# Patient Record
Sex: MALE | Race: WHITE | NOT HISPANIC OR LATINO | Employment: OTHER | URBAN - METROPOLITAN AREA
[De-identification: names, ages, dates, MRNs, and addresses within clinical notes are randomized per-mention and may not be internally consistent; named-entity substitution may affect disease eponyms.]

---

## 2017-01-26 ENCOUNTER — TRANSCRIBE ORDERS (OUTPATIENT)
Dept: LAB | Age: 57
End: 2017-01-26

## 2017-02-03 ENCOUNTER — APPOINTMENT (OUTPATIENT)
Dept: LAB | Age: 57
End: 2017-02-03
Payer: COMMERCIAL

## 2017-02-03 ENCOUNTER — TRANSCRIBE ORDERS (OUTPATIENT)
Dept: LAB | Age: 57
End: 2017-02-03

## 2017-02-03 DIAGNOSIS — E78.5 HYPERLIPIDEMIA, UNSPECIFIED HYPERLIPIDEMIA TYPE: Primary | ICD-10-CM

## 2017-02-03 LAB
CHOLEST SERPL-MCNC: 222 MG/DL (ref 50–200)
HDLC SERPL-MCNC: 41 MG/DL (ref 40–60)
LDLC SERPL CALC-MCNC: 159 MG/DL (ref 0–100)
TRIGL SERPL-MCNC: 108 MG/DL

## 2017-02-03 PROCEDURE — 80061 LIPID PANEL: CPT

## 2017-02-03 PROCEDURE — 36415 COLL VENOUS BLD VENIPUNCTURE: CPT

## 2017-02-22 ENCOUNTER — ALLSCRIPTS OFFICE VISIT (OUTPATIENT)
Dept: OTHER | Facility: OTHER | Age: 57
End: 2017-02-22

## 2017-02-22 DIAGNOSIS — M25.532 PAIN IN LEFT WRIST: ICD-10-CM

## 2017-02-22 DIAGNOSIS — E78.5 HYPERLIPIDEMIA: ICD-10-CM

## 2017-02-24 ENCOUNTER — GENERIC CONVERSION - ENCOUNTER (OUTPATIENT)
Dept: OTHER | Facility: OTHER | Age: 57
End: 2017-02-24

## 2017-07-11 ENCOUNTER — GENERIC CONVERSION - ENCOUNTER (OUTPATIENT)
Dept: OTHER | Facility: OTHER | Age: 57
End: 2017-07-11

## 2017-07-26 ENCOUNTER — HOSPITAL ENCOUNTER (OUTPATIENT)
Dept: RADIOLOGY | Age: 57
Discharge: HOME | End: 2017-07-26
Payer: COMMERCIAL

## 2017-07-26 DIAGNOSIS — I67.1 NONRUPTURED CEREBRAL ANEURYSM: ICD-10-CM

## 2017-07-26 PROCEDURE — 70496 CT ANGIOGRAPHY HEAD: CPT

## 2017-07-26 RX ADMIN — IOHEXOL 100 ML: 350 INJECTION, SOLUTION INTRAVENOUS at 08:41

## 2017-08-03 ENCOUNTER — ALLSCRIPTS OFFICE VISIT (OUTPATIENT)
Dept: OTHER | Facility: OTHER | Age: 57
End: 2017-08-03

## 2017-09-07 ENCOUNTER — TRANSCRIBE ORDERS (OUTPATIENT)
Dept: LAB | Age: 57
End: 2017-09-07

## 2017-09-07 ENCOUNTER — APPOINTMENT (OUTPATIENT)
Dept: LAB | Age: 57
End: 2017-09-07
Payer: COMMERCIAL

## 2017-09-07 DIAGNOSIS — E78.5 HYPERLIPIDEMIA: ICD-10-CM

## 2017-09-07 DIAGNOSIS — M25.532 PAIN IN LEFT WRIST: ICD-10-CM

## 2017-09-07 LAB
ANION GAP SERPL CALCULATED.3IONS-SCNC: 7 MMOL/L (ref 4–13)
BILIRUB UR QL STRIP: NEGATIVE
BUN SERPL-MCNC: 19 MG/DL (ref 5–25)
CALCIUM SERPL-MCNC: 8.8 MG/DL (ref 8.3–10.1)
CHLORIDE SERPL-SCNC: 106 MMOL/L (ref 100–108)
CHOLEST SERPL-MCNC: 201 MG/DL (ref 50–200)
CLARITY UR: CLEAR
CO2 SERPL-SCNC: 28 MMOL/L (ref 21–32)
COLOR UR: YELLOW
CREAT SERPL-MCNC: 0.88 MG/DL (ref 0.6–1.3)
ERYTHROCYTE [DISTWIDTH] IN BLOOD BY AUTOMATED COUNT: 13 % (ref 11.6–15.1)
GFR SERPL CREATININE-BSD FRML MDRD: 94 ML/MIN/1.73SQ M
GLUCOSE P FAST SERPL-MCNC: 91 MG/DL (ref 65–99)
GLUCOSE UR STRIP-MCNC: NEGATIVE MG/DL
HCT VFR BLD AUTO: 44.4 % (ref 36.5–49.3)
HDLC SERPL-MCNC: 35 MG/DL (ref 40–60)
HGB BLD-MCNC: 15 G/DL (ref 12–17)
HGB UR QL STRIP.AUTO: NEGATIVE
KETONES UR STRIP-MCNC: NEGATIVE MG/DL
LDLC SERPL CALC-MCNC: 140 MG/DL (ref 0–100)
LEUKOCYTE ESTERASE UR QL STRIP: NEGATIVE
MCH RBC QN AUTO: 30.5 PG (ref 26.8–34.3)
MCHC RBC AUTO-ENTMCNC: 33.8 G/DL (ref 31.4–37.4)
MCV RBC AUTO: 90 FL (ref 82–98)
NITRITE UR QL STRIP: NEGATIVE
PH UR STRIP.AUTO: 6.5 [PH] (ref 4.5–8)
PLATELET # BLD AUTO: 219 THOUSANDS/UL (ref 149–390)
PMV BLD AUTO: 10.2 FL (ref 8.9–12.7)
POTASSIUM SERPL-SCNC: 4.3 MMOL/L (ref 3.5–5.3)
PROT UR STRIP-MCNC: NEGATIVE MG/DL
PSA SERPL-MCNC: 0.4 NG/ML (ref 0–4)
RBC # BLD AUTO: 4.92 MILLION/UL (ref 3.88–5.62)
SODIUM SERPL-SCNC: 141 MMOL/L (ref 136–145)
SP GR UR STRIP.AUTO: 1.02 (ref 1–1.03)
TRIGL SERPL-MCNC: 132 MG/DL
UROBILINOGEN UR QL STRIP.AUTO: 0.2 E.U./DL
WBC # BLD AUTO: 4.99 THOUSAND/UL (ref 4.31–10.16)

## 2017-09-07 PROCEDURE — 85027 COMPLETE CBC AUTOMATED: CPT

## 2017-09-07 PROCEDURE — 80061 LIPID PANEL: CPT

## 2017-09-07 PROCEDURE — G0103 PSA SCREENING: HCPCS

## 2017-09-07 PROCEDURE — 36415 COLL VENOUS BLD VENIPUNCTURE: CPT

## 2017-09-07 PROCEDURE — 81003 URINALYSIS AUTO W/O SCOPE: CPT

## 2017-09-07 PROCEDURE — 80048 BASIC METABOLIC PNL TOTAL CA: CPT

## 2017-09-14 ENCOUNTER — GENERIC CONVERSION - ENCOUNTER (OUTPATIENT)
Dept: OTHER | Facility: OTHER | Age: 57
End: 2017-09-14

## 2017-09-14 DIAGNOSIS — E78.5 HYPERLIPIDEMIA: ICD-10-CM

## 2017-12-19 ENCOUNTER — APPOINTMENT (OUTPATIENT)
Dept: RADIOLOGY | Facility: CLINIC | Age: 57
End: 2017-12-19
Payer: COMMERCIAL

## 2017-12-19 ENCOUNTER — ALLSCRIPTS OFFICE VISIT (OUTPATIENT)
Dept: OTHER | Facility: OTHER | Age: 57
End: 2017-12-19

## 2017-12-19 DIAGNOSIS — M25.511 PAIN IN RIGHT SHOULDER: ICD-10-CM

## 2017-12-19 DIAGNOSIS — M19.041 PRIMARY OSTEOARTHRITIS OF RIGHT HAND: ICD-10-CM

## 2017-12-19 PROCEDURE — 73130 X-RAY EXAM OF HAND: CPT

## 2017-12-19 PROCEDURE — 73030 X-RAY EXAM OF SHOULDER: CPT

## 2017-12-20 NOTE — PROGRESS NOTES
Assessment  1  Arthritis of finger of right hand (866 94) (M19 041)   2  Right shoulder pain (719 41) (M25 511)    Plan  Arthritis of finger of right hand    · *1 -  OCCUPATIONAL THERAPY Co-Management  *  Status: Active  Requested for:23Uuq7777  Care Summary provided  : Yes   · 1 - Charity Funes MD  (Orthopedic Surgery) Co-Management  *  Status: Active Requested for: 35AQB2464  Care Summary provided  : Yes  Right shoulder pain    · * XR HAND 3+ VIEW RIGHT; Status:Active; Requested for:31Cth2262;    · * XR SHOULDER 2+ VIEW RIGHT; Status:Active; Requested for:51Rly1951;     Discussion/Summary    Right long finger MCP joint DJD  Referred to occupational therapy  Follow-up with Dr Kale Chavarria in 5-6 weeks for further recommendations if not improving  Right shoulder pain  Activity modification as needed  Follow-up as needed if symptoms persist or worsen  Currently symptoms are mild and patient is tolerating his daily activities  Chief Complaint  1  Hand Problem    History of Present Illness  HPI: 77-year-old male presents with pain in the right long finger localizing over the MCP joint with associated swelling  He was treated with multiple steroid injections in the past with temporary relief but persistent pain overall  He also has pain in the right shoulder localized to the posterior aspect and worse with increased activity level  He states that he tends to be more active with his right shoulder during this time of the year  No history of injury reported  The past medical history, past surgical history, medications, allergies, social history, and family history were reviewed and updated today  Review of Systems   Constitutional: No fever or chills, feels well, no tiredness, no recent weight loss or weight gain  Eyes: No complaints of red eyes, no eyesight problems  ENT: no complaints of loss of hearing, no nosebleeds, no sore throat    Cardiovascular: No complaints of chest pain, no palpitations, no leg claudication or lower extremity edema  Respiratory: No complaints of shortness of breath, no wheezing, no cough  Gastrointestinal: No complaints of abdominal pain, no constipation, no nausea or vomiting, no diarrhea or bloody stools  Genitourinary: No complaints of dysuria or incontinence, no hesitancy, no nocturia  Musculoskeletal: as noted in HPI  Integumentary: No complaints of skin rash or lesion, no itching or dry skin, no skin wounds  Neurological: No complaints of headache, no confusion, no numbness or tingling, no dizziness  Psychiatric: No suicidal thoughts, no anxiety, no depression  Endocrine: No muscle weakness, no frequent urination, no excessive thirst, no feelings of weakness  Active Problems  1  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 (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 3) (I67 1)   2  Arthralgia of left wrist (719 43) (M25 532)   3  Hyperlipidemia (272 4) (E78 5)   4  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)   5   Right shoulder pain (719 41) (M25 511)    Past Medical History   · History of arthritis (V13 4) (Z87 39)   · History of Wrist pain (719 43) (M25 539)    Surgical History   · History of Hernia Repair   · History of Hip Surgery   · History of Knee Replacement   · History of Shoulder Surgery Left   · History of Shoulder Surgery Right   · History of Wrist Surgery    Family History  Mother    · Family history of In good health  Father    · Family history of cardiac disorder (V17 49) (Z80 55)  Son    · Family history of malignant neoplasm of thyroid (V16 8) (Z80 8)  Maternal Grandmother    · Family history of    · Family history of death of natural cause (V19 8) (Z80 80)  Paternal Grandmother    · Family history of    · Family history of death of natural cause (V19 8) (Z80 80)  Maternal Grandfather    · Family history of    · FH: heart attack (V17 3) (Z82 49)  Paternal Grandfather    · Family history of    · FH: heart attack (V17 3) (Z82 49)    Social History   · Four children   · High school or GED   · Never a smoker   · No drug use   · Occupation   ·  - [employer]   · Social alcohol use (Z78 9)    Current Meds   1  Aspirin 81 MG Oral Tablet Delayed Release; TAKE 1 TABLET DAILY; Therapy: (Recorded:70Cnq7192) to Recorded   2  Daily Value Multivitamin TABS; TAKE 1 TABLET DAILY; Therapy: (Recorded:94Rvk1341) to Recorded   3  Fish Oil 1200 MG Oral Capsule; TAKE 2 CAPSULE Daily; Therapy: (Recorded:20Jws7571) to Recorded   4  MSM Oral Powder; Mix 1 tsp w/water daily in the morning; Therapy: 35Rbw0219 to Recorded   5  Vitamin C 250 MG Oral Tablet; Take 1 tablet daily; Therapy: 35Ixc5059 to Recorded    Allergies  1  No Known Drug Allergies    Physical Exam  Right hand:  Mild to moderate soft tissue swelling over the long finger MCP joint which lacks approximately 20-30 degrees of flexion  No extensor lag or crossover deformity  Tenderness to palpation over the MCP joint  Skin is intact without erythema or ecchymosis  Capillary refill is brisk distally  Sensation is intact to light touch in the long finger tip  Right shoulder:  No gross deformity  Nontender to palpation  Range of motion is full  Neer impingement sign is negative  Stallworth impingement sign is negative  Empty can test is negative  Speed's test is negative  Cross body adduction test causes posterior shoulder pain  5/5 strength forward flexion  Constitutional - General appearance: Normal    Eyes - Conjunctiva and lids: Normal   Cardiovascular - Pulses: Normal  Lungs - Respiratory effort: Normal  Skin - Skin and subcutaneous tissue: Normal   Neurologic - Sensation: Normal   Psychiatric - Mood and affect: Normal       Results/Data  I personally reviewed the films/images/results in the office today  My interpretation follows  X-ray Review Right shoulder 12/19/17: No acute bony abnormalities  No significant degenerative changes  Right hand 12/19/17: Moderate to severe degenerative changes long finger MCP joint  No acute bony abnormalities        Signatures   Electronically signed by : ROGER Ellison ; Dec 19 2017 10:11AM EST                       (Author)

## 2018-01-12 NOTE — PROGRESS NOTES
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, Arthralgia of left wrist, Brain aneurysm; Ordered By: Berny Salgado Performed:  Due: 79MEY0716    Discussion/Summary    This is a 38-year-old male who is followed for a 3 mm irregularity of the intracavernous carotid  Statistically this has a very small risk of rupture and if it did rupture it would produce a cavernous sinus syndrome (see below)    I've recommended continuing 81 mg of aspirin a day in an effort to stabilize the growth of the inflammatory processes at the aneurysm    He does not need routine follow-up in my view  This is an intracavernous artery aneurysm  The risk of this type of aneurysm includes the development of a cavernous sinus - carotid fistula  This is rare and is not life threatening  If this were to occur, the eye could become injected and pulsatile and have a diminished range of motion  This is an intracavernous artery aneurysm  Chief Complaint  Patient presents for 1 year follow up of 2-3 mm Left Intracavernous Carotid Aneurysm with CTA Head      History of Present Illness  Patient is a 38-year-old male who presented to our office on 8/4/16 for intial evaluation regarding brain aneurysm  Patient reported that he began having left-sided facial pain and then headaches  He presented to the ER and they performed an MRI and MRA which indicated a left cavernous internal carotid artery aneurysm  A CTA Head was ordered for confirmation and this demosntrated a 3 mm x 2 5 mm aneurysm arising from the anterior medial aspect of the cavernous internal carotid artery, just below the ophthalmic artery origin and optic strut   Because of the size and location of the aneurysm we recommended a yearly follow up with repeat CTA Head  The patient is being seen for a routine clinic follow-up of a cerebral aneurysm  Symptoms:  no neck stiffness, no diplopia, no visual disturbance, no extraocular weakness, no facial pain, no facial weakness, no extremity weakness, no spasticity, no dizziness, no syncope, no seizure, no depression, no anxiety and no cognitive difficulties    The patient presents with complaints of occasional episodes of bilateral occipital headache  Symptoms are unchanged  (OTC Tylenol for Headache)      Review of Systems    Constitutional: No fever or chills, feels well, no tiredness, no recent weight gain or weight loss  Eyes: No complaints of eye pain, no red eyes, no discharge from eyes, no itchy eyes  ENT: no complaints of earache, no hearing loss, no nosebleeds, no nasal discharge, no sore throat, no hoarseness  Cardiovascular: No complaints of slow heart rate, no fast heart rate, no chest pain, no palpitations, no leg claudication, no lower extremity  Respiratory: No complaints of shortness of breath, no wheezing, no cough, no SOB on exertion, no orthopnea or PND  Gastrointestinal: No complaints of abdominal pain, no constipation, no nausea or vomiting, no diarrhea or bloody stools  Genitourinary: No complaints of dysuria, no incontinence, no hesitancy, no nocturia, no genital lesion, no testicular pain  Musculoskeletal: No complaints of arthralgia, no myalgias, no joint swelling or stiffness, no limb pain or swelling  Integumentary: No complaints of skin rash or skin lesions, no itching, no skin wound, no dry skin  Neurological: No compliants of headache, no confusion, no convulsions, no numbness or tingling, no dizziness or fainting, no limb weakness, no difficulty walking  Psychiatric: Is not suicidal, no sleep disturbances, no anxiety or depression, no change in personality, no emotional problems     Endocrine: No complaints of proptosis, no hot flashes, no muscle weakness, no erectile dysfunction, no deepening of the voice, no feelings of weakness  Hematologic/Lymphatic: No complaints of swollen glands, no swollen glands in the neck, does not bleed easily, no easy bruising  ROS reviewed  Active Problems    1  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 (437 3) (I67 1)   2  Arthralgia of left wrist (719 43) (M25 532)   3  Brain aneurysm (437 3) (I67 1)   4  Hyperlipidemia (272 4) (E78 5)   5  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)    Past Medical History    1  History of arthritis (V13 4) (Z87 39)   2  History of Wrist pain (719 43) (M25 539)    The active problems and past medical history were reviewed and updated today  Surgical History    1  History of Hernia Repair   2  History of Hip Surgery   3  History of Knee Replacement   4  History of Shoulder Surgery Left   5  History of Shoulder Surgery Right   6  History of Wrist Surgery    The surgical history was reviewed and updated today  Family History  Mother    1  Family history of In good health  Father    2  Family history of cardiac disorder (V17 49) (Z82 49)  Son    3  Family history of malignant neoplasm of thyroid (V16 8) (Z80 8)  Maternal Grandmother    4  Family history of    5  Family history of death of natural cause (V19 8) (Z80 80)  Paternal Grandmother    10  Family history of    7  Family history of death of natural cause (V19 8) (Z80 80)  Maternal Grandfather    8  Family history of    5  FH: heart attack (V17 3) (Z82 49)  Paternal Grandfather    10  Family history of    6  FH: heart attack (V17 3) (Z82 49)    The family history was reviewed and updated today  Social History    · Four children   · High school or GED   · Never a smoker   · No drug use   · Occupation   · Social alcohol use (Z78 9)  The social history was reviewed and updated today  Current Meds   1   Aspirin 81 MG Oral Tablet Delayed Release; TAKE 1 TABLET DAILY; Therapy: (Recorded:39Pzk4425) to Recorded   2  Daily Value Multivitamin TABS; TAKE 1 TABLET DAILY; Therapy: (Recorded:47Qfz4814) to Recorded   3  Fish Oil 1200 MG Oral Capsule; TAKE 2 CAPSULE Daily; Therapy: (Recorded:80Jaa3907) to Recorded    The medication list was reviewed and updated today  Allergies    1  No Known Drug Allergies    Vitals  Vital Signs    Recorded: 03Aug2017 11:50AM   Temperature 97 5 F   Heart Rate 63   Respiration 18   Systolic 230   Diastolic 81   Height 5 ft 7 in   Weight 162 lb 6 oz   BMI Calculated 25 43   BSA Calculated 1 85   Pain Scale 0     Physical Exam     Mental Status: Alert and Oriented x3  Memory is intact  Attentive  Speech is articulate and fluent  Knowledge and vocabulary consistent with education  Grossly nonfocal     Cranial Nerve Exam:  2nd cranial nerve: Normal with no noted deficit  3rd, 4th, and 6th cranial nerves: PERRLA and EOMI without later gaze nystagmus  7th cranial nerve: Face symmetrical at grimace and at rest   8th cranial nerve: Grossly intact to finger rub bilaterally  11th cranial nerve: Shoulder shrug equal bilaterally  Motor System - Upper Extremities: Muscle strength: 5/5 bilaterally  Muscle tone: Normal bilaterally  Muscle Bulk: Normal Muscle bulk bilaterally  Motor System - Lower Extremities: Muscle strength: 5/5 bilaterally  Muscle tone: Normal bilaterally  Muscle Bulk: Normal Muscle bulk bilaterally  Coordination: Coordination: Finger to nose was normal, heel to knee to shin was normal able to perform rapid alternating movements well bilaterally  Involuntary movements: None   Gait and Station: Gait and station: Yuliana with a normal gait and station  Tandem walk is normal, Heel walk and toe walk intact and without sign of paresis  Results/Data  Diagnostic Studies Reviewed [facility]:   I personally reviewed the in detail with the patient  CT Scan Review CTA the brain is carefully reviewed  This demonstrates a small left intracavernous irregularity which is no larger than 2 5 mm in size  No other abnormalities are observed on this very high quality study  Health Management  History of Colon cancer screening   COLONOSCOPY (GI, SURG); every 10 years; Last 11Aug2015; Next Due: 11Aug2025; Active  Health Maintenance   *VB-Depression Screening; every 1 year; Last 01Aug2016; Next Due: 01Aug2017;  Overdue    Future Appointments    Date/Time Provider Specialty Site   09/11/2017 09:15 AM Baylee Garrido MD Internal Medicine [facility]     Signatures   Electronically signed by : ROGER Ibarra ; Aug  3 2017  1:15PM EST                       (Author)

## 2018-01-13 VITALS
DIASTOLIC BLOOD PRESSURE: 80 MMHG | WEIGHT: 167 LBS | OXYGEN SATURATION: 98 % | SYSTOLIC BLOOD PRESSURE: 126 MMHG | TEMPERATURE: 98.6 F | HEIGHT: 67 IN | BODY MASS INDEX: 26.21 KG/M2 | HEART RATE: 63 BPM

## 2018-01-14 VITALS
RESPIRATION RATE: 18 BRPM | DIASTOLIC BLOOD PRESSURE: 81 MMHG | SYSTOLIC BLOOD PRESSURE: 126 MMHG | HEIGHT: 67 IN | HEART RATE: 63 BPM | TEMPERATURE: 97.5 F | BODY MASS INDEX: 25.48 KG/M2 | WEIGHT: 162.38 LBS

## 2018-01-15 NOTE — PROGRESS NOTES
Assessment    1  Hyperlipidemia (272 4) (E78 5)    Plan  Colon cancer screening    · MoviPrep 100 GM Oral Solution Reconstituted  Hyperlipidemia    · (Q) LIPID PANEL WITH DIRECT LDL; Status:Active; Requested for:18Jan2016;    · Follow-up visit in 6 months Evaluation and Treatment  Follow-up  Status: Hold For - Scheduling   Requested for: 70EKT9845  Need for prophylactic vaccination and inoculation against influenza    · Stop: Fluzone Quadrivalent 0 5 ML Intramuscular Suspension  Need for TD vaccine    · Stop: Tetanus    Discussion/Summary  Discussion Summary:   Add fosh oil tab  Counseling Documentation With Imm: The patient was counseled regarding diagnostic results, instructions for management, risk factor reductions, prognosis  Chief Complaint  Chief Complaint Free Text Note Form: PT  presents for 6 month follow up for routine follow up  History of Present Illness  HPI: Pt presents for 6 mo follow up  Has no health concerns today  Colonoscopy was done in August Reports good diet and exercises regularly  Lifts weights, cycles, and walks regularly  Hyperlipidemia (Follow-Up): The patient states his hyperlipidemia has been stable since the last visit  He has no comorbid illnesses  Symptoms: Associated symptoms include focal neurologic deficit  Medications: The patient is not currently on any medications for his hyperlipidemia  The patient is not doing well with his hyperlipidemia goals  Review of Systems  Complete-Male:   Constitutional: No fever or chills, feels well, no tiredness, no recent weight gain or weight loss  Eyes: No complaints of eye pain, no red eyes, no discharge from eyes, no itchy eyes  ENT: no complaints of earache, no hearing loss, no nosebleeds, no nasal discharge, no sore throat, no hoarseness  Cardiovascular: No complaints of slow heart rate, no fast heart rate, no chest pain, no palpitations, no leg claudication, no lower extremity     Respiratory: No complaints of shortness of breath, no wheezing, no cough, no SOB on exertion, no orthopnea or PND  Musculoskeletal: No complaints of arthralgia, no myalgias, no joint swelling or stiffness, no limb pain or swelling  Neurological: No compliants of headache, no confusion, no convulsions, no numbness or tingling, no dizziness or fainting, no limb weakness, no difficulty walking  Psychiatric: Is not suicidal, no sleep disturbances, no anxiety or depression, no change in personality, no emotional problems  Endocrine: No complaints of proptosis, no hot flashes, no muscle weakness, no erectile dysfunction, no deepening of the voice, no feelings of weakness  Active Problems    1  Colon cancer screening (V76 51) (Z12 11)   2  Need for prophylactic vaccination and inoculation against influenza (V04 81) (Z23)    Past Medical History  Active Problems And Past Medical History Reviewed: The active problems and past medical history were reviewed and updated today  Surgical History    1  History of Hernia Repair   2  History of Hip Surgery   3  History of Knee Replacement   4  History of Shoulder Surgery Left   5  History of Shoulder Surgery Right  Surgical History Reviewed: The surgical history was reviewed and updated today  Family History    1  Family history of cardiac disorder (V17 49) (Z82 49)    2  Family history of malignant neoplasm of thyroid (V16 8) (Z80 8)  Family History Reviewed: The family history was reviewed and updated today  Social History    · Never a smoker   · Never smoked any substance (V49 89) (Z78 9)   · No drug use   · Social alcohol use (F10 99)  Social History Reviewed: The social history was reviewed and updated today  Current Meds   1  Daily Multi Oral Tablet; TAKE 1 TABLET DAILY; Therapy: (Recorded:32Vly7862) to Recorded   2  MoviPrep 100 GM Oral Solution Reconstituted; USE AS DIRECTED; Therapy: 06EPI9178 to (Last Rx:34Nao9511)  Requested for: 78Qbe2879 Ordered   3  No Reported Medications Recorded  Medication List Reviewed: The medication list was reviewed and updated today  Allergies    1  No Known Drug Allergies    Vitals  Vital Signs [Data Includes: Current Encounter]    Recorded: 90VYL5959 03:25PM   Temperature 98 1 F, Tympanic   Heart Rate 66   Systolic 916, LUE, Sitting   Diastolic 82, LUE, Sitting   Height 5 ft 7 in   Weight 166 lb 8 oz   BMI Calculated 26 08   BSA Calculated 1 87   O2 Saturation 97     Physical Exam    Constitutional   General appearance: No acute distress, well appearing and well nourished  Eyes   Conjunctiva and lids: No swelling, erythema, or discharge  Ears, Nose, Mouth, and Throat   Otoscopic examination: Tympanic membrance translucent with normal light reflex  Canals patent without erythema  Oropharynx: Normal with no erythema, edema, exudate or lesions  Pulmonary   Auscultation of lungs: Clear to auscultation, equal breath sounds bilaterally, no wheezes, no rales, no rhonci  Cardiovascular   Auscultation of heart: Normal rate and rhythm, normal S1 and S2, without murmurs  Examination of extremities for edema and/or varicosities: Normal     Carotid pulses: Normal     Abdomen   Abdomen: Non-tender, no masses  Lymphatic   Palpation of lymph nodes in neck: No lymphadenopathy  Neurologic   Cranial nerves: Cranial nerves 2-12 intact  Psychiatric   Orientation to person, place and time: Normal     Mood and affect: Normal          Health Management  Colon cancer screening   COLONOSCOPY (GI, SURG); every 10 years; Last 11Aug2015; Next Due: 11Aug2025; Active    Signatures   Electronically signed by :  Moshe Ariza MD; Jan 18 2016  3:52PM EST                       (Author)

## 2018-01-22 VITALS
OXYGEN SATURATION: 98 % | HEIGHT: 66 IN | DIASTOLIC BLOOD PRESSURE: 64 MMHG | TEMPERATURE: 98.5 F | HEART RATE: 57 BPM | WEIGHT: 160 LBS | SYSTOLIC BLOOD PRESSURE: 128 MMHG | BODY MASS INDEX: 25.71 KG/M2

## 2018-02-20 RX ORDER — MULTIVIT WITH MINERALS/LUTEIN
1 TABLET ORAL DAILY
COMMUNITY
Start: 2017-09-14 | End: 2018-09-05

## 2018-02-21 VITALS
HEART RATE: 73 BPM | DIASTOLIC BLOOD PRESSURE: 89 MMHG | SYSTOLIC BLOOD PRESSURE: 150 MMHG | WEIGHT: 168.8 LBS | BODY MASS INDEX: 26.49 KG/M2 | HEIGHT: 67 IN

## 2018-02-21 DIAGNOSIS — M19.041 OSTEOARTHRITIS OF FINGER OF RIGHT HAND: Primary | ICD-10-CM

## 2018-02-21 PROCEDURE — 99214 OFFICE O/P EST MOD 30 MIN: CPT | Performed by: ORTHOPAEDIC SURGERY

## 2018-02-21 NOTE — H&P
ASSESSMENT/PLAN:    Diagnoses and all orders for this visit:    Osteoarthritis of finger of right hand    Other orders  -     ascorbic acid (VITAMIN C) 250 mg tablet; Take 1 tablet by mouth daily  -     aspirin 81 MG tablet; Take 1 tablet by mouth daily        Assessment:   Right LF MCP arthritis and IF MCP arthritis, LF more symptomatic     Plan:   Potentially planning for R LF and IF cheilectomy of MCP for DJD  Discusses cheilectomy, fusion, MCP arthroplasty  Will discuss with wife and will schedule surgery if desires  Follow Up:  after discussion     To Do Next Visit:       General Discussions:     Osteoarthritis:  The anatomy and physiology of osteoarthritis was discussed with the patient today in the office  Deterioration of the articular cartilage eventually leads to altered mobility at the joint, resulting in joint subluxation, osteophyte formation, cystic changes, as well as subchondral sclerosis  Eventually, pain, limited mobility, and compensatory hypermobility at surrounding joints may develop  While normal activity and usage of the joint may provide a painful experience to the patient, this typically does not result in damage to the limb  Treatment options include splints to decreased joint edema, pain, and inflammation  Therapy exercises to strengthen the surrounding musculature may relieve pain, but do not alter the overall continued development of osteoarthritis  Oral medications, topical medications, corticosteroid injections may decrease pain and increase overall function  Eventually, some patients may require surgical intervention  _____________________________________________________  CHIEF COMPLAINT:  Chief Complaint   Patient presents with    Right Hand - Pain         SUBJECTIVE:  Ayah Rhoades is a 59y o  year old male who presents with Pain  Moderate  Constant  Dull and Aching and Stiffness/LROM to the right long finger and occasion IF   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) 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  He has been treated in past at [facility]  He has previously received injections x2 with some improvement  Radiation: Yes to the  index finger  Previous Treatments: steroid injections with relief  Associated symptoms: No Complaints    PAST MEDICAL HISTORY:  No past medical history on file  PAST SURGICAL HISTORY:  Past Surgical History:   Procedure Laterality Date    REPLACEMENT TOTAL KNEE Right 2011    WRIST FUSION Left 2016       FAMILY HISTORY:  Family History   Problem Relation Age of Onset    Heart disease Father        SOCIAL HISTORY:  Social History   Substance Use Topics    Smoking status: Never Smoker    Smokeless tobacco: Never Used    Alcohol use Yes      Comment: social       MEDICATIONS:    Current Outpatient Prescriptions:     ascorbic acid (VITAMIN C) 250 mg tablet, Take 1 tablet by mouth daily, Disp: , Rfl:     aspirin 81 MG tablet, Take 1 tablet by mouth daily, Disp: , Rfl:     multivitamin (THERAGRAN) TABS, Take 1 tablet by mouth daily  , Disp: , Rfl:     Omega-3 Fatty Acids (FISH OIL) 1200 MG CAPS, Take by mouth , Disp: , Rfl:     ALLERGIES:  No Known Allergies    REVIEW OF SYSTEMS:  Pertinent items are noted in HPI  A comprehensive review of systems was negative      LABS:  HgA1c: No results found for: HGBA1C  BMP:   Lab Results   Component Value Date    GLUCOSE 94 08/02/2016    CALCIUM 8 8 09/07/2017     09/07/2017    K 4 3 09/07/2017    CO2 28 09/07/2017     09/07/2017    BUN 19 09/07/2017    CREATININE 0 88 09/07/2017         _____________________________________________________  PHYSICAL EXAMINATION:  General: well developed and well nourished, alert, oriented times 3 and appears comfortable  Psychiatric: Normal  HEENT: Trachea Midline, No torticollis  Cardiovascular: No discernable arrhythmia  Pulmonary: No wheezing or stridor  Skin: No masses, erthema, lacerations, fluctation, ulcerations  Neurovascular: Sensation Intact to the Median, Ulnar, Radial Nerve, Motor Intact to the Median, Ulnar, Radial Nerve and Pulses Intact    MUSCULOSKELETAL EXAMINATION:  RIGHT SIDE:  Finger:  No instability and ROM limited to 45 degrees to LF MCP and 90 degrees all other digits  Osteophytes noted over dorsal aspect of LF MCP  Tendons intact, SILT median, radial, and ulnar      _____________________________________________________  STUDIES REVIEWED:  No Studies to review and I have personally reviewed pertinent films in PACS and my interpretation is Right hand LF MCP joint degenerative arthritis with loss of joint space and osteophytes  Left IF  early changes noted of DJD  PROCEDURES PERFORMED:  Procedures  No Procedures performed today       I interviewed, took the history and examined the patient  I discuss the case with the resident and reviewed the resident's note  I supervised the resident and I agree with the resident management plan as it was presented to me  I was present in the clinic and examined the patient

## 2018-02-21 NOTE — PROGRESS NOTES
ASSESSMENT/PLAN:    Diagnoses and all orders for this visit:    Osteoarthritis of finger of right hand    Other orders  -     ascorbic acid (VITAMIN C) 250 mg tablet; Take 1 tablet by mouth daily  -     aspirin 81 MG tablet; Take 1 tablet by mouth daily        Assessment:   Right LF MCP arthritis and IF MCP arthritis, LF more symptomatic     Plan:   Potentially planning for R LF and IF cheilectomy of MCP for DJD  Discusses cheilectomy, fusion, MCP arthroplasty  Will discuss with wife and will schedule surgery if desires  Follow Up:  after discussion     To Do Next Visit:       General Discussions:     Osteoarthritis:  The anatomy and physiology of osteoarthritis was discussed with the patient today in the office  Deterioration of the articular cartilage eventually leads to altered mobility at the joint, resulting in joint subluxation, osteophyte formation, cystic changes, as well as subchondral sclerosis  Eventually, pain, limited mobility, and compensatory hypermobility at surrounding joints may develop  While normal activity and usage of the joint may provide a painful experience to the patient, this typically does not result in damage to the limb  Treatment options include splints to decreased joint edema, pain, and inflammation  Therapy exercises to strengthen the surrounding musculature may relieve pain, but do not alter the overall continued development of osteoarthritis  Oral medications, topical medications, corticosteroid injections may decrease pain and increase overall function  Eventually, some patients may require surgical intervention  _____________________________________________________  CHIEF COMPLAINT:  Chief Complaint   Patient presents with    Right Hand - Pain         SUBJECTIVE:  Anisha Barker is a 59y o  year old male who presents with Pain  Moderate  Constant  Dull and Aching and Stiffness/LROM to the right long finger and occasion IF   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) 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  He has been treated in past at [facility]  He has previously received injections x2 with some improvement  Radiation: Yes to the  index finger  Previous Treatments: steroid injections with relief  Associated symptoms: No Complaints    PAST MEDICAL HISTORY:  No past medical history on file  PAST SURGICAL HISTORY:  Past Surgical History:   Procedure Laterality Date    REPLACEMENT TOTAL KNEE Right 2011    WRIST FUSION Left 2016       FAMILY HISTORY:  Family History   Problem Relation Age of Onset    Heart disease Father        SOCIAL HISTORY:  Social History   Substance Use Topics    Smoking status: Never Smoker    Smokeless tobacco: Never Used    Alcohol use Yes      Comment: social       MEDICATIONS:    Current Outpatient Prescriptions:     ascorbic acid (VITAMIN C) 250 mg tablet, Take 1 tablet by mouth daily, Disp: , Rfl:     aspirin 81 MG tablet, Take 1 tablet by mouth daily, Disp: , Rfl:     multivitamin (THERAGRAN) TABS, Take 1 tablet by mouth daily  , Disp: , Rfl:     Omega-3 Fatty Acids (FISH OIL) 1200 MG CAPS, Take by mouth , Disp: , Rfl:     ALLERGIES:  No Known Allergies    REVIEW OF SYSTEMS:  Pertinent items are noted in HPI  A comprehensive review of systems was negative      LABS:  HgA1c: No results found for: HGBA1C  BMP:   Lab Results   Component Value Date    GLUCOSE 94 08/02/2016    CALCIUM 8 8 09/07/2017     09/07/2017    K 4 3 09/07/2017    CO2 28 09/07/2017     09/07/2017    BUN 19 09/07/2017    CREATININE 0 88 09/07/2017         _____________________________________________________  PHYSICAL EXAMINATION:  General: well developed and well nourished, alert, oriented times 3 and appears comfortable  Psychiatric: Normal  HEENT: Trachea Midline, No torticollis  Cardiovascular: No discernable arrhythmia  Pulmonary: No wheezing or stridor  Skin: No masses, erthema, lacerations, fluctation, ulcerations  Neurovascular: Sensation Intact to the Median, Ulnar, Radial Nerve, Motor Intact to the Median, Ulnar, Radial Nerve and Pulses Intact    MUSCULOSKELETAL EXAMINATION:  RIGHT SIDE:  Finger:  No instability and ROM limited to 45 degrees to LF MCP and 90 degrees all other digits  Osteophytes noted over dorsal aspect of LF MCP  Tendons intact, SILT median, radial, and ulnar      _____________________________________________________  STUDIES REVIEWED:  No Studies to review and I have personally reviewed pertinent films in PACS and my interpretation is Right hand LF MCP joint degenerative arthritis with loss of joint space and osteophytes  Left IF  early changes noted of DJD  PROCEDURES PERFORMED:  Procedures  No Procedures performed today       I interviewed, took the history and examined the patient  I discuss the case with the resident and reviewed the resident's note  I supervised the resident and I agree with the resident management plan as it was presented to me  I was present in the clinic and examined the patient

## 2018-08-17 ENCOUNTER — APPOINTMENT (OUTPATIENT)
Dept: LAB | Age: 58
End: 2018-08-17
Payer: COMMERCIAL

## 2018-08-17 DIAGNOSIS — E78.5 HYPERLIPIDEMIA: ICD-10-CM

## 2018-08-17 LAB
ALBUMIN SERPL BCP-MCNC: 3.7 G/DL (ref 3.5–5)
ALP SERPL-CCNC: 69 U/L (ref 46–116)
ALT SERPL W P-5'-P-CCNC: 32 U/L (ref 12–78)
ANION GAP SERPL CALCULATED.3IONS-SCNC: 9 MMOL/L (ref 4–13)
AST SERPL W P-5'-P-CCNC: 19 U/L (ref 5–45)
BILIRUB SERPL-MCNC: 0.78 MG/DL (ref 0.2–1)
BUN SERPL-MCNC: 19 MG/DL (ref 5–25)
CALCIUM SERPL-MCNC: 8.9 MG/DL (ref 8.3–10.1)
CHLORIDE SERPL-SCNC: 105 MMOL/L (ref 100–108)
CHOLEST SERPL-MCNC: 239 MG/DL (ref 50–200)
CO2 SERPL-SCNC: 27 MMOL/L (ref 21–32)
CREAT SERPL-MCNC: 0.89 MG/DL (ref 0.6–1.3)
ERYTHROCYTE [DISTWIDTH] IN BLOOD BY AUTOMATED COUNT: 12.5 % (ref 11.6–15.1)
GFR SERPL CREATININE-BSD FRML MDRD: 94 ML/MIN/1.73SQ M
GLUCOSE P FAST SERPL-MCNC: 90 MG/DL (ref 65–99)
HCT VFR BLD AUTO: 45.2 % (ref 36.5–49.3)
HDLC SERPL-MCNC: 42 MG/DL (ref 40–60)
HGB BLD-MCNC: 14.9 G/DL (ref 12–17)
LDLC SERPL CALC-MCNC: 174 MG/DL (ref 0–100)
MCH RBC QN AUTO: 30.1 PG (ref 26.8–34.3)
MCHC RBC AUTO-ENTMCNC: 33 G/DL (ref 31.4–37.4)
MCV RBC AUTO: 91 FL (ref 82–98)
NONHDLC SERPL-MCNC: 197 MG/DL
PLATELET # BLD AUTO: 215 THOUSANDS/UL (ref 149–390)
PMV BLD AUTO: 10.3 FL (ref 8.9–12.7)
POTASSIUM SERPL-SCNC: 4.1 MMOL/L (ref 3.5–5.3)
PROT SERPL-MCNC: 7.1 G/DL (ref 6.4–8.2)
RBC # BLD AUTO: 4.95 MILLION/UL (ref 3.88–5.62)
SODIUM SERPL-SCNC: 141 MMOL/L (ref 136–145)
TRIGL SERPL-MCNC: 117 MG/DL
WBC # BLD AUTO: 4.02 THOUSAND/UL (ref 4.31–10.16)

## 2018-08-17 PROCEDURE — 80061 LIPID PANEL: CPT

## 2018-08-17 PROCEDURE — 80053 COMPREHEN METABOLIC PANEL: CPT

## 2018-08-17 PROCEDURE — 85027 COMPLETE CBC AUTOMATED: CPT

## 2018-08-17 PROCEDURE — 36415 COLL VENOUS BLD VENIPUNCTURE: CPT

## 2018-09-05 ENCOUNTER — OFFICE VISIT (OUTPATIENT)
Dept: INTERNAL MEDICINE CLINIC | Facility: CLINIC | Age: 58
End: 2018-09-05
Payer: COMMERCIAL

## 2018-09-05 VITALS
DIASTOLIC BLOOD PRESSURE: 60 MMHG | HEART RATE: 70 BPM | OXYGEN SATURATION: 98 % | SYSTOLIC BLOOD PRESSURE: 102 MMHG | TEMPERATURE: 98.6 F | WEIGHT: 164.8 LBS | BODY MASS INDEX: 25.87 KG/M2 | HEIGHT: 67 IN

## 2018-09-05 DIAGNOSIS — E78.2 MIXED HYPERLIPIDEMIA: Primary | ICD-10-CM

## 2018-09-05 PROCEDURE — 99214 OFFICE O/P EST MOD 30 MIN: CPT | Performed by: INTERNAL MEDICINE

## 2018-09-05 PROCEDURE — 1036F TOBACCO NON-USER: CPT | Performed by: INTERNAL MEDICINE

## 2018-09-05 RX ORDER — ATORVASTATIN CALCIUM 20 MG/1
20 TABLET, FILM COATED ORAL DAILY
Qty: 90 TABLET | Refills: 1 | Status: SHIPPED | OUTPATIENT
Start: 2018-09-05 | End: 2019-02-18 | Stop reason: SDUPTHER

## 2018-09-05 NOTE — PROGRESS NOTES
Assessment/Plan:   1  Hyperlipidemia   patient's LDL is 174  Is already taking fish oil tablets  He does have a strong family history of hyperlipidemia including brother and father  Will start him on atorvastatin 20 mg daily  Will repeat lipid profile in 4 months  Diagnoses and all orders for this visit:    Mixed hyperlipidemia  -     atorvastatin (LIPITOR) 20 mg tablet; Take 1 tablet (20 mg total) by mouth daily  -     Lipid Panel with Direct LDL reflex; Future          Subjective:          Patient ID: Juan Garrido is a 59 y o  male  Patient came to the office for regular checkup  He does have blood work done few weeks ago  Want to discuss the results  Denied any new complaints        The following portions of the patient's history were reviewed and updated as appropriate: allergies, current medications, past family history, past medical history, past social history, past surgical history and problem list     Review of Systems   Constitutional: Negative for fatigue and fever  HENT: Negative for congestion, ear discharge, ear pain, postnasal drip, sinus pressure, sore throat, tinnitus and trouble swallowing  Eyes: Negative for discharge, itching and visual disturbance  Respiratory: Negative for cough and shortness of breath  Cardiovascular: Negative for chest pain and palpitations  Gastrointestinal: Negative for abdominal pain, diarrhea, nausea and vomiting  Endocrine: Negative for cold intolerance and polyuria  Genitourinary: Negative for difficulty urinating, dysuria and urgency  Musculoskeletal: Negative for arthralgias and neck pain  Skin: Negative for rash  Allergic/Immunologic: Negative for environmental allergies  Neurological: Negative for dizziness, weakness and headaches  Psychiatric/Behavioral: Negative for agitation and behavioral problems  The patient is not nervous/anxious            Past Medical History:   Diagnosis Date    Arthritis          Current Outpatient Prescriptions:     aspirin 81 MG tablet, Take 1 tablet by mouth daily, Disp: , Rfl:     multivitamin (THERAGRAN) TABS, Take 1 tablet by mouth daily  , Disp: , Rfl:     Omega-3 Fatty Acids (FISH OIL) 1200 MG CAPS, Take by mouth , Disp: , Rfl:     atorvastatin (LIPITOR) 20 mg tablet, Take 1 tablet (20 mg total) by mouth daily, Disp: 90 tablet, Rfl: 1    No Known Allergies    Social History   Past Surgical History:   Procedure Laterality Date    HERNIA REPAIR      HIP SURGERY      REPLACEMENT TOTAL KNEE Right 2011    REPLACEMENT TOTAL KNEE      SHOULDER SURGERY Left     SHOULDER SURGERY Right     WRIST FUSION Left 2016    WRIST SURGERY       Family History   Problem Relation Age of Onset    Heart disease Father     Other Father         cardiac disorder    No Known Problems Mother     Heart attack Maternal Grandfather     Heart attack Paternal Grandfather     Thyroid cancer Son        Objective:  /60 (BP Location: Left arm, Patient Position: Sitting, Cuff Size: Standard)   Pulse 70   Temp 98 6 °F (37 °C) (Oral)   Ht 5' 7" (1 702 m)   Wt 74 8 kg (164 lb 12 8 oz)   SpO2 98%   BMI 25 81 kg/m²   Body mass index is 25 81 kg/m²  Physical Exam   Constitutional: He appears well-developed  HENT:   Head: Normocephalic  Right Ear: External ear normal    Left Ear: External ear normal    Mouth/Throat: Oropharynx is clear and moist    Eyes: Pupils are equal, round, and reactive to light  No scleral icterus  Neck: Normal range of motion  Neck supple  No tracheal deviation present  No thyromegaly present  Cardiovascular: Normal rate, regular rhythm and normal heart sounds  No murmur heard  Pulmonary/Chest: Effort normal and breath sounds normal  No respiratory distress  He exhibits no tenderness  Abdominal: Soft  Bowel sounds are normal  He exhibits no mass  There is no tenderness  Musculoskeletal: Normal range of motion  He exhibits no edema     Lymphadenopathy:     He has no cervical adenopathy  Neurological: He is alert  No cranial nerve deficit  Skin: Skin is warm  Psychiatric: He has a normal mood and affect   His behavior is normal

## 2018-12-12 DIAGNOSIS — E78.2 MIXED HYPERLIPIDEMIA: ICD-10-CM

## 2018-12-12 RX ORDER — ATORVASTATIN CALCIUM 20 MG/1
TABLET, FILM COATED ORAL
Qty: 90 TABLET | Refills: 1 | OUTPATIENT
Start: 2018-12-12

## 2019-02-12 ENCOUNTER — APPOINTMENT (OUTPATIENT)
Dept: LAB | Age: 59
End: 2019-02-12
Payer: COMMERCIAL

## 2019-02-12 DIAGNOSIS — E78.2 MIXED HYPERLIPIDEMIA: ICD-10-CM

## 2019-02-12 LAB
CHOLEST SERPL-MCNC: 191 MG/DL (ref 50–200)
HDLC SERPL-MCNC: 40 MG/DL (ref 40–60)
LDLC SERPL CALC-MCNC: 120 MG/DL (ref 0–100)
TRIGL SERPL-MCNC: 156 MG/DL

## 2019-02-12 PROCEDURE — 36415 COLL VENOUS BLD VENIPUNCTURE: CPT

## 2019-02-12 PROCEDURE — 80061 LIPID PANEL: CPT

## 2019-02-18 ENCOUNTER — OFFICE VISIT (OUTPATIENT)
Dept: INTERNAL MEDICINE CLINIC | Age: 59
End: 2019-02-18
Payer: COMMERCIAL

## 2019-02-18 VITALS
TEMPERATURE: 97.8 F | HEIGHT: 66 IN | HEART RATE: 64 BPM | DIASTOLIC BLOOD PRESSURE: 64 MMHG | SYSTOLIC BLOOD PRESSURE: 116 MMHG | BODY MASS INDEX: 26.1 KG/M2 | WEIGHT: 162.4 LBS | OXYGEN SATURATION: 97 %

## 2019-02-18 DIAGNOSIS — Z12.11 SCREENING FOR COLON CANCER: Primary | ICD-10-CM

## 2019-02-18 DIAGNOSIS — E78.2 MIXED HYPERLIPIDEMIA: ICD-10-CM

## 2019-02-18 PROCEDURE — 99214 OFFICE O/P EST MOD 30 MIN: CPT | Performed by: INTERNAL MEDICINE

## 2019-02-18 PROCEDURE — 3008F BODY MASS INDEX DOCD: CPT | Performed by: INTERNAL MEDICINE

## 2019-02-18 PROCEDURE — 1036F TOBACCO NON-USER: CPT | Performed by: INTERNAL MEDICINE

## 2019-02-18 RX ORDER — ATORVASTATIN CALCIUM 20 MG/1
20 TABLET, FILM COATED ORAL DAILY
Qty: 90 TABLET | Refills: 2 | Status: SHIPPED | OUTPATIENT
Start: 2019-02-18 | End: 2019-11-08 | Stop reason: SDUPTHER

## 2019-02-18 NOTE — PROGRESS NOTES
Assessment/Plan:  1  Hyperlipidemia  Patient's lipid profile reviewed  LDL is still 120  Will continue with present dose of atorvastatin 20 mg daily along with the fish oil tablets and exercise  Will repeat lipid profile in about 4 months if it is still high will increase the dose  Diagnoses and all orders for this visit:    Screening for colon cancer  -     Ambulatory referral to Gastroenterology; Future    Mixed hyperlipidemia  -     atorvastatin (LIPITOR) 20 mg tablet; Take 1 tablet (20 mg total) by mouth daily  -     Lipid Panel with Direct LDL reflex; Future          Subjective:          Patient ID: Anisha Barker is a 59 y o  male  Patient is here for regular follow-up  Does have blood work done last week and would like to discuss results  Denied any new complaints      The following portions of the patient's history were reviewed and updated as appropriate: allergies, current medications, past family history, past medical history, past social history, past surgical history and problem list     Review of Systems   Constitutional: Negative for fatigue and fever  HENT: Negative for congestion, ear discharge, ear pain, postnasal drip, sinus pressure, sore throat, tinnitus and trouble swallowing  Eyes: Negative for discharge, itching and visual disturbance  Respiratory: Negative for cough and shortness of breath  Cardiovascular: Negative for chest pain and palpitations  Gastrointestinal: Negative for abdominal pain, diarrhea, nausea and vomiting  Endocrine: Negative for cold intolerance and polyuria  Genitourinary: Negative for difficulty urinating, dysuria and urgency  Musculoskeletal: Negative for arthralgias and neck pain  Skin: Negative for rash  Allergic/Immunologic: Negative for environmental allergies  Neurological: Negative for dizziness, weakness and headaches  Psychiatric/Behavioral: Negative for agitation and behavioral problems   The patient is not nervous/anxious  Past Medical History:   Diagnosis Date    Arthritis          Current Outpatient Medications:     aspirin 81 MG tablet, Take 1 tablet by mouth daily, Disp: , Rfl:     atorvastatin (LIPITOR) 20 mg tablet, Take 1 tablet (20 mg total) by mouth daily, Disp: 90 tablet, Rfl: 2    multivitamin (THERAGRAN) TABS, Take 1 tablet by mouth daily  , Disp: , Rfl:     Omega-3 Fatty Acids (FISH OIL) 1200 MG CAPS, Take by mouth , Disp: , Rfl:     No Known Allergies    Social History   Past Surgical History:   Procedure Laterality Date    HERNIA REPAIR      HIP SURGERY      REPLACEMENT TOTAL KNEE Right 2011    REPLACEMENT TOTAL KNEE      SHOULDER SURGERY Left     SHOULDER SURGERY Right     WRIST FUSION Left 2016    WRIST SURGERY       Family History   Problem Relation Age of Onset    Heart disease Father     Other Father         cardiac disorder    No Known Problems Mother     Heart attack Maternal Grandfather     Heart attack Paternal Grandfather     Thyroid cancer Son        Objective:  /64 (BP Location: Left arm, Patient Position: Sitting, Cuff Size: Standard)   Pulse 64   Temp 97 8 °F (36 6 °C) (Tympanic)   Ht 5' 5 75" (1 67 m) Comment: shoes off  Wt 73 7 kg (162 lb 6 4 oz) Comment: shoes off  SpO2 97%   BMI 26 41 kg/m²   Body mass index is 26 41 kg/m²  Physical Exam   Constitutional: He appears well-developed  HENT:   Head: Normocephalic  Mouth/Throat: Oropharynx is clear and moist    Eyes: Pupils are equal, round, and reactive to light  No scleral icterus  Neck: Normal range of motion  Neck supple  No tracheal deviation present  No thyromegaly present  Cardiovascular: Normal rate, regular rhythm and normal heart sounds  Pulmonary/Chest: Effort normal and breath sounds normal  No respiratory distress  He exhibits no tenderness  Abdominal: Soft  Bowel sounds are normal  He exhibits no mass  There is no tenderness  Musculoskeletal: Normal range of motion  Lymphadenopathy:     He has no cervical adenopathy  Neurological: He is alert  No cranial nerve deficit  Skin: Skin is warm  No erythema  No pallor  Psychiatric: He has a normal mood and affect

## 2019-03-06 DIAGNOSIS — E78.2 MIXED HYPERLIPIDEMIA: ICD-10-CM

## 2019-03-06 RX ORDER — ATORVASTATIN CALCIUM 20 MG/1
TABLET, FILM COATED ORAL
Qty: 90 TABLET | Refills: 1 | OUTPATIENT
Start: 2019-03-06

## 2019-05-30 ENCOUNTER — APPOINTMENT (OUTPATIENT)
Dept: LAB | Age: 59
End: 2019-05-30
Payer: COMMERCIAL

## 2019-05-30 ENCOUNTER — TRANSCRIBE ORDERS (OUTPATIENT)
Dept: LAB | Age: 59
End: 2019-05-30

## 2019-05-30 DIAGNOSIS — E78.2 MIXED HYPERLIPIDEMIA: ICD-10-CM

## 2019-05-30 LAB
CHOLEST SERPL-MCNC: 137 MG/DL (ref 50–200)
HDLC SERPL-MCNC: 43 MG/DL (ref 40–60)
LDLC SERPL CALC-MCNC: 80 MG/DL (ref 0–100)
TRIGL SERPL-MCNC: 69 MG/DL

## 2019-05-30 PROCEDURE — 36415 COLL VENOUS BLD VENIPUNCTURE: CPT

## 2019-05-30 PROCEDURE — 80061 LIPID PANEL: CPT

## 2019-06-10 ENCOUNTER — EVALUATION (OUTPATIENT)
Dept: PHYSICAL THERAPY | Age: 59
End: 2019-06-10
Payer: COMMERCIAL

## 2019-06-10 ENCOUNTER — OFFICE VISIT (OUTPATIENT)
Dept: INTERNAL MEDICINE CLINIC | Age: 59
End: 2019-06-10
Payer: COMMERCIAL

## 2019-06-10 VITALS
BODY MASS INDEX: 25.02 KG/M2 | TEMPERATURE: 98.3 F | SYSTOLIC BLOOD PRESSURE: 128 MMHG | DIASTOLIC BLOOD PRESSURE: 68 MMHG | HEIGHT: 67 IN | WEIGHT: 159.4 LBS | HEART RATE: 64 BPM | OXYGEN SATURATION: 97 %

## 2019-06-10 DIAGNOSIS — M54.50 ACUTE BILATERAL LOW BACK PAIN WITHOUT SCIATICA: Primary | ICD-10-CM

## 2019-06-10 DIAGNOSIS — E78.2 MIXED HYPERLIPIDEMIA: ICD-10-CM

## 2019-06-10 DIAGNOSIS — Z23 ENCOUNTER FOR IMMUNIZATION: Primary | ICD-10-CM

## 2019-06-10 DIAGNOSIS — M54.50 ACUTE BILATERAL LOW BACK PAIN WITHOUT SCIATICA: ICD-10-CM

## 2019-06-10 PROCEDURE — 99214 OFFICE O/P EST MOD 30 MIN: CPT | Performed by: NURSE PRACTITIONER

## 2019-06-10 PROCEDURE — 97161 PT EVAL LOW COMPLEX 20 MIN: CPT | Performed by: PHYSICAL THERAPIST

## 2019-06-10 PROCEDURE — 97110 THERAPEUTIC EXERCISES: CPT | Performed by: PHYSICAL THERAPIST

## 2019-06-10 PROCEDURE — 97140 MANUAL THERAPY 1/> REGIONS: CPT | Performed by: PHYSICAL THERAPIST

## 2019-06-10 PROCEDURE — 3008F BODY MASS INDEX DOCD: CPT | Performed by: NURSE PRACTITIONER

## 2019-06-10 RX ORDER — CYCLOBENZAPRINE HCL 5 MG
5 TABLET ORAL 3 TIMES DAILY PRN
Qty: 30 TABLET | Refills: 0 | Status: SHIPPED | OUTPATIENT
Start: 2019-06-10 | End: 2019-12-10 | Stop reason: ALTCHOICE

## 2019-06-11 ENCOUNTER — OFFICE VISIT (OUTPATIENT)
Dept: PHYSICAL THERAPY | Age: 59
End: 2019-06-11
Payer: COMMERCIAL

## 2019-06-11 DIAGNOSIS — M54.50 ACUTE BILATERAL LOW BACK PAIN WITHOUT SCIATICA: Primary | ICD-10-CM

## 2019-06-11 PROCEDURE — 97112 NEUROMUSCULAR REEDUCATION: CPT | Performed by: PHYSICAL THERAPIST

## 2019-06-11 PROCEDURE — 97110 THERAPEUTIC EXERCISES: CPT | Performed by: PHYSICAL THERAPIST

## 2019-06-11 PROCEDURE — 97140 MANUAL THERAPY 1/> REGIONS: CPT | Performed by: PHYSICAL THERAPIST

## 2019-06-13 ENCOUNTER — OFFICE VISIT (OUTPATIENT)
Dept: PHYSICAL THERAPY | Age: 59
End: 2019-06-13
Payer: COMMERCIAL

## 2019-06-13 DIAGNOSIS — M54.50 ACUTE BILATERAL LOW BACK PAIN WITHOUT SCIATICA: Primary | ICD-10-CM

## 2019-06-13 PROCEDURE — 97112 NEUROMUSCULAR REEDUCATION: CPT | Performed by: PHYSICAL THERAPIST

## 2019-06-13 PROCEDURE — 97140 MANUAL THERAPY 1/> REGIONS: CPT | Performed by: PHYSICAL THERAPIST

## 2019-06-13 PROCEDURE — 97110 THERAPEUTIC EXERCISES: CPT | Performed by: PHYSICAL THERAPIST

## 2019-06-24 ENCOUNTER — OFFICE VISIT (OUTPATIENT)
Dept: PHYSICAL THERAPY | Age: 59
End: 2019-06-24
Payer: COMMERCIAL

## 2019-06-24 DIAGNOSIS — M54.50 ACUTE BILATERAL LOW BACK PAIN WITHOUT SCIATICA: Primary | ICD-10-CM

## 2019-06-24 PROCEDURE — 97110 THERAPEUTIC EXERCISES: CPT | Performed by: PHYSICAL THERAPIST

## 2019-06-24 PROCEDURE — 97112 NEUROMUSCULAR REEDUCATION: CPT | Performed by: PHYSICAL THERAPIST

## 2019-06-24 PROCEDURE — 97140 MANUAL THERAPY 1/> REGIONS: CPT | Performed by: PHYSICAL THERAPIST

## 2019-10-13 ENCOUNTER — APPOINTMENT (EMERGENCY)
Dept: CT IMAGING | Facility: HOSPITAL | Age: 59
DRG: 872 | End: 2019-10-13
Payer: COMMERCIAL

## 2019-10-13 ENCOUNTER — HOSPITAL ENCOUNTER (INPATIENT)
Facility: HOSPITAL | Age: 59
LOS: 2 days | Discharge: HOME | DRG: 872 | End: 2019-10-15
Attending: EMERGENCY MEDICINE | Admitting: HOSPITALIST
Payer: COMMERCIAL

## 2019-10-13 DIAGNOSIS — C64.2 RENAL CELL CANCER, LEFT (HCC): ICD-10-CM

## 2019-10-13 DIAGNOSIS — D72.829 LEUKOCYTOSIS: ICD-10-CM

## 2019-10-13 DIAGNOSIS — G44.009 CLUSTER HEADACHE: ICD-10-CM

## 2019-10-13 DIAGNOSIS — N28.89 LEFT KIDNEY MASS: ICD-10-CM

## 2019-10-13 DIAGNOSIS — R11.0 NAUSEA: ICD-10-CM

## 2019-10-13 DIAGNOSIS — K57.92 DIVERTICULITIS: Primary | ICD-10-CM

## 2019-10-13 PROBLEM — K57.20 DIVERTICULITIS OF LARGE INTESTINE WITH PERFORATION WITHOUT BLEEDING: Status: ACTIVE | Noted: 2019-10-13

## 2019-10-13 PROBLEM — A41.9 SEPSIS (HCC): Status: ACTIVE | Noted: 2019-10-13

## 2019-10-13 LAB
BILIRUB UR QL STRIP: NEGATIVE
CLARITY UR: CLEAR
COLOR UR: YELLOW
GLUCOSE UR STRIP-MCNC: NEGATIVE MG/DL
HGB UR QL STRIP.AUTO: NEGATIVE
KETONES UR STRIP-MCNC: NEGATIVE MG/DL
LEUKOCYTE ESTERASE UR QL STRIP: NEGATIVE
NITRITE UR QL STRIP: NEGATIVE
PH UR STRIP.AUTO: 6 [PH]
PROT UR STRIP-MCNC: NEGATIVE MG/DL
SP GR UR STRIP.AUTO: <=1.005 (ref 1–1.03)
UROBILINOGEN UR QL STRIP.AUTO: 0.2 E.U./DL

## 2019-10-13 PROCEDURE — 99284 EMERGENCY DEPT VISIT MOD MDM: CPT | Performed by: EMERGENCY MEDICINE

## 2019-10-13 PROCEDURE — 99223 1ST HOSP IP/OBS HIGH 75: CPT | Performed by: PHYSICIAN ASSISTANT

## 2019-10-13 PROCEDURE — 36415 COLL VENOUS BLD VENIPUNCTURE: CPT | Performed by: EMERGENCY MEDICINE

## 2019-10-13 PROCEDURE — 96375 TX/PRO/DX INJ NEW DRUG ADDON: CPT

## 2019-10-13 PROCEDURE — 96361 HYDRATE IV INFUSION ADD-ON: CPT

## 2019-10-13 PROCEDURE — 99285 EMERGENCY DEPT VISIT HI MDM: CPT

## 2019-10-13 PROCEDURE — 74177 CT ABD & PELVIS W/CONTRAST: CPT

## 2019-10-13 PROCEDURE — 87040 BLOOD CULTURE FOR BACTERIA: CPT | Performed by: EMERGENCY MEDICINE

## 2019-10-13 PROCEDURE — 96374 THER/PROPH/DIAG INJ IV PUSH: CPT

## 2019-10-13 PROCEDURE — 81003 URINALYSIS AUTO W/O SCOPE: CPT | Performed by: EMERGENCY MEDICINE

## 2019-10-13 PROCEDURE — 99223 1ST HOSP IP/OBS HIGH 75: CPT | Performed by: COLON & RECTAL SURGERY

## 2019-10-13 RX ORDER — CHLORAL HYDRATE 500 MG
1000 CAPSULE ORAL DAILY
Status: DISCONTINUED | OUTPATIENT
Start: 2019-10-14 | End: 2019-10-15 | Stop reason: HOSPADM

## 2019-10-13 RX ORDER — ONDANSETRON 2 MG/ML
4 INJECTION INTRAMUSCULAR; INTRAVENOUS EVERY 6 HOURS PRN
Status: DISCONTINUED | OUTPATIENT
Start: 2019-10-13 | End: 2019-10-15 | Stop reason: HOSPADM

## 2019-10-13 RX ORDER — SODIUM CHLORIDE 9 MG/ML
125 INJECTION, SOLUTION INTRAVENOUS CONTINUOUS
Status: DISCONTINUED | OUTPATIENT
Start: 2019-10-14 | End: 2019-10-15 | Stop reason: HOSPADM

## 2019-10-13 RX ORDER — CEFAZOLIN SODIUM 2 G/50ML
2000 SOLUTION INTRAVENOUS ONCE
Status: COMPLETED | OUTPATIENT
Start: 2019-10-13 | End: 2019-10-13

## 2019-10-13 RX ORDER — OXYCODONE HYDROCHLORIDE 5 MG/1
5 TABLET ORAL EVERY 6 HOURS PRN
Status: DISCONTINUED | OUTPATIENT
Start: 2019-10-13 | End: 2019-10-14

## 2019-10-13 RX ORDER — ATORVASTATIN CALCIUM 20 MG/1
20 TABLET, FILM COATED ORAL DAILY
Status: DISCONTINUED | OUTPATIENT
Start: 2019-10-14 | End: 2019-10-15 | Stop reason: HOSPADM

## 2019-10-13 RX ORDER — SODIUM CHLORIDE 9 MG/ML
75 INJECTION, SOLUTION INTRAVENOUS CONTINUOUS
Status: DISCONTINUED | OUTPATIENT
Start: 2019-10-13 | End: 2019-10-13

## 2019-10-13 RX ORDER — ACETAMINOPHEN 325 MG/1
650 TABLET ORAL EVERY 6 HOURS PRN
Status: DISCONTINUED | OUTPATIENT
Start: 2019-10-13 | End: 2019-10-15 | Stop reason: HOSPADM

## 2019-10-13 RX ORDER — ONDANSETRON 2 MG/ML
4 INJECTION INTRAMUSCULAR; INTRAVENOUS ONCE
Status: COMPLETED | OUTPATIENT
Start: 2019-10-13 | End: 2019-10-13

## 2019-10-13 RX ORDER — MORPHINE SULFATE 10 MG/ML
7 INJECTION, SOLUTION INTRAMUSCULAR; INTRAVENOUS ONCE
Status: DISCONTINUED | OUTPATIENT
Start: 2019-10-13 | End: 2019-10-13

## 2019-10-13 RX ORDER — CEFAZOLIN SODIUM 2 G/50ML
2000 SOLUTION INTRAVENOUS EVERY 8 HOURS
Status: DISCONTINUED | OUTPATIENT
Start: 2019-10-14 | End: 2019-10-15 | Stop reason: HOSPADM

## 2019-10-13 RX ADMIN — ONDANSETRON 4 MG: 2 INJECTION INTRAMUSCULAR; INTRAVENOUS at 18:37

## 2019-10-13 RX ADMIN — SODIUM CHLORIDE 125 ML/HR: 0.9 INJECTION, SOLUTION INTRAVENOUS at 23:41

## 2019-10-13 RX ADMIN — METRONIDAZOLE 500 MG: 500 INJECTION, SOLUTION INTRAVENOUS at 21:15

## 2019-10-13 RX ADMIN — CEFAZOLIN SODIUM 2000 MG: 2 SOLUTION INTRAVENOUS at 20:37

## 2019-10-13 RX ADMIN — IOHEXOL 100 ML: 350 INJECTION, SOLUTION INTRAVENOUS at 19:05

## 2019-10-13 RX ADMIN — SODIUM CHLORIDE 1000 ML: 0.9 INJECTION, SOLUTION INTRAVENOUS at 18:39

## 2019-10-13 NOTE — ED PROVIDER NOTES
Final Diagnosis:  1  Diverticulitis (sigmoid, perforated)    2  Left kidney mass    3  Nausea    4  Leukocytosis      Chief Complaint   Patient presents with    Back Pain     Pt presents to ED from Urgent Care w/ lower back and abd pain starting 3 weeks ago  Pt (-) N/VD  Pt (+) fever  Pt (+) decreased talon  Pt denies PMH   Abdominal Pain     This si a 63 y/o M presenting for evaluation of belly pain, fever  For the past 2 weeks the patient has been feeling unwell  In the last 2 days it's gotten significantly worse so went to urgent care  While there he had a urine, and basic labs done  WBC 12 9  BUN/Creat: 17/1 1  Patient was then sent here for further evaluation  Fever at urgent care (102F)  No vomiting but feels very nauseous today  No CP/SOB  No change in location of pain  A little but on the left lateral abdomen, but diffuse belly especially around the umbilicus  Not localizable  Unchanged with oral intake but has decreased oral intake / appetite  Denies any urinary tract infection symptoms (burning, itching, pain, blood, frequency)  Denies any upper respiratory tract infection symptoms (cough, congestion, rhinorrhea, sore throat)  PMH:  - Arthritis  - HLD  PSH:  - Appendectomy (per patient)  - TKR  - Wrist fusion  - Hernia repair  - Shoulder surgery  - Wrist surgery  No smoking  +alcohol socially  No drugs  PE:   Vitals:    10/13/19 1756 10/13/19 1759 10/13/19 2038   BP: 158/74  132/62   BP Location: Right arm  Left arm   Pulse: 88  78   Resp: 16  18   Temp:  98 3 °F (36 8 °C)    TempSrc:  Oral    SpO2: 97%  96%   Weight: 75 5 kg (166 lb 7 2 oz)     Height: 5' 7" (1 702 m)     General: VSS, NAD, awake, alert  Well-nourished, well-developed  Appears stated age  Head: Normocephalic, atraumatic, nontender  Eyes: PERRL, EOM-I  No diplopia  No hyphema  No subconjunctival hemorrhages  Symmetrical lids  ENT: Atraumatic external nose and ears  Dry MM  No malocclusion  No stridor   Normal phonation  No drooling  Normal swallowing  Neck: Symmetric, trachea midline  No JVD  CV: RRR  +S1/S2  No murmurs or gallops  Peripheral pulses +2 throughout  No chest wall tenderness  Lungs:   Unlabored No retractions  CTAB, lungs sounds equal bilateral    No crepitus  No tachypnea  No paradoxical motion  Abd: +BS, soft, diffuse belly tenderness w/o rebound / guarding  ND   No guarding  No rigidity  No rebound  Heel strike negative  No CVAT  MSK:   FROM   No lower extremity edema  Back:   No CVAT  Skin: Dry, intact  Neuro: AAOx3, GCS 15, CN II-XII grossly intact  Motor grossly intact  Psychiatric/Behavioral: Appropriate mood and affect   Exam: deferred  A:  - Belly pain  - Leukocytosis  - Fever  P:  - Labs already done: CT for acute pathology, specifically, r/o cholecystitis  - 13 point ROS was performed and all are normal unless stated in the history above  - Nursing note reviewed  Vitals reviewed  - Orders placed by myself and/or advanced practitioner / resident     - Previous chart was reviewed  - No language barrier    - History obtained from patient, wife  - There are no limitations to the history obtained  - Critical care time: Not applicable for this patient  ED Course as of Oct 13 2045   Kimberly Penn Oct 13, 2019   1831 Updated CT scan about labs being done right before coming in        1856 Leukocytes, UA: Negative   1856 Nitrite, UA: Negative   1856 Clarity, UA: Clear   1856 SL AMB SPECIFIC GRAVITY_URINE: <=1 005   2002 CT:   - Left renal cancer  - Perforated sigmoid diverticultits free air       2032 I had a very long conversation with the patient about the CT scan results  Discussed that I will consult surgery, begin antibiotics  Nursing aware of plan  2042 Consulted Colorectal (Resident Justice Elsie)  Will admit to medicine  Likely non-operative management           Medications   morphine (PF) 10 mg/mL injection 7 mg (7 mg Intravenous Not Given 10/13/19 1840)   ceFAZolin (ANCEF) IVPB (premix) 2,000 mg (2,000 mg Intravenous New Bag 10/13/19 2037)   metroNIDAZOLE (FLAGYL) IVPB (premix) 500 mg (has no administration in time range)   sodium chloride 0 9 % bolus 1,000 mL (1,000 mL Intravenous New Bag 10/13/19 1839)   ondansetron (ZOFRAN) injection 4 mg (4 mg Intravenous Given 10/13/19 1837)   iohexol (OMNIPAQUE) 350 MG/ML injection (MULTI-DOSE) 100 mL (100 mL Intravenous Given 10/13/19 1905)     CT abdomen pelvis with contrast   Final Result         1  Perforated sigmoid diverticulitis with focal free air  Surgical consult recommended  2  2 5 cm left renal midpole enhancing mass protruding into the calyceal system / renal pelvis containing peripheral calcification concerning for renal cell carcinoma/renal neoplasm  Renal ultrasound and CT urogram is recommended for further evaluation  Renal vein appears patent  No evidence of perirenal infiltration or medial invasion at this time  3  Diffuse bladder wall thickening could relate to underdistention, cystitis or outlet obstruction  Correlate with urinalysis  I personally discussed this study with Noemi Clemons on 10/13/2019 at 7:53 PM                          Workstation performed: [id number]           Orders Placed This Encounter   Procedures    Blood culture #1    Blood culture #2    CT abdomen pelvis with contrast    UA w Reflex to Microscopic w Reflex to Culture    Insert peripheral IV    Inpatient consult to Colorectal Surgery    Inpatient Admission (expected length of stay for this patient Order details is greater than two midnights)     Labs Reviewed   BLOOD CULTURE   BLOOD CULTURE   UA W REFLEX TO MICROSCOPIC WITH REFLEX TO CULTURE       Result Value Ref Range Status    Color, UA Yellow   Final    Clarity, UA Clear   Final    Specific Gravity, UA <=1 005  1 003 - 1 030 Final    pH, UA 6 0  4 5, 5 0, 5 5, 6 0, 6 5, 7 0, 7 5, 8 0 Final    Leukocytes, UA Negative  Negative Final    Nitrite, UA Negative  Negative Final    Protein, UA Negative  Negative mg/dl Final    Glucose, UA Negative  Negative mg/dl Final    Ketones, UA Negative  Negative mg/dl Final    Urobilinogen, UA 0 2  0 2, 1 0 E U /dl E U /dl Final    Bilirubin, UA Negative  Negative Final    Blood, UA Negative  Negative Final     Time reflects when diagnosis was documented in both MDM as applicable and the Disposition within this note     Time User Action Codes Description Comment    10/13/2019  8:44 PM Maribell Galeazzi [K57 92] Diverticulitis     10/13/2019  8:44 PM Travis Titus Modify [K57 92] Diverticulitis (sigmoid, perforated)     10/13/2019  8:44 PM Travis Titus Add [N28 89] Left kidney mass     10/13/2019  8:44 PM Maribell Galeazzi [R11 0] Nausea     10/13/2019  8:44 PM Maribell Galeazzi [V85 555] Leukocytosis       ED Disposition     ED Disposition Condition Date/Time Comment    Admit Stable Sun Oct 13, 2019  8:44 PM Case was discussed with Elsie and the patient's admission status was agreed to be Admission Status: inpatient status to the service of Dr Goldy Bassett   Follow-up Information    None       Patient's Medications   Discharge Prescriptions    No medications on file     No discharge procedures on file  Prior to Admission Medications   Prescriptions Last Dose Informant Patient Reported? Taking? Omega-3 Fatty Acids (FISH OIL) 1200 MG CAPS  Self Yes Yes   Sig: Take by mouth  aspirin 81 MG tablet  Self Yes Yes   Sig: Take 1 tablet by mouth daily   atorvastatin (LIPITOR) 20 mg tablet  Self No Yes   Sig: Take 1 tablet (20 mg total) by mouth daily   cyclobenzaprine (FLEXERIL) 5 mg tablet   No No   Sig: Take 1 tablet (5 mg total) by mouth 3 (three) times a day as needed for muscle spasms for up to 10 days   multivitamin (THERAGRAN) TABS  Self Yes Yes   Sig: Take 1 tablet by mouth daily        Facility-Administered Medications: None       Portions of the record may have been created with voice recognition software  Occasional wrong word or "sound a like" substitutions may have occurred due to the inherent limitations of voice recognition software  Read the chart carefully and recognize, using context, where substitutions have occurred      Electronically signed by:  Carol Frias MD  10/13/19 2043

## 2019-10-14 ENCOUNTER — APPOINTMENT (INPATIENT)
Dept: CT IMAGING | Facility: HOSPITAL | Age: 59
DRG: 872 | End: 2019-10-14
Payer: COMMERCIAL

## 2019-10-14 ENCOUNTER — TELEPHONE (OUTPATIENT)
Dept: UROLOGY | Facility: CLINIC | Age: 59
End: 2019-10-14

## 2019-10-14 LAB
ANION GAP SERPL CALCULATED.3IONS-SCNC: 7 MMOL/L (ref 4–13)
BASOPHILS # BLD AUTO: 0.03 THOUSANDS/ΜL (ref 0–0.1)
BASOPHILS NFR BLD AUTO: 0 % (ref 0–1)
BUN SERPL-MCNC: 14 MG/DL (ref 5–25)
CALCIUM SERPL-MCNC: 8.2 MG/DL (ref 8.3–10.1)
CHLORIDE SERPL-SCNC: 107 MMOL/L (ref 100–108)
CO2 SERPL-SCNC: 25 MMOL/L (ref 21–32)
CREAT SERPL-MCNC: 1.04 MG/DL (ref 0.6–1.3)
EOSINOPHIL # BLD AUTO: 0.04 THOUSAND/ΜL (ref 0–0.61)
EOSINOPHIL NFR BLD AUTO: 1 % (ref 0–6)
ERYTHROCYTE [DISTWIDTH] IN BLOOD BY AUTOMATED COUNT: 12.6 % (ref 11.6–15.1)
GFR SERPL CREATININE-BSD FRML MDRD: 77 ML/MIN/1.73SQ M
GLUCOSE SERPL-MCNC: 91 MG/DL (ref 65–140)
HCT VFR BLD AUTO: 38.7 % (ref 36.5–49.3)
HGB BLD-MCNC: 12.6 G/DL (ref 12–17)
IMM GRANULOCYTES # BLD AUTO: 0.03 THOUSAND/UL (ref 0–0.2)
IMM GRANULOCYTES NFR BLD AUTO: 0 % (ref 0–2)
LYMPHOCYTES # BLD AUTO: 0.57 THOUSANDS/ΜL (ref 0.6–4.47)
LYMPHOCYTES NFR BLD AUTO: 7 % (ref 14–44)
MCH RBC QN AUTO: 30.2 PG (ref 26.8–34.3)
MCHC RBC AUTO-ENTMCNC: 32.6 G/DL (ref 31.4–37.4)
MCV RBC AUTO: 93 FL (ref 82–98)
MONOCYTES # BLD AUTO: 0.51 THOUSAND/ΜL (ref 0.17–1.22)
MONOCYTES NFR BLD AUTO: 6 % (ref 4–12)
NEUTROPHILS # BLD AUTO: 7.16 THOUSANDS/ΜL (ref 1.85–7.62)
NEUTS SEG NFR BLD AUTO: 86 % (ref 43–75)
NRBC BLD AUTO-RTO: 0 /100 WBCS
PLATELET # BLD AUTO: 157 THOUSANDS/UL (ref 149–390)
PMV BLD AUTO: 9.2 FL (ref 8.9–12.7)
POTASSIUM SERPL-SCNC: 3.7 MMOL/L (ref 3.5–5.3)
RBC # BLD AUTO: 4.17 MILLION/UL (ref 3.88–5.62)
SODIUM SERPL-SCNC: 139 MMOL/L (ref 136–145)
WBC # BLD AUTO: 8.34 THOUSAND/UL (ref 4.31–10.16)

## 2019-10-14 PROCEDURE — 99232 SBSQ HOSP IP/OBS MODERATE 35: CPT | Performed by: HOSPITALIST

## 2019-10-14 PROCEDURE — 74178 CT ABD&PLV WO CNTR FLWD CNTR: CPT

## 2019-10-14 PROCEDURE — 80048 BASIC METABOLIC PNL TOTAL CA: CPT | Performed by: PHYSICIAN ASSISTANT

## 2019-10-14 PROCEDURE — 70450 CT HEAD/BRAIN W/O DYE: CPT

## 2019-10-14 PROCEDURE — 99254 IP/OBS CNSLTJ NEW/EST MOD 60: CPT | Performed by: UROLOGY

## 2019-10-14 PROCEDURE — 85025 COMPLETE CBC W/AUTO DIFF WBC: CPT | Performed by: PHYSICIAN ASSISTANT

## 2019-10-14 RX ORDER — KETOROLAC TROMETHAMINE 30 MG/ML
15 INJECTION, SOLUTION INTRAMUSCULAR; INTRAVENOUS EVERY 6 HOURS PRN
Status: DISCONTINUED | OUTPATIENT
Start: 2019-10-14 | End: 2019-10-15 | Stop reason: HOSPADM

## 2019-10-14 RX ADMIN — SODIUM CHLORIDE 125 ML/HR: 0.9 INJECTION, SOLUTION INTRAVENOUS at 21:43

## 2019-10-14 RX ADMIN — ACETAMINOPHEN 650 MG: 325 TABLET ORAL at 12:12

## 2019-10-14 RX ADMIN — CEFAZOLIN SODIUM 2000 MG: 2 SOLUTION INTRAVENOUS at 04:12

## 2019-10-14 RX ADMIN — SODIUM CHLORIDE 125 ML/HR: 0.9 INJECTION, SOLUTION INTRAVENOUS at 12:56

## 2019-10-14 RX ADMIN — KETOROLAC TROMETHAMINE 15 MG: 30 INJECTION, SOLUTION INTRAMUSCULAR at 23:50

## 2019-10-14 RX ADMIN — MORPHINE SULFATE 2 MG: 2 INJECTION, SOLUTION INTRAMUSCULAR; INTRAVENOUS at 00:38

## 2019-10-14 RX ADMIN — SODIUM CHLORIDE 125 ML/HR: 0.9 INJECTION, SOLUTION INTRAVENOUS at 07:09

## 2019-10-14 RX ADMIN — METRONIDAZOLE 500 MG: 500 INJECTION, SOLUTION INTRAVENOUS at 21:42

## 2019-10-14 RX ADMIN — ACETAMINOPHEN 650 MG: 325 TABLET ORAL at 20:28

## 2019-10-14 RX ADMIN — CEFAZOLIN SODIUM 2000 MG: 2 SOLUTION INTRAVENOUS at 20:30

## 2019-10-14 RX ADMIN — IOHEXOL 100 ML: 350 INJECTION, SOLUTION INTRAVENOUS at 22:50

## 2019-10-14 RX ADMIN — METRONIDAZOLE 500 MG: 500 INJECTION, SOLUTION INTRAVENOUS at 12:52

## 2019-10-14 RX ADMIN — METRONIDAZOLE 500 MG: 500 INJECTION, SOLUTION INTRAVENOUS at 04:47

## 2019-10-14 RX ADMIN — CEFAZOLIN SODIUM 2000 MG: 2 SOLUTION INTRAVENOUS at 11:34

## 2019-10-14 NOTE — PROGRESS NOTES
Progress Note - Sherri Woodruff 1960, 62 y o  male MRN: [id number]    Unit/Bed#: -01 Encounter: [id number]    Primary Care Provider: Donald Jack MD   Date and time admitted to hospital: 10/13/2019  5:53 PM        * Diverticulitis of large intestine with perforation without bleeding  Assessment & Plan  · POA, noted on CT scan  Microperforation noted  No prior history of diverticulitis  Last colonoscopy was around the age of 52 and was noted to have a normal findings, for repeat colonoscopy after 10 years  No no history of bleeding reported  Abdomen is tender, however no signs of peritonitis  · Per patient, he was told that he will be undergoing surgical intervention in around 4-6 weeks once inflammation and infection is much more controlled  · Colorectal surgery on board, appreciate input  · Continue with IV antibiotics Ancef/Flagyl  · Continue with IV fluids  · Currently still NPO, will await for any change/advance in diet  · Adequate pain control   · Monitor hemodynamics      Sepsis ([address] )  Assessment & Plan  · Likely present on admission, patient felt feverish at home since last night as well as has a white count of 12,900 as per urgent care labs  Likely from diverticultitis   · Continue with IV antibiotics   · IVF  · Follow blood cultures  · Trend CBC  · Monitor temp    Renal cell cancer, left (HCC)  Assessment & Plan  · POA, incidental finding on CT abdomen pelvis  Patient did have left flank pain for the last few weeks that was different from his typical back pain  No signs of blood in urine  No family history of cancer  No smoking history   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   · Urology on board, appreciate input  · Awaiting results of renal CT  · Will plan for further surgical evaluation once diverticulitis more controlled and addressed  · If findings remain equivocal for urothelial neoplasm versus renal cell carcinoma, would consider more invasive testing as an outpatient either in the form of diagnostic left ureteroscopy or potentially percutaneous biopsy of the renal mass preoperatively    Mixed hyperlipidemia  Assessment & Plan  · Continue statin        VTE Pharmacologic Prophylaxis:   Pharmacologic: Enoxaparin (Lovenox)  Mechanical VTE Prophylaxis in Place: Yes    Discussions with Specialists or Other Care Team Provider:  Discussed with the nurse, attending, case management    Education and Discussions with Family / Patient:  Discussed with the patient and wife at bedside    Current Length of Stay: 1 day(s)    Current Patient Status: Inpatient     Discharge Plan / Estimated Discharge Date: To be determined based on clinical course likely within 48 hours    Code Status: Level 1 - Full Code      Subjective:   Patient seen and examined this morning  Patient noted improvement with abdominal pain compared with initial presentation  Patient denies any blood in the urine or the stool  Does not endorse any chest pain, lightheadedness/dizziness, shortness of breath  With noted febrile episode last night  However no recurrence of fever this morning  No other complaints noted  Objective:     Vitals:   Temp (24hrs), Av 6 °F (37 6 °C), Min:98 3 °F (36 8 °C), Max:100 7 °F (38 2 °C)    Temp:  [98 3 °F (36 8 °C)-100 7 °F (38 2 °C)] 99 5 °F (37 5 °C)  HR:  [73-88] 73  Resp:  [16-18] 18  BP: (115-158)/(57-74) 115/57  SpO2:  [95 %-97 %] 95 %  Body mass index is 26 21 kg/m²  Input and Output Summary (last 24 hours): Intake/Output Summary (Last 24 hours) at 10/14/2019 1154  Last data filed at 10/14/2019 0800  Gross per 24 hour   Intake 1039 58 ml   Output    Net 1039 58 ml       Physical Exam:     Physical Exam   Constitutional: He is oriented to person, place, and time  He appears well-developed and well-nourished  No distress  HENT:   Head: Normocephalic  Mouth/Throat: Oropharynx is clear and moist  No oropharyngeal exudate     Eyes: Pupils are equal, round, and reactive to light  Conjunctivae are normal  No scleral icterus  Neck: Normal range of motion  Neck supple  No JVD present  Cardiovascular: Normal rate, regular rhythm, normal heart sounds and intact distal pulses  Pulmonary/Chest: Effort normal and breath sounds normal  No respiratory distress  Abdominal: Soft  Bowel sounds are normal  There is no tenderness  There is no guarding  Musculoskeletal: Normal range of motion  He exhibits no edema or tenderness  Lymphadenopathy:     He has no cervical adenopathy  Neurological: He is alert and oriented to person, place, and time  No sensory deficit  He exhibits normal muscle tone  Skin: Skin is warm  No erythema  No pallor  Psychiatric: He has a normal mood and affect  His behavior is normal  Thought content normal    Nursing note and vitals reviewed  Additional Data:     Labs:    Results from last 7 days   Lab Units 10/14/19  0445   WBC Thousand/uL 8 34   HEMOGLOBIN g/dL 12 6   HEMATOCRIT % 38 7   PLATELETS Thousands/uL 157   NEUTROS PCT % 86*   LYMPHS PCT % 7*   MONOS PCT % 6   EOS PCT % 1     Results from last 7 days   Lab Units 10/14/19  0445   POTASSIUM mmol/L 3 7   CHLORIDE mmol/L 107   CO2 mmol/L 25   BUN mg/dL 14   CREATININE mg/dL 1 04   CALCIUM mg/dL 8 2*           * I Have Reviewed All Lab Data Listed Above  * Additional Pertinent Lab Tests Reviewed:  [facility] 64 Admission Reviewed    Imaging:    Imaging Reports Reviewed Today Include:  CT of the abdomen and pelvis  Imaging Personally Reviewed by Myself Includes:  None    Recent Cultures (last 7 days):           Last 24 Hours Medication List:     Current Facility-Administered Medications:  acetaminophen 650 mg Oral Q6H PRN Иван Ordonez PA-C    atorvastatin 20 mg Oral Daily Иван Cummings PA-C    cefazolin 2,000 mg Intravenous Q8H Иван Cummings PA-C Last Rate: 2,000 mg (10/14/19 1134)   enoxaparin 40 mg Subcutaneous Daily Иван Cummings PA-C    fish oil 1,000 mg Oral Daily Иван Moreno PA-C    metroNIDAZOLE 500 mg Intravenous Q8H Иван Moreno PA-C Last Rate: 500 mg (10/14/19 4947)   morphine injection 2 mg Intravenous Q6H PRN JULIETTE Duran    ondansetron 4 mg Intravenous Q6H PRN Иван Moreno PA-C    sodium chloride 125 mL/hr Intravenous Continuous Ava Ponce PA-C Last Rate: 125 mL/hr (10/14/19 0709)        Today, Patient Was Seen By: Dolly Alaniz MD    ** Please Note: This note has been constructed using a voice recognition system   **

## 2019-10-14 NOTE — UTILIZATION REVIEW
Initial Clinical Review    Admission: Date/Time/Statement: Inpatient Admission Orders (From admission, onward)     Ordered        10/13/19 2044  Inpatient Admission (expected length of stay for this patient Order details is greater than two midnights)  Once                   Orders Placed This Encounter   Procedures    Inpatient Admission (expected length of stay for this patient Order details is greater than two midnights)     Standing Status:   Standing     Number of Occurrences:   1     Order Specific Question:   Admitting Physician     Answer:   Naeem Brewer     Order Specific Question:   Level of Care     Answer:   Med Surg [16]     Order Specific Question:   Estimated length of stay     Answer:   More than 2 Midnights     Order Specific Question:   Certification     Answer:   I certify that inpatient services are medically necessary for this patient for a duration of greater than two midnights  See H&P and MD Progress Notes for additional information about the patient's course of treatment  ED Arrival Information     Expected Arrival Acuity Means of Arrival Escorted By Service Admission Type    - 10/13/2019 17:36 Urgent Walk-In Family Member General Medicine Urgent    Arrival Complaint    Back/Abd Pain        Chief Complaint   Patient presents with    Back Pain     Pt presents to ED from Urgent Care w/ lower back and abd pain starting 3 weeks ago  Pt (-) N/VD  Pt (+) fever  Pt (+) decreased talon  Pt denies PMH   Abdominal Pain     Assessment/Plan:   61 yo male,  To ED from Urgent Care,  Admitted INPT status at [facility] level of care,  For treatment of  Sepsis 2/2  perforated large intestine w/diverticulitis and workup of incidental renal mass  Pt reports severe pain located inferior to umbilicus; Comes in "waves"  Pt went to urgent care today where temp was 102 and WBC was 12 9  Sent to ER:  Exam reveals tender abd but no signs peritonitis    CTAP  Reveals  Perforated sigmoid diverticulitis with focal free air  AND incidental  Renal mass  Will initiate IVF, IV Ancef/Flagyl,  IV pain control,  Consult colorectal surgery and  Nephrology, obtain CT  Urogram       10/13  COLORECTAL/UROLOGY   Consults: Will continue treatment plan "as is" at this time: treatment of Diverticulitis w/perf is priority  Once pt has recovered from [place],  Will plan for surgical management of his left kidney cancer     10/14  INTERNAL MED  Adequate pain control;  abd tender but no sign peritoniits; Cont treatment plan      ED Triage Vitals   Temperature Pulse Respirations Blood Pressure SpO2   10/13/19 1759 10/13/19 1756 10/13/19 1756 10/13/19 1756 10/13/19 1756   98 3 °F (36 8 °C) 88 16 158/74 97 %      Temp Source Heart Rate Source Patient Position - Orthostatic VS BP Location FiO2 (%)   10/13/19 1759 10/13/19 1756 10/13/19 1756 10/13/19 1756 --   Oral Monitor Sitting Right arm       Pain Score       10/13/19 1756       9        Wt Readings from Last 1 Encounters:   10/13/19 75 9 kg (167 lb 5 3 oz)     Additional Vital Signs:   10/14/19 0700  99 5 °F  73  18  115/57  82  95 %   10/14/19 0000  100 7 °F              10/13/19 2224  100 °F   78  18  125/64  88  96 %  rm air      Pertinent Labs/Diagnostic Test Results:   10/13  cTAP -  1  Perforated sigmoid diverticulitis with focal free air  Surgical consult recommended  2  2 5 cm left renal midpole enhancing mass protruding into the calyceal system / renal pelvis containing peripheral calcification concerning for renal cell carcinoma/renal neoplasm  Renal ultrasound and CT urogram is recommended for further evaluation  Renal vein appears patent  No evidence of perirenal infiltration or medial invasion at this time  3  Diffuse bladder wall thickening could relate to underdistention, cystitis or outlet obstruction  Correlate with urinalysis  *Awaiting CT renal protocol (CT urogram)    Results from last 7 days   Lab Units 10/14/19  0445   WBC Thousand/uL 8 34   HEMOGLOBIN g/dL 12 6   HEMATOCRIT % 38 7   PLATELETS Thousands/uL 157   NEUTROS ABS Thousands/µL 7 16         Results from last 7 days   Lab Units 10/14/19  0445   SODIUM mmol/L 139   POTASSIUM mmol/L 3 7   CHLORIDE mmol/L 107   CO2 mmol/L 25   ANION GAP mmol/L 7   BUN mg/dL 14   CREATININE mg/dL 1 04   EGFR ml/min/1 73sq m 77   CALCIUM mg/dL 8 2*     Results from last 7 days   Lab Units 10/14/19  0445   GLUCOSE RANDOM mg/dL 91     Results from last 7 days   Lab Units 10/13/19  1832   CLARITY UA  Clear   COLOR UA  Yellow   SPEC GRAV UA  <=1 005   PH UA  6 0   GLUCOSE UA mg/dl Negative   KETONES UA mg/dl Negative   BLOOD UA  Negative   PROTEIN UA mg/dl Negative   NITRITE UA  Negative   BILIRUBIN UA  Negative   UROBILINOGEN UA E U /dl 0 2   LEUKOCYTES UA  Negative         ED Treatment:   Medication Administration from 10/13/2019 1736 to 10/13/2019 2236       Date/Time Order Dose Route     10/13/2019 1839 sodium chloride 0 9 % bolus 1,000 mL 1,000 mL Intravenous     10/13/2019 1837 ondansetron (ZOFRAN) injection 4 mg 4 mg Intravenous     10/13/2019 2037 ceFAZolin (ANCEF) IVPB (premix) 2,000 mg 2,000 mg Intravenous     10/13/2019 2115 metroNIDAZOLE (FLAGYL) IVPB (premix) 500 mg 500 mg Intravenous        Past Medical History:   Diagnosis Date    Arthritis     Diverticulitis of large intestine with perforation without bleeding 10/13/2019    Hyperlipidemia     Renal cell cancer, left ([facility] [address] ) 10/13/2019     Present on Admission:   Sepsis ([facility] [address] )   Diverticulitis of large intestine with perforation without bleeding   Renal cell cancer, left ([facility] [address] )   Mixed hyperlipidemia      Admitting Diagnosis: Back pain [M54 9]  Diverticulitis [K57 92]  Leukocytosis [D72 829]  Nausea [R11 0]  Left kidney mass [N28 89]  Renal cell cancer, left ([facility] [address] ) [C64 2]  Age/Sex: 62 y o  male  Admission Orders:  SCD;s;  NPO;  IVF @  125      Medications:  atorvastatin 20 mg Oral Daily   cefazolin 2,000 mg Intravenous Q8H   enoxaparin 40 mg Subcutaneous Daily   fish oil 1,000 mg Oral Daily   metroNIDAZOLE 500 mg Intravenous Q8H       sodium chloride 125 mL/hr Intravenous Continuous     acetaminophen 650 mg Oral Q6H PRN  10/14  @  1212   morphine injection 2 mg Intravenous Q6H PRN  10/14  @  0038   ondansetron 4 mg Intravenous Q6H PRN       Network Utilization Review Department  Phone: [phone]; Fax [phone]  [email or web address]  ATTENTION: Please call with any questions or concerns to [phone]  and carefully listen to the prompts so that you are directed to the right person  Send all requests for admission clinical reviews, approved or denied determinations and any other requests to fax [phone]   All voicemails are confidential

## 2019-10-14 NOTE — ASSESSMENT & PLAN NOTE
· Likely present on admission, patient felt feverish at home since last night as well as has a white count of 12,900 as per urgent care labs   Likely from diverticultitis   · Continue with IV antibiotics   · IVF  · Follow blood cultures  · Trend CBC  · Monitor temp

## 2019-10-14 NOTE — ASSESSMENT & PLAN NOTE
· POA, noted on CT scan  Microperforation noted  No prior history of diverticulitis  Last colonoscopy was around the age of 52 and was noted to have a normal findings, for repeat colonoscopy after 10 years  No no history of bleeding reported  Abdomen is tender, however no signs of peritonitis    · Per patient, he was told that he will be undergoing surgical intervention in around 4-6 weeks once inflammation and infection is much more controlled  · Colorectal surgery on board, appreciate input  · Continue with IV antibiotics Ancef/Flagyl  · Continue with IV fluids  · Current the still NPO, will await for any change in diet  · Adequate pain control   · Monitor hemodynamics

## 2019-10-14 NOTE — ASSESSMENT & PLAN NOTE
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

## 2019-10-14 NOTE — SEPSIS NOTE
Sepsis Note   Henny Toth 62 y o  male MRN: [id number]  Unit/Bed#: ED 05 Encounter: [id number]      qSOFA     [address] Name 10/13/19 1902 10/13/19 1756             Altered mental status GCS < 15  0         Respiratory Rate > / =22    0       Systolic BP < / =519    0       Q Sofa Score  0  0           Initial Sepsis Screening     Row Name 10/13/19 2032                Is the patient's history suggestive of a new or worsening infection? (!) Yes (Proceed)  -VB        Suspected source of infection  acute abdominal infection  -VB        Are two or more of the following signs & symptoms of infection both present and new to the patient? No  -VB        Indicate SIRS criteria  Leukocytosis (WBC > 91756 IJL)  -VB        If the answer is yes to both questions, suspicion of sepsis is present          If severe sepsis is present AND tissue hypoperfusion perists in the hour after fluid resuscitation or lactate > 4, the patient meets criteria for SEPTIC SHOCK          Are any of the following organ dysfunction criteria present within 6 hours of suspected infection and SIRS criteria that are NOT considered to be chronic conditions?         Organ dysfunction          Date of presentation of severe sepsis          Time of presentation of severe sepsis          Tissue hypoperfusion persists in the hour after crystalloid fluid administration, evidenced, by either:          Was hypotension present within one hour of the conclusion of crystalloid fluid administration?           Date of presentation of septic shock          Time of presentation of septic shock            User Key  (r) = Recorded By, (t) = Taken By, (c) = Cosigned By    [address] Name Provider Evelina Vásquez MD Physician
verbal cues/1 person assist

## 2019-10-14 NOTE — CONSULTS
UROLOGY CONSULTATION NOTE     Patient Identifiers: Analilia Osborne (MRN [id number])  Service Requesting Consultation:  Internal Medicine  Service Providing Consultation:  Urology, Zeferino Murguia MD    Date of Service: 10/14/2019    Reason for Consultation:  Left renal mass      ASSESSMENT:     1  2 5 cm enhancing left renal mass, consistent with urothelial neoplasm  - CT is negative for regional lymphadenopathy or metastatic disease, clinical staging is cT1a  - difficult to determine if this is renal cell carcinoma versus upper tract urothelial neoplasm  On personal review I favor renal cell carcinoma though further evaluation is certainly required to determine ultimate surgical plan    2  Perforated diverticulitis  - doing well with conservative management for colorectal surgery      We discussed with the patient the details regarding their renal mass, including the risks of recurrence, progression and metastasis  We went over the various alternatives for managing renal masses including active surveillance, cryoablation and radiofrequency ablation, partial nephrectomy and radical nephrectomy  Where appropriate, we also discussed the differences between open, laparoscopic and robotic renal surgery  A particular emphasis was placed on how these options apply (or don't apply) to the patient's current situation  The perioperative morbidity and mortality associated with the nephrectomy procedures was discussed in detail and the normal postoperative course was presented  The patient asked questions and all of them were answered  PLAN:     - I have ordered a CT urogram for further care duration of the renal mass  - discussed the management of his diverticulitis with microperforation is the clinical prior to the present time and once he has recovered from this we will plan for surgical management of his left kidney cancer  - I will follow up on the CT scan    If the findings remain equivocal for urothelial neoplasm versus renal cell carcinoma, would consider more invasive testing as an outpatient either in the form of diagnostic left ureteroscopy or potentially percutaneous biopsy of the renal mass preoperatively  - discussed with the patient that if the evaluation is consistent with renal cell carcinoma, due to the endophytic nature of his tumor this lesion would not be amenable to a nephron sparing approach my recommendation would be for a laparoscopic left radical nephrectomy  - will request appropriate outpatient follow-up      Thank you for allowing me to participate in this patients care  Please do not hesitate to call with any additional questions  Damon Mata MD    History of Present Illness:     Allyson Stringer is a 62 y o  old with minimal medical history who presented with abdominal and back pain and subjective fevers  Imaging revealed evidence of perforated diverticulitis  He has been clinically stable and is being managed with conservative therapy, antibiotics and fluids and bowel rest appears to be doing quite well  Incidentally noted on his CT scan was a 2 5 cm well circumscribed completely endophytic enhancing left renal mass consistent with neoplasm  The lesion is a centrally central within the kidney, located close to the hilum and the renal sinus  Peripheral calcification is noted  No evidence of renal vein, regional lymphadenopathy or metastatic disease is noted  Nehemiah Ventura is a lifelong nonsmoker  Familial history is negative for genitourinary malignancies  He describes no gross hematuria or other urologic complaints      Past Medical, Past Surgical History:     Past Medical History:   Diagnosis Date    Arthritis     Diverticulitis of large intestine with perforation without bleeding 10/13/2019    Hyperlipidemia     Renal cell cancer, left ([address] ) 10/13/2019   :    Past Surgical History:   Procedure Laterality Date    HERNIA REPAIR      HIP SURGERY      REPLACEMENT TOTAL KNEE Right 2011    REPLACEMENT TOTAL KNEE      SHOULDER SURGERY Left     SHOULDER SURGERY Right     WRIST FUSION Left 2016    WRIST SURGERY     :    Medications, Allergies:     Current Facility-Administered Medications:     acetaminophen (TYLENOL) tablet 650 mg, 650 mg, Oral, Q6H PRN, Иван Cummings PA-C    atorvastatin (LIPITOR) tablet 20 mg, 20 mg, Oral, Daily, Иван Cummings PA-C    ceFAZolin (ANCEF) IVPB (premix) 2,000 mg, 2,000 mg, Intravenous, Q8H, Иван Cummings PA-C, Last Rate: 100 mL/hr at 10/14/19 0412, 2,000 mg at 10/14/19 0412    enoxaparin (LOVENOX) subcutaneous injection 40 mg, 40 mg, Subcutaneous, Daily, Иван Cummings PA-C    fish oil capsule 1,000 mg, 1,000 mg, Oral, Daily, Иван Cummings PA-C    metroNIDAZOLE (FLAGYL) IVPB (premix) 500 mg, 500 mg, Intravenous, Q8H, Иван Cummings PA-C, Last Rate: 200 mL/hr at 10/14/19 0447, 500 mg at 10/14/19 0447    morphine injection 2 mg, 2 mg, Intravenous, Q6H PRN, JULIETTE Allen, 2 mg at 10/14/19 0038    ondansetron (ZOFRAN) injection 4 mg, 4 mg, Intravenous, Q6H PRN, Sue Sahu PA-C    sodium chloride 0 9 % infusion, 125 mL/hr, Intravenous, Continuous, Иван Carmona PA-C, Last Rate: 125 mL/hr at 10/14/19 0709, 125 mL/hr at 10/14/19 0709    Allergies:  No Known Allergies:    Social and Family History:   Social History:   Social History     Tobacco Use    Smoking status: Never Smoker    Smokeless tobacco: Never Used   Substance Use Topics    Alcohol use:  Yes     Alcohol/week: 3 0 standard drinks     Types: 3 Cans of beer per week     Frequency: Monthly or less     Drinks per session: 3 or 4     Binge frequency: Never     Comment: weekends    Drug use: No        Social History     Tobacco Use   Smoking Status Never Smoker   Smokeless Tobacco Never Used       Family History:  Family History   Problem Relation Age of Onset    Heart disease Father     Other Father         cardiac disorder    No Known Problems Mother     Heart attack Maternal Grandfather     Heart attack Paternal Grandfather     Thyroid cancer Son    :     Review of Systems:     General: Fever, chills, or night sweats: positive  Cardiac: Negative for chest pain  Pulmonary: Negative for shortness of breath  Gastrointestinal: Abdominal pain positive  Nausea, vomiting, or diarrhea negative,  Genitourinary: See HPI above  Patient does not have hematuria  All other systems queried were negative  Physical Exam:   General: Patient is pleasant and in NAD  Awake and alert  /57 (BP Location: Left arm)   Pulse 73   Temp 99 5 °F (37 5 °C) (Oral)   Resp 18   Ht 5' 7" (1 702 m)   Wt 75 9 kg (167 lb 5 3 oz)   SpO2 95%   BMI 26 21 kg/m²   Cardiac: Peripheral edema: positive  Pulmonary: Non-labored breathing  Abdomen: Soft, non-tender, non-distended  No surgical scars  No masses, tenderness, hernias noted  Genitourinary: Negative CVA tenderness, positive suprapubic tenderness  Labs:     Lab Results   Component Value Date    HGB 12 6 10/14/2019    HCT 38 7 10/14/2019    WBC 8 34 10/14/2019     10/14/2019   ]    Lab Results   Component Value Date    K 3 7 10/14/2019     10/14/2019    CO2 25 10/14/2019    BUN 14 10/14/2019    CREATININE 1 04 10/14/2019    CALCIUM 8 2 (L) 10/14/2019   ]    Imaging:   I personally reviewed the images and report of the following studies, and reviewed them with the patient:    FINDINGS:     ABDOMEN     LOWER CHEST:  No clinically significant abnormality identified in the visualized lower chest      LIVER/BILIARY TREE:  Unremarkable      GALLBLADDER:  No calcified gallstones  No pericholecystic inflammatory change      SPLEEN:  Unremarkable      PANCREAS:  Unremarkable      ADRENAL GLANDS:  Unremarkable      KIDNEYS/URETERS:  2 5 cm suspect left renal mass protruding into the calyceal system / renal pelvis containing peripheral calcification concerning for renal cell carcinoma/renal neoplasm   Renal ultrasound is recommended for further evaluation  Renal vein   appears patent  No evidence of perirenal infiltration or medial invasion at this time       STOMACH AND BOWEL:  Fatty infiltration about the proximal sigmoid colon consistent with sigmoid diverticulitis  Few foci of free air identified along the superior aspect best seen on series 2 image 62 and 61 and 64 concerning for focal perforation       APPENDIX:  No findings to suggest appendicitis      ABDOMINOPELVIC CAVITY:  Small foci of free air identified around the sigmoid diverticulitis  No significant free fluid No lymphadenopathy      VESSELS:  Unremarkable for patient's age      PELVIS     REPRODUCTIVE ORGANS:  Unremarkable for patient's age      URINARY BLADDER:  Diffuse bladder wall thickening could relate to underdistention, cystitis or outlet obstruction  Correlate with urinalysis      ABDOMINAL WALL/INGUINAL REGIONS:  Unremarkable      OSSEOUS STRUCTURES:  No acute fracture or destructive osseous lesion      IMPRESSION:        1  Perforated sigmoid diverticulitis with focal free air  Surgical consult recommended  2  2 5 cm left renal midpole enhancing mass protruding into the calyceal system / renal pelvis containing peripheral calcification concerning for renal cell carcinoma/renal neoplasm  Renal ultrasound and CT urogram is recommended for further evaluation  Renal vein appears patent  No evidence of perirenal infiltration or medial invasion at this time  3  Diffuse bladder wall thickening could relate to underdistention, cystitis or outlet obstruction  Correlate with urinalysis

## 2019-10-14 NOTE — ASSESSMENT & PLAN NOTE
· POA, noted on CT scan  Microperforation noted  No prior history of diverticulitis  Last colonoscopy was around the age of 52 and he was told he was good for 10 years  No signs of bleeding as per patient  Abdomen is tender, however no signs of peritonitis     · Admit patient to med/surg under inpatient status   · Consult Colorectal surgery   · Ancef/Flagyl IV  · Aggressive IVF  · Pain control   · Monitor hemodynamics

## 2019-10-14 NOTE — ASSESSMENT & PLAN NOTE
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

## 2019-10-14 NOTE — PLAN OF CARE
Problem: PAIN - ADULT  Goal: Verbalizes/displays adequate comfort level or baseline comfort level  Description  Interventions:  - Encourage patient to monitor pain and request assistance  - Assess pain using appropriate pain scale  - Administer analgesics based on type and severity of pain and evaluate response  - Implement non-pharmacological measures as appropriate and evaluate response  - Consider cultural and social influences on pain and pain management  - Notify physician/advanced practitioner if interventions unsuccessful or patient reports new pain  Outcome: Progressing     Problem: DISCHARGE PLANNING  Goal: Discharge to home or other facility with appropriate resources  Description  INTERVENTIONS:  - Identify barriers to discharge w/patient and caregiver  - Arrange for needed discharge resources and transportation as appropriate  - Identify discharge learning needs (meds, wound care, etc )  - Arrange for interpretive services to assist at discharge as needed  - Refer to Case Management Department for coordinating discharge planning if the patient needs post-hospital services based on physician/advanced practitioner order or complex needs related to functional status, cognitive ability, or social support system  Outcome: Progressing

## 2019-10-14 NOTE — TELEPHONE ENCOUNTER
I spoke to pt and confirmed apt  He is still inpatient but he thinks he is being discharged tomorrow

## 2019-10-14 NOTE — TELEPHONE ENCOUNTER
New patient with  Renal mass  Seen by me in hospital    Please schedule office visit for follow-up, 15 minutes is okay, next available date at [place], okay to over both    Let me know if any questions

## 2019-10-14 NOTE — ASSESSMENT & PLAN NOTE
· Likely present on admission, patient felt feverish at home since last night as well as has a white count of 12,900 as per urgent care labs   Likely from diverticultitis   · Antibiotics as per primary problem   · IVF  · Follow blood cultures

## 2019-10-14 NOTE — CONSULTS
Consultation - Colorectal Surgery  Nicola Ryan 62 y o  male MRN: [id number]  Unit/Bed#: -01 Encounter: [id number]    Assessment/Plan     Assessment/Plan:  62 y o  yo male with acute diverticulitis with contained microperforation    - NPO  - IVF resuscitation  - Ancef/flagyl  - OR if clinically deteriorates, otherwise would manage conservatively  - will need outpatient c-scope once acute inflammation has resolved    History of Present Illness     HPI:  Nicola Ryan is a 62 y o  male who presents with L sided abdominal/back pain  Patient reports back pain started three weeks ago after doing lawn work  Gradually got worse and radiated to LUQ and LLQ  He reports that he was very sensitive to touch  Pain has been intermittent, but worsened today  Patient also reports fevers at home  Denies previous episodes of diverticulitis  Last c-scope was at age 52, reportedly normal           Inpatient consult to Colorectal Surgery     Performed by  [facility]     Authorized by Sarah Escobar PA-C              Review of Systems   Constitutional: Positive for fever  HENT: Negative  Eyes: Negative  Respiratory: Negative  Cardiovascular: Negative  Gastrointestinal: Positive for abdominal pain  Endocrine: Negative  Genitourinary: Negative  Musculoskeletal: Positive for back pain  Skin: Negative  Allergic/Immunologic: Negative  Hematological: Negative  Psychiatric/Behavioral: Negative          Historical Information   Past Medical History:   Diagnosis Date    Arthritis     Diverticulitis of large intestine with perforation without bleeding 10/13/2019    Hyperlipidemia     Renal cell cancer, left ([facility] [address] ) 10/13/2019     Past Surgical History:   Procedure Laterality Date    HERNIA REPAIR      HIP SURGERY      REPLACEMENT TOTAL KNEE Right 2011    REPLACEMENT TOTAL KNEE      SHOULDER SURGERY Left     SHOULDER SURGERY Right     WRIST FUSION Left 2016    WRIST SURGERY       Social History   Social History     Substance and Sexual Activity   Alcohol Use Yes    Alcohol/week: 3 0 standard drinks    Types: 3 Cans of beer per week    Frequency: Monthly or less    Drinks per session: 3 or 4    Binge frequency: Never    Comment: weekends     Social History     Substance and Sexual Activity   Drug Use No     Social History     Tobacco Use   Smoking Status Never Smoker   Smokeless Tobacco Never Used     Family History:   Family History   Problem Relation Age of Onset    Heart disease Father     Other Father         cardiac disorder    No Known Problems Mother     Heart attack Maternal Grandfather     Heart attack Paternal Grandfather     Thyroid cancer Son        Meds/Allergies   current meds:   Current Facility-Administered Medications   Medication Dose Route Frequency    acetaminophen (TYLENOL) tablet 650 mg  650 mg Oral Q6H PRN    [START ON 10/14/2019] atorvastatin (LIPITOR) tablet 20 mg  20 mg Oral Daily    [START ON 10/14/2019] ceFAZolin (ANCEF) IVPB (premix) 2,000 mg  2,000 mg Intravenous Q8H    [START ON 10/14/2019] enoxaparin (LOVENOX) subcutaneous injection 40 mg  40 mg Subcutaneous Daily    [START ON 10/14/2019] fish oil capsule 1,000 mg  1,000 mg Oral Daily    [START ON 10/14/2019] metroNIDAZOLE (FLAGYL) IVPB (premix) 500 mg  500 mg Intravenous Q8H    morphine injection 2 mg  2 mg Intravenous Q6H PRN    ondansetron (ZOFRAN) injection 4 mg  4 mg Intravenous Q6H PRN    oxyCODONE (ROXICODONE) IR tablet 5 mg  5 mg Oral Q6H PRN    [START ON 10/14/2019] sodium chloride 0 9 % infusion  125 mL/hr Intravenous Continuous    and PTA meds:   Prior to Admission Medications   Prescriptions Last Dose Informant Patient Reported? Taking? Omega-3 Fatty Acids (FISH OIL) 1200 MG CAPS  Self Yes Yes   Sig: Take by mouth     aspirin 81 MG tablet  Self Yes Yes   Sig: Take 1 tablet by mouth daily   atorvastatin (LIPITOR) 20 mg tablet  Self No Yes   Sig: Take 1 tablet (20 mg total) by mouth daily   cyclobenzaprine (FLEXERIL) 5 mg tablet   No No   Sig: Take 1 tablet (5 mg total) by mouth 3 (three) times a day as needed for muscle spasms for up to 10 days   multivitamin (THERAGRAN) TABS  Self Yes Yes   Sig: Take 1 tablet by mouth daily  Facility-Administered Medications: None     No Known Allergies    Objective   First Vitals:   Blood Pressure: 158/74 (10/13/19 1756)  Pulse: 88 (10/13/19 1756)  Temperature: 98 3 °F (36 8 °C) (10/13/19 1759)  Temp Source: Oral (10/13/19 1759)  Respirations: 16 (10/13/19 1756)  Height: 5' 7" (170 2 cm) (10/13/19 1756)  Weight - Scale: 75 5 kg (166 lb 7 2 oz) (10/13/19 1756)  SpO2: 97 % (10/13/19 1756)    Current Vitals:   Blood Pressure: 125/64 (10/13/19 2224)  Pulse: 78 (10/13/19 2224)  Temperature: 100 °F (37 8 °C) (10/13/19 2224)  Temp Source: Oral (10/13/19 2224)  Respirations: 18 (10/13/19 2224)  Height: 5' 7" (170 2 cm) (10/13/19 2224)  Weight - Scale: 75 9 kg (167 lb 5 3 oz) (10/13/19 2224)  SpO2: 96 % (10/13/19 2224)    No intake or output data in the 24 hours ending 10/13/19 2246    Invasive Devices     Peripheral Intravenous Line            Peripheral IV 10/13/19 Right Antecubital less than 1 day                Physical Exam   Constitutional: He is oriented to person, place, and time  He appears well-developed and well-nourished  HENT:   Head: Normocephalic and atraumatic  Eyes: Pupils are equal, round, and reactive to light  Neck: Normal range of motion  Cardiovascular: Normal rate and regular rhythm  Pulmonary/Chest: Effort normal    Abdominal: Soft  He exhibits no distension and no mass  There is tenderness  There is no guarding  Musculoskeletal: Normal range of motion  He exhibits no edema  Neurological: He is alert and oriented to person, place, and time  Skin: Skin is warm and dry         Lab Results: CBC: No results found for: WBC, HGB, HCT, MCV, PLT, ADJUSTEDWBC, MCH, MCHC, RDW, MPV, NRBC, CMP: No results found for: SODIUM, K, CL, CO2, ANIONGAP, BUN, CREATININE, GLUCOSE, CALCIUM, AST, ALT, ALKPHOS, PROT, BILITOT, EGFR, Coagulation: No results found for: PT, INR, APTT  Imaging: I have personally reviewed pertinent reports  and I have personally reviewed pertinent films in PACS  EKG, Pathology, and Other Studies: I have personally reviewed pertinent reports  and I have personally reviewed pertinent films in PACS    Counseling / Coordination of Care  Total floor / unit time spent today 20 minutes  Greater than 50% of total time was spent with the patient and / or family counseling and / or coordination of care

## 2019-10-14 NOTE — H&P
[address] [facility]  H&P- Virgie Simental 1960, 62 y o  male MRN: [id number]    Unit/Bed#: [facility] Encounter: [id number]    Primary Care Provider: Dora Singh MD   Date and time admitted to hospital: 10/13/2019  5:53 PM        * Diverticulitis of large intestine with perforation without bleeding  Assessment & Plan  · POA, noted on CT scan  Microperforation noted  No prior history of diverticulitis  Last colonoscopy was around the age of 52 and he was told he was good for 10 years  No signs of bleeding as per patient  Abdomen is tender, however no signs of peritonitis  · Admit patient to med/surg under inpatient status   · Consult Colorectal surgery   · Ancef/Flagyl IV  · Aggressive IVF  · Pain control   · Monitor hemodynamics      Sepsis ([address] )  Assessment & Plan  · Likely present on admission, patient felt feverish at home since last night as well as has a white count of 12,900 as per urgent care labs  Likely from diverticultitis   · Antibiotics as per primary problem   · IVF  · Follow blood cultures     Renal cell cancer, left (HCC)  Assessment & Plan  · POA, incidental finding on CT abdomen pelvis  Patient did have left flank pain for the last few weeks that was different from his typical back pain  No signs of blood in urine  No family history of cancer  No smoking history  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   · Check retroperitoneal ultrasound   · Consult urology     Mixed hyperlipidemia  Assessment & Plan  · Continue statin       VTE Prophylaxis: Enoxaparin (Lovenox)  / sequential compression device   Code Status: Full Code   POLST: POLST form is not discussed and not completed at this time  Discussion with family: Wife at bedside     Anticipated Length of Stay:  Patient will be admitted on an Inpatient basis with an anticipated length of stay of  Greater than 2 midnights     Justification for Hospital Stay: As per above assessment and plan     Total Time for Visit, including Counseling / Coordination of Care: 1 hour  Greater than 50% of this total time spent on direct patient counseling and coordination of care  Chief Complaint:   Back pain and abdominal pain    History of Present Illness:    Lata Swain is a 62 y o  male with a history of hyperlipidemia who presents with back and abdominal pain  The patient states that initially he started with left sided back pain few weeks ago  He states that this back pain was different than his typical back pain which is usually in the center and he has all the time  He states he felt that the back pain then moved to the center of his abdomen a few days ago  He denies any blood in his urine or changes urinary frequency  He denies any family history of cancers  He denies any smoking history  He states that he works at a power plant for 35 years  As far as his abdominal pain the patient states that is very severe and located inferiorly to his umbilicus  He states that it seems to come in waves and when it is at its worst is roughly a 7 to 8/10, right now is controlled at a 3 to 4/10  The patient states that last night he felt very fevers and states he could not get call and he states that he had a fever again today  He states that both times he did not check his temperatures  The patient denies any nausea or vomiting  He denies any diarrhea  He denies any change in color of stool, bright red blood in the stool, or tarry stools  The patient states his last colonoscopy was a roughly 6 years ago at the age of 52 and he states he was told he was good until he was 63  The patient denies any prior history of diverticulosis or diverticulitis  Review of Systems:    Review of Systems   Constitutional: Positive for fever  Negative for appetite change, chills, diaphoresis and fatigue  HENT: Negative for congestion, rhinorrhea and sore throat  Eyes: Negative for visual disturbance     Respiratory: Negative for cough, chest tightness, shortness of breath and wheezing  Cardiovascular: Negative for chest pain, palpitations and leg swelling  Gastrointestinal: Positive for abdominal pain  Negative for anal bleeding, blood in stool, constipation, diarrhea, nausea and vomiting  Genitourinary: Positive for flank pain  Negative for decreased urine volume, dysuria, frequency and hematuria  Musculoskeletal: Positive for back pain  Negative for arthralgias and myalgias  Neurological: Negative for dizziness, syncope, weakness, light-headedness, numbness and headaches  All other systems reviewed and are negative  Past Medical and Surgical History:     Past Medical History:   Diagnosis Date    Arthritis     Diverticulitis of large intestine with perforation without bleeding 10/13/2019    Hyperlipidemia     Renal cell cancer, left ([address] ) 10/13/2019       Past Surgical History:   Procedure Laterality Date    HERNIA REPAIR      HIP SURGERY      REPLACEMENT TOTAL KNEE Right 2011    REPLACEMENT TOTAL KNEE      SHOULDER SURGERY Left     SHOULDER SURGERY Right     WRIST FUSION Left 2016    WRIST SURGERY         Meds/Allergies:    Prior to Admission medications    Medication Sig Start Date End Date Taking? Authorizing Provider   aspirin 81 MG tablet Take 1 tablet by mouth daily   Yes Historical Provider, MD   atorvastatin (LIPITOR) 20 mg tablet Take 1 tablet (20 mg total) by mouth daily 2/18/19  Yes Char Urena MD   multivitamin [facility]) TABS Take 1 tablet by mouth daily  Yes Historical Provider, MD   Omega-3 Fatty Acids (FISH OIL) 1200 MG CAPS Take by mouth  Yes Historical Provider, MD   cyclobenzaprine (FLEXERIL) 5 mg tablet Take 1 tablet (5 mg total) by mouth 3 (three) times a day as needed for muscle spasms for up to 10 days 6/10/19 6/20/19  Lexus Carlin have reviewed home medications with patient personally      Allergies: No Known Allergies    Social History:     Marital Status: /Civil Union   Occupation: Semi-retired  Patient Pre-hospital Living Situation: Home  Patient Pre-hospital Level of Mobility: Full  Patient Pre-hospital Diet Restrictions: None  Substance Use History:   Social History     Substance and Sexual Activity   Alcohol Use Yes    Alcohol/week: 3 0 standard drinks    Types: 3 Cans of beer per week    Frequency: Monthly or less    Drinks per session: 3 or 4    Binge frequency: Never    Comment: weekends     Social History     Tobacco Use   Smoking Status Never Smoker   Smokeless Tobacco Never Used     Social History     Substance and Sexual Activity   Drug Use No       Family History:    Family History   Problem Relation Age of Onset    Heart disease Father     Other Father         cardiac disorder    No Known Problems Mother     Heart attack Maternal Grandfather     Heart attack Paternal Grandfather     Thyroid cancer Son        Physical Exam:     Vitals:   Blood Pressure: 132/62 (10/13/19 2116)  Pulse: 83 (10/13/19 2116)  Temperature: 98 3 °F (36 8 °C) (10/13/19 1759)  Temp Source: Oral (10/13/19 1759)  Respirations: 18 (10/13/19 2116)  Height: 5' 7" (170 2 cm) (10/13/19 1756)  Weight - Scale: 75 5 kg (166 lb 7 2 oz) (10/13/19 1756)  SpO2: 96 % (10/13/19 2038)    Physical Exam   Constitutional: He is oriented to person, place, and time  Vital signs are normal  He appears well-developed and well-nourished  Non-toxic appearance  No distress  HENT:   Head: Normocephalic and atraumatic  Mouth/Throat: Mucous membranes are not dry  Eyes: Pupils are equal, round, and reactive to light  Conjunctivae and EOM are normal  No scleral icterus  Pupils are equal    Neck: Neck supple  Cardiovascular: Normal rate, regular rhythm, S1 normal, S2 normal, normal heart sounds and intact distal pulses  Exam reveals no S3 and no S4  No murmur heard  Pulmonary/Chest: Effort normal and breath sounds normal  No accessory muscle usage or stridor  No respiratory distress   He has no decreased breath sounds  He has no wheezes  He has no rhonchi  He has no rales  He exhibits no tenderness  Abdominal: Soft  Bowel sounds are normal  He exhibits no distension and no mass  There is tenderness in the right lower quadrant, suprapubic area and left lower quadrant  There is no rigidity, no rebound, no guarding and no CVA tenderness  Neurological: He is alert and oriented to person, place, and time  He is not disoriented  GCS eye subscore is 4  GCS verbal subscore is 5  GCS motor subscore is 6  Skin: Skin is warm and dry  Additional Data:     Lab Results: I have personally reviewed pertinent reports  Imaging: I have personally reviewed pertinent reports  CT abdomen pelvis with contrast   Final Result by Ton Serra MD (10/13 2013)         1  Perforated sigmoid diverticulitis with focal free air  Surgical consult recommended  2  2 5 cm left renal midpole enhancing mass protruding into the calyceal system / renal pelvis containing peripheral calcification concerning for renal cell carcinoma/renal neoplasm  Renal ultrasound and CT urogram is recommended for further evaluation  Renal vein appears patent  No evidence of perirenal infiltration or medial invasion at this time  3  Diffuse bladder wall thickening could relate to underdistention, cystitis or outlet obstruction  Correlate with urinalysis  I personally discussed this study with Ambreen Miller on 10/13/2019 at 7:53 PM              Workstation performed: [id number]         US retroperitoneal complete    (Results Pending)       EKG, Pathology, and Other Studies Reviewed on Admission:   · EKG:  Perforated sigmoid colon diverticulitis with local free air  Surgical consult recommended  2 5 cm left renal mid pole enhancing mass protruding into the caliceal system/renal pelvis containing peripheral calcification concerning for renal cell carcinoma/renal neoplasm    Renal ultrasound CT urogram is recommended for further evaluation  Renal vein appears patent  No evidence of daniella renal infiltration or medial invasion at this time  Diffuse bladder wall thickening could relate to understand shin, cystitis or outlet obstruction  Correlate with urinalysis  Allscripts / Epic Records Reviewed: Yes     ** Please Note: This note has been constructed using a voice recognition system   **

## 2019-10-15 VITALS
BODY MASS INDEX: 26.26 KG/M2 | OXYGEN SATURATION: 99 % | RESPIRATION RATE: 18 BRPM | WEIGHT: 167.33 LBS | HEIGHT: 67 IN | HEART RATE: 50 BPM | TEMPERATURE: 98.9 F | DIASTOLIC BLOOD PRESSURE: 70 MMHG | SYSTOLIC BLOOD PRESSURE: 164 MMHG

## 2019-10-15 PROBLEM — G44.009 CLUSTER HEADACHE: Status: ACTIVE | Noted: 2019-10-15

## 2019-10-15 PROBLEM — A41.9 SEPSIS (HCC): Status: RESOLVED | Noted: 2019-10-13 | Resolved: 2019-10-15

## 2019-10-15 LAB
ERYTHROCYTE [DISTWIDTH] IN BLOOD BY AUTOMATED COUNT: 12.4 % (ref 11.6–15.1)
GLUCOSE SERPL-MCNC: 71 MG/DL (ref 65–140)
HCT VFR BLD AUTO: 40 % (ref 36.5–49.3)
HGB BLD-MCNC: 12.9 G/DL (ref 12–17)
MCH RBC QN AUTO: 30.3 PG (ref 26.8–34.3)
MCHC RBC AUTO-ENTMCNC: 32.3 G/DL (ref 31.4–37.4)
MCV RBC AUTO: 94 FL (ref 82–98)
PLATELET # BLD AUTO: 160 THOUSANDS/UL (ref 149–390)
PMV BLD AUTO: 9.3 FL (ref 8.9–12.7)
RBC # BLD AUTO: 4.26 MILLION/UL (ref 3.88–5.62)
WBC # BLD AUTO: 6.22 THOUSAND/UL (ref 4.31–10.16)

## 2019-10-15 PROCEDURE — 82948 REAGENT STRIP/BLOOD GLUCOSE: CPT

## 2019-10-15 PROCEDURE — 85027 COMPLETE CBC AUTOMATED: CPT | Performed by: INTERNAL MEDICINE

## 2019-10-15 PROCEDURE — 99233 SBSQ HOSP IP/OBS HIGH 50: CPT | Performed by: COLON & RECTAL SURGERY

## 2019-10-15 PROCEDURE — 99239 HOSP IP/OBS DSCHRG MGMT >30: CPT | Performed by: INTERNAL MEDICINE

## 2019-10-15 RX ORDER — SUMATRIPTAN 6 MG/.5ML
6 INJECTION, SOLUTION SUBCUTANEOUS ONCE
Status: COMPLETED | OUTPATIENT
Start: 2019-10-15 | End: 2019-10-15

## 2019-10-15 RX ORDER — SUMATRIPTAN 50 MG/1
50 TABLET, FILM COATED ORAL ONCE AS NEEDED
Qty: 10 TABLET | Refills: 0 | Status: SHIPPED | OUTPATIENT
Start: 2019-10-15 | End: 2019-12-10

## 2019-10-15 RX ORDER — AMOXICILLIN AND CLAVULANATE POTASSIUM 875; 125 MG/1; MG/1
1 TABLET, FILM COATED ORAL EVERY 12 HOURS SCHEDULED
Qty: 16 TABLET | Refills: 0 | Status: SHIPPED | OUTPATIENT
Start: 2019-10-15 | End: 2019-10-23

## 2019-10-15 RX ADMIN — ACETAMINOPHEN 650 MG: 325 TABLET ORAL at 03:26

## 2019-10-15 RX ADMIN — MORPHINE SULFATE 2 MG: 2 INJECTION, SOLUTION INTRAMUSCULAR; INTRAVENOUS at 04:37

## 2019-10-15 RX ADMIN — METRONIDAZOLE 500 MG: 500 INJECTION, SOLUTION INTRAVENOUS at 12:10

## 2019-10-15 RX ADMIN — METRONIDAZOLE 500 MG: 500 INJECTION, SOLUTION INTRAVENOUS at 04:33

## 2019-10-15 RX ADMIN — SUMATRIPTAN 6 MG: 6 INJECTION, SOLUTION SUBCUTANEOUS at 15:05

## 2019-10-15 RX ADMIN — SODIUM CHLORIDE 125 ML/HR: 0.9 INJECTION, SOLUTION INTRAVENOUS at 07:51

## 2019-10-15 RX ADMIN — KETOROLAC TROMETHAMINE 15 MG: 30 INJECTION, SOLUTION INTRAMUSCULAR at 09:57

## 2019-10-15 RX ADMIN — CEFAZOLIN SODIUM 2000 MG: 2 SOLUTION INTRAVENOUS at 04:33

## 2019-10-15 RX ADMIN — CEFAZOLIN SODIUM 2000 MG: 2 SOLUTION INTRAVENOUS at 11:31

## 2019-10-15 NOTE — PROGRESS NOTES
Pt is c/o worsening h/a behind his right eye  He is also now developing pain in the right jaw and ear, with associated mild numbness to the right cheek  Pt states "this feels similar to when my aneurysm was discovered in the past"  Neuro check WNKAYLA Wade with [facility] made aware

## 2019-10-15 NOTE — DISCHARGE SUMMARY
Discharge- Oliva Santiago 1960, 62 y o  male MRN: [id number]    Unit/Bed#: -01 Encounter: [id number]    Primary Care Provider: Brianna Lucio MD   Date and time admitted to hospital: 10/13/2019  5:53 PM        * Diverticulitis of large intestine with perforation without bleeding  Assessment & Plan  · POA, noted on CT scan  Microperforation noted  No prior history of diverticulitis  Last colonoscopy was around the age of 52 and was noted to have a normal findings, for repeat colonoscopy after 10 years  No no history of bleeding reported  Abdomen is tender, however no signs of peritonitis  · Colorectal surgery on board, appreciate input  · Transitioned to Augmentin 875 mg twice a day for 8 more days to complete 10 days  · Follow up with Dr Nickie Dumont within a week as outpatient for further evaluation and management  · May advance diet as tolerated  · Stable for discharge per colorectal surgery team    Sepsis (HCC)resolved as of 10/15/2019  Assessment & Plan  · Likely present on admission, patient felt feverish at home since last night as well as has a white count of 12,900 as per urgent care labs  Likely from diverticultitis   · No more leukocytosis  · Transition to oral antibiotics Augmentin 875 mg b i d  For 8 more days to complete 10 days  · Follow-up as outpatient with Dr Nickie Dumont    Renal cell cancer, left [facility])  Assessment & Plan  · POA, incidental finding on CT abdomen pelvis  Patient did have left flank pain for the last few weeks that was different from his typical back pain  No signs of blood in urine  No family history of cancer  No smoking history  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   · Urology on board, appreciate input  · CT scan renal protocol: 2 8 x 2 6 cm enhancing left midpole parapelvic renal mass consistent with renal cell carcinoma    · Follow-up with Urology as outpatient for further evaluation and possible surgical management  · If findings remain equivocal for urothelial neoplasm versus renal cell carcinoma, would consider more invasive testing as an outpatient either in the form of diagnostic left ureteroscopy or potentially percutaneous biopsy of the renal mass preoperatively    Mixed hyperlipidemia  Assessment & Plan  · Continue statin    Cluster headache  Assessment & Plan  Patient presented with headache described to be behind the right eye  Patient also with slight congestion and right eye tearing  Patient was also complaining of runny nose  Patient reported that he had the same symptoms during a previous hospitalization  · CT of the head was unremarkable  · O2 therapy  · Imitrex 6 mg subcutaneous  · Patient will be prescribed sumatriptan 50 mg tablet as needed      Discharging Resident Physician: Finesse Lora MD  Attending: Mallory Regalado MD  PCP: Linnette Dang MD  Admission Date: 10/13/2019  Discharge Date: 10/15/19    Disposition:     Home    Reason for Admission:  Palpitations    Consultations During Hospital Stay:  · Colorectal surgery  · Urology    Procedures Performed:     · None    Significant Findings / Test Results:     · CT of the abdomen and pelvis: Perforated sigmoid diverticulitis with focal free air  Surgical consult recommended; 2 5 cm left renal midpole enhancing mass protruding into the calyceal system / renal pelvis containing peripheral calcification concerning for renal cell carcinoma/renal neoplasm  Renal ultrasound and CT urogram is recommended for further evaluation  Renal vein appears patent  No evidence of perirenal infiltration or medial invasion at this time; Diffuse bladder wall thickening could relate to underdistention, cystitis or outlet obstruction  · CT of the head:  No acute intracranial abnormality  · CT renal protocol: 2 8 x 2 6 cm enhancing left midpole parapelvic renal mass consistent with renal cell carcinoma      Incidental Findings:   · None     Test Results Pending at Discharge (will require follow up):   · None     Outpatient Tests Requested:  · None    Complications:  None    Hospital Course:     Oliva Santiago is a 62 y o  male patient who originally presented to the hospital on 10/13/2019 due to back pain and abdominal pain  Pain was worse around the left upper quadrant and left lower quadrant  Patient also reports intermittent fever at home  No blood in the urine or the stool, no dysuria, no change in bowel or bladder habits  Last colonoscopy was done 6 years ago and was told that it was normal and to come back after 10 years  Patient went to the urgent care and was noted to be afebrile at 102° F  Patient also had leukocytosis of 12  Patient was then sent to the emergency department for further evaluation and management  At the ED CT of the abdomen and pelvis showed perforated sigmoid diverticulitis with focal free air  There was also a 2 x 5 cm left renal mid pole enhancing mass  Patient was referred to Colorectal surgery as well as Urology  Patient was also started on IV Ancef and Flagyl  Patient was kept on NPO for possible surgical intervention  Patient was noted to be complaining of pain behind the eye  Patient also noted increased tearing and slight congestion of her right eye  CT of the head was obtained, see findings above  Patient was given supplemental oxygen  Patient was also given Imitrex 6 mg subcutaneous  Patient was monitored for any recurrence of fever  Patient's leukocytosis eventually resolved  Patient was able to tolerate advance in diet  Patient is stable for discharge and will be sent home with Augmentin 875 mg twice a day to complete for 8 more days for a total of 10 days  Patient will follow up with Colorectal surgery and Urology as outpatient for further evaluation and management and possible surgical intervention  Patient will also be discharged with sumatriptan 50 mg tablets to take daily as needed for migraine    Patient was told to advance diet as tolerated  Patient is stable for discharge today  Condition at Discharge: stable     Discharge Day Visit / Exam:     Subjective:    Patient seen and examined today  Patient noted slight relief of headache and numbness and tingling on the face as compared to yesterday  Patient verbalized that his abdominal pain is a lot better  Patient was able to tolerate low fiber low residue diet today  Patient verbalized that he is ready to go home however was hoping for some pain medications for the headache  Otherwise no noted complaints  Vitals: Blood Pressure: 164/70 (10/15/19 1455)  Pulse: (!) 50 (10/15/19 1455)  Temperature: 98 9 °F (37 2 °C) (10/15/19 1455)  Temp Source: Oral (10/15/19 1455)  Respirations: 18 (10/15/19 1455)  Height: 5' 7" (170 2 cm) (10/13/19 2224)  Weight - Scale: 75 9 kg (167 lb 5 3 oz) (10/13/19 2224)  SpO2: 99 % (10/15/19 1455)    Exam:   Physical Exam   Constitutional: He is oriented to person, place, and time  He appears well-developed and well-nourished  No distress  HENT:   Head: Normocephalic  Mouth/Throat: Oropharynx is clear and moist  No oropharyngeal exudate  Eyes: Pupils are equal, round, and reactive to light  Conjunctivae and EOM are normal  No scleral icterus  Neck: Normal range of motion  Neck supple  No JVD present  Cardiovascular: Normal rate, regular rhythm, normal heart sounds and intact distal pulses  Pulmonary/Chest: Effort normal and breath sounds normal  No respiratory distress  Abdominal: Soft  Bowel sounds are normal  There is no tenderness  There is no guarding  Musculoskeletal: Normal range of motion  He exhibits no edema or tenderness  Lymphadenopathy:     He has no cervical adenopathy  Neurological: He is alert and oriented to person, place, and time  No cranial nerve deficit or sensory deficit  He exhibits normal muscle tone  Skin: Skin is warm  He is not diaphoretic  No erythema  No pallor     Psychiatric: He has a normal mood and affect  His behavior is normal  Thought content normal    Nursing note and vitals reviewed  Discussion with Family:  Discussed with the patient and wife at bedside    Discharge instructions/Information to patient and family:   See after visit summary for information provided to patient and family  Provisions for Follow-Up Care:  See after visit summary for information related to follow-up care and any pertinent home health orders  Planned Readmission:  None     Discharge Medications:  See after visit summary for reconciled discharge medications provided to patient and family        ** Please Note: This note has been constructed using a voice recognition system **

## 2019-10-15 NOTE — PROGRESS NOTES
Progress Note -Surgery PA  Norma Burroughs 62 y o  male MRN: [id number]  Unit/Bed#: -01 Encounter: [id number]    ASSESSMENT/PLAN:  Problem List     * (Principal) Diverticulitis of large intestine with perforation without bleeding    Osteoarthritis of finger of right hand    Mixed hyperlipidemia    Acute bilateral low back pain without sciatica    Sepsis ([facility] [address] )    Renal cell cancer, left [facility])        38-year-old male admitted for sigmoid diverticulitis with perforation, incidental CT scan finding renal cell cancer  Minimal abdominal pain, passing flatus but no bowel movement  Complaining of right-sided headaches and facial tingling, workup by medicine team so far negative  - Continue ancef/flagyl  - NPO, IVF  - Pain control PRN  - Coordinate with urology regarding renal cell cancer surgical planning      VTE Pharmacologic Prophylaxis: Sequential compression device (Venodyne)  and Enoxaparin (Lovenox)    Subjective/Objective     Subjective:  38-year-old male being treated for sigmoid diverticulitis with perforation  States he is doing well regarding his abdomen, minimal lower abdominal pain at this time  Passing large amounts of flatus but no bowel movement yet  Denies fevers or chills, chest pain shortness of breath, nausea or vomiting, calf pain  Does note onset of right-sided headache and facial tingling started last night, "this feels similar to when my aneurysm was discovered in the past", medicine team aware, CTH negative  Objective/Physical Exam: Blood pressure 158/76, pulse (!) 53, temperature 98 3 °F (36 8 °C), temperature source Temporal, resp  rate 18, height 5' 7" (1 702 m), weight 75 9 kg (167 lb 5 3 oz), SpO2 97 %  ,Body mass index is 26 21 kg/m²  General appearance: alert and oriented, in no acute distress  Heart: regular rate and rhythm, S1, S2 normal, no murmur, click, rub or gallop  Lungs: clear to auscultation bilaterally  Abdomen: A soft nondistended abdomen    Active bowel sounds throughout  Mild tenderness in lower abdomen without rigidity or guarding    Neurological: normal without focal findings, mental status, speech normal, alert and oriented x3, MIREYA and reflexes normal and symmetric      Current Facility-Administered Medications:     acetaminophen (TYLENOL) tablet 650 mg, 650 mg, Oral, Q6H PRN, DANIEL Sierra-C, 650 mg at 10/15/19 0326    atorvastatin (LIPITOR) tablet 20 mg, 20 mg, Oral, Daily, DANIEL Sierra-DAYRON    ceFAZolin (ANCEF) IVPB (premix) 2,000 mg, 2,000 mg, Intravenous, Q8H, Иван Cummings PA-C, Last Rate: 100 mL/hr at 10/15/19 0433, 2,000 mg at 10/15/19 0433    enoxaparin (LOVENOX) subcutaneous injection 40 mg, 40 mg, Subcutaneous, Daily, Иван Cummings PA-C    fish oil capsule 1,000 mg, 1,000 mg, Oral, Daily, DANIEL Sierra-DAYRON    ketorolac (TORADOL) injection 15 mg, 15 mg, Intravenous, Q6H PRN, Hedy Cox PA-C, 15 mg at 10/14/19 2350    metroNIDAZOLE (FLAGYL) IVPB (premix) 500 mg, 500 mg, Intravenous, Q8H, Иван Cummings PA-C, Last Rate: 200 mL/hr at 10/15/19 0433, 500 mg at 10/15/19 0433    morphine injection 2 mg, 2 mg, Intravenous, Q6H PRN, JULIETTE Gallagher, 2 mg at 10/15/19 0437    ondansetron (ZOFRAN) injection 4 mg, 4 mg, Intravenous, Q6H PRN, Candy Gray PA-C    sodium chloride 0 9 % infusion, 125 mL/hr, Intravenous, Continuous, Иван Cummings PA-C, Last Rate: 125 mL/hr at 10/15/19 0751, 125 mL/hr at 10/15/19 0751      Intake/Output Summary (Last 24 hours) at 10/15/2019 0914  Last data filed at 10/14/2019 1801  Gross per 24 hour   Intake 0 ml   Output    Net 0 ml       Invasive Devices     Peripheral Intravenous Line            Peripheral IV 10/13/19 Right Antecubital 1 day                          Lab, Imaging and other studies:  Admission on 10/13/2019   Component Date Value Ref Range Status    Color, UA 10/13/2019 Yellow   Final    Clarity, UA 10/13/2019 Clear   Final    Specific [place], UA 10/13/2019 <=1 005  1 003 - 1 030 Final    pH, UA 10/13/2019 6 0  4 5, 5 0, 5 5, 6 0, 6 5, 7 0, 7 5, 8 0 Final    Leukocytes, UA 10/13/2019 Negative  Negative Final    Nitrite, UA 10/13/2019 Negative  Negative Final    Protein, UA 10/13/2019 Negative  Negative mg/dl Final    Glucose, UA 10/13/2019 Negative  Negative mg/dl Final    Ketones, UA 10/13/2019 Negative  Negative mg/dl Final    Urobilinogen, UA 10/13/2019 0 2  0 2, 1 0 E U /dl E U /dl Final    Bilirubin, UA 10/13/2019 Negative  Negative Final    Blood, UA 10/13/2019 Negative  Negative Final    Blood Culture 10/13/2019 No Growth at 24 hrs  Preliminary    Blood Culture 10/13/2019 No Growth at 24 hrs     Preliminary    Sodium 10/14/2019 139  136 - 145 mmol/L Final    Potassium 10/14/2019 3 7  3 5 - 5 3 mmol/L Final    Chloride 10/14/2019 107  100 - 108 mmol/L Final    CO2 10/14/2019 25  21 - 32 mmol/L Final    ANION GAP 10/14/2019 7  4 - 13 mmol/L Final    BUN 10/14/2019 14  5 - 25 mg/dL Final    Creatinine 10/14/2019 1 04  0 60 - 1 30 mg/dL Final    Glucose 10/14/2019 91  65 - 140 mg/dL Final    Calcium 10/14/2019 8 2* 8 3 - 10 1 mg/dL Final    eGFR 10/14/2019 77  ml/min/1 73sq m Final    WBC 10/14/2019 8 34  4 31 - 10 16 Thousand/uL Final    RBC 10/14/2019 4 17  3 88 - 5 62 Million/uL Final    Hemoglobin 10/14/2019 12 6  12 0 - 17 0 g/dL Final    Hematocrit 10/14/2019 38 7  36 5 - 49 3 % Final    MCV 10/14/2019 93  82 - 98 fL Final    MCH 10/14/2019 30 2  26 8 - 34 3 pg Final    MCHC 10/14/2019 32 6  31 4 - 37 4 g/dL Final    RDW 10/14/2019 12 6  11 6 - 15 1 % Final    MPV 10/14/2019 9 2  8 9 - 12 7 fL Final    Platelets 09/25/1303 157  149 - 390 Thousands/uL Final    nRBC 10/14/2019 0  /100 WBCs Final    Neutrophils Relative 10/14/2019 86* 43 - 75 % Final    Immat GRANS % 10/14/2019 0  0 - 2 % Final    Lymphocytes Relative 10/14/2019 7* 14 - 44 % Final    Monocytes Relative 10/14/2019 6  4 - 12 % Final    Eosinophils Relative 10/14/2019 1  0 - 6 % Final  Basophils Relative 10/14/2019 0  0 - 1 % Final    Neutrophils Absolute 10/14/2019 7 16  1 85 - 7 62 Thousands/µL Final    Immature Grans Absolute 10/14/2019 0 03  0 00 - 0 20 Thousand/uL Final    Lymphocytes Absolute 10/14/2019 0 57* 0 60 - 4 47 Thousands/µL Final    Monocytes Absolute 10/14/2019 0 51  0 17 - 1 22 Thousand/µL Final    Eosinophils Absolute 10/14/2019 0 04  0 00 - 0 61 Thousand/µL Final    Basophils Absolute 10/14/2019 0 03  0 00 - 0 10 Thousands/µL Final    WBC 10/15/2019 6 22  4 31 - 10 16 Thousand/uL Final    RBC 10/15/2019 4 26  3 88 - 5 62 Million/uL Final    Hemoglobin 10/15/2019 12 9  12 0 - 17 0 g/dL Final    Hematocrit 10/15/2019 40 0  36 5 - 49 3 % Final    MCV 10/15/2019 94  82 - 98 fL Final    MCH 10/15/2019 30 3  26 8 - 34 3 pg Final    MCHC 10/15/2019 32 3  31 4 - 37 4 g/dL Final    RDW 10/15/2019 12 4  11 6 - 15 1 % Final    Platelets 45/38/2511 160  149 - 390 Thousands/uL Final    MPV 10/15/2019 9 3  8 9 - 12 7 fL Final

## 2019-10-15 NOTE — TELEPHONE ENCOUNTER
Patient is still inpt as of 10/15/19  Check back tomorrow morning to see if patient has been discharged, if not call patient to reschedule as we do not want this to count against him as a no show due to hospitalization

## 2019-10-15 NOTE — ASSESSMENT & PLAN NOTE
· Likely present on admission, patient felt feverish at home since last night as well as has a white count of 12,900 as per urgent care labs  Likely from diverticultitis   · No more leukocytosis  · Transition to oral antibiotics Augmentin 875 mg b i d   For 8 more days to complete 10 days  · Follow-up as outpatient with Dr Rian Cordero

## 2019-10-15 NOTE — ASSESSMENT & PLAN NOTE
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

## 2019-10-15 NOTE — ASSESSMENT & PLAN NOTE
· POA, noted on CT scan  Microperforation noted  No prior history of diverticulitis  Last colonoscopy was around the age of 52 and was noted to have a normal findings, for repeat colonoscopy after 10 years  No no history of bleeding reported  Abdomen is tender, however no signs of peritonitis    · Colorectal surgery on board, appreciate input  · Transitioned to Augmentin 875 mg twice a day for 8 more days to complete 10 days  · Follow up with Dr Lima Rodríguez within a week as outpatient for further evaluation and management  · May advance diet as tolerated  · Stable for discharge per colorectal surgery team

## 2019-10-15 NOTE — ASSESSMENT & PLAN NOTE
Patient presented with headache described to be behind the right eye  Patient also with slight congestion and right eye tearing  Patient was also complaining of runny nose  Patient reported that he had the same symptoms during a previous hospitalization    · CT of the head was unremarkable  · O2 therapy  · Imitrex 6 mg subcutaneous  · Patient will be prescribed sumatriptan 50 mg tablet as needed

## 2019-10-16 ENCOUNTER — OFFICE VISIT (OUTPATIENT)
Dept: UROLOGY | Facility: CLINIC | Age: 59
End: 2019-10-16
Payer: COMMERCIAL

## 2019-10-16 VITALS
WEIGHT: 162 LBS | SYSTOLIC BLOOD PRESSURE: 148 MMHG | BODY MASS INDEX: 25.43 KG/M2 | DIASTOLIC BLOOD PRESSURE: 84 MMHG | HEART RATE: 63 BPM | HEIGHT: 67 IN

## 2019-10-16 DIAGNOSIS — C64.2 RENAL CELL CANCER, LEFT (HCC): Primary | ICD-10-CM

## 2019-10-16 PROCEDURE — 99214 OFFICE O/P EST MOD 30 MIN: CPT | Performed by: UROLOGY

## 2019-10-16 NOTE — TELEPHONE ENCOUNTER
Per [facility], pt was discharged and remains on the schedule for today

## 2019-10-16 NOTE — LETTER
October 16, 2019     Stephanie Sorto MD  [address] [place] [address]    Patient: Shade Todd   YOB: 1960   Date of Visit: 10/16/2019       Dear Dr Jaiden Diaz: Thank you for referring [place] to me for evaluation  Below are my notes for this consultation  If you have questions, please do not hesitate to call me  I look forward to following your patient along with you  Sincerely,        Sabino Morley MD        CC: Claudell Hoa, MD Sander Soles, MD  10/16/2019  2:49 PM  Sign at close encounter      ASSESSMENT/PLAN:     1  2 5 cm enhancing left renal mass, consistent with urothelial neoplasm  - CT is negative for regional lymphadenopathy or metastatic disease, clinical staging is cT1a  - radiographically this lesion is consistent with renal cell carcinoma  Notation is made to the absence of microscopic hematuria on urinalysis, lower level of concern for urothelial carcinoma      2  Perforated diverticulitis  - doing well with conservative management per colorectal surgery    Had a nice discussion with Nelly Ruvalcaba and his rajwinder wife today  We discussed the CT scan is clearly consistent with renal cell carcinoma  Based upon the purely endophytic nature of his tumor abutting the renal hilum and in close proximity to the renal sinus I do not believe that his lesion is amenable to a nephron sparing approach my recommendation for a laparoscopic left radical nephrectomy      We discussed with the patient the details regarding their renal mass, including the risks of recurrence, progression and metastasis  We went over the various alternatives for managing renal masses including active surveillance, cryoablation and radiofrequency ablation, partial nephrectomy and radical nephrectomy  Where appropriate, we also discussed the differences between open, laparoscopic and robotic renal surgery    A particular emphasis was placed on how these options apply (or don't apply) to the patient's current situation  The perioperative morbidity and mortality associated with the nephrectomy procedures was discussed in detail and the normal postoperative course was presented  The patient asked questions and all of them were answered  The potential risks, benefits, possible complications and alternatives were discussed  Possible complications include but are not limited to bleeding/transfusion, infection, reaction to anesthesia, heart attack,stroke or other adverse medical outcome, positioning injury,neuropraxia, PE/DVT, air embolus, the possibility the lesion is benign, failure to eradicate all the tumor if applicable or tumor recurrence necessitating further treatment, injury to surrounding structures including but not limited to solid organs (spleen/splenectomy, pancreas, liver, adrenal, etc ), bowel, nerve, vessels, etc , port site hernia, bowel obstruction, discomfort, wound infection, poor cosmesis, worsened renal function, chronic renal insufficiency, need for temporary or permanent dialysis, postoperative fluid collections or other unforeseen complication  The patient understands there is always a chance of having to convert the procedure to an open nephrectomy  Also, complications may require additional procedures or surgery  Informed consent was obtained  After this discussion the patient consented to a laparoscopic hand assisted radical nephrectomy  Surgical be scheduled in the near future  If the patient is recommended to undergo a sigmoid colectomy by Dr Marcelle Machado for his diverticulitis, we discussed that the laparoscopic left radical nephrectomy could be performed as a concurrent procedure    I will coordinate with Dr Ana Guerra of indicated, otherwise we will plan to schedule his nephrectomy in the next 4-6 weeks giving some time to continue recovery from his episode of diverticulitis     Marcie Long MD    History of Present Illness: Ramiro Juan is a 62 y o  returns in follow-up  He is known to me for recent inpatient hospitalization  In brief,hepresented with abdominal and back pain and subjective fevers  Imaging revealed evidence of perforated diverticulitis  He has been clinically stable and is being managed with conservative therapy, antibiotics and fluids and bowel rest appears to be doing quite well  Incidentally noted on his CT scan was a 2 5 cm well circumscribed completely endophytic enhancing left renal mass consistent with neoplasm  The lesion is a centrally central within the kidney, located close to the hilum and the renal sinus  Peripheral calcification is noted  No evidence of renal vein, regional lymphadenopathy or metastatic disease is noted  Shante Mendez is a lifelong nonsmoker  Familial history is negative for genitourinary malignancies  He describes no gross hematuria or other urologic complaints  Following my initial evaluation obtain a CT renal protocol which revealed a well-circumscribed nodular lesion within the parenchyma of the left kidney  Radiographically this is felt to be consistent with renal cell carcinoma      Past Medical, Past Surgical History:     Past Medical History:   Diagnosis Date    Arthritis     Diverticulitis of large intestine with perforation without bleeding 10/13/2019    Hyperlipidemia     Renal cell cancer, left ([facility] [address] ) 10/13/2019   :    Past Surgical History:   Procedure Laterality Date    HERNIA REPAIR      HIP SURGERY      REPLACEMENT TOTAL KNEE Right 2011    REPLACEMENT TOTAL KNEE      SHOULDER SURGERY Left     SHOULDER SURGERY Right     WRIST FUSION Left 2016    WRIST SURGERY     :    Medications, Allergies:     Current Outpatient Medications:     amoxicillin-clavulanate (AUGMENTIN) 875-125 mg per tablet, Take 1 tablet by mouth every 12 (twelve) hours for 8 days, Disp: 16 tablet, Rfl: 0    aspirin 81 MG tablet, Take 1 tablet by mouth daily, Disp: , Rfl:    atorvastatin (LIPITOR) 20 mg tablet, Take 1 tablet (20 mg total) by mouth daily, Disp: 90 tablet, Rfl: 2    multivitamin (THERAGRAN) TABS, Take 1 tablet by mouth daily  , Disp: , Rfl:     Omega-3 Fatty Acids (FISH OIL) 1200 MG CAPS, Take by mouth , Disp: , Rfl:     SUMAtriptan (IMITREX) 50 mg tablet, Take 1 tablet (50 mg total) by mouth once as needed for migraine (headache) for up to 10 days, Disp: 10 tablet, Rfl: 0    cyclobenzaprine (FLEXERIL) 5 mg tablet, Take 1 tablet (5 mg total) by mouth 3 (three) times a day as needed for muscle spasms for up to 10 days, Disp: 30 tablet, Rfl: 0  No current facility-administered medications for this visit  Allergies:  No Known Allergies:    Social and Family History:   Social History:   Social History     Tobacco Use    Smoking status: Never Smoker    Smokeless tobacco: Never Used   Substance Use Topics    Alcohol use: Yes     Alcohol/week: 3 0 standard drinks     Types: 3 Cans of beer per week     Frequency: Monthly or less     Drinks per session: 3 or 4     Binge frequency: Never     Comment: weekends    Drug use: No        Social History     Tobacco Use   Smoking Status Never Smoker   Smokeless Tobacco Never Used       Family History:  Family History   Problem Relation Age of Onset    Heart disease Father     Other Father         cardiac disorder    No Known Problems Mother     Heart attack Maternal Grandfather     Heart attack Paternal Grandfather     Thyroid cancer Son    :     Review of Systems:     General: Fever, chills, or night sweats: positive  Cardiac: Negative for chest pain  Pulmonary: Negative for shortness of breath  Gastrointestinal: Abdominal pain positive  Nausea, vomiting, or diarrhea negative,  Genitourinary: See HPI above  Patient does not have hematuria  All other systems queried were negative  Physical Exam:   General: Patient is pleasant and in NAD   Awake and alert  /84 (BP Location: Right arm, Patient Position: Sitting, Cuff Size: Adult)   Pulse 63   Ht 5' 7" (1 702 m)   Wt 73 5 kg (162 lb)   BMI 25 37 kg/m²    Cardiac: Peripheral edema: positive  Pulmonary: Non-labored breathing  Abdomen: Soft, non-tender, non-distended  No surgical scars  No masses, tenderness, hernias noted  Genitourinary: Negative CVA tenderness, positive suprapubic tenderness  Labs:     Lab Results   Component Value Date    HGB 12 9 10/15/2019    HCT 40 0 10/15/2019    WBC 6 22 10/15/2019     10/15/2019   ]    Lab Results   Component Value Date    K 3 7 10/14/2019     10/14/2019    CO2 25 10/14/2019    BUN 14 10/14/2019    CREATININE 1 04 10/14/2019    CALCIUM 8 2 (L) 10/14/2019   ]    Imaging:   I personally reviewed the images and report of the following studies, and reviewed them with the patient:    FINDINGS:     ABDOMEN     LOWER CHEST:  No clinically significant abnormality identified in the visualized lower chest      LIVER/BILIARY TREE:  Unremarkable      GALLBLADDER:  No calcified gallstones  No pericholecystic inflammatory change      SPLEEN:  Unremarkable      PANCREAS:  Unremarkable      ADRENAL GLANDS:  Unremarkable      KIDNEYS/URETERS:  2 5 cm suspect left renal mass protruding into the calyceal system / renal pelvis containing peripheral calcification concerning for renal cell carcinoma/renal neoplasm  Renal ultrasound is recommended for further evaluation  Renal vein   appears patent  No evidence of perirenal infiltration or medial invasion at this time       STOMACH AND BOWEL:  Fatty infiltration about the proximal sigmoid colon consistent with sigmoid diverticulitis  Few foci of free air identified along the superior aspect best seen on series 2 image 62 and 61 and 64 concerning for focal perforation       APPENDIX:  No findings to suggest appendicitis      ABDOMINOPELVIC CAVITY:  Small foci of free air identified around the sigmoid diverticulitis   No significant free fluid No lymphadenopathy      VESSELS: Unremarkable for patient's age      PELVIS     REPRODUCTIVE ORGANS:  Unremarkable for patient's age      URINARY BLADDER:  Diffuse bladder wall thickening could relate to underdistention, cystitis or outlet obstruction  Correlate with urinalysis      ABDOMINAL WALL/INGUINAL REGIONS:  Unremarkable      OSSEOUS STRUCTURES:  No acute fracture or destructive osseous lesion      IMPRESSION:        1  Perforated sigmoid diverticulitis with focal free air  Surgical consult recommended  2  2 5 cm left renal midpole enhancing mass protruding into the calyceal system / renal pelvis containing peripheral calcification concerning for renal cell carcinoma/renal neoplasm  Renal ultrasound and CT urogram is recommended for further evaluation  Renal vein appears patent  No evidence of perirenal infiltration or medial invasion at this time  3  Diffuse bladder wall thickening could relate to underdistention, cystitis or outlet obstruction  Correlate with urinalysis

## 2019-10-16 NOTE — PROGRESS NOTES
ASSESSMENT/PLAN:     1  2 5 cm enhancing left renal mass, consistent with urothelial neoplasm  - CT is negative for regional lymphadenopathy or metastatic disease, clinical staging is cT1a  - radiographically this lesion is consistent with renal cell carcinoma  Notation is made to the absence of microscopic hematuria on urinalysis, lower level of concern for urothelial carcinoma      2  Perforated diverticulitis  - doing well with conservative management per colorectal surgery    Had a nice discussion with Stephanie Remy and his rajwinder wife today  We discussed the CT scan is clearly consistent with renal cell carcinoma  Based upon the purely endophytic nature of his tumor abutting the renal hilum and in close proximity to the renal sinus I do not believe that his lesion is amenable to a nephron sparing approach my recommendation for a laparoscopic left radical nephrectomy      We discussed with the patient the details regarding their renal mass, including the risks of recurrence, progression and metastasis  We went over the various alternatives for managing renal masses including active surveillance, cryoablation and radiofrequency ablation, partial nephrectomy and radical nephrectomy  Where appropriate, we also discussed the differences between open, laparoscopic and robotic renal surgery  A particular emphasis was placed on how these options apply (or don't apply) to the patient's current situation  The perioperative morbidity and mortality associated with the nephrectomy procedures was discussed in detail and the normal postoperative course was presented  The patient asked questions and all of them were answered  The potential risks, benefits, possible complications and alternatives were discussed    Possible complications include but are not limited to bleeding/transfusion, infection, reaction to anesthesia, heart attack,stroke or other adverse medical outcome, positioning injury,neuropraxia, PE/DVT, air embolus, the possibility the lesion is benign, failure to eradicate all the tumor if applicable or tumor recurrence necessitating further treatment, injury to surrounding structures including but not limited to solid organs (spleen/splenectomy, pancreas, liver, adrenal, etc ), bowel, nerve, vessels, etc , port site hernia, bowel obstruction, discomfort, wound infection, poor cosmesis, worsened renal function, chronic renal insufficiency, need for temporary or permanent dialysis, postoperative fluid collections or other unforeseen complication  The patient understands there is always a chance of having to convert the procedure to an open nephrectomy  Also, complications may require additional procedures or surgery  Informed consent was obtained  After this discussion the patient consented to a laparoscopic hand assisted radical nephrectomy  Surgical be scheduled in the near future  If the patient is recommended to undergo a sigmoid colectomy by Dr Sameer Lacy for his diverticulitis, we discussed that the laparoscopic left radical nephrectomy could be performed as a concurrent procedure  I will coordinate with Dr Karlene Rivera of indicated, otherwise we will plan to schedule his nephrectomy in the next 4-6 weeks giving some time to continue recovery from his episode of diverticulitis     Jennifer Zimmerman MD    History of Present Illness:     Cristina Queen is a 62 y o  returns in follow-up  He is known to me for recent inpatient hospitalization  In brief,hepresented with abdominal and back pain and subjective fevers  Imaging revealed evidence of perforated diverticulitis  He has been clinically stable and is being managed with conservative therapy, antibiotics and fluids and bowel rest appears to be doing quite well  Incidentally noted on his CT scan was a 2 5 cm well circumscribed completely endophytic enhancing left renal mass consistent with neoplasm    The lesion is a centrally central within the kidney, located close to the hilum and the renal sinus  Peripheral calcification is noted  No evidence of renal vein, regional lymphadenopathy or metastatic disease is noted  Tiffany Hanson is a lifelong nonsmoker  Familial history is negative for genitourinary malignancies  He describes no gross hematuria or other urologic complaints  Following my initial evaluation obtain a CT renal protocol which revealed a well-circumscribed nodular lesion within the parenchyma of the left kidney  Radiographically this is felt to be consistent with renal cell carcinoma  Past Medical, Past Surgical History:     Past Medical History:   Diagnosis Date    Arthritis     Diverticulitis of large intestine with perforation without bleeding 10/13/2019    Hyperlipidemia     Renal cell cancer, left ([facility] [address] ) 10/13/2019   :    Past Surgical History:   Procedure Laterality Date    HERNIA REPAIR      HIP SURGERY      REPLACEMENT TOTAL KNEE Right 2011    REPLACEMENT TOTAL KNEE      SHOULDER SURGERY Left     SHOULDER SURGERY Right     WRIST FUSION Left 2016    WRIST SURGERY     :    Medications, Allergies:     Current Outpatient Medications:     amoxicillin-clavulanate (AUGMENTIN) 875-125 mg per tablet, Take 1 tablet by mouth every 12 (twelve) hours for 8 days, Disp: 16 tablet, Rfl: 0    aspirin 81 MG tablet, Take 1 tablet by mouth daily, Disp: , Rfl:     atorvastatin (LIPITOR) 20 mg tablet, Take 1 tablet (20 mg total) by mouth daily, Disp: 90 tablet, Rfl: 2    multivitamin (THERAGRAN) TABS, Take 1 tablet by mouth daily  , Disp: , Rfl:     Omega-3 Fatty Acids (FISH OIL) 1200 MG CAPS, Take by mouth , Disp: , Rfl:     SUMAtriptan (IMITREX) 50 mg tablet, Take 1 tablet (50 mg total) by mouth once as needed for migraine (headache) for up to 10 days, Disp: 10 tablet, Rfl: 0    cyclobenzaprine (FLEXERIL) 5 mg tablet, Take 1 tablet (5 mg total) by mouth 3 (three) times a day as needed for muscle spasms for up to 10 days, Disp: 30 tablet, Rfl: 0  No current facility-administered medications for this visit  Allergies:  No Known Allergies:    Social and Family History:   Social History:   Social History     Tobacco Use    Smoking status: Never Smoker    Smokeless tobacco: Never Used   Substance Use Topics    Alcohol use: Yes     Alcohol/week: 3 0 standard drinks     Types: 3 Cans of beer per week     Frequency: Monthly or less     Drinks per session: 3 or 4     Binge frequency: Never     Comment: weekends    Drug use: No        Social History     Tobacco Use   Smoking Status Never Smoker   Smokeless Tobacco Never Used       Family History:  Family History   Problem Relation Age of Onset    Heart disease Father     Other Father         cardiac disorder    No Known Problems Mother     Heart attack Maternal Grandfather     Heart attack Paternal Grandfather     Thyroid cancer Son    :     Review of Systems:     General: Fever, chills, or night sweats: positive  Cardiac: Negative for chest pain  Pulmonary: Negative for shortness of breath  Gastrointestinal: Abdominal pain positive  Nausea, vomiting, or diarrhea negative,  Genitourinary: See HPI above  Patient does not have hematuria  All other systems queried were negative  Physical Exam:   General: Patient is pleasant and in NAD  Awake and alert  /84 (BP Location: Right arm, Patient Position: Sitting, Cuff Size: Adult)   Pulse 63   Ht 5' 7" (1 702 m)   Wt 73 5 kg (162 lb)   BMI 25 37 kg/m²   Cardiac: Peripheral edema: positive  Pulmonary: Non-labored breathing  Abdomen: Soft, non-tender, non-distended  No surgical scars  No masses, tenderness, hernias noted  Genitourinary: Negative CVA tenderness, positive suprapubic tenderness        Labs:     Lab Results   Component Value Date    HGB 12 9 10/15/2019    HCT 40 0 10/15/2019    WBC 6 22 10/15/2019     10/15/2019   ]    Lab Results   Component Value Date    K 3 7 10/14/2019     10/14/2019    CO2 25 10/14/2019    BUN 14 10/14/2019    CREATININE 1 04 10/14/2019    CALCIUM 8 2 (L) 10/14/2019   ]    Imaging:   I personally reviewed the images and report of the following studies, and reviewed them with the patient:    FINDINGS:     ABDOMEN     LOWER CHEST:  No clinically significant abnormality identified in the visualized lower chest      LIVER/BILIARY TREE:  Unremarkable      GALLBLADDER:  No calcified gallstones  No pericholecystic inflammatory change      SPLEEN:  Unremarkable      PANCREAS:  Unremarkable      ADRENAL GLANDS:  Unremarkable      KIDNEYS/URETERS:  2 5 cm suspect left renal mass protruding into the calyceal system / renal pelvis containing peripheral calcification concerning for renal cell carcinoma/renal neoplasm  Renal ultrasound is recommended for further evaluation  Renal vein   appears patent  No evidence of perirenal infiltration or medial invasion at this time       STOMACH AND BOWEL:  Fatty infiltration about the proximal sigmoid colon consistent with sigmoid diverticulitis  Few foci of free air identified along the superior aspect best seen on series 2 image 62 and 61 and 64 concerning for focal perforation       APPENDIX:  No findings to suggest appendicitis      ABDOMINOPELVIC CAVITY:  Small foci of free air identified around the sigmoid diverticulitis  No significant free fluid No lymphadenopathy      VESSELS:  Unremarkable for patient's age      PELVIS     REPRODUCTIVE ORGANS:  Unremarkable for patient's age      URINARY BLADDER:  Diffuse bladder wall thickening could relate to underdistention, cystitis or outlet obstruction  Correlate with urinalysis      ABDOMINAL WALL/INGUINAL REGIONS:  Unremarkable      OSSEOUS STRUCTURES:  No acute fracture or destructive osseous lesion      IMPRESSION:        1  Perforated sigmoid diverticulitis with focal free air  Surgical consult recommended    2  2 5 cm left renal midpole enhancing mass protruding into the calyceal system / renal pelvis containing peripheral calcification concerning for renal cell carcinoma/renal neoplasm  Renal ultrasound and CT urogram is recommended for further evaluation  Renal vein appears patent  No evidence of perirenal infiltration or medial invasion at this time  3  Diffuse bladder wall thickening could relate to underdistention, cystitis or outlet obstruction  Correlate with urinalysis

## 2019-10-17 ENCOUNTER — TRANSITIONAL CARE MANAGEMENT (OUTPATIENT)
Dept: INTERNAL MEDICINE CLINIC | Facility: CLINIC | Age: 59
End: 2019-10-17

## 2019-10-19 LAB
BACTERIA BLD CULT: NORMAL
BACTERIA BLD CULT: NORMAL

## 2019-10-29 PROBLEM — C64.2 MALIGNANT NEOPLASM OF LEFT KIDNEY, EXCEPT RENAL PELVIS (HCC): Status: ACTIVE | Noted: 2019-10-23

## 2019-10-31 ENCOUNTER — TELEPHONE (OUTPATIENT)
Dept: UROLOGY | Facility: AMBULATORY SURGERY CENTER | Age: 59
End: 2019-10-31

## 2019-10-31 NOTE — TELEPHONE ENCOUNTER
Appointment scheduled for 1/15/2020 at 11:30 at the [facility] office with Dr Ramona Jones  Please confirm with patient

## 2019-10-31 NOTE — TELEPHONE ENCOUNTER
Keturahshorty Chung is scheduled for joint surgery with Consuelo Marie on 12/30/19  He will need a post-op appointment with Dr Allie Olivera for the left nephrectomy  Please schedule and have  call patient with date/time  Thank you

## 2019-11-08 DIAGNOSIS — E78.2 MIXED HYPERLIPIDEMIA: ICD-10-CM

## 2019-11-08 RX ORDER — ATORVASTATIN CALCIUM 20 MG/1
20 TABLET, FILM COATED ORAL DAILY
Qty: 30 TABLET | Refills: 0 | Status: SHIPPED | OUTPATIENT
Start: 2019-11-08 | End: 2019-12-10 | Stop reason: SDUPTHER

## 2019-11-08 NOTE — TELEPHONE ENCOUNTER
Patient will need a refill on the atorvastatin (LIPITOR) 20 mg tablet     Once a day    Patient doesn't want the 30 day supply till he comes in to see the provider on his 12/10/2019 appointment then he will get the 90 day to the pharmacy on the visit      Pharmacy    [facility] on [address] in [place]

## 2019-11-19 DIAGNOSIS — E78.2 MIXED HYPERLIPIDEMIA: ICD-10-CM

## 2019-11-19 RX ORDER — ATORVASTATIN CALCIUM 20 MG/1
TABLET, FILM COATED ORAL
Qty: 90 TABLET | Refills: 2 | OUTPATIENT
Start: 2019-11-19

## 2019-12-02 ENCOUNTER — OFFICE VISIT (OUTPATIENT)
Dept: LAB | Age: 59
End: 2019-12-02
Payer: COMMERCIAL

## 2019-12-02 ENCOUNTER — APPOINTMENT (OUTPATIENT)
Dept: LAB | Age: 59
End: 2019-12-02
Payer: COMMERCIAL

## 2019-12-02 DIAGNOSIS — C64.2 RENAL CELL CANCER, LEFT (HCC): ICD-10-CM

## 2019-12-02 DIAGNOSIS — K57.20 DIVERTICULITIS OF LARGE INTESTINE WITH PERFORATION WITHOUT BLEEDING: ICD-10-CM

## 2019-12-02 DIAGNOSIS — E78.2 MIXED HYPERLIPIDEMIA: ICD-10-CM

## 2019-12-02 LAB
ABO GROUP BLD: NORMAL
ALBUMIN SERPL BCP-MCNC: 4.2 G/DL (ref 3.5–5)
ALP SERPL-CCNC: 80 U/L (ref 46–116)
ALT SERPL W P-5'-P-CCNC: 41 U/L (ref 12–78)
ANION GAP SERPL CALCULATED.3IONS-SCNC: 2 MMOL/L (ref 4–13)
AST SERPL W P-5'-P-CCNC: 22 U/L (ref 5–45)
ATRIAL RATE: 56 BPM
BASOPHILS # BLD AUTO: 0.03 THOUSANDS/ΜL (ref 0–0.1)
BASOPHILS NFR BLD AUTO: 1 % (ref 0–1)
BILIRUB SERPL-MCNC: 0.99 MG/DL (ref 0.2–1)
BLD GP AB SCN SERPL QL: NEGATIVE
BUN SERPL-MCNC: 18 MG/DL (ref 5–25)
CALCIUM SERPL-MCNC: 9.3 MG/DL (ref 8.3–10.1)
CHLORIDE SERPL-SCNC: 107 MMOL/L (ref 100–108)
CHOLEST SERPL-MCNC: 164 MG/DL (ref 50–200)
CO2 SERPL-SCNC: 30 MMOL/L (ref 21–32)
CREAT SERPL-MCNC: 0.87 MG/DL (ref 0.6–1.3)
EOSINOPHIL # BLD AUTO: 0.12 THOUSAND/ΜL (ref 0–0.61)
EOSINOPHIL NFR BLD AUTO: 2 % (ref 0–6)
ERYTHROCYTE [DISTWIDTH] IN BLOOD BY AUTOMATED COUNT: 12.8 % (ref 11.6–15.1)
EST. AVERAGE GLUCOSE BLD GHB EST-MCNC: 120 MG/DL
GFR SERPL CREATININE-BSD FRML MDRD: 93 ML/MIN/1.73SQ M
GLUCOSE P FAST SERPL-MCNC: 79 MG/DL (ref 65–99)
HBA1C MFR BLD: 5.8 % (ref 4.2–6.3)
HCT VFR BLD AUTO: 48.5 % (ref 36.5–49.3)
HDLC SERPL-MCNC: 42 MG/DL
HGB BLD-MCNC: 15.8 G/DL (ref 12–17)
IMM GRANULOCYTES # BLD AUTO: 0.01 THOUSAND/UL (ref 0–0.2)
IMM GRANULOCYTES NFR BLD AUTO: 0 % (ref 0–2)
LDLC SERPL CALC-MCNC: 106 MG/DL (ref 0–100)
LYMPHOCYTES # BLD AUTO: 1.03 THOUSANDS/ΜL (ref 0.6–4.47)
LYMPHOCYTES NFR BLD AUTO: 19 % (ref 14–44)
MCH RBC QN AUTO: 29.6 PG (ref 26.8–34.3)
MCHC RBC AUTO-ENTMCNC: 32.6 G/DL (ref 31.4–37.4)
MCV RBC AUTO: 91 FL (ref 82–98)
MONOCYTES # BLD AUTO: 0.48 THOUSAND/ΜL (ref 0.17–1.22)
MONOCYTES NFR BLD AUTO: 9 % (ref 4–12)
NEUTROPHILS # BLD AUTO: 3.7 THOUSANDS/ΜL (ref 1.85–7.62)
NEUTS SEG NFR BLD AUTO: 69 % (ref 43–75)
NONHDLC SERPL-MCNC: 122 MG/DL
NRBC BLD AUTO-RTO: 0 /100 WBCS
P AXIS: 58 DEGREES
PLATELET # BLD AUTO: 217 THOUSANDS/UL (ref 149–390)
PMV BLD AUTO: 9.7 FL (ref 8.9–12.7)
POTASSIUM SERPL-SCNC: 3.8 MMOL/L (ref 3.5–5.3)
PR INTERVAL: 136 MS
PROT SERPL-MCNC: 7.5 G/DL (ref 6.4–8.2)
QRS AXIS: -7 DEGREES
QRSD INTERVAL: 96 MS
QT INTERVAL: 422 MS
QTC INTERVAL: 407 MS
RBC # BLD AUTO: 5.33 MILLION/UL (ref 3.88–5.62)
RH BLD: POSITIVE
SODIUM SERPL-SCNC: 139 MMOL/L (ref 136–145)
SPECIMEN EXPIRATION DATE: NORMAL
T WAVE AXIS: 11 DEGREES
TRIGL SERPL-MCNC: 81 MG/DL
VENTRICULAR RATE: 56 BPM
WBC # BLD AUTO: 5.37 THOUSAND/UL (ref 4.31–10.16)

## 2019-12-02 PROCEDURE — 86901 BLOOD TYPING SEROLOGIC RH(D): CPT

## 2019-12-02 PROCEDURE — 93005 ELECTROCARDIOGRAM TRACING: CPT

## 2019-12-02 PROCEDURE — 86850 RBC ANTIBODY SCREEN: CPT

## 2019-12-02 PROCEDURE — 85025 COMPLETE CBC W/AUTO DIFF WBC: CPT | Performed by: NURSE PRACTITIONER

## 2019-12-02 PROCEDURE — 36415 COLL VENOUS BLD VENIPUNCTURE: CPT

## 2019-12-02 PROCEDURE — 83036 HEMOGLOBIN GLYCOSYLATED A1C: CPT

## 2019-12-02 PROCEDURE — 86900 BLOOD TYPING SEROLOGIC ABO: CPT

## 2019-12-02 PROCEDURE — 86920 COMPATIBILITY TEST SPIN: CPT

## 2019-12-02 PROCEDURE — 80053 COMPREHEN METABOLIC PANEL: CPT

## 2019-12-02 PROCEDURE — 93010 ELECTROCARDIOGRAM REPORT: CPT | Performed by: INTERNAL MEDICINE

## 2019-12-02 PROCEDURE — 80061 LIPID PANEL: CPT | Performed by: NURSE PRACTITIONER

## 2019-12-10 ENCOUNTER — CONSULT (OUTPATIENT)
Dept: INTERNAL MEDICINE CLINIC | Age: 59
End: 2019-12-10
Payer: COMMERCIAL

## 2019-12-10 VITALS
SYSTOLIC BLOOD PRESSURE: 126 MMHG | OXYGEN SATURATION: 97 % | DIASTOLIC BLOOD PRESSURE: 80 MMHG | BODY MASS INDEX: 24.96 KG/M2 | WEIGHT: 159 LBS | HEIGHT: 67 IN | TEMPERATURE: 97.7 F | HEART RATE: 68 BPM

## 2019-12-10 DIAGNOSIS — Z01.818 PREOP EXAM FOR INTERNAL MEDICINE: ICD-10-CM

## 2019-12-10 DIAGNOSIS — C64.2 MALIGNANT NEOPLASM OF LEFT KIDNEY, EXCEPT RENAL PELVIS (HCC): Primary | ICD-10-CM

## 2019-12-10 DIAGNOSIS — E78.2 MIXED HYPERLIPIDEMIA: ICD-10-CM

## 2019-12-10 DIAGNOSIS — C64.2 RENAL CELL CANCER, LEFT (HCC): ICD-10-CM

## 2019-12-10 DIAGNOSIS — B36.0 TINEA VERSICOLOR: ICD-10-CM

## 2019-12-10 DIAGNOSIS — Z11.59 NEED FOR HEPATITIS C SCREENING TEST: ICD-10-CM

## 2019-12-10 DIAGNOSIS — K57.20 DIVERTICULITIS OF LARGE INTESTINE WITH PERFORATION WITHOUT BLEEDING: ICD-10-CM

## 2019-12-10 PROBLEM — M54.50 ACUTE BILATERAL LOW BACK PAIN WITHOUT SCIATICA: Status: RESOLVED | Noted: 2019-06-10 | Resolved: 2019-12-10

## 2019-12-10 PROCEDURE — 99242 OFF/OP CONSLTJ NEW/EST SF 20: CPT | Performed by: NURSE PRACTITIONER

## 2019-12-10 RX ORDER — ATORVASTATIN CALCIUM 20 MG/1
20 TABLET, FILM COATED ORAL DAILY
Qty: 90 TABLET | Refills: 1 | Status: SHIPPED | OUTPATIENT
Start: 2019-12-10 | End: 2020-07-08 | Stop reason: SDUPTHER

## 2019-12-10 RX ORDER — KETOCONAZOLE 20 MG/ML
1 SHAMPOO TOPICAL ONCE
Qty: 120 ML | Refills: 0 | Status: SHIPPED | OUTPATIENT
Start: 2019-12-10 | End: 2020-05-13

## 2019-12-10 NOTE — PROGRESS NOTES
Subjective:    John Redding is a 62y o  year old male who presents to the office today for a preoperative consultation at the request of surgeon Dr Ancelmo Morales and Dr Pravin Jorge who plans on performing RESECTION COLON SIGMOID LAPAROSCOPIC HAND-ASSISTED, NEPHRECTOMY LAPAROSCOPIC ASSISTED (Left Abdomen) on December 30, 2019  Planned anesthesia: general  The patient has the following known anesthesia issues: No known personal or family issues with anesthesia  He reports he has developed cluster headaches the last 2 times he has been in the hospital - most recently for his diverticulitis and prior to that was after waking up from surgery with a nerve block  Patients bleeding risk: no recent abnormal bleeding  Patient is able to walk 4 blocks without symptoms  Patient is able to walk up 2 flights of stairs without symptoms       Significant past medical history includes:  HLD  Lipid panel well controlled - currently on Atorvastatin 20mg once daily   The 10-year ASCVD risk score (Anatoliy Mayfield et al , 2013) is: 8 6%    Values used to calculate the score:      Age: 62 years      Sex: Male      Is Non- : No      Diabetic: No      Tobacco smoker: No      Systolic Blood Pressure: 210 mmHg      Is BP treated: No      HDL Cholesterol: 42 mg/dL      Total Cholesterol: 164 mg/dL    He denies any history of arrythmia, CAD, CAD with hx MI, CAD with recent PCI, CHF, chronic liver disease, acute hepatitis, coagulation delay, primary hypercoagulable state, pulmonary embolism, DVT, use of anticoagulants, diabetes, insulin use, thyroid disease, neck osteoarthritis, TMJ osteoarthritis, wears dentures, seizure disorder, CVA, asthma, COPD, BULL,  obesity, low serum albumin    Tobacco use: none  Alcohol use: occasional  Illicit drug use: none    Symptoms:   Easy bleeding: no  Easy bruising: no  Frequent nose bleeds: no  Chest pain: no  Cough: no  Dyspnea on exertion: no  Edema: no  Palpitations: no  Wheezing: no    Living situation: Patient lives with wife Jg Núñez in a private home  She will be caring for him after surgery  hedoes not have post-op concerns  The following portions of the patient's history were reviewed and updated as appropriate: allergies, current medications, past family history, past medical history, past social history, past surgical history and problem list     Review of Systems  Review of Systems   Constitutional: Negative for activity change, appetite change, chills, diaphoresis, fatigue, fever and unexpected weight change  HENT: Negative for congestion, ear discharge, ear pain, postnasal drip, rhinorrhea, sinus pressure, sinus pain and sore throat  Respiratory: Negative for cough, chest tightness, shortness of breath and wheezing  Cardiovascular: Negative for chest pain, palpitations and leg swelling  Gastrointestinal: Positive for abdominal pain (mild and intermittent, generalized to lower abdomen)  Negative for abdominal distention, blood in stool, constipation, diarrhea, nausea and vomiting  Genitourinary: Negative for difficulty urinating, dysuria, frequency and hematuria  Musculoskeletal: Positive for arthralgias (chronic arthritis, hips)  Negative for myalgias  Skin: Positive for rash (upper shoulders)  Neurological: Negative for dizziness, seizures, syncope, light-headedness and headaches           Past Medical History:   Diagnosis Date    Arthritis     Diverticulitis of large intestine with perforation without bleeding 10/13/2019    Hyperlipidemia     Renal cell cancer, left ([facility] [address] ) 10/13/2019     Past Surgical History:   Procedure Laterality Date    HERNIA REPAIR      HIP SURGERY      REPLACEMENT TOTAL KNEE Right 2011    REPLACEMENT TOTAL KNEE      SHOULDER SURGERY Left     SHOULDER SURGERY Right     WRIST FUSION Left 2016    WRIST SURGERY       Social History     Tobacco Use    Smoking status: Never Smoker    Smokeless tobacco: Never Used   Substance Use Topics    Alcohol use: Yes     Alcohol/week: 3 0 standard drinks     Types: 3 Cans of beer per week     Frequency: Monthly or less     Drinks per session: 3 or 4     Binge frequency: Never     Comment: weekends    Drug use: No     Family History   Problem Relation Age of Onset    Heart disease Father     Other Father         cardiac disorder    No Known Problems Mother     Heart attack Maternal Grandfather     Heart attack Paternal Grandfather     Thyroid cancer Son     Colon cancer Neg Hx      Patient has no known allergies  Current Outpatient Medications   Medication Sig Dispense Refill    aspirin 81 MG tablet Take 1 tablet by mouth daily      atorvastatin (LIPITOR) 20 mg tablet Take 1 tablet (20 mg total) by mouth daily 90 tablet 1    [START ON 12/29/2019] metroNIDAZOLE (FLAGYL) 500 mg tablet Take 1 tablet at 1:00pm and 1 tablet at 10:00pm the day prior to surgery  2 tablet 0    multivitamin (THERAGRAN) TABS Take 1 tablet by mouth daily   [START ON 12/29/2019] neomycin (MYCIFRADIN) 500 mg tablet Take 2 tablets at 1:00pm and 2 tablets at 10:00pm the day prior to surgery  4 tablet 0    Omega-3 Fatty Acids (FISH OIL) 1200 MG CAPS Take by mouth   ketoconazole (NIZORAL) 2 % shampoo Apply 1 application topically once for 1 dose 120 mL 0     No current facility-administered medications for this visit  Objective:     /80 (BP Location: Left arm, Patient Position: Sitting, Cuff Size: Standard)   Pulse 68   Temp 97 7 °F (36 5 °C) (Tympanic)   Ht 5' 7" (1 702 m)   Wt 72 1 kg (159 lb)   SpO2 97%   BMI 24 90 kg/m²   Physical Exam   Constitutional: He is oriented to person, place, and time  He appears well-developed and well-nourished  No distress  HENT:   Head: Normocephalic and atraumatic     Right Ear: Tympanic membrane and external ear normal    Left Ear: Tympanic membrane and external ear normal    Nose: Nose normal    Mouth/Throat: Oropharynx is clear and moist  No oropharyngeal exudate, posterior oropharyngeal edema or posterior oropharyngeal erythema  Eyes: Pupils are equal, round, and reactive to light  Conjunctivae and EOM are normal    Neck: Normal range of motion  Neck supple  Carotid bruit is not present  No thyromegaly present  Cardiovascular: Normal rate, regular rhythm and normal heart sounds  No murmur heard  Pulmonary/Chest: Effort normal and breath sounds normal  No respiratory distress  He has no decreased breath sounds  He has no wheezes  He has no rhonchi  Abdominal: Soft  Bowel sounds are normal  He exhibits no distension and no mass  There is tenderness (mild lower abdominal tenderness, generalized)  There is no guarding  Musculoskeletal: He exhibits no edema  Lymphadenopathy:     He has no cervical adenopathy  Neurological: He is alert and oriented to person, place, and time  Skin: Skin is warm and dry  Rash (tinea versicolor right shoulder) noted  He is not diaphoretic  Psychiatric: He has a normal mood and affect  His behavior is normal    Vitals reviewed         Cardiographics  ECG: Sinus bradycardia with nonspecific T wave abnormality     Imaging  Chest x-ray: not indicated     Lab Review   Office Visit on 12/02/2019   Component Date Value    Ventricular Rate 12/02/2019 56     Atrial Rate 12/02/2019 56     MI Interval 12/02/2019 136     QRSD Interval 12/02/2019 96     QT Interval 12/02/2019 422     QTC Interval 12/02/2019 407     P Axis 12/02/2019 58     QRS Axis 12/02/2019 -7     T Wave [place] 12/02/2019 11    Appointment on 12/02/2019   Component Date Value    Sodium 12/02/2019 139     Potassium 12/02/2019 3 8     Chloride 12/02/2019 107     CO2 12/02/2019 30     ANION GAP 12/02/2019 2*    BUN 12/02/2019 18     Creatinine 12/02/2019 0 87     Glucose, Fasting 12/02/2019 79     Calcium 12/02/2019 9 3     AST 12/02/2019 22     ALT 12/02/2019 41     Alkaline Phosphatase 12/02/2019 80     Total Protein 12/02/2019 7 5  Albumin 12/02/2019 4 2     Total Bilirubin 12/02/2019 0 99     eGFR 12/02/2019 93     Hemoglobin A1C 12/02/2019 5 8     EAG 12/02/2019 120     ABO Grouping 12/02/2019 AB     Rh Factor 12/02/2019 Positive     Antibody Screen 12/02/2019 Negative     Specimen Expiration Date 12/02/2019 [id number]    Admission on 10/13/2019, Discharged on 10/15/2019   Component Date Value    Color, UA 10/13/2019 Yellow     Clarity, UA 10/13/2019 Clear     Specific [place], UA 10/13/2019 <=1 005     pH, UA 10/13/2019 6 0     Leukocytes, UA 10/13/2019 Negative     Nitrite, UA 10/13/2019 Negative     Protein, UA 10/13/2019 Negative     Glucose, UA 10/13/2019 Negative     Ketones, UA 10/13/2019 Negative     Urobilinogen, UA 10/13/2019 0 2     Bilirubin, UA 10/13/2019 Negative     Blood, UA 10/13/2019 Negative     Blood Culture 10/13/2019 No Growth After 5 Days   Blood Culture 10/13/2019 No Growth After 5 Days       Sodium 10/14/2019 139     Potassium 10/14/2019 3 7     Chloride 10/14/2019 107     CO2 10/14/2019 25     ANION GAP 10/14/2019 7     BUN 10/14/2019 14     Creatinine 10/14/2019 1 04     Glucose 10/14/2019 91     Calcium 10/14/2019 8 2*    eGFR 10/14/2019 77     WBC 10/14/2019 8 34     RBC 10/14/2019 4 17     Hemoglobin 10/14/2019 12 6     Hematocrit 10/14/2019 38 7     MCV 10/14/2019 93     MCH 10/14/2019 30 2     MCHC 10/14/2019 32 6     RDW 10/14/2019 12 6     MPV 10/14/2019 9 2     Platelets 37/57/6252 157     nRBC 10/14/2019 0     Neutrophils Relative 10/14/2019 86*    Immat GRANS % 10/14/2019 0     Lymphocytes Relative 10/14/2019 7*    Monocytes Relative 10/14/2019 6     Eosinophils Relative 10/14/2019 1     Basophils Relative 10/14/2019 0     Neutrophils Absolute 10/14/2019 7 16     Immature Grans Absolute 10/14/2019 0 03     Lymphocytes Absolute 10/14/2019 0 57*    Monocytes Absolute 10/14/2019 0 51     Eosinophils Absolute 10/14/2019 0 04     Basophils Absolute 10/14/2019 0 03     WBC 10/15/2019 6 22     RBC 10/15/2019 4 26     Hemoglobin 10/15/2019 12 9     Hematocrit 10/15/2019 40 0     MCV 10/15/2019 94     MCH 10/15/2019 30 3     MCHC 10/15/2019 32 3     RDW 10/15/2019 12 4     Platelets 76/86/5702 160     MPV 10/15/2019 9 3     POC Glucose 10/15/2019 71         Assessment:     62 y o  male with planned surgery as above  Known risk factors for perioperative complications: None      Cardiac Risk Estimation: franklin Real is cleared from a cardiovascular standpoint to proceed with surgery  he is at a low risk from a cardiovascular standpoint at this time without any additional cardiac testing  Reevaluation needed if he should present with symptoms prior to surgery  Plan:  Preoperative workup as follows: none  Change in medication regimen before surgery: Stop ASA and Fish oil 7 days prior to surgery  Avoid all NSAIDs 7 days before surgery         Assessment/Plan:    Problem List Items Addressed This Visit        Digestive    Diverticulitis of large intestine with perforation without bleeding       Musculoskeletal and Integument    Tinea versicolor     Ketoconazole 2% shampoo 1 application to affected area         Relevant Medications    ketoconazole (NIZORAL) 2 % shampoo       Genitourinary    Renal cell cancer, left (HCC)    Malignant neoplasm of left kidney, except renal pelvis (HCC) - Primary       Other    Mixed hyperlipidemia     Continue atorvastatin 20mg daily  Continue routine exerise         Relevant Medications    atorvastatin (LIPITOR) 20 mg tablet    Preop exam for internal medicine     Optimized for proposed surgeries           Other Visit Diagnoses     Need for hepatitis C screening test        Relevant Orders    Hepatitis C antibody

## 2019-12-10 NOTE — PATIENT INSTRUCTIONS
Stop aspirin and fish oil 7 days before surgery   No advil, motrin, ibuprofen or aleve prior 1 week prior to surgery   Notify office of any medical changes prior to surgery

## 2019-12-12 DIAGNOSIS — Z91.89 RISK FACTORS FOR OBSTRUCTIVE SLEEP APNEA: Primary | ICD-10-CM

## 2019-12-26 ENCOUNTER — TELEPHONE (OUTPATIENT)
Dept: UROLOGY | Facility: MEDICAL CENTER | Age: 59
End: 2019-12-26

## 2019-12-26 NOTE — TELEPHONE ENCOUNTER
Thank you  Please let Mortimer Luz know I reviewed his images as well, extensively in the past and again today  I agree this is an error in the interpretation by the radiologist but will in no way impact our plan  It is assuredly his left kidney which harbors the kidney tumor      Please let him know I have reached out directly to the radiologist to inform him of the error and to request an addendum

## 2019-12-26 NOTE — TELEPHONE ENCOUNTER
Patient of Dr Una Mullen seen in the [facility] office for suspected renal cell carcinoma  Patient is scheduled for left nephrectomy on 12/30/19  Called and spoke with patient at this time  He reports he recently downloaded [employer] and was reviewing his CT scans  The CT renal protocol scan on 10/14/19 report states:  ABDOMEN     RIGHT KIDNEY AND URETER:  Solid enhancing 2 8 x 2 6 cm left mid pole parapelvic renal mass is noted series 3 image 65  Associated calcifications are seen  Appearance is consistent with renal cell carcinoma  No hydronephrosis or hydroureter  No urinary tract calculi  No perinephric collection      LEFT KIDNEY AND URETER:  No solid renal mass  No detectable urothelial mass  No hydronephrosis or hydroureter  No urinary tract calculi  No perinephric collection  IMPRESSION:     2 8 x 2 6 cm enhancing left midpole parapelvic renal mass consistent with renal cell carcinoma      Acute uncomplicated proximal sigmoid diverticulitis again seen  This concerned patient as he is having surgery on Monday and wants to ensure the correct kidney is removed  Informed patient the images are always reviewed by providers, this is likely a typo  All other documentation does state mass in left kidney  Will route to providers for review and return call to patient  Please review images and double check the renal mass is in the left kidney

## 2019-12-26 NOTE — TELEPHONE ENCOUNTER
Pt managed by Carmine Grace pt calling with concerns with upcoming surgery he has reviewed his 10/14/19 CT Scan results which states finding left kidney and he's having rt kidney removed,please contact pt

## 2019-12-26 NOTE — TELEPHONE ENCOUNTER
Called and left detailed message per communication consent  Informed patient Dr Jesika Oropeza and another provider reviewed CT scan images and ensured the mass is in the left kidney  Dr Jesika Oropeza called radiology and requested that report be addended  Encouraged a call back with any other questions or concerns

## 2019-12-26 NOTE — TELEPHONE ENCOUNTER
I personally reviewed patient's most recent CT in agree with left-sided findings  Please have the radiology team review CT report and amended report as necessary  Please provide patient reassurance that this will again be reviewed with Dr Jeremiah Reynoso to ensure proper kidneys removed  Thank you

## 2019-12-30 ENCOUNTER — ANESTHESIA EVENT (OUTPATIENT)
Dept: PERIOP | Facility: HOSPITAL | Age: 59
DRG: 330 | End: 2019-12-30
Payer: COMMERCIAL

## 2019-12-30 ENCOUNTER — HOSPITAL ENCOUNTER (INPATIENT)
Facility: HOSPITAL | Age: 59
LOS: 4 days | Discharge: HOME | DRG: 330 | End: 2020-01-03
Attending: COLON & RECTAL SURGERY | Admitting: COLON & RECTAL SURGERY
Payer: COMMERCIAL

## 2019-12-30 ENCOUNTER — ANESTHESIA (OUTPATIENT)
Dept: PERIOP | Facility: HOSPITAL | Age: 59
DRG: 330 | End: 2019-12-30
Payer: COMMERCIAL

## 2019-12-30 DIAGNOSIS — K57.20 DIVERTICULITIS OF LARGE INTESTINE WITH PERFORATION WITHOUT BLEEDING: ICD-10-CM

## 2019-12-30 DIAGNOSIS — N17.9 AKI (ACUTE KIDNEY INJURY) (HCC): Primary | ICD-10-CM

## 2019-12-30 DIAGNOSIS — C64.2 MALIGNANT NEOPLASM OF LEFT KIDNEY, EXCEPT RENAL PELVIS (HCC): ICD-10-CM

## 2019-12-30 LAB
ABO GROUP BLD: NORMAL
ANION GAP SERPL CALCULATED.3IONS-SCNC: 8 MMOL/L (ref 4–13)
BASOPHILS # BLD MANUAL: 0 THOUSAND/UL (ref 0–0.1)
BASOPHILS NFR MAR MANUAL: 0 % (ref 0–1)
BUN SERPL-MCNC: 10 MG/DL (ref 5–25)
CALCIUM SERPL-MCNC: 7.9 MG/DL (ref 8.3–10.1)
CHLORIDE SERPL-SCNC: 109 MMOL/L (ref 100–108)
CO2 SERPL-SCNC: 25 MMOL/L (ref 21–32)
CREAT SERPL-MCNC: 0.91 MG/DL (ref 0.6–1.3)
EOSINOPHIL # BLD MANUAL: 0 THOUSAND/UL (ref 0–0.4)
EOSINOPHIL NFR BLD MANUAL: 0 % (ref 0–6)
ERYTHROCYTE [DISTWIDTH] IN BLOOD BY AUTOMATED COUNT: 12.4 % (ref 11.6–15.1)
GFR SERPL CREATININE-BSD FRML MDRD: 91 ML/MIN/1.73SQ M
GLUCOSE SERPL-MCNC: 143 MG/DL (ref 65–140)
GLUCOSE SERPL-MCNC: 79 MG/DL (ref 65–140)
HCT VFR BLD AUTO: 40 % (ref 36.5–49.3)
HGB BLD-MCNC: 13.5 G/DL (ref 12–17)
LYMPHOCYTES # BLD AUTO: 0.46 THOUSAND/UL (ref 0.6–4.47)
LYMPHOCYTES # BLD AUTO: 4 % (ref 14–44)
MCH RBC QN AUTO: 30.5 PG (ref 26.8–34.3)
MCHC RBC AUTO-ENTMCNC: 33.8 G/DL (ref 31.4–37.4)
MCV RBC AUTO: 91 FL (ref 82–98)
MONOCYTES # BLD AUTO: 0.34 THOUSAND/UL (ref 0–1.22)
MONOCYTES NFR BLD: 3 % (ref 4–12)
NEUTROPHILS # BLD MANUAL: 10.51 THOUSAND/UL (ref 1.85–7.62)
NEUTS BAND NFR BLD MANUAL: 9 % (ref 0–8)
NEUTS SEG NFR BLD AUTO: 83 % (ref 43–75)
NRBC BLD AUTO-RTO: 0 /100 WBCS
PLATELET # BLD AUTO: 175 THOUSANDS/UL (ref 149–390)
PLATELET BLD QL SMEAR: ADEQUATE
PMV BLD AUTO: 9.5 FL (ref 8.9–12.7)
POTASSIUM SERPL-SCNC: 3.5 MMOL/L (ref 3.5–5.3)
RBC # BLD AUTO: 4.42 MILLION/UL (ref 3.88–5.62)
RBC MORPH BLD: NORMAL
RH BLD: POSITIVE
SODIUM SERPL-SCNC: 142 MMOL/L (ref 136–145)
TOTAL CELLS COUNTED SPEC: 100
VARIANT LYMPHS # BLD AUTO: 1 %
WBC # BLD AUTO: 11.42 THOUSAND/UL (ref 4.31–10.16)

## 2019-12-30 PROCEDURE — 94762 N-INVAS EAR/PLS OXIMTRY CONT: CPT

## 2019-12-30 PROCEDURE — 82948 REAGENT STRIP/BLOOD GLUCOSE: CPT

## 2019-12-30 PROCEDURE — 45330 DIAGNOSTIC SIGMOIDOSCOPY: CPT | Performed by: PHYSICIAN ASSISTANT

## 2019-12-30 PROCEDURE — 44213 LAP MOBIL SPLENIC FL ADD-ON: CPT | Performed by: PHYSICIAN ASSISTANT

## 2019-12-30 PROCEDURE — 88307 TISSUE EXAM BY PATHOLOGIST: CPT | Performed by: PATHOLOGY

## 2019-12-30 PROCEDURE — 86900 BLOOD TYPING SEROLOGIC ABO: CPT | Performed by: UROLOGY

## 2019-12-30 PROCEDURE — 44207 L COLECTOMY/COLOPROCTOSTOMY: CPT | Performed by: COLON & RECTAL SURGERY

## 2019-12-30 PROCEDURE — 88342 IMHCHEM/IMCYTCHM 1ST ANTB: CPT | Performed by: PATHOLOGY

## 2019-12-30 PROCEDURE — NC001 PR NO CHARGE: Performed by: UROLOGY

## 2019-12-30 PROCEDURE — 0TT14ZZ RESECTION OF LEFT KIDNEY, PERCUTANEOUS ENDOSCOPIC APPROACH: ICD-10-PCS | Performed by: UROLOGY

## 2019-12-30 PROCEDURE — 88341 IMHCHEM/IMCYTCHM EA ADD ANTB: CPT | Performed by: PATHOLOGY

## 2019-12-30 PROCEDURE — NC001 PR NO CHARGE: Performed by: PHYSICIAN ASSISTANT

## 2019-12-30 PROCEDURE — 44213 LAP MOBIL SPLENIC FL ADD-ON: CPT | Performed by: COLON & RECTAL SURGERY

## 2019-12-30 PROCEDURE — 50545 LAPARO RADICAL NEPHRECTOMY: CPT | Performed by: PHYSICIAN ASSISTANT

## 2019-12-30 PROCEDURE — 50545 LAPARO RADICAL NEPHRECTOMY: CPT | Performed by: UROLOGY

## 2019-12-30 PROCEDURE — 45330 DIAGNOSTIC SIGMOIDOSCOPY: CPT | Performed by: COLON & RECTAL SURGERY

## 2019-12-30 PROCEDURE — 44207 L COLECTOMY/COLOPROCTOSTOMY: CPT | Performed by: PHYSICIAN ASSISTANT

## 2019-12-30 PROCEDURE — 94760 N-INVAS EAR/PLS OXIMETRY 1: CPT

## 2019-12-30 PROCEDURE — 0DBN4ZZ EXCISION OF SIGMOID COLON, PERCUTANEOUS ENDOSCOPIC APPROACH: ICD-10-PCS | Performed by: COLON & RECTAL SURGERY

## 2019-12-30 PROCEDURE — 86901 BLOOD TYPING SEROLOGIC RH(D): CPT | Performed by: UROLOGY

## 2019-12-30 PROCEDURE — 85027 COMPLETE CBC AUTOMATED: CPT | Performed by: UROLOGY

## 2019-12-30 PROCEDURE — 80048 BASIC METABOLIC PNL TOTAL CA: CPT | Performed by: UROLOGY

## 2019-12-30 PROCEDURE — 85007 BL SMEAR W/DIFF WBC COUNT: CPT | Performed by: UROLOGY

## 2019-12-30 RX ORDER — METRONIDAZOLE 500 MG/1
1000 TABLET ORAL 3 TIMES DAILY
Status: DISCONTINUED | OUTPATIENT
Start: 2019-12-30 | End: 2019-12-30

## 2019-12-30 RX ORDER — SODIUM CHLORIDE, SODIUM LACTATE, POTASSIUM CHLORIDE, CALCIUM CHLORIDE 600; 310; 30; 20 MG/100ML; MG/100ML; MG/100ML; MG/100ML
125 INJECTION, SOLUTION INTRAVENOUS CONTINUOUS
Status: DISCONTINUED | OUTPATIENT
Start: 2019-12-30 | End: 2019-12-31

## 2019-12-30 RX ORDER — LABETALOL 20 MG/4 ML (5 MG/ML) INTRAVENOUS SYRINGE
AS NEEDED
Status: DISCONTINUED | OUTPATIENT
Start: 2019-12-30 | End: 2019-12-30 | Stop reason: SURG

## 2019-12-30 RX ORDER — NEOMYCIN SULFATE 500 MG/1
1000 TABLET ORAL 3 TIMES DAILY
Status: DISCONTINUED | OUTPATIENT
Start: 2019-12-30 | End: 2019-12-30

## 2019-12-30 RX ORDER — HYDROMORPHONE HCL 110MG/55ML
PATIENT CONTROLLED ANALGESIA SYRINGE INTRAVENOUS AS NEEDED
Status: DISCONTINUED | OUTPATIENT
Start: 2019-12-30 | End: 2019-12-30 | Stop reason: SURG

## 2019-12-30 RX ORDER — MIDAZOLAM HYDROCHLORIDE 2 MG/2ML
INJECTION, SOLUTION INTRAMUSCULAR; INTRAVENOUS AS NEEDED
Status: DISCONTINUED | OUTPATIENT
Start: 2019-12-30 | End: 2019-12-30 | Stop reason: SURG

## 2019-12-30 RX ORDER — ONDANSETRON 2 MG/ML
4 INJECTION INTRAMUSCULAR; INTRAVENOUS ONCE AS NEEDED
Status: DISCONTINUED | OUTPATIENT
Start: 2019-12-30 | End: 2019-12-30 | Stop reason: HOSPADM

## 2019-12-30 RX ORDER — HEPARIN SODIUM 5000 [USP'U]/ML
5000 INJECTION, SOLUTION INTRAVENOUS; SUBCUTANEOUS ONCE
Status: COMPLETED | OUTPATIENT
Start: 2019-12-30 | End: 2019-12-30

## 2019-12-30 RX ORDER — CEFAZOLIN SODIUM 1 G/50ML
1000 SOLUTION INTRAVENOUS ONCE
Status: COMPLETED | OUTPATIENT
Start: 2019-12-30 | End: 2019-12-30

## 2019-12-30 RX ORDER — PROPOFOL 10 MG/ML
INJECTION, EMULSION INTRAVENOUS CONTINUOUS PRN
Status: DISCONTINUED | OUTPATIENT
Start: 2019-12-30 | End: 2019-12-30 | Stop reason: SURG

## 2019-12-30 RX ORDER — EPHEDRINE SULFATE 50 MG/ML
INJECTION INTRAVENOUS AS NEEDED
Status: DISCONTINUED | OUTPATIENT
Start: 2019-12-30 | End: 2019-12-30 | Stop reason: SURG

## 2019-12-30 RX ORDER — POLYETHYLENE GLYCOL 3350 17 G/17G
255 POWDER, FOR SOLUTION ORAL ONCE
Status: DISCONTINUED | OUTPATIENT
Start: 2019-12-30 | End: 2019-12-30

## 2019-12-30 RX ORDER — PROPOFOL 10 MG/ML
INJECTION, EMULSION INTRAVENOUS AS NEEDED
Status: DISCONTINUED | OUTPATIENT
Start: 2019-12-30 | End: 2019-12-30 | Stop reason: SURG

## 2019-12-30 RX ORDER — BUPIVACAINE HYDROCHLORIDE 2.5 MG/ML
INJECTION, SOLUTION EPIDURAL; INFILTRATION; INTRACAUDAL AS NEEDED
Status: DISCONTINUED | OUTPATIENT
Start: 2019-12-30 | End: 2019-12-30 | Stop reason: HOSPADM

## 2019-12-30 RX ORDER — ATORVASTATIN CALCIUM 20 MG/1
20 TABLET, FILM COATED ORAL
Status: DISCONTINUED | OUTPATIENT
Start: 2019-12-30 | End: 2020-01-03 | Stop reason: HOSPADM

## 2019-12-30 RX ORDER — LIDOCAINE HYDROCHLORIDE 10 MG/ML
INJECTION, SOLUTION EPIDURAL; INFILTRATION; INTRACAUDAL; PERINEURAL AS NEEDED
Status: DISCONTINUED | OUTPATIENT
Start: 2019-12-30 | End: 2019-12-30 | Stop reason: SURG

## 2019-12-30 RX ORDER — GLYCOPYRROLATE 0.2 MG/ML
INJECTION INTRAMUSCULAR; INTRAVENOUS AS NEEDED
Status: DISCONTINUED | OUTPATIENT
Start: 2019-12-30 | End: 2019-12-30 | Stop reason: SURG

## 2019-12-30 RX ORDER — MAGNESIUM HYDROXIDE 1200 MG/15ML
LIQUID ORAL AS NEEDED
Status: DISCONTINUED | OUTPATIENT
Start: 2019-12-30 | End: 2019-12-30 | Stop reason: HOSPADM

## 2019-12-30 RX ORDER — HYDROMORPHONE HCL/PF 1 MG/ML
0.2 SYRINGE (ML) INJECTION
Status: DISCONTINUED | OUTPATIENT
Start: 2019-12-30 | End: 2019-12-30 | Stop reason: HOSPADM

## 2019-12-30 RX ORDER — FENTANYL CITRATE 50 UG/ML
INJECTION, SOLUTION INTRAMUSCULAR; INTRAVENOUS AS NEEDED
Status: DISCONTINUED | OUTPATIENT
Start: 2019-12-30 | End: 2019-12-30 | Stop reason: SURG

## 2019-12-30 RX ORDER — LIDOCAINE HYDROCHLORIDE 20 MG/ML
INJECTION, SOLUTION INFILTRATION; PERINEURAL AS NEEDED
Status: DISCONTINUED | OUTPATIENT
Start: 2019-12-30 | End: 2019-12-30 | Stop reason: HOSPADM

## 2019-12-30 RX ORDER — ONDANSETRON 2 MG/ML
INJECTION INTRAMUSCULAR; INTRAVENOUS AS NEEDED
Status: DISCONTINUED | OUTPATIENT
Start: 2019-12-30 | End: 2019-12-30 | Stop reason: SURG

## 2019-12-30 RX ORDER — DIPHENHYDRAMINE HYDROCHLORIDE 50 MG/ML
25 INJECTION INTRAMUSCULAR; INTRAVENOUS EVERY 6 HOURS PRN
Status: DISCONTINUED | OUTPATIENT
Start: 2019-12-30 | End: 2020-01-03 | Stop reason: HOSPADM

## 2019-12-30 RX ORDER — DEXAMETHASONE SODIUM PHOSPHATE 10 MG/ML
INJECTION, SOLUTION INTRAMUSCULAR; INTRAVENOUS AS NEEDED
Status: DISCONTINUED | OUTPATIENT
Start: 2019-12-30 | End: 2019-12-30 | Stop reason: SURG

## 2019-12-30 RX ORDER — ONDANSETRON 2 MG/ML
4 INJECTION INTRAMUSCULAR; INTRAVENOUS EVERY 6 HOURS PRN
Status: DISCONTINUED | OUTPATIENT
Start: 2019-12-30 | End: 2020-01-03 | Stop reason: HOSPADM

## 2019-12-30 RX ORDER — SODIUM CHLORIDE 9 MG/ML
INJECTION, SOLUTION INTRAVENOUS CONTINUOUS PRN
Status: DISCONTINUED | OUTPATIENT
Start: 2019-12-30 | End: 2019-12-30 | Stop reason: SURG

## 2019-12-30 RX ORDER — ACETAMINOPHEN 325 MG/1
975 TABLET ORAL EVERY 8 HOURS SCHEDULED
Status: DISCONTINUED | OUTPATIENT
Start: 2019-12-30 | End: 2020-01-03 | Stop reason: HOSPADM

## 2019-12-30 RX ORDER — NEOSTIGMINE METHYLSULFATE 1 MG/ML
INJECTION INTRAVENOUS AS NEEDED
Status: DISCONTINUED | OUTPATIENT
Start: 2019-12-30 | End: 2019-12-30 | Stop reason: SURG

## 2019-12-30 RX ORDER — SODIUM CHLORIDE, SODIUM LACTATE, POTASSIUM CHLORIDE, CALCIUM CHLORIDE 600; 310; 30; 20 MG/100ML; MG/100ML; MG/100ML; MG/100ML
100 INJECTION, SOLUTION INTRAVENOUS CONTINUOUS
Status: DISCONTINUED | OUTPATIENT
Start: 2019-12-30 | End: 2019-12-30

## 2019-12-30 RX ORDER — FENTANYL CITRATE/PF 50 MCG/ML
25 SYRINGE (ML) INJECTION
Status: DISCONTINUED | OUTPATIENT
Start: 2019-12-30 | End: 2019-12-30 | Stop reason: HOSPADM

## 2019-12-30 RX ORDER — SODIUM CHLORIDE, SODIUM LACTATE, POTASSIUM CHLORIDE, CALCIUM CHLORIDE 600; 310; 30; 20 MG/100ML; MG/100ML; MG/100ML; MG/100ML
INJECTION, SOLUTION INTRAVENOUS CONTINUOUS PRN
Status: DISCONTINUED | OUTPATIENT
Start: 2019-12-30 | End: 2019-12-30 | Stop reason: SURG

## 2019-12-30 RX ORDER — ROCURONIUM BROMIDE 10 MG/ML
INJECTION, SOLUTION INTRAVENOUS AS NEEDED
Status: DISCONTINUED | OUTPATIENT
Start: 2019-12-30 | End: 2019-12-30 | Stop reason: SURG

## 2019-12-30 RX ADMIN — DEXAMETHASONE SODIUM PHOSPHATE 4 MG: 10 INJECTION, SOLUTION INTRAMUSCULAR; INTRAVENOUS at 08:20

## 2019-12-30 RX ADMIN — SODIUM CHLORIDE, SODIUM LACTATE, POTASSIUM CHLORIDE, AND CALCIUM CHLORIDE: .6; .31; .03; .02 INJECTION, SOLUTION INTRAVENOUS at 07:07

## 2019-12-30 RX ADMIN — EPHEDRINE SULFATE 10 MG: 50 INJECTION, SOLUTION INTRAVENOUS at 11:38

## 2019-12-30 RX ADMIN — ROCURONIUM BROMIDE 20 MG: 10 SOLUTION INTRAVENOUS at 10:44

## 2019-12-30 RX ADMIN — NEOSTIGMINE METHYLSULFATE 3 MG: 1 INJECTION INTRAVENOUS at 11:21

## 2019-12-30 RX ADMIN — ACETAMINOPHEN 975 MG: 325 TABLET, FILM COATED ORAL at 16:15

## 2019-12-30 RX ADMIN — ROCURONIUM BROMIDE 10 MG: 10 SOLUTION INTRAVENOUS at 08:20

## 2019-12-30 RX ADMIN — CEFAZOLIN SODIUM 1000 MG: 1 SOLUTION INTRAVENOUS at 07:45

## 2019-12-30 RX ADMIN — Medication 20 MG: at 09:00

## 2019-12-30 RX ADMIN — EPHEDRINE SULFATE 10 MG: 50 INJECTION, SOLUTION INTRAVENOUS at 07:59

## 2019-12-30 RX ADMIN — ROCURONIUM BROMIDE 10 MG: 10 SOLUTION INTRAVENOUS at 09:05

## 2019-12-30 RX ADMIN — SODIUM CHLORIDE, SODIUM LACTATE, POTASSIUM CHLORIDE, AND CALCIUM CHLORIDE 125 ML/HR: .6; .31; .03; .02 INJECTION, SOLUTION INTRAVENOUS at 22:34

## 2019-12-30 RX ADMIN — Medication: at 12:30

## 2019-12-30 RX ADMIN — PROPOFOL 60 MG: 10 INJECTION, EMULSION INTRAVENOUS at 08:34

## 2019-12-30 RX ADMIN — LIDOCAINE HYDROCHLORIDE 50 MG: 10 INJECTION, SOLUTION EPIDURAL; INFILTRATION; INTRACAUDAL; PERINEURAL at 07:53

## 2019-12-30 RX ADMIN — HYDROMORPHONE HYDROCHLORIDE 1 MG: 2 INJECTION INTRAMUSCULAR; INTRAVENOUS; SUBCUTANEOUS at 09:12

## 2019-12-30 RX ADMIN — HEPARIN SODIUM 5000 UNITS: 5000 INJECTION INTRAVENOUS; SUBCUTANEOUS at 22:28

## 2019-12-30 RX ADMIN — GLYCOPYRROLATE 0.6 MG: 0.2 INJECTION, SOLUTION INTRAMUSCULAR; INTRAVENOUS at 11:21

## 2019-12-30 RX ADMIN — ROCURONIUM BROMIDE 20 MG: 10 SOLUTION INTRAVENOUS at 09:41

## 2019-12-30 RX ADMIN — ROCURONIUM BROMIDE 20 MG: 10 SOLUTION INTRAVENOUS at 10:01

## 2019-12-30 RX ADMIN — ACETAMINOPHEN 975 MG: 325 TABLET, FILM COATED ORAL at 22:28

## 2019-12-30 RX ADMIN — ROCURONIUM BROMIDE 40 MG: 10 SOLUTION INTRAVENOUS at 07:53

## 2019-12-30 RX ADMIN — PROPOFOL 100 MCG/KG/MIN: 10 INJECTION, EMULSION INTRAVENOUS at 07:56

## 2019-12-30 RX ADMIN — Medication 0.4 MCG/KG/HR: at 07:56

## 2019-12-30 RX ADMIN — EPHEDRINE SULFATE 10 MG: 50 INJECTION, SOLUTION INTRAVENOUS at 08:25

## 2019-12-30 RX ADMIN — ONDANSETRON 4 MG: 2 INJECTION INTRAMUSCULAR; INTRAVENOUS at 11:05

## 2019-12-30 RX ADMIN — ATORVASTATIN CALCIUM 20 MG: 20 TABLET, FILM COATED ORAL at 16:15

## 2019-12-30 RX ADMIN — HYDROMORPHONE HYDROCHLORIDE 1 MG: 2 INJECTION INTRAMUSCULAR; INTRAVENOUS; SUBCUTANEOUS at 08:34

## 2019-12-30 RX ADMIN — SODIUM CHLORIDE, SODIUM LACTATE, POTASSIUM CHLORIDE, AND CALCIUM CHLORIDE: .6; .31; .03; .02 INJECTION, SOLUTION INTRAVENOUS at 11:01

## 2019-12-30 RX ADMIN — Medication 20 MG: at 10:00

## 2019-12-30 RX ADMIN — PROPOFOL 140 MG: 10 INJECTION, EMULSION INTRAVENOUS at 07:53

## 2019-12-30 RX ADMIN — MIDAZOLAM HYDROCHLORIDE 2 MG: 1 INJECTION, SOLUTION INTRAMUSCULAR; INTRAVENOUS at 07:47

## 2019-12-30 RX ADMIN — SODIUM CHLORIDE, SODIUM LACTATE, POTASSIUM CHLORIDE, AND CALCIUM CHLORIDE 125 ML/HR: .6; .31; .03; .02 INJECTION, SOLUTION INTRAVENOUS at 16:16

## 2019-12-30 RX ADMIN — ROCURONIUM BROMIDE 20 MG: 10 SOLUTION INTRAVENOUS at 08:08

## 2019-12-30 RX ADMIN — Medication 20 MG: at 08:01

## 2019-12-30 RX ADMIN — LABETALOL 20 MG/4 ML (5 MG/ML) INTRAVENOUS SYRINGE 10 MG: at 09:27

## 2019-12-30 RX ADMIN — SODIUM CHLORIDE: 0.9 INJECTION, SOLUTION INTRAVENOUS at 07:56

## 2019-12-30 RX ADMIN — FENTANYL CITRATE 100 MCG: 50 INJECTION INTRAMUSCULAR; INTRAVENOUS at 07:53

## 2019-12-30 RX ADMIN — METRONIDAZOLE 500 MG: 500 INJECTION, SOLUTION INTRAVENOUS at 08:03

## 2019-12-30 NOTE — PROGRESS NOTES
Pca pump initiated  Pt denies pain at present  Pt education performed regarding Pca pump use for pain    Pt requires reinforcement

## 2019-12-30 NOTE — H&P
UROLOGY HISTORY AND PHYSICAL     Patient Identifiers: Tashi Islas (MRN [id number])      Date of Service: 12/30/2019        ASSESSMENT:     62 y o  old male with  2 cm enhancing completely endophytic left renal mass, as well as diverticulitis  PLAN:     To OR for laparoscopic left radical nephrectomy, as a planned combined case with colectomy by Dr Graham Méndez      History of Present Illness:     Tashi Islas is a 62 y o  old with a history of recently diagnosed solid left renal mass consistent with organ confined renal cell carcinoma  Tumor is completely endophytic and not amenable to a partial nephrectomy    This was diagnosed in the setting of acute diverticulitis    Past Medical, Past Surgical History:     Past Medical History:   Diagnosis Date    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-    Arthritis     Diverticulitis of large intestine with perforation without bleeding 10/13/2019    Hyperlipidemia     Renal cell cancer, left ([facility] [address] ) 10/13/2019   :    Past Surgical History:   Procedure Laterality Date    HERNIA REPAIR      HIP SURGERY Right     age 4    JOINT REPLACEMENT Right 2011    TKR    REPLACEMENT TOTAL KNEE Right 2011    REPLACEMENT TOTAL KNEE      SHOULDER SURGERY Left     SHOULDER SURGERY Right     WRIST FUSION Left 2016    WRIST SURGERY     :    Medications, Allergies:     Current Facility-Administered Medications:     bisacodyl (DULCOLAX) EC tablet 10 mg, 10 mg, Oral, BID, Suraj Bee MD    ceFAZolin (ANCEF) IVPB (premix) 1,000 mg, 1,000 mg, Intravenous, Once **AND** metroNIDAZOLE (FLAGYL) IVPB (premix) 500 mg, 500 mg, Intravenous, Once, MD Lexi Mancilla [facility]) tablet 1,000 mg, 1,000 mg, Oral, TID **AND** metroNIDAZOLE (FLAGYL) tablet 1,000 mg, 1,000 mg, Oral, TID, Suraj Bee MD    polyethylene glycol [facility]) bowel prep 255 g, 255 g, Oral, Once, Suraj Bee MD    Allergies:  No Known Allergies:    Social and Family History:   Social History:   Social History     Tobacco Use    Smoking status: Never Smoker    Smokeless tobacco: Never Used   Substance Use Topics    Alcohol use: Yes     Alcohol/week: 3 0 standard drinks     Types: 3 Cans of beer per week     Frequency: Monthly or less     Drinks per session: 3 or 4     Binge frequency: Never    Drug use: No        Social History     Tobacco Use   Smoking Status Never Smoker   Smokeless Tobacco Never Used       Family History:  Family History   Problem Relation Age of Onset    Heart disease Father     Other Father         cardiac disorder    No Known Problems Mother     Heart attack Maternal Grandfather     Heart attack Paternal Grandfather     Thyroid cancer Son     Colon cancer Neg Hx    :     Review of Systems:     General: Fever, chills, or night sweats: negative  Cardiac: Negative for chest pain  Pulmonary: Negative for shortness of breath  Gastrointestinal: Abdominal pain negative  Nausea, vomiting, or diarrhea negative  Genitourinary: See HPI above  Patient does nothave hematuria  All other systems queried were negative  Physical Exam:   General: Patient is pleasant and in NAD  Awake and alert  Ht 5' 7" (1 702 m)   Wt 72 1 kg (159 lb)   BMI 24 90 kg/m²   Cardiac: Peripheral edema: negative  Pulmonary: Non-labored breathing  Abdomen: Soft, non-tender, non-distended  No surgical scars  No masses, tenderness, hernias noted  Genitourinary: negative CVA tenderness, negative suprapubic tenderness        Labs:     Lab Results   Component Value Date    HGB 15 8 12/02/2019    HCT 48 5 12/02/2019    WBC 5 37 12/02/2019     12/02/2019   ]    Lab Results   Component Value Date    K 3 8 12/02/2019     12/02/2019    CO2 30 12/02/2019    BUN 18 12/02/2019    CREATININE 0 87 12/02/2019    CALCIUM 9 3 12/02/2019   ]    Imaging:   I personally reviewed the images and report of the following studies, and reviewed them with the patient:        Thank you for allowing me to participate in this patients care  Please do not hesitate to call with any additional questions    Marcie Long MD

## 2019-12-30 NOTE — ANESTHESIA PROCEDURE NOTES
Arterial Line Insertion  Performed by: Rupert Bennett CRNA  Authorized by: Lacy Nevarez MD   Consent: Verbal consent obtained  Written consent obtained  Risks and benefits: risks, benefits and alternatives were discussed  Consent given by: patient  Patient understanding: patient states understanding of the procedure being performed  Patient consent: the patient's understanding of the procedure matches consent given  Procedure consent: procedure consent matches procedure scheduled  Relevant documents: relevant documents present and verified  Test results: test results not available  Site marked: the operative site was not marked  Radiology Images: No Radiology Images displayed or report reviewed  Required items: required blood products, implants, devices, and special equipment available  Patient identity confirmed: arm band  Time out: Immediately prior to procedure a "time out" was called to verify the correct patient, procedure, equipment, support staff and site/side marked as required  Preparation: Patient was prepped and draped in the usual sterile fashion    Indications: hemodynamic monitoring  Orientation:  Right  Location: radial artery  Procedure Details:  Sai's test normal: no  Needle gauge: 20  Number of attempts: 2    Post-procedure:  Post-procedure: dressing applied  Waveform: good waveform  Post-procedure CNS: normal

## 2019-12-30 NOTE — OP NOTE
OPERATIVE REPORT  PATIENT NAME: Oliva Santiago    :  1960  MRN: [id number]  Pt Location: AN OR ROOM 04    SURGERY DATE: 2019    Surgeon(s) and Role:  Panel 1:     * Jo Cornejo MD - Primary     * [facility], [facility], HAKEEM - Assisting  She was needed for assistance and retraction as well as visualization  No resident was available  Panel 2:      * Alcides Garsia MD - Primary    Preop Diagnosis:  Diverticulitis of large intestine with perforation without bleeding [K57 20]  Malignant neoplasm of left kidney, except renal pelvis ([address] ) [C64 2]    Post-Op Diagnosis Codes:     * Diverticulitis of large intestine with perforation without bleeding [K57 20]     * Malignant neoplasm of left kidney, except renal pelvis ([address] ) [C64 2]    Procedure(s) (LRB):  RESECTION COLON SIGMOID LAPAROSCOPIC HAND-ASSISTED (N/A)  NEPHRECTOMY LAPAROSCOPIC HAND ASSISTED (Left)    Specimen(s):  ID Type Source Tests Collected by Time Destination   1 : LEFT KIDNEY Tissue Kidney, Left TISSUE EXAM Alcides Garsia MD 2019 1025    2 : SIGMOID COLON Tissue Large Intestine, Sigmoid Colon TISSUE EXAM Jo Cornejo MD 2019 1041        Estimated Blood Loss:   100 mL    Drains:  Urethral Catheter Latex 16 Fr   (Active)   Number of days: 0       Anesthesia Type:   General    Operative Indications:  Diverticulitis of large intestine with perforation without bleeding [K57 20]  Malignant neoplasm of left kidney, except renal pelvis ([address] ) [C64 2]    Operative Findings:  Diverticulitis of large intestine with perforation without bleeding [K57 20]  Malignant neoplasm of left kidney, except renal pelvis ([address] ) [S56 8]    Complications:   None    Procedure and Technique:  The patient was placed in a supine position with the left torso folded anteriorly with arms across his chest   The arms were doubly padded at the elbows and wrists and were then held in position by the bump under his left chest and circumferential wrapping up tape around his thorax  He was also adhered with the [employer] in which he was positioned  The abdomen was prepped widely using ChloraPrep and was draped in a sterile manner  A time-out was done  The procedure was initiated by creating a periumbilical vertical incision in the midline  A GelPort was positioned and hand assist was used to place 12 mm trocars in the left lower quadrant laterally and near the midline  A 5 mm trocar was placed in the right abdomen at the lateral border of the rectus muscle at the level of the umbilicus  A 12 mm trocar was placed in the right lower quadrant  Hand assisted laparoscopic technique was used to mobilize the sigmoid anteriorly  The sigmoid was palpated and was found to have a nodular scar in the mid sigmoid that was consistent with the area of previous perforation  No surrounding infection or adhesions were present  The small and large bowel were unremarkable except for in the distal sigmoid colon at and distal to the point mentioned above  The medial approach was used to open the peritoneum at the avascular plane  The avascular plane was adhered to in the left ureter was identified through a window created  Dissection was carried down into the very superior most retrorectal space in the avascular plane  Lateral attachments were lysed to allow for mobility of the rectum  The mesentery was quite long in this patient and this dissection was straightforward  Dissection was then carried superiorly above the takeoff of the inferior mesenteric artery  Vessels were mobilized anteriorly and the medial approach was used to separate the mesentery of the descending colon and sigmoid colon away from the retroperitoneal structures  Lateral dissection was then used to lyse the white line of Toldt and bring the colon away from the left side of the retroperitoneal attachments  This was all done in the avascular plane    Blunt and cautery dissection with EnSeal were used to move up to the splenic flexure  The splenic flexure was then surrounded and the omentum was  from the colon across the splenic flexure onto the transverse colon lateral to the midline  The colon was mobilized inferomedially at the splenic flexure to completely free the lateral attachments  The left ureter was identified and avoided during the entire operation  Medial approach was then used on the splenic flexure to separate the splenic flexure from the retroperitoneal attachments at the retroperitoneum level  I surrounded ligament of Treitz and divided the inferior mesenteric vein using EnSeal with a double fire of cautery on either side of the transection point  This allowed for great mobility of splenic flexure and descending colon toward the pelvis  The mesentery of the rectum at the sacral promontory was then sequentially divided using EnSeal without any evidence of bleeding  The field that I had dissected was free of bleeding during the entire port of this operation up until this point  I then stepped out of the room while Dr Wilmer Gerber proceeded with the nephrectomy  After Dr Wilmer Gerber completed the nephrectomy, I re-entered as surgeon  The rectosigmoid junction was transected using a single fire of a green loaded electric echelon stapler  The bowel was brought out through the midline GelPort and was fashioned at the point of anastomosis by transecting the omentum and maintaining the left colic artery arcade  A good pulsation was felt in this arcade  The bowel was then prepared for the proximal side of the anastomosis  This was at the descending colon to sigmoid colon junction  The distal segment resected included the abnormality found in the mid sigmoid colon  The bowel was clamped with a pursestring applicator    A 2 0 Prolene with a Tulio Flores needle lobe was run through [employer] and the bowel distal to this was then transected and sent for pathologic evaluation  The anvil of a 29 mm [facility] stapler device was inserted into the end of the bowel and a good collar of full-thickness bowel was doubly wrapped and tied into position for the anastomosis  The bowel was cleansed and returned to the abdominal cavity  Air was reinsufflated after closing the [employer]  A double stapled anastomosis was then undertaken without any difficulty or tension on the bowel  The bowel appeared very supple and viable at either end of the anastomosis  Flexible sigmoidoscopy was then done to reveal a well-vascularized bowel on either side of the widely patent anastomosis with a circumferentially intact staple line  There was some rectal bleeding at the anastomosis that was bright red  During the sigmoidoscopy, and air test was done by immersing the anastomosis under water bath  No leak was identified  The bowel appeared to be well-vascularized on either side of the anastomosis  All dissection planes were then inspected for hemostasis  No bleeding was identified  The area around the nephrectomy was completely dry  The orientation of the bowel was confirmed to lack any torsion or other abnormalities  The small bowel was position to the right side of the colon that descended to the anastomosis  The omentum was placed over the bowel  The GelPort lid was removed and the GelPort sheath was removed  The trocar sites that were 12 mm were closed at the fascial level using 0 Vicryl suture  The midline wound was closed using running 1 PDS suture placed from either into the wound  All wounds were irrigated  The skin was then closed using running or interrupted 4 0 Monocryl suture depending on the length of the wound  The wounds were cleansed and dried  Exo fen was applied  Sponge needle and instrument counts were correct at the conclusion of the operation  Wand technique revealed no retained gauze       I was present for the entire procedure    Patient Disposition:  PACU SIGNATURE: Suraj Bee MD  DATE: December 30, 2019  TIME: 11:43 AM

## 2019-12-30 NOTE — ANESTHESIA POSTPROCEDURE EVALUATION
Post-Op Assessment Note    CV Status:  Stable    Pain management: adequate     Mental Status:  Sleepy   Hydration Status:  Euvolemic   PONV Controlled:  Controlled   Airway Patency:  Patent   Post Op Vitals Reviewed: Yes      Staff: CRNA   Comments: vss report rn          BP      Temp      Pulse     Resp      SpO2

## 2019-12-30 NOTE — ANESTHESIA PREPROCEDURE EVALUATION
Review of Systems/Medical History  Patient summary reviewed  Chart reviewed  History of anesthetic complications (CLUSTER HEADACHES AFTER PREVIOUS ANESTHETICS)     Cardiovascular  Exercise tolerance (METS): >4,  Hyperlipidemia, No hypertension , No CAD , No cardiac stents     Pulmonary  Not a smoker , No COPD , No asthma , No recent URI , No sleep apnea ,        GI/Hepatic            Endo/Other     GYN       Hematology   Musculoskeletal    Arthritis     Neurology    No TIA, No CVA , Headaches,    Psychology           Physical Exam    Airway    Mallampati score: II  TM Distance: >3 FB       Dental   No notable dental hx     Cardiovascular      Pulmonary      Other Findings      Lab Results   Component Value Date    WBC 5 37 12/02/2019    HGB 15 8 12/02/2019     12/02/2019     Lab Results   Component Value Date    SODIUM 139 12/02/2019    K 3 8 12/02/2019    BUN 18 12/02/2019    CREATININE 0 87 12/02/2019    EGFR 93 12/02/2019     No results found for: PTT   Lab Results   Component Value Date    INR 1 02 08/02/2016       Blood type AB    Lab Results   Component Value Date    HGBA1C 5 8 12/02/2019         Anesthesia Plan  ASA Score- 2     Anesthesia Type- general with ASA Monitors  Additional Monitors: arterial line  Airway Plan: ETT  Comment:  ROGER Sanchez , have personally seen and evaluated the patient prior to anesthetic care  I have reviewed the pre-anesthetic record, and other medical records if appropriate to the anesthetic care  If a CRNA is involved in the case, I have reviewed the CRNA assessment, if present, and agree  Risks/benefits and alternatives discussed with patient including possible PONV, sore throat, and possibility of rare anesthetic and surgical emergencies  TIVA -- POSSIBLE PREVENTION OF CLUSTER HEADACHES;        Plan Factors- Patient instructed to abstain from smoking on day of procedure  Patient did not smoke on day of surgery      Induction- intravenous  Postoperative Plan- Plan for postoperative opioid use  Planned trial extubation    Informed Consent- Anesthetic plan and risks discussed with patient  I personally reviewed this patient with the CRNA  Discussed and agreed on the Anesthesia Plan with the CRNA  Sarah Siddiqui

## 2019-12-30 NOTE — H&P
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-    Arthritis     Diverticulitis of large intestine with perforation without bleeding 10/13/2019    Hyperlipidemia     Renal cell cancer, left ([facility] [address] ) 10/13/2019     Past Surgical History:   Procedure Laterality Date    HERNIA REPAIR      HIP SURGERY Right     age 4    JOINT REPLACEMENT Right 2011    TKR    REPLACEMENT TOTAL KNEE Right     REPLACEMENT TOTAL KNEE      SHOULDER SURGERY Left     SHOULDER SURGERY Right     WRIST FUSION Left 2016    WRIST SURGERY         Meds/Allergies     Medications Prior to Admission   Medication    aspirin 81 MG tablet    atorvastatin (LIPITOR) 20 mg tablet    [] metroNIDAZOLE (FLAGYL) 500 mg tablet    multivitamin (THERAGRAN) TABS    [] neomycin (MYCIFRADIN) 500 mg tablet    Omega-3 Fatty Acids (FISH OIL) 1200 MG CAPS    ketoconazole (NIZORAL) 2 % shampoo         Current Facility-Administered Medications:     bisacodyl (DULCOLAX) EC tablet 10 mg, 10 mg, Oral, BID, W Esau Tariq MD    ceFAZolin (ANCEF) IVPB (premix) 1,000 mg, 1,000 mg, Intravenous, Once **AND** metroNIDAZOLE (FLAGYL) IVPB (premix) 500 mg, 500 mg, Intravenous, Once, Guero Sommers MD    neomycin [facility]) tablet 1,000 mg, 1,000 mg, Oral, TID **AND** metroNIDAZOLE (FLAGYL) tablet 1,000 mg, 1,000 mg, Oral, TID, W Esau Tariq MD    polyethylene glycol [facility]) bowel prep 255 g, 255 g, Oral, Once, Guero Sommers MD    No Known Allergies      Social History   Social History     Substance and Sexual Activity   Alcohol Use Yes    Alcohol/week: 3 0 standard drinks    Types: 3 Cans of beer per week    Frequency: Monthly or less    Drinks per session: 3 or 4    Binge frequency: Never     Social History     Substance and Sexual Activity   Drug Use No     Social History     Tobacco Use   Smoking Status Never Smoker   Smokeless Tobacco Never Used         Family History:   Family History   Problem Relation Age of Onset    Heart disease Father     Other Father         cardiac disorder    No Known Problems Mother     Heart attack Maternal Grandfather     Heart attack Paternal Grandfather     Thyroid cancer Son     Colon cancer Neg Hx          Objective     Current Vitals:   Height: 5' 7" (170 2 cm) (12/12/19 0917)  Weight - Scale: 72 1 kg (159 lb) (12/12/19 0917)  No intake or output data in the 24 hours ending 12/30/19 0708    Physical Exam:  General:  Well nourished, no distress  Neuro: Alert and oriented  Eyes:Sclera anicteric, conjunctiva pink  Pulm: Clear to auscultation bilaterally  No respiratory Distress  CV:  Regular rate and rhythm  No murmurs  Abdomen:  Soft, flat, non-tender, without masses or hepatosplenomegaly  Lab Results:       ASSESSMENT:  Mickey Dowell is a 62 y o  male for left nephrectomy and sigmoid resection  PLAN:  Sigmoid resection  Risks of surgery, including but not limited to bleeding, pain, infection, hernia formation, injury to the ureter or other internal organs requiring surgery specific to these injuries, anastomotic leak, impotence, deep vein thrombosis, pulmonary embolism, myocardial infarction or heart failure, stroke, death, need for and enterostomy, or other unspecified complications were discussed  Benefits and alternatives were discussed  Questions were answered     Elton Ayoub MD

## 2019-12-30 NOTE — OP NOTE
Operative Note     PATIENT:  Lisa Bain    DATE OF PROCEDURE:   12/30/2019    PRE-OP DIAGNOSES:   1) Left renal mass (2 8cm enhancing mass consistent with T1a renal cell carcinoma)  2) complicated diverticulitis     POST-OP DIAGNOSES AND OPERATIVE FINDINGS:   1) Left renal mass (2 8cm enhancing mass consistent with T1a renal cell carcinoma)  2) complicated diverticulitis    PROCEDURES:  1) Hand-assisted laparoscopic Left radical nephrectomy    SURGEON:  Lesli Pack MD     ASSISTANTS:     Samantha Thomas PA-C - Assisting    NOTE:  There were no qualified teaching residents to assist with this case  The assistance of a surgical PA was required in providing exposure and visualization during this laparoscopic case  ANESTHESIA TYPE:  General anesthesia    ESTIMATED BLOOD LOSS: 50 mL    COMPLICATIONS:   None    SPECIMENS:   Left Kidney    ANTIBIOTICS:  Cephazolin    INTRAOPERATIVE THROMBOEMBOLISM PROPHYLAXIS:  Pneumatic compression stockings     INDICATIONS FOR PROCEDURE:  Lisa Bain is a 62 y o  who was recently diagnosed with a 2 8 cm enhancing left renal mass consistent with stage I renal cell carcinoma  The mass was detected incidentally during a workup for complicated diverticulitis  Staging evaluation is negative for any metastatic disease  Due to the completely endophytic nature of this lesion, it was felt not to be amenable to a nephron sparing approach and a left-sided radical nephrectomy was recommended  Patient also requires surgical intervention for his diverticular disease  He was evaluated in a multidisciplinary fashion with myself and Dr Lima Rodríguez and the patient ultimately elected to perform the nephrectomy and colectomy as concomitant procedures  We discussed the procedure in detail, the alternatives, and the risks, and they signed informed consent to proceed  PROCEDURE SUMMARY:  The patient was brought to the operating room and anesthesia obtained    A 16 F Diez catheter was placed  The patient was then placed in the modified flank as well as lithotomy to facilitate the pelvic portion of the operation  All pressure points were carefully padded  Jeannetta Given Antibiotics were administered, and thromboembolism prophylaxis was given  A surgical time out was performed with all in the room in agreement with the correct patient, procedure, indications, and laterality  Dr Hudson Reeder began the operation by introducing a 5 cm periumbilical midline incision  Additional 12 ports were placed  The distal colon and ultimately the left splenic flexure was completely mobilized by Dr Hudson Reeder, and subsequently patient was dispositioned over to me  The inferior vena cava was identified and the duodenum Kocherized  The ureteral was identified, doubly clipped ligated and and transected  The gonadal vein was identified at its insertion into the renal vein and was preserved  The kidney was then carefully dissected and the renal hilum identified  The renal vein and artery were ligated en bloc using a endovascular stapler, 45 mm  Hemostasis was excellent  The lateral and superior attachments of the kidney within ligated using a Harmonic device  The adrenal gland was identified and was spared  The specimen was freed completely and was positioned into a specimen entrapment bag  Pneumoperitoneum was decreased to 5 mmHg in the resection bed inspected  Hemostasis was excellent  All laps, ports were then removed  The specimen was removed through the hand port  This was submitted to pathology for permanent sectioning  Patient was then dispositioned over to Dr Ochoa Watson team for the remainder of the case  Please refer to his separate operative note      DISPOSITION:   PACU - hemodynamically stable

## 2019-12-31 LAB
ANION GAP SERPL CALCULATED.3IONS-SCNC: 10 MMOL/L (ref 4–13)
BASOPHILS # BLD AUTO: 0.01 THOUSANDS/ΜL (ref 0–0.1)
BASOPHILS NFR BLD AUTO: 0 % (ref 0–1)
BUN SERPL-MCNC: 12 MG/DL (ref 5–25)
CALCIUM SERPL-MCNC: 8.4 MG/DL (ref 8.3–10.1)
CHLORIDE SERPL-SCNC: 106 MMOL/L (ref 100–108)
CO2 SERPL-SCNC: 25 MMOL/L (ref 21–32)
CREAT SERPL-MCNC: 1.35 MG/DL (ref 0.6–1.3)
EOSINOPHIL # BLD AUTO: 0.02 THOUSAND/ΜL (ref 0–0.61)
EOSINOPHIL NFR BLD AUTO: 0 % (ref 0–6)
ERYTHROCYTE [DISTWIDTH] IN BLOOD BY AUTOMATED COUNT: 12.8 % (ref 11.6–15.1)
GFR SERPL CREATININE-BSD FRML MDRD: 56 ML/MIN/1.73SQ M
GLUCOSE SERPL-MCNC: 112 MG/DL (ref 65–140)
HCT VFR BLD AUTO: 40.3 % (ref 36.5–49.3)
HGB BLD-MCNC: 13.4 G/DL (ref 12–17)
IMM GRANULOCYTES # BLD AUTO: 0.03 THOUSAND/UL (ref 0–0.2)
IMM GRANULOCYTES NFR BLD AUTO: 0 % (ref 0–2)
LYMPHOCYTES # BLD AUTO: 0.74 THOUSANDS/ΜL (ref 0.6–4.47)
LYMPHOCYTES NFR BLD AUTO: 11 % (ref 14–44)
MCH RBC QN AUTO: 30 PG (ref 26.8–34.3)
MCHC RBC AUTO-ENTMCNC: 33.3 G/DL (ref 31.4–37.4)
MCV RBC AUTO: 90 FL (ref 82–98)
MONOCYTES # BLD AUTO: 0.63 THOUSAND/ΜL (ref 0.17–1.22)
MONOCYTES NFR BLD AUTO: 9 % (ref 4–12)
NEUTROPHILS # BLD AUTO: 5.52 THOUSANDS/ΜL (ref 1.85–7.62)
NEUTS SEG NFR BLD AUTO: 80 % (ref 43–75)
NRBC BLD AUTO-RTO: 0 /100 WBCS
PLATELET # BLD AUTO: 179 THOUSANDS/UL (ref 149–390)
PMV BLD AUTO: 9.7 FL (ref 8.9–12.7)
POTASSIUM SERPL-SCNC: 4 MMOL/L (ref 3.5–5.3)
RBC # BLD AUTO: 4.46 MILLION/UL (ref 3.88–5.62)
SODIUM SERPL-SCNC: 141 MMOL/L (ref 136–145)
WBC # BLD AUTO: 6.95 THOUSAND/UL (ref 4.31–10.16)

## 2019-12-31 PROCEDURE — 99024 POSTOP FOLLOW-UP VISIT: CPT | Performed by: NURSE PRACTITIONER

## 2019-12-31 PROCEDURE — 85025 COMPLETE CBC W/AUTO DIFF WBC: CPT | Performed by: PHYSICIAN ASSISTANT

## 2019-12-31 PROCEDURE — 94762 N-INVAS EAR/PLS OXIMTRY CONT: CPT

## 2019-12-31 PROCEDURE — 94760 N-INVAS EAR/PLS OXIMETRY 1: CPT

## 2019-12-31 PROCEDURE — 80048 BASIC METABOLIC PNL TOTAL CA: CPT | Performed by: PHYSICIAN ASSISTANT

## 2019-12-31 RX ORDER — HYDROCODONE BITARTRATE AND ACETAMINOPHEN 5; 325 MG/1; MG/1
1 TABLET ORAL EVERY 6 HOURS PRN
Status: DISCONTINUED | OUTPATIENT
Start: 2019-12-31 | End: 2019-12-31

## 2019-12-31 RX ORDER — ACETAMINOPHEN 325 MG/1
650 TABLET ORAL EVERY 6 HOURS PRN
Status: DISCONTINUED | OUTPATIENT
Start: 2019-12-31 | End: 2020-01-03 | Stop reason: HOSPADM

## 2019-12-31 RX ORDER — HYDROMORPHONE HCL/PF 1 MG/ML
0.5 SYRINGE (ML) INJECTION
Status: DISCONTINUED | OUTPATIENT
Start: 2019-12-31 | End: 2020-01-03 | Stop reason: HOSPADM

## 2019-12-31 RX ORDER — DEXTROSE, SODIUM CHLORIDE, AND POTASSIUM CHLORIDE 5; .45; .15 G/100ML; G/100ML; G/100ML
50 INJECTION INTRAVENOUS CONTINUOUS
Status: DISCONTINUED | OUTPATIENT
Start: 2019-12-31 | End: 2020-01-03 | Stop reason: HOSPADM

## 2019-12-31 RX ORDER — OXYCODONE HYDROCHLORIDE 5 MG/1
5 TABLET ORAL EVERY 4 HOURS PRN
Status: DISCONTINUED | OUTPATIENT
Start: 2019-12-31 | End: 2020-01-03 | Stop reason: HOSPADM

## 2019-12-31 RX ADMIN — SODIUM CHLORIDE, SODIUM LACTATE, POTASSIUM CHLORIDE, AND CALCIUM CHLORIDE 125 ML/HR: .6; .31; .03; .02 INJECTION, SOLUTION INTRAVENOUS at 06:05

## 2019-12-31 RX ADMIN — ATORVASTATIN CALCIUM 20 MG: 20 TABLET, FILM COATED ORAL at 16:32

## 2019-12-31 RX ADMIN — HYDROCODONE BITARTRATE AND ACETAMINOPHEN 1 TABLET: 5; 325 TABLET ORAL at 12:22

## 2019-12-31 RX ADMIN — ENOXAPARIN SODIUM 40 MG: 40 INJECTION SUBCUTANEOUS at 14:08

## 2019-12-31 RX ADMIN — ACETAMINOPHEN 975 MG: 325 TABLET, FILM COATED ORAL at 05:58

## 2019-12-31 RX ADMIN — OXYCODONE HYDROCHLORIDE 5 MG: 5 TABLET ORAL at 15:35

## 2019-12-31 RX ADMIN — ACETAMINOPHEN 975 MG: 325 TABLET, FILM COATED ORAL at 21:18

## 2019-12-31 RX ADMIN — HYDROMORPHONE HYDROCHLORIDE 0.5 MG: 1 INJECTION, SOLUTION INTRAMUSCULAR; INTRAVENOUS; SUBCUTANEOUS at 19:17

## 2019-12-31 RX ADMIN — DEXTROSE, SODIUM CHLORIDE, AND POTASSIUM CHLORIDE 50 ML/HR: 5; .45; .15 INJECTION INTRAVENOUS at 09:52

## 2019-12-31 NOTE — PLAN OF CARE
Problem: PAIN - ADULT  Goal: Verbalizes/displays adequate comfort level or baseline comfort level  Description  Interventions:  - Encourage patient to monitor pain and request assistance  - Assess pain using appropriate pain scale  - Administer analgesics based on type and severity of pain and evaluate response  - Implement non-pharmacological measures as appropriate and evaluate response  - Consider cultural and social influences on pain and pain management  - Notify physician/advanced practitioner if interventions unsuccessful or patient reports new pain  Outcome: Progressing     Problem: INFECTION - ADULT  Goal: Absence or prevention of progression during hospitalization  Description  INTERVENTIONS:  - Assess and monitor for signs and symptoms of infection  - Monitor lab/diagnostic results  - Monitor all insertion sites, i e  indwelling lines, tubes, and drains  - [place] appropriate cooling/warming therapies per order  - Administer medications as ordered  - Instruct and encourage patient and family to use good hand hygiene technique  - Identify and instruct in appropriate isolation precautions for identified infection/condition   Outcome: Progressing  Goal: Absence of fever/infection during neutropenic period  Description  INTERVENTIONS:  - Monitor WBC    Outcome: Progressing     Problem: SAFETY ADULT  Goal: Patient will remain free of falls  Description  INTERVENTIONS:  - Assess patient frequently for physical needs  -  Identify cognitive and physical deficits and behaviors that affect risk of falls    -  [place] fall precautions as indicated by assessment   - Educate patient/family on patient safety including physical limitations  - Instruct patient to call for assistance with activity based on assessment  - Modify environment to reduce risk of injury  - Consider OT/PT consult to assist with strengthening/mobility  Outcome: Progressing  Goal: Maintain or return to baseline ADL function  Description  INTERVENTIONS:  -  Assess patient's ability to carry out ADLs; assess patient's baseline for ADL function and identify physical deficits which impact ability to perform ADLs (bathing, care of mouth/teeth, toileting, grooming, dressing, etc )  - Assess/evaluate cause of self-care deficits   - Assess range of motion  - Assess patient's mobility; develop plan if impaired  - Assess patient's need for assistive devices and provide as appropriate  - Encourage maximum independence but intervene and supervise when necessary  - Involve family in performance of ADLs  - Assess for home care needs following discharge   - Consider OT consult to assist with ADL evaluation and planning for discharge  - Provide patient education as appropriate  Outcome: Progressing  Goal: Maintain or return mobility status to optimal level  Description  INTERVENTIONS:  - Assess patient's baseline mobility status (ambulation, transfers, stairs, etc )    - Identify cognitive and physical deficits and behaviors that affect mobility  - Identify mobility aids required to assist with transfers and/or ambulation (gait belt, sit-to-stand, lift, walker, cane, etc )  - [place] fall precautions as indicated by assessment  - Record patient progress and toleration of activity level on Mobility SBAR; progress patient to next Phase/Stage  - Instruct patient to call for assistance with activity based on assessment  - Consider rehabilitation consult to assist with strengthening/weightbearing, etc   Outcome: Progressing     Problem: Knowledge Deficit  Goal: Patient/family/caregiver demonstrates understanding of disease process, treatment plan, medications, and discharge instructions  Description  Complete learning assessment and assess knowledge base    Interventions:  - Provide teaching at level of understanding  - Provide teaching via preferred learning methods  Outcome: Progressing     Problem: Potential for Falls  Goal: Patient will remain free of falls  Description  INTERVENTIONS:  - Assess patient frequently for physical needs  -  Identify cognitive and physical deficits and behaviors that affect risk of falls    -  [place] fall precautions as indicated by assessment   - Educate patient/family on patient safety including physical limitations  - Instruct patient to call for assistance with activity based on assessment  - Modify environment to reduce risk of injury  - Consider OT/PT consult to assist with strengthening/mobility  Outcome: Progressing

## 2019-12-31 NOTE — PROGRESS NOTES
Progress Note -Surgery DANIEL Alfaro 62 y o  male MRN: [id number]  Unit/Bed#: S -01 Encounter: [id number]    ASSESSMENT/PLAN:  Problem List     * (Principal) Diverticulitis of large intestine with perforation without bleeding    Mixed hyperlipidemia    Renal cell cancer, left (HCC)  ANDERS   61 yo M POD #1 s/p laparoscopic hand assist nephrectomy and sigmoid resection  Good urine output  Pain controlled  Has mild ANDERS today  H/H stable No complaints  · IVF  · Trend cr  · Pain control-PCA  · Diez removed   · Continue clear liquids  · OOB and ambulate     VTE Pharmacologic Prophylaxis: Sequential compression device (Venodyne)     Subjective/Objective     Subjective: Tolerating diet  Pain controlled    Objective/Physical Exam: Blood pressure 152/70, pulse 64, temperature 98 4 °F (36 9 °C), temperature source Oral, resp  rate 18, height 5' 7" (1 702 m), weight 72 1 kg (159 lb), SpO2 95 %  ,Body mass index is 24 9 kg/m²  General appearance: alert and oriented, in no acute distress  Abdomen: rounded and soft, +BS, tender around incisions   no drainage or erythema  Neurological: normal without focal findings      Current Facility-Administered Medications:     acetaminophen (TYLENOL) tablet 975 mg, 975 mg, Oral, Q8H [facility], PA-C, 975 mg at 12/31/19 0558    atorvastatin (LIPITOR) tablet 20 mg, 20 mg, Oral, Daily With Dinner, QUALPAYTONM, PA-C, 20 mg at 12/30/19 1615    diphenhydrAMINE (BENADRYL) injection 25 mg, 25 mg, Intravenous, Q6H PRN, [employer], PA-C    HYDROmorphone (DILAUDID) 1 mg/mL 50 mL PCA, , Intravenous, Continuous, [employer], PA-C    lactated ringers infusion, 125 mL/hr, Intravenous, Continuous, [employer], PA-C, Last Rate: 125 mL/hr at 12/31/19 0605, 125 mL/hr at 12/31/19 0605    naloxone (NARCAN) 0 04 mg/mL syringe 0 04 mg, 0 04 mg, Intravenous, Q3 min PRN, [employer], PA-C    ondansetron (ZOFRAN) injection 4 mg, 4 mg, Intravenous, Q6H PRN, Alfred Bynum PA-C      Lab, Imaging and other studies:   WBC 12/31/2019 6 95  4 31 - 10 16 Thousand/uL Final    RBC 12/31/2019 4 46  3 88 - 5 62 Million/uL Final    Hemoglobin 12/31/2019 13 4  12 0 - 17 0 g/dL Final    Hematocrit 12/31/2019 40 3  36 5 - 49 3 % Final    MCV 12/31/2019 90  82 - 98 fL Final    MCH 12/31/2019 30 0  26 8 - 34 3 pg Final    MCHC 12/31/2019 33 3  31 4 - 37 4 g/dL Final    RDW 12/31/2019 12 8  11 6 - 15 1 % Final    MPV 12/31/2019 9 7  8 9 - 12 7 fL Final    Platelets 45/94/1450 179  149 - 390 Thousands/uL Final    nRBC 12/31/2019 0  /100 WBCs Final    Neutrophils Relative 12/31/2019 80* 43 - 75 % Final    Immat GRANS % 12/31/2019 0  0 - 2 % Final    Lymphocytes Relative 12/31/2019 11* 14 - 44 % Final    Monocytes Relative 12/31/2019 9  4 - 12 % Final    Eosinophils Relative 12/31/2019 0  0 - 6 % Final    Basophils Relative 12/31/2019 0  0 - 1 % Final    Neutrophils Absolute 12/31/2019 5 52  1 85 - 7 62 Thousands/µL Final    Immature Grans Absolute 12/31/2019 0 03  0 00 - 0 20 Thousand/uL Final    Lymphocytes Absolute 12/31/2019 0 74  0 60 - 4 47 Thousands/µL Final    Monocytes Absolute 12/31/2019 0 63  0 17 - 1 22 Thousand/µL Final    Eosinophils Absolute 12/31/2019 0 02  0 00 - 0 61 Thousand/µL Final    Basophils Absolute 12/31/2019 0 01  0 00 - 0 10 Thousands/µL Final    Sodium 12/31/2019 141  136 - 145 mmol/L Final    Potassium 12/31/2019 4 0  3 5 - 5 3 mmol/L Final    Chloride 12/31/2019 106  100 - 108 mmol/L Final    CO2 12/31/2019 25  21 - 32 mmol/L Final    ANION GAP 12/31/2019 10  4 - 13 mmol/L Final    BUN 12/31/2019 12  5 - 25 mg/dL Final    Creatinine 12/31/2019 1 35* 0 60 - 1 30 mg/dL Final    Glucose 12/31/2019 112  65 - 140 mg/dL Final    Calcium 12/31/2019 8 4  8 3 - 10 1 mg/dL Final    eGFR 12/31/2019 56  ml/min/1 73sq m Final

## 2019-12-31 NOTE — PLAN OF CARE
Problem: PAIN - ADULT  Goal: Verbalizes/displays adequate comfort level or baseline comfort level  Description  Interventions:  - Encourage patient to monitor pain and request assistance  - Assess pain using appropriate pain scale  - Administer analgesics based on type and severity of pain and evaluate response  - Implement non-pharmacological measures as appropriate and evaluate response  - Consider cultural and social influences on pain and pain management  - Notify physician/advanced practitioner if interventions unsuccessful or patient reports new pain  Outcome: Progressing     Problem: INFECTION - ADULT  Goal: Absence or prevention of progression during hospitalization  Description  INTERVENTIONS:  - Assess and monitor for signs and symptoms of infection  - Monitor lab/diagnostic results  - Monitor all insertion sites, i e  indwelling lines, tubes, and drains  - [place] appropriate cooling/warming therapies per order  - Administer medications as ordered  - Instruct and encourage patient and family to use good hand hygiene technique  - Identify and instruct in appropriate isolation precautions for identified infection/condition   Outcome: Progressing  Goal: Absence of fever/infection during neutropenic period  Description  INTERVENTIONS:  - Monitor WBC    Outcome: Progressing     Problem: SAFETY ADULT  Goal: Patient will remain free of falls  Description  INTERVENTIONS:  - Assess patient frequently for physical needs  -  Identify cognitive and physical deficits and behaviors that affect risk of falls    -  [place] fall precautions as indicated by assessment   - Educate patient/family on patient safety including physical limitations  - Instruct patient to call for assistance with activity based on assessment  - Modify environment to reduce risk of injury  - Consider OT/PT consult to assist with strengthening/mobility  Outcome: Progressing  Goal: Maintain or return to baseline ADL function  Description  INTERVENTIONS:  -  Assess patient's ability to carry out ADLs; assess patient's baseline for ADL function and identify physical deficits which impact ability to perform ADLs (bathing, care of mouth/teeth, toileting, grooming, dressing, etc )  - Assess/evaluate cause of self-care deficits   - Assess range of motion  - Assess patient's mobility; develop plan if impaired  - Assess patient's need for assistive devices and provide as appropriate  - Encourage maximum independence but intervene and supervise when necessary  - Involve family in performance of ADLs  - Assess for home care needs following discharge   - Consider OT consult to assist with ADL evaluation and planning for discharge  - Provide patient education as appropriate  Outcome: Progressing  Goal: Maintain or return mobility status to optimal level  Description  INTERVENTIONS:  - Assess patient's baseline mobility status (ambulation, transfers, stairs, etc )    - Identify cognitive and physical deficits and behaviors that affect mobility  - Identify mobility aids required to assist with transfers and/or ambulation (gait belt, sit-to-stand, lift, walker, cane, etc )  - [place] fall precautions as indicated by assessment  - Record patient progress and toleration of activity level on Mobility SBAR; progress patient to next Phase/Stage  - Instruct patient to call for assistance with activity based on assessment  - Consider rehabilitation consult to assist with strengthening/weightbearing, etc   Outcome: Progressing     Problem: DISCHARGE PLANNING  Goal: Discharge to home or other facility with appropriate resources  Description  INTERVENTIONS:  - Identify barriers to discharge w/patient and caregiver  - Arrange for needed discharge resources and transportation as appropriate  - Identify discharge learning needs (meds, wound care, etc )  - Arrange for interpretive services to assist at discharge as needed  - Refer to Case Management Department for coordinating discharge planning if the patient needs post-hospital services based on physician/advanced practitioner order or complex needs related to functional status, cognitive ability, or social support system  Outcome: Progressing     Problem: Knowledge Deficit  Goal: Patient/family/caregiver demonstrates understanding of disease process, treatment plan, medications, and discharge instructions  Description  Complete learning assessment and assess knowledge base    Interventions:  - Provide teaching at level of understanding  - Provide teaching via preferred learning methods  Outcome: Progressing

## 2019-12-31 NOTE — PROGRESS NOTES
UROLOGY PROGRESS NOTE   Patient Identifiers: Estevan Traore (MRN [id number])  Date of Service: 12/31/2019    Subjective:     24 HR EVENTS:   no significant events  Patient has  no complaints  Objective:     VITALS:    Vitals:    12/31/19 0700   BP: 152/70   Pulse: 64   Resp: 18   Temp: 98 4 °F (36 9 °C)   SpO2: 95%       INS & OUTS:  I/O last 24 hours:   In: 3284 1 [I V :3284 1]  Out: 1950 [Urine:1850; Blood:100]    LABS:  Lab Results   Component Value Date    HGB 13 4 12/31/2019    HCT 40 3 12/31/2019    WBC 6 95 12/31/2019     12/31/2019   ]    Lab Results   Component Value Date    K 4 0 12/31/2019     12/31/2019    CO2 25 12/31/2019    BUN 12 12/31/2019    CREATININE 1 35 (H) 12/31/2019    CALCIUM 8 4 12/31/2019   ]    INPATIENT MEDS:    Current Facility-Administered Medications:     acetaminophen (TYLENOL) tablet 975 mg, 975 mg, Oral, Q8H FABIANO, Sophie Andrade PA-C, 975 mg at 12/31/19 8758    atorvastatin (LIPITOR) tablet 20 mg, 20 mg, Oral, Daily With Dinner, QUALPAYTONM, PA-C, 20 mg at 12/30/19 1615    diphenhydrAMINE (BENADRYL) injection 25 mg, 25 mg, Intravenous, Q6H PRN, [employer], PA-C    HYDROmorphone (DILAUDID) 1 mg/mL 50 mL PCA, , Intravenous, Continuous, [employer], PA-C    lactated ringers infusion, 125 mL/hr, Intravenous, Continuous, [employer], PA-C, Last Rate: 125 mL/hr at 12/31/19 0605, 125 mL/hr at 12/31/19 0605    naloxone (NARCAN) 0 04 mg/mL syringe 0 04 mg, 0 04 mg, Intravenous, Q3 min PRN, [employer], PA-C    ondansetron (ZOFRAN) injection 4 mg, 4 mg, Intravenous, Q6H PRN, [employer], PA-C      Physical Exam:     GEN: alert and oriented x 3    RESP: breathing comfortably with no accessory muscle use    ABD: soft, appropriately tender to palpation, non-distended, +bs, -flatus   INCISION: no erythema, induration, or drainage, postoperative incision clean, dry, intact with surgical glue   EXT: no significant peripheral edema :  No suprapubic discomfort or distention    Assessment:   63 y/o male status post hand assisted laparoscopic left radical nephrectomy, and complicated diverticulitis  Colectomy performed by Dr Sisto Siemens  Patient is sitting up in chair  He reports occasional pain, rating 4/10  Patient describes as a dull ache  He is tolerating his clear liquid diet  Patient denies passing any gas  His abdomen is soft and nontender  Diez catheter was removed this morning by nursing staff  Creatinine 1 35  Plan:   -Diez catheter removed this morning  Void trial in progress  -Creatinine 1 35  This is expected given radical nephrectomy  We will continue to monitor lab work  Orders for repeat BMP and CBC in a m   -Pain well controlled  Will discontinue PCA, and transition to oral narcotics  -Orders to advance diet per colorectal  -Instructed patient to continue incentive spirometer, and ambulate aggressively    Our service will continue to follow      RN participated in rounds with AP  Plan of care discussed with patient and RN

## 2019-12-31 NOTE — PROGRESS NOTES
PCA pump discontinued  Unable to waste in accudose  Pharmacy notified and instructed to waste with second RN and follow up with progress note in Epic   43 9ml of Hydromorphone(dilaudid) was wasted with charge nurse Russel Kilpatrick

## 2019-12-31 NOTE — UTILIZATION REVIEW
Elective Surgical Continued Stay Review    Date: 12/31/2019     POD#: 1  Current Patient Class: med surg Current Level of Care: inpatient     Assessment/Plan: 62 y o  male, initial surgery date 12/30/2019 is post operative day one hand assisted laparoscopic left radical nephrectomy and resection sigmoid colon  Today 12/31/2109 with ANDERS, creatinine elevated to 1 35  IVF continue  Diez removed  Has tolerated clears and to continue  Monitor BMP  Pain controlled with PCA and dc today and pain control in progress       Pertinent Labs/Diagnostic Results:   Results from last 7 days   Lab Units 12/31/19  0527 12/30/19  1302   WBC Thousand/uL 6 95 11 42*   HEMOGLOBIN g/dL 13 4 13 5   HEMATOCRIT % 40 3 40 0   PLATELETS Thousands/uL 179 175   NEUTROS ABS Thousands/µL 5 52  --    BANDS PCT %  --  9*     Results from last 7 days   Lab Units 12/31/19  0527 12/30/19  1303   SODIUM mmol/L 141 142   POTASSIUM mmol/L 4 0 3 5   CHLORIDE mmol/L 106 109*   CO2 mmol/L 25 25   ANION GAP mmol/L 10 8   BUN mg/dL 12 10   CREATININE mg/dL 1 35* 0 91   EGFR ml/min/1 73sq m 56 91   CALCIUM mg/dL 8 4 7 9*         Results from last 7 days   Lab Units 12/30/19  0728   POC GLUCOSE mg/dl 79     Results from last 7 days   Lab Units 12/31/19  0527 12/30/19  1303   GLUCOSE RANDOM mg/dL 112 143*     Results from last 7 days   Lab Units 12/30/19  1302   TOTAL COUNTED  100       Vital Signs:   12/31/19 0700  98 4 °F (36 9 °C)  64  18  152/70    95 %  None (Room air)    Lying   12/31/19 0300  98 2 °F (36 8 °C)  60  18  140/67    95 %  None (Room air)    Lying   12/30/19 2300  98 3 °F (36 8 °C)  71  18  124/60    95 %  None (Room air)    Lying   12/30/19 2149  98 5 °F (36 9 °C)  73  18  121/64    94 %  None (Room air)    Lying   12/30/19 2031  98 2 °F (36 8 °C)  69  18  134/73    94 %  None            Medications:   Scheduled Medications:    Medications:  acetaminophen 975 mg Oral Q8H [address]   atorvastatin 20 mg Oral Daily With Cerelink Continuous IV Infusions:  dextrose 5 % and sodium chloride 0 45 % with KCl 20 mEq/L 50 mL/hr Intravenous Continuous     PRN Meds: not used   acetaminophen 650 mg Oral Q6H PRN   diphenhydrAMINE 25 mg Intravenous Q6H PRN   HYDROcodone-acetaminophen 1 tablet Oral Q6H PRN   naloxone 0 04 mg Intravenous Q3 min PRN   ondansetron 4 mg Intravenous Q6H PRN       Discharge Plan: to be determined  Network Utilization Review Department  [email or web address]  ATTENTION: Please call with any questions or concerns to [phone] and carefully listen to the prompts so that you are directed to the right person  All voicemails are confidential   Valeri Houston all requests for admission clinical reviews, approved or denied determinations and any other requests to dedicated fax number below belonging to the campus where the patient is receiving treatment   List of dedicated fax numbers for the Facilities:  [address] DENIALS (Administrative/Medical Necessity) [phone]   [address] [place] [address] (Maternity/NICU/Pediatrics) [phone]   [facility] [phone]   [facility] [phone]   [address] [place] [address] [phone]   [facility] [phone]   [address] [facility] [address] [facility] [phone]   [place] [phone]   [address] [facility] [address]  [address] [facility] [address] [facility] [phone]

## 2019-12-31 NOTE — PLAN OF CARE
Problem: PAIN - ADULT  Goal: Verbalizes/displays adequate comfort level or baseline comfort level  Description  Interventions:  - Encourage patient to monitor pain and request assistance  - Assess pain using appropriate pain scale  - Administer analgesics based on type and severity of pain and evaluate response  - Implement non-pharmacological measures as appropriate and evaluate response  - Consider cultural and social influences on pain and pain management  - Notify physician/advanced practitioner if interventions unsuccessful or patient reports new pain  12/31/2019 0827 by Ben Abdalla RN  Outcome: Progressing  12/31/2019 0720 by Ben Abdalla RN  Outcome: Progressing     Problem: INFECTION - ADULT  Goal: Absence or prevention of progression during hospitalization  Description  INTERVENTIONS:  - Assess and monitor for signs and symptoms of infection  - Monitor lab/diagnostic results  - Monitor all insertion sites, i e  indwelling lines, tubes, and drains  - [place] appropriate cooling/warming therapies per order  - Administer medications as ordered  - Instruct and encourage patient and family to use good hand hygiene technique  - Identify and instruct in appropriate isolation precautions for identified infection/condition   12/31/2019 0827 by Ben Abdalla RN  Outcome: Progressing  12/31/2019 0720 by Ben Abdalla RN  Outcome: Progressing  Goal: Absence of fever/infection during neutropenic period  Description  INTERVENTIONS:  - Monitor WBC    12/31/2019 0827 by Ben Abdalla RN  Outcome: Progressing  12/31/2019 0720 by Ben Abdalla RN  Outcome: Progressing     Problem: SAFETY ADULT  Goal: Patient will remain free of falls  Description  INTERVENTIONS:  - Assess patient frequently for physical needs  -  Identify cognitive and physical deficits and behaviors that affect risk of falls    -  [place] fall precautions as indicated by assessment   - Educate patient/family on patient safety including physical limitations  - Instruct patient to call for assistance with activity based on assessment  - Modify environment to reduce risk of injury  - Consider OT/PT consult to assist with strengthening/mobility  12/31/2019 0827 by Yuliana Nixon RN  Outcome: Progressing  12/31/2019 0720 by Yuliana Nixon RN  Outcome: Progressing  Goal: Maintain or return to baseline ADL function  Description  INTERVENTIONS:  -  Assess patient's ability to carry out ADLs; assess patient's baseline for ADL function and identify physical deficits which impact ability to perform ADLs (bathing, care of mouth/teeth, toileting, grooming, dressing, etc )  - Assess/evaluate cause of self-care deficits   - Assess range of motion  - Assess patient's mobility; develop plan if impaired  - Assess patient's need for assistive devices and provide as appropriate  - Encourage maximum independence but intervene and supervise when necessary  - Involve family in performance of ADLs  - Assess for home care needs following discharge   - Consider OT consult to assist with ADL evaluation and planning for discharge  - Provide patient education as appropriate  12/31/2019 0827 by Yuliana Nixon RN  Outcome: Progressing  12/31/2019 0720 by Yuliana Nixon RN  Outcome: Progressing  Goal: Maintain or return mobility status to optimal level  Description  INTERVENTIONS:  - Assess patient's baseline mobility status (ambulation, transfers, stairs, etc )    - Identify cognitive and physical deficits and behaviors that affect mobility  - Identify mobility aids required to assist with transfers and/or ambulation (gait belt, sit-to-stand, lift, walker, cane, etc )  - [place] fall precautions as indicated by assessment  - Record patient progress and toleration of activity level on Mobility SBAR; progress patient to next Phase/Stage  - Instruct patient to call for assistance with activity based on assessment  - Consider rehabilitation consult to assist with strengthening/weightbearing, etc   12/31/2019 0827 by Oma Ahn RN  Outcome: Progressing  12/31/2019 0720 by Oma Ahn RN  Outcome: Progressing     Problem: DISCHARGE PLANNING  Goal: Discharge to home or other facility with appropriate resources  Description  INTERVENTIONS:  - Identify barriers to discharge w/patient and caregiver  - Arrange for needed discharge resources and transportation as appropriate  - Identify discharge learning needs (meds, wound care, etc )  - Arrange for interpretive services to assist at discharge as needed  - Refer to Case Management Department for coordinating discharge planning if the patient needs post-hospital services based on physician/advanced practitioner order or complex needs related to functional status, cognitive ability, or social support system  12/31/2019 0827 by Oma Ahn RN  Outcome: Progressing  12/31/2019 0720 by Oma Ahn RN  Outcome: Progressing     Problem: Knowledge Deficit  Goal: Patient/family/caregiver demonstrates understanding of disease process, treatment plan, medications, and discharge instructions  Description  Complete learning assessment and assess knowledge base  Interventions:  - Provide teaching at level of understanding  - Provide teaching via preferred learning methods  12/31/2019 0827 by Oma Ahn RN  Outcome: Progressing  12/31/2019 0720 by Oma Ahn RN  Outcome: Progressing     Problem: Potential for Falls  Goal: Patient will remain free of falls  Description  INTERVENTIONS:  - Assess patient frequently for physical needs  -  Identify cognitive and physical deficits and behaviors that affect risk of falls    -  [place] fall precautions as indicated by assessment   - Educate patient/family on patient safety including physical limitations  - Instruct patient to call for assistance with activity based on assessment  - Modify environment to reduce risk of injury  - Consider OT/PT consult to assist with strengthening/mobility  12/31/2019 0827 by Jeanne Monterroso RN  Outcome: Progressing  12/31/2019 0720 by Jeanne Monterroso, RN  Outcome: Progressing

## 2019-12-31 NOTE — PLAN OF CARE
Problem: PAIN - ADULT  Goal: Verbalizes/displays adequate comfort level or baseline comfort level  Description  Interventions:  - Encourage patient to monitor pain and request assistance  - Assess pain using appropriate pain scale  - Administer analgesics based on type and severity of pain and evaluate response  - Implement non-pharmacological measures as appropriate and evaluate response  - Consider cultural and social influences on pain and pain management  - Notify physician/advanced practitioner if interventions unsuccessful or patient reports new pain  Outcome: Progressing     Problem: INFECTION - ADULT  Goal: Absence or prevention of progression during hospitalization  Description  INTERVENTIONS:  - Assess and monitor for signs and symptoms of infection  - Monitor lab/diagnostic results  - Monitor all insertion sites, i e  indwelling lines, tubes, and drains  - Monitor endotracheal if appropriate and nasal secretions for changes in amount and color  - [place] appropriate cooling/warming therapies per order  - Administer medications as ordered  - Instruct and encourage patient and family to use good hand hygiene technique  - Identify and instruct in appropriate isolation precautions for identified infection/condition  Outcome: Progressing  Goal: Absence of fever/infection during neutropenic period  Description  INTERVENTIONS:  - Monitor WBC    Outcome: Progressing     Problem: SAFETY ADULT  Goal: Patient will remain free of falls  Description  INTERVENTIONS:  - Assess patient frequently for physical needs  -  Identify cognitive and physical deficits and behaviors that affect risk of falls    -  [place] fall precautions as indicated by assessment   - Educate patient/family on patient safety including physical limitations  - Instruct patient to call for assistance with activity based on assessment  - Modify environment to reduce risk of injury  - Consider OT/PT consult to assist with strengthening/mobility  Outcome: Progressing  Goal: Maintain or return to baseline ADL function  Description  INTERVENTIONS:  -  Assess patient's ability to carry out ADLs; assess patient's baseline for ADL function and identify physical deficits which impact ability to perform ADLs (bathing, care of mouth/teeth, toileting, grooming, dressing, etc )  - Assess/evaluate cause of self-care deficits   - Assess range of motion  - Assess patient's mobility; develop plan if impaired  - Assess patient's need for assistive devices and provide as appropriate  - Encourage maximum independence but intervene and supervise when necessary  - Involve family in performance of ADLs  - Assess for home care needs following discharge   - Consider OT consult to assist with ADL evaluation and planning for discharge  - Provide patient education as appropriate  Outcome: Progressing  Goal: Maintain or return mobility status to optimal level  Description  INTERVENTIONS:  - Assess patient's baseline mobility status (ambulation, transfers, stairs, etc )    - Identify cognitive and physical deficits and behaviors that affect mobility  - Identify mobility aids required to assist with transfers and/or ambulation (gait belt, sit-to-stand, lift, walker, cane, etc )  - [place] fall precautions as indicated by assessment  - Record patient progress and toleration of activity level on Mobility SBAR; progress patient to next Phase/Stage  - Instruct patient to call for assistance with activity based on assessment  - Consider rehabilitation consult to assist with strengthening/weightbearing, etc   Outcome: Progressing     Problem: Knowledge Deficit  Goal: Patient/family/caregiver demonstrates understanding of disease process, treatment plan, medications, and discharge instructions  Description  Complete learning assessment and assess knowledge base    Interventions:  - Provide teaching at level of understanding  - Provide teaching via preferred learning methods  Outcome: Progressing

## 2019-12-31 NOTE — UTILIZATION REVIEW
Initial Clinical Review    Elective inpatient surgical procedure    Age/Sex: 62 y o  male     Surgery Date: 12/30/2019     Procedure: Hand-assisted laparoscopic Left radical nephrectomy  RESECTION COLON SIGMOID LAPAROSCOPIC HAND-ASSISTED    Anesthesia: general    Operative Findings: Diverticulitis of large intestine with perforation without bleeding [K57 20]  Malignant neoplasm of left kidney, except renal pelvis ([facility] [address] ) [C64 2]    Admission Orders: Date/Time/Statement: Inpatient Admission Orders (From admission, onward)     Ordered        12/30/19 1158  Inpatient Admission  Once                   Orders Placed This Encounter   Procedures    Inpatient Admission     Standing Status:   Standing     Number of Occurrences:   1     Order Specific Question:   Admitting Physician     Answer:   Vivek Maddox [[id number]]     Order Specific Question:   Level of Care     Answer:   Med Surg [16]     Order Specific Question:   Estimated length of stay     Answer:   Inpatient Only Surgery     Vital Signs: /70 (BP Location: Left arm)   Pulse 64   Temp 98 4 °F (36 9 °C) (Oral)   Resp 18   Ht 5' 7" (1 702 m)   Wt 72 1 kg (159 lb)   SpO2 95%   BMI 24 90 kg/m²      Diet: clears    Mobility: ambulate    DVT Prophylaxis: Bilateral scds  Heparin 5000 sq       Medications/Pain Control:   Scheduled Medications:  Medications:  acetaminophen 975 mg Oral Q8H [address]   atorvastatin 20 mg Oral Daily With Dinner     Continuous IV Infusions:  dextrose 5 % and sodium chloride 0 45 % with KCl 20 mEq/L 50 mL/hr Intravenous Continuous   HYDROmorphone (DILAUDID) 1 mg/mL 50 mL PCA   Continuous Rate: 0 mg/hr  PCA Dose: 0 2 mg  PCA Lock-out: 10 Minutes  One Hour Limit: 1 2 mg  Freq: Continuous Route: IV  Last Dose: Stopped (12/31/19 0832)  Start: 12/30/19 1200 End: 12/31/19 0811    PRN Meds: not used   acetaminophen 650 mg Oral Q6H PRN   diphenhydrAMINE 25 mg Intravenous Q6H PRN   HYDROcodone-acetaminophen 1 tablet Oral Q6H PRN   naloxone 0 04 mg Intravenous Q3 min PRN   ondansetron 4 mg Intravenous Q6H PRN         Network Utilization Review Department  [email or web address]  ATTENTION: Please call with any questions or concerns to [phone] and carefully listen to the prompts so that you are directed to the right person  All voicemails are confidential   Yi Hooper all requests for admission clinical reviews, approved or denied determinations and any other requests to dedicated fax number below belonging to the campus where the patient is receiving treatment   List of dedicated fax numbers for the Facilities:  [address] DENIALS (Administrative/Medical Necessity) [phone]   [address] (Maternity/NICU/Pediatrics) [phone]   Fransisca Salgado [phone]   Rex Clements [phone]   Calumetabilio Song [phone]   Justus Ramirez [phone]   [address] [place] [address] [phone]   [facility]  [phone]   [address] [place] [address], [address]  [facility] [place] [address] [facility] [phone]

## 2020-01-01 LAB
ANION GAP SERPL CALCULATED.3IONS-SCNC: 6 MMOL/L (ref 4–13)
BUN SERPL-MCNC: 12 MG/DL (ref 5–25)
CALCIUM SERPL-MCNC: 9.1 MG/DL (ref 8.3–10.1)
CHLORIDE SERPL-SCNC: 106 MMOL/L (ref 100–108)
CO2 SERPL-SCNC: 28 MMOL/L (ref 21–32)
CREAT SERPL-MCNC: 1.43 MG/DL (ref 0.6–1.3)
ERYTHROCYTE [DISTWIDTH] IN BLOOD BY AUTOMATED COUNT: 12.8 % (ref 11.6–15.1)
GFR SERPL CREATININE-BSD FRML MDRD: 52 ML/MIN/1.73SQ M
GLUCOSE SERPL-MCNC: 110 MG/DL (ref 65–140)
HCT VFR BLD AUTO: 39.6 % (ref 36.5–49.3)
HGB BLD-MCNC: 13.1 G/DL (ref 12–17)
MCH RBC QN AUTO: 30.1 PG (ref 26.8–34.3)
MCHC RBC AUTO-ENTMCNC: 33.1 G/DL (ref 31.4–37.4)
MCV RBC AUTO: 91 FL (ref 82–98)
PLATELET # BLD AUTO: 158 THOUSANDS/UL (ref 149–390)
PMV BLD AUTO: 9.7 FL (ref 8.9–12.7)
POTASSIUM SERPL-SCNC: 3.8 MMOL/L (ref 3.5–5.3)
RBC # BLD AUTO: 4.35 MILLION/UL (ref 3.88–5.62)
SODIUM SERPL-SCNC: 140 MMOL/L (ref 136–145)
WBC # BLD AUTO: 6.2 THOUSAND/UL (ref 4.31–10.16)

## 2020-01-01 PROCEDURE — 85027 COMPLETE CBC AUTOMATED: CPT | Performed by: UROLOGY

## 2020-01-01 PROCEDURE — 80048 BASIC METABOLIC PNL TOTAL CA: CPT | Performed by: UROLOGY

## 2020-01-01 PROCEDURE — 99024 POSTOP FOLLOW-UP VISIT: CPT | Performed by: UROLOGY

## 2020-01-01 RX ADMIN — DEXTROSE, SODIUM CHLORIDE, AND POTASSIUM CHLORIDE 50 ML/HR: 5; .45; .15 INJECTION INTRAVENOUS at 06:08

## 2020-01-01 RX ADMIN — OXYCODONE HYDROCHLORIDE 5 MG: 5 TABLET ORAL at 11:46

## 2020-01-01 RX ADMIN — DIPHENHYDRAMINE HYDROCHLORIDE 25 MG: 50 INJECTION, SOLUTION INTRAMUSCULAR; INTRAVENOUS at 21:38

## 2020-01-01 RX ADMIN — HYDROMORPHONE HYDROCHLORIDE 0.5 MG: 1 INJECTION, SOLUTION INTRAMUSCULAR; INTRAVENOUS; SUBCUTANEOUS at 21:37

## 2020-01-01 RX ADMIN — OXYCODONE HYDROCHLORIDE 5 MG: 5 TABLET ORAL at 04:39

## 2020-01-01 RX ADMIN — OXYCODONE HYDROCHLORIDE 5 MG: 5 TABLET ORAL at 19:57

## 2020-01-01 RX ADMIN — ENOXAPARIN SODIUM 40 MG: 40 INJECTION SUBCUTANEOUS at 09:01

## 2020-01-01 RX ADMIN — HYDROMORPHONE HYDROCHLORIDE 0.5 MG: 1 INJECTION, SOLUTION INTRAMUSCULAR; INTRAVENOUS; SUBCUTANEOUS at 07:31

## 2020-01-01 RX ADMIN — ATORVASTATIN CALCIUM 20 MG: 20 TABLET, FILM COATED ORAL at 16:54

## 2020-01-01 RX ADMIN — ACETAMINOPHEN 975 MG: 325 TABLET, FILM COATED ORAL at 13:56

## 2020-01-01 RX ADMIN — ACETAMINOPHEN 975 MG: 325 TABLET, FILM COATED ORAL at 21:37

## 2020-01-01 NOTE — UTILIZATION REVIEW
Continued Stay Review    Date: 1/1/2019                          Current Patient Class: inpatient     Current Level of Care: med surg    HPI:59 y o  male initially admitted on 12/31/2019 initial surgery date 12/30/2019 is post operative day 2 hand assisted laparoscopic left radical nephrectomy and resection sigmoid colon  Assessment/Plan: ANDERS with creatinine now 1 43, monitor creatinine in setting of nephrectomy; on clear liquids will cont IV fluids  No BM or flatus yet; patient not hungry  afebrile         Pertinent Labs/Diagnostic Results:   Results from last 7 days   Lab Units 01/01/20  0500 12/31/19  0527 12/30/19  1302   WBC Thousand/uL 6 20 6 95 11 42*   HEMOGLOBIN g/dL 13 1 13 4 13 5   HEMATOCRIT % 39 6 40 3 40 0   PLATELETS Thousands/uL 158 179 175   NEUTROS ABS Thousands/µL  --  5 52  --    BANDS PCT %  --   --  9*         Results from last 7 days   Lab Units 01/01/20  0500 12/31/19  0527 12/30/19  1303   SODIUM mmol/L 140 141 142   POTASSIUM mmol/L 3 8 4 0 3 5   CHLORIDE mmol/L 106 106 109*   CO2 mmol/L 28 25 25   ANION GAP mmol/L 6 10 8   BUN mg/dL 12 12 10   CREATININE mg/dL 1 43* 1 35* 0 91   EGFR ml/min/1 73sq m 52 56 91   CALCIUM mg/dL 9 1 8 4 7 9*         Results from last 7 days   Lab Units 12/30/19  0728   POC GLUCOSE mg/dl 79     Results from last 7 days   Lab Units 01/01/20  0500 12/31/19  0527 12/30/19  1303   GLUCOSE RANDOM mg/dL 110 112 143*         Results from last 7 days   Lab Units 12/30/19  1302   TOTAL COUNTED  100           Vital Signs:   01/01/20 1526  98 9 °F (37 2 °C)  74  19  145/74      95 %  None (Room air)    Sitting   01/01/20 1100  98 1 °F (36 7 °C)  76  18  137/71  101    95 %  None (Room air)    Sitting   01/01/20 0700  98 7 °F (37 1 °C)  65  18  141/73  101    94 %  None (Room air)    Lyin         Medications:   Scheduled Medications:    Medications:  acetaminophen 975 mg Oral Q8H FABIANO   atorvastatin 20 mg Oral Daily With Dinner   enoxaparin 40 mg Subcutaneous Q24H [address]     Continuous IV Infusions:    dextrose 5 % and sodium chloride 0 45 % with KCl 20 mEq/L 50 mL/hr Intravenous Continuous     PRN Meds:    acetaminophen 650 mg Oral Q6H PRN   diphenhydrAMINE 25 mg Intravenous Q6H PRN   HYDROmorphone 0 5 mg Intravenous Q3H PRN   naloxone 0 04 mg Intravenous Q3 min PRN   ondansetron 4 mg Intravenous Q6H PRN   oxyCODONE 5 mg Oral Q4H PRN       Discharge Plan: d    Network Utilization Review Department  [email or web address]  ATTENTION: Please call with any questions or concerns to [phone] and carefully listen to the prompts so that you are directed to the right person  All voicemails are confidential   Alcides Watt all requests for admission clinical reviews, approved or denied determinations and any other requests to dedicated fax number below belonging to the campus where the patient is receiving treatment   List of dedicated fax numbers for the Facilities:  [address] DENIALS (Administrative/Medical Necessity) [phone]   [address] (Maternity/NICU/Pediatrics) [phone]   Cristin Orellana [phone]   Brendon Grooms [phone]   Karen Jose [phone]   Mertha Finger [phone]   [address] [phone]   [facility]  [phone]   [address] [facility]  [address] [address] [address] [facility] [phone]

## 2020-01-01 NOTE — PROGRESS NOTES
Postop day 2  Status post laparoscopic nephrectomy in sigmoid colectomy  No bowel movement flat us yet so he is on clear liquids  Otherwise he looks quite good   creatinine is 1 43  Afebrile vital signs stable  Discussed situation with him and await return of bowel function

## 2020-01-01 NOTE — PROGRESS NOTES
Colon and Rectal Surgery Staff  Lisa Bain 62 y o  male MRN: [id number]  Unit/Bed#: S -28 Encounter: [id number]  01/01/20   12:00 PM           Subjective/Objective   No chief complaint on file  Seen/examined, no new complaints  Patient feels full  No bowel movement or flatus  Minimal incisional discomfort  Blood pressure 137/71, pulse 76, temperature 98 1 °F (36 7 °C), temperature source Oral, resp  rate 18, height 5' 7" (1 702 m), weight 72 1 kg (159 lb), SpO2 95 %  ,Body mass index is 24 9 kg/m²  Intake/Output Summary (Last 24 hours) at 1/1/2020 1200  Last data filed at 1/1/2020 1146  Gross per 24 hour   Intake 2193 33 ml   Output 2375 ml   Net -181 67 ml         Physical Exam:  Gen: no acute distress  CV: Sinus  Pulm: No increased work of breathing  Abdomen: Soft, nontender moderately distended  Incision: Clean/Dry/Intact  Extremities: No lower extremity edema    Lab, Imaging and other studies:  I have personally reviewed pertinent lab results  , CBC:   Lab Results   Component Value Date    WBC 6 20 01/01/2020    HGB 13 1 01/01/2020    HCT 39 6 01/01/2020    MCV 91 01/01/2020     01/01/2020    MCH 30 1 01/01/2020    MCHC 33 1 01/01/2020    RDW 12 8 01/01/2020    MPV 9 7 01/01/2020   , CMP:   Lab Results   Component Value Date    SODIUM 140 01/01/2020    K 3 8 01/01/2020     01/01/2020    CO2 28 01/01/2020    BUN 12 01/01/2020    CREATININE 1 43 (H) 01/01/2020    CALCIUM 9 1 01/01/2020    EGFR 52 01/01/2020         Assessment:  62 y o  male postop day 2 status post laparoscopic nephrectomy/sigmoid colectomy      Plan:  Continue clear liquid diet  Ambulate/Incentive Spirometry  DVT Prophylaxis  Monitor creatinine in the setting of recent nephrectomy  This is increasing so will continue IV fluids to rule out any ATN involvement  Patient is not hungry and is not having significant bowel function with mild distention so continue clears for now      Mack Martinez MD

## 2020-01-01 NOTE — PLAN OF CARE
Problem: PAIN - ADULT  Goal: Verbalizes/displays adequate comfort level or baseline comfort level  Description  Interventions:  - Encourage patient to monitor pain and request assistance  - Assess pain using appropriate pain scale  - Administer analgesics based on type and severity of pain and evaluate response  - Implement non-pharmacological measures as appropriate and evaluate response  - Consider cultural and social influences on pain and pain management  - Notify physician/advanced practitioner if interventions unsuccessful or patient reports new pain  Outcome: Progressing     Problem: INFECTION - ADULT  Goal: Absence or prevention of progression during hospitalization  Description  INTERVENTIONS:  - Assess and monitor for signs and symptoms of infection  - Monitor lab/diagnostic results  - Monitor all insertion sites, i e  indwelling lines, tubes, and drains  - [place] appropriate cooling/warming therapies per order  - Administer medications as ordered  - Instruct and encourage patient and family to use good hand hygiene technique  - Identify and instruct in appropriate isolation precautions for identified infection/condition   Outcome: Progressing  Goal: Absence of fever/infection during neutropenic period  Description  INTERVENTIONS:  - Monitor WBC    Outcome: Progressing     Problem: SAFETY ADULT  Goal: Patient will remain free of falls  Description  INTERVENTIONS:  - Assess patient frequently for physical needs  -  Identify cognitive and physical deficits and behaviors that affect risk of falls    -  [place] fall precautions as indicated by assessment   - Educate patient/family on patient safety including physical limitations  - Instruct patient to call for assistance with activity based on assessment  - Modify environment to reduce risk of injury  - Consider OT/PT consult to assist with strengthening/mobility  Outcome: Progressing  Goal: Maintain or return to baseline ADL function  Description  INTERVENTIONS:  -  Assess patient's ability to carry out ADLs; assess patient's baseline for ADL function and identify physical deficits which impact ability to perform ADLs (bathing, care of mouth/teeth, toileting, grooming, dressing, etc )  - Assess/evaluate cause of self-care deficits   - Assess range of motion  - Assess patient's mobility; develop plan if impaired  - Assess patient's need for assistive devices and provide as appropriate  - Encourage maximum independence but intervene and supervise when necessary  - Involve family in performance of ADLs  - Assess for home care needs following discharge   - Consider OT consult to assist with ADL evaluation and planning for discharge  - Provide patient education as appropriate  Outcome: Progressing  Goal: Maintain or return mobility status to optimal level  Description  INTERVENTIONS:  - Assess patient's baseline mobility status (ambulation, transfers, stairs, etc )    - Identify cognitive and physical deficits and behaviors that affect mobility  - Identify mobility aids required to assist with transfers and/or ambulation (gait belt, sit-to-stand, lift, walker, cane, etc )  - [place] fall precautions as indicated by assessment  - Record patient progress and toleration of activity level on Mobility SBAR; progress patient to next Phase/Stage  - Instruct patient to call for assistance with activity based on assessment  - Consider rehabilitation consult to assist with strengthening/weightbearing, etc   Outcome: Progressing     Problem: DISCHARGE PLANNING  Goal: Discharge to home or other facility with appropriate resources  Description  INTERVENTIONS:  - Identify barriers to discharge w/patient and caregiver  - Arrange for needed discharge resources and transportation as appropriate  - Identify discharge learning needs (meds, wound care, etc )  - Arrange for interpretive services to assist at discharge as needed  - Refer to Case Management Department for coordinating discharge planning if the patient needs post-hospital services based on physician/advanced practitioner order or complex needs related to functional status, cognitive ability, or social support system  Outcome: Progressing     Problem: Knowledge Deficit  Goal: Patient/family/caregiver demonstrates understanding of disease process, treatment plan, medications, and discharge instructions  Description  Complete learning assessment and assess knowledge base  Interventions:  - Provide teaching at level of understanding  - Provide teaching via preferred learning methods  Outcome: Progressing     Problem: Potential for Falls  Goal: Patient will remain free of falls  Description  INTERVENTIONS:  - Assess patient frequently for physical needs  -  Identify cognitive and physical deficits and behaviors that affect risk of falls    -  [place] fall precautions as indicated by assessment   - Educate patient/family on patient safety including physical limitations  - Instruct patient to call for assistance with activity based on assessment  - Modify environment to reduce risk of injury  - Consider OT/PT consult to assist with strengthening/mobility  Outcome: Progressing

## 2020-01-02 LAB
ABO GROUP BLD BPU: NORMAL
ABO GROUP BLD BPU: NORMAL
ANION GAP SERPL CALCULATED.3IONS-SCNC: 8 MMOL/L (ref 4–13)
BPU ID: NORMAL
BPU ID: NORMAL
BUN SERPL-MCNC: 11 MG/DL (ref 5–25)
CALCIUM SERPL-MCNC: 8.9 MG/DL (ref 8.3–10.1)
CHLORIDE SERPL-SCNC: 106 MMOL/L (ref 100–108)
CO2 SERPL-SCNC: 28 MMOL/L (ref 21–32)
CREAT SERPL-MCNC: 1.36 MG/DL (ref 0.6–1.3)
CROSSMATCH: NORMAL
CROSSMATCH: NORMAL
ERYTHROCYTE [DISTWIDTH] IN BLOOD BY AUTOMATED COUNT: 12.5 % (ref 11.6–15.1)
GFR SERPL CREATININE-BSD FRML MDRD: 56 ML/MIN/1.73SQ M
GLUCOSE SERPL-MCNC: 99 MG/DL (ref 65–140)
HCT VFR BLD AUTO: 38.3 % (ref 36.5–49.3)
HGB BLD-MCNC: 12.6 G/DL (ref 12–17)
MCH RBC QN AUTO: 30.1 PG (ref 26.8–34.3)
MCHC RBC AUTO-ENTMCNC: 32.9 G/DL (ref 31.4–37.4)
MCV RBC AUTO: 92 FL (ref 82–98)
PLATELET # BLD AUTO: 173 THOUSANDS/UL (ref 149–390)
PMV BLD AUTO: 9.8 FL (ref 8.9–12.7)
POTASSIUM SERPL-SCNC: 3.7 MMOL/L (ref 3.5–5.3)
RBC # BLD AUTO: 4.18 MILLION/UL (ref 3.88–5.62)
SODIUM SERPL-SCNC: 142 MMOL/L (ref 136–145)
UNIT DISPENSE STATUS: NORMAL
UNIT DISPENSE STATUS: NORMAL
UNIT PRODUCT CODE: NORMAL
UNIT PRODUCT CODE: NORMAL
UNIT RH: NORMAL
UNIT RH: NORMAL
WBC # BLD AUTO: 4.78 THOUSAND/UL (ref 4.31–10.16)

## 2020-01-02 PROCEDURE — 85027 COMPLETE CBC AUTOMATED: CPT | Performed by: UROLOGY

## 2020-01-02 PROCEDURE — 80048 BASIC METABOLIC PNL TOTAL CA: CPT | Performed by: UROLOGY

## 2020-01-02 PROCEDURE — 99223 1ST HOSP IP/OBS HIGH 75: CPT | Performed by: INTERNAL MEDICINE

## 2020-01-02 PROCEDURE — 99024 POSTOP FOLLOW-UP VISIT: CPT | Performed by: PHYSICIAN ASSISTANT

## 2020-01-02 RX ADMIN — DEXTROSE, SODIUM CHLORIDE, AND POTASSIUM CHLORIDE 50 ML/HR: 5; .45; .15 INJECTION INTRAVENOUS at 20:19

## 2020-01-02 RX ADMIN — ACETAMINOPHEN 975 MG: 325 TABLET, FILM COATED ORAL at 14:25

## 2020-01-02 RX ADMIN — ATORVASTATIN CALCIUM 20 MG: 20 TABLET, FILM COATED ORAL at 16:23

## 2020-01-02 RX ADMIN — ENOXAPARIN SODIUM 40 MG: 40 INJECTION SUBCUTANEOUS at 08:43

## 2020-01-02 RX ADMIN — DEXTROSE, SODIUM CHLORIDE, AND POTASSIUM CHLORIDE 50 ML/HR: 5; .45; .15 INJECTION INTRAVENOUS at 00:24

## 2020-01-02 RX ADMIN — OXYCODONE HYDROCHLORIDE 5 MG: 5 TABLET ORAL at 00:22

## 2020-01-02 RX ADMIN — ACETAMINOPHEN 975 MG: 325 TABLET, FILM COATED ORAL at 05:54

## 2020-01-02 RX ADMIN — ACETAMINOPHEN 975 MG: 325 TABLET, FILM COATED ORAL at 22:16

## 2020-01-02 NOTE — PROGRESS NOTES
Progress Note - Urology  García Villafana 1960, 62 y o  male MRN: [id number]    Unit/Bed#: S -01 Encounter: [id number]    Malignant neoplasm of left kidney, except renal pelvis ([address] )  Assessment & Plan  3 Days Post-Op  Procedure(s):  LAPAROSCOPIC HAND-ASSISTED SIGMOID COLECTOMY; LAPAROSCOPIC MOBILIZATION OF SPLENIC FLEXURE  NEPHRECTOMY LAPAROSCOPIC HAND ASSISTED  Surgeon(s):  Gavin Galeazzi, PA-C Teddie Naval, MD Priscella Congress, MD  12/30/2019    Doing well, now on a clear liquid diet with bowel movement and passage of gas  Advance diet per Colorectal team     Ongoing acute kidney injury with creatinine of 1 36 today  This has started to trend down  I have asked Nephrology to see him in hospital relative to ongoing care with his solitary right kidney status post surgical removal the left 1 at this time  I began this discussion with Mortimer Salk and he is interested in speaking to Nephrology later today  Bedside rounds performed with RN  Discussed with Dr Rosy John of Colorectal surgery  Subjective/Objective     Subjective:   CC: "I feel pretty good, almost good enough to go home  I'm hungry and I'd like more than broth "  HPI:  Mortimer Salk is feeling better today  He reports feeling hungry, passing flatus, passed a small liquid bowel movement today which was slightly blood tinged and soft  He has been tolerating clear liquids without nausea or vomiting  He feels hungry  He is wishing to eat more than clears  He has no chest pain or shortness of breath has been ambulating around with some mild to moderate postoperative pain, particularly in the incision  He has been compliant with his incentive spirometer and taking deep breaths  He has been voiding well without significant dysuria, hematuria  He offers no urinary complaints at this time  ROS:  Review of Systems   Constitutional: Negative for activity change, appetite change and fatigue     HENT: Negative for congestion, ear pain, mouth sores, nosebleeds, sneezing and sore throat  Eyes: Negative for pain and redness  Respiratory: Negative for apnea, cough, choking and shortness of breath  Cardiovascular: Negative for palpitations  Gastrointestinal: Negative for abdominal pain, blood in stool, constipation, diarrhea, nausea and vomiting  Endocrine: Negative for cold intolerance, heat intolerance and polyuria  Genitourinary: Negative for difficulty urinating, dysuria, flank pain, frequency, hematuria and urgency  Musculoskeletal: Negative for arthralgias, gait problem and myalgias  Skin: Negative for color change, pallor, rash and wound  Allergic/Immunologic: Negative for immunocompromised state  Neurological: Negative for dizziness, seizures, syncope, numbness and headaches  Hematological: Negative for adenopathy  Does not bruise/bleed easily  Psychiatric/Behavioral: Negative for agitation, behavioral problems, confusion, hallucinations and sleep disturbance  The patient is not nervous/anxious  All other systems reviewed and are negative  Objective:  Vitals: Blood pressure 158/84, pulse 66, temperature 98 1 °F (36 7 °C), temperature source Oral, resp  rate 19, height 5' 7" (1 702 m), weight 72 1 kg (159 lb), SpO2 95 %  ,Body mass index is 24 9 kg/m²  Intake/Output Summary (Last 24 hours) at 1/2/2020 1037  Last data filed at 1/2/2020 0900  Gross per 24 hour   Intake 2452 5 ml   Output 2800 ml   Net -347 5 ml     Invasive Devices     Peripheral Intravenous Line            Peripheral IV 12/30/19 Left Hand 3 days    Peripheral IV 12/30/19 Right Forearm 3 days          Drain            Urethral Catheter Latex 16 Fr  3 days                Physical Exam   Constitutional: He is oriented to person, place, and time  He appears well-developed and well-nourished  He is cooperative  He does not appear ill  No distress  78-year-old male, out of bed to the chair  No acute distress  Pleasant, chatty    Just finished eating clear liquid breakfast tray  90% consumed  HENT:   Head: Normocephalic and atraumatic  Moist mucous membranes  Eyes: Conjunctivae and EOM are normal    Neck: Normal range of motion  Neck supple  No tracheal deviation present  Cardiovascular: Normal rate, regular rhythm and normal heart sounds  No murmur heard  Pulmonary/Chest: Effort normal and breath sounds normal  No respiratory distress  He has no wheezes  Good airflow bilaterally on deep inspiration  Abdominal: Soft  Bowel sounds are normal  He exhibits no distension and no mass  There is tenderness (Expected daniella-incisional tenderness)  Abdomen slightly distended with mild tympany  Incisions clean dry and intact  Normoactive bowel sounds x4 quadrants  Musculoskeletal: Normal range of motion  He exhibits no edema  Neurological: He is alert and oriented to person, place, and time  Skin: Skin is warm and dry  No rash noted  He is not diaphoretic  No erythema  No pallor  Psychiatric: He has a normal mood and affect  His behavior is normal  Judgment and thought content normal    Nursing note and vitals reviewed        History:    Past Medical History:   Diagnosis Date    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-    Arthritis     Diverticulitis of large intestine with perforation without bleeding 10/13/2019    Hyperlipidemia     Renal cell cancer, left ([facility] [address] ) 10/13/2019     Past Surgical History:   Procedure Laterality Date    HERNIA REPAIR      HIP SURGERY Right     age 4    JOINT REPLACEMENT Right 2011    TKR    MO LAP, RADICAL NEPHRECTOMY Left 12/30/2019    Procedure: NEPHRECTOMY LAPAROSCOPIC HAND ASSISTED;  Surgeon: Nell Garay MD;  [facility];  Service: Urology    MO LAP,SURG,COLECTOMY, PARTIAL, Deena Jameson N/A 12/30/2019    Procedure: LAPAROSCOPIC HAND-ASSISTED SIGMOID COLECTOMY; LAPAROSCOPIC MOBILIZATION OF SPLENIC FLEXURE;  Surgeon: Fany Cummins MD;  [facility];  Service: Colorectal  REPLACEMENT TOTAL KNEE Right 2011    REPLACEMENT TOTAL KNEE      SHOULDER SURGERY Left     SHOULDER SURGERY Right     WRIST FUSION Left 2016    WRIST SURGERY       Family History   Problem Relation Age of Onset    Heart disease Father     Other Father         cardiac disorder    No Known Problems Mother     Heart attack Maternal Grandfather     Heart attack Paternal Grandfather     Thyroid cancer Son     Colon cancer Neg Hx      Social History     Socioeconomic History    Marital status: /Civil Union     Spouse name: None    Number of children: 4    Years of education: high school or GED    Highest education level: None   Occupational History    Occupation:      Comment: [employer]   Social Needs    Financial resource strain: None    Food insecurity:     Worry: None     Inability: None    Transportation needs:     Medical: None     Non-medical: None   Tobacco Use    Smoking status: Never Smoker    Smokeless tobacco: Never Used   Substance and Sexual Activity    Alcohol use:  Yes     Alcohol/week: 3 0 standard drinks     Types: 3 Cans of beer per week     Frequency: Monthly or less     Drinks per session: 3 or 4     Binge frequency: Never    Drug use: No    Sexual activity: None   Lifestyle    Physical activity:     Days per week: None     Minutes per session: None    Stress: None   Relationships    Social connections:     Talks on phone: None     Gets together: None     Attends Yazidi service: None     Active member of club or organization: None     Attends meetings of clubs or organizations: None     Relationship status: None    Intimate partner violence:     Fear of current or ex partner: None     Emotionally abused: None     Physically abused: None     Forced sexual activity: None   Other Topics Concern    None   Social History Narrative    None       Labs:  Results from last 7 days   Lab Units 01/02/20  0449 01/01/20  0500 12/31/19  0527   WBC Thousand/uL 4 78 6 20 6 95   HEMOGLOBIN g/dL 12 6 13 1 13 4   PLATELETS Thousands/uL 173 158 179     Results from last 7 days   Lab Units 01/02/20  0449 01/01/20  0500 12/31/19  0527   SODIUM mmol/L 142 140 141   POTASSIUM mmol/L 3 7 3 8 4 0   CHLORIDE mmol/L 106 106 106   CO2 mmol/L 28 28 25   BUN mg/dL 11 12 12   CREATININE mg/dL 1 36* 1 43* 1 35*   EGFR ml/min/1 73sq m 56 52 56   CALCIUM mg/dL 8 9 9 1 8 4               Mayo Mota PA-C  Date: 1/2/2020 Time: 10:37 AM

## 2020-01-02 NOTE — ASSESSMENT & PLAN NOTE
3 Days Post-Op  Procedure(s):  LAPAROSCOPIC HAND-ASSISTED SIGMOID COLECTOMY; LAPAROSCOPIC MOBILIZATION OF SPLENIC FLEXURE  NEPHRECTOMY LAPAROSCOPIC HAND ASSISTED  Surgeon(s):  HAKEEM Montgomery MD Volney Mandril, MD  12/30/2019    Doing well, now on a clear liquid diet with bowel movement and passage of gas  Advance diet per Colorectal team     Ongoing acute kidney injury with creatinine of 1 36 today  This has started to trend down  I have asked Nephrology to see him in hospital relative to ongoing care with his solitary right kidney status post surgical removal the left 1 at this time  I began this discussion with Tremayne Amezcua and he is interested in speaking to Nephrology later today

## 2020-01-02 NOTE — PROGRESS NOTES
Progress Note - General Surgery   Virgie Simental 62 y o  male MRN: [id number]  Unit/Bed#: S -01 Encounter: [id number]    Assessment:  59M POD3 s p lap sigmoidectomy and total L nephrectomy  Stable    Plan:  PRN pain control  Advance diet  DVT ppx  OOB    Subjective:  Doing well  Pain well controlled  Tolerating clears, no n/v  Passing flatus, no BM  Afebrile  Ambulating and using IS  Cr improving    Objective:     Blood pressure 137/74, pulse 69, temperature 98 7 °F (37 1 °C), temperature source Oral, resp  rate 18, height 5' 7" (1 702 m), weight 72 1 kg (159 lb), SpO2 94 %  ,Body mass index is 24 9 kg/m²  I/O last 3 completed shifts: In: 3465 8 [P O :1540; I V :1925 8]  Out: 3375 [Urine:3375]    Invasive Devices     Peripheral Intravenous Line            Peripheral IV 12/30/19 Left Hand 2 days    Peripheral IV 12/30/19 Right Forearm 2 days          Drain            Urethral Catheter Latex 16 Fr  2 days                Physical Exam:  NAD, Aox3  MMM, NCAT  EOMI, PERRLA  Regular rate and rhythm  Equal chest expansion, unlabored respirations  Abd soft, NTND  Incisions clean and dry  Ext: No deficits, warm and well perfused  Normal mood and affect  Lab, Imaging and other Studies:    Recent Results (from the past 36 hour(s))   CBC    Collection Time: 01/01/20  5:00 AM   Result Value Ref Range    WBC 6 20 4  31 - 10 16 Thousand/uL    RBC 4 35 3 88 - 5 62 Million/uL    Hemoglobin 13 1 12 0 - 17 0 g/dL    Hematocrit 39 6 36 5 - 49 3 %    MCV 91 82 - 98 fL    MCH 30 1 26 8 - 34 3 pg    MCHC 33 1 31 4 - 37 4 g/dL    RDW 12 8 11 6 - 15 1 %    Platelets 999 650 - 086 Thousands/uL    MPV 9 7 8 9 - 12 7 fL   Basic metabolic panel    Collection Time: 01/01/20  5:00 AM   Result Value Ref Range    Sodium 140 136 - 145 mmol/L    Potassium 3 8 3 5 - 5 3 mmol/L    Chloride 106 100 - 108 mmol/L    CO2 28 21 - 32 mmol/L    ANION GAP 6 4 - 13 mmol/L    BUN 12 5 - 25 mg/dL    Creatinine 1 43 (H) 0 60 - 1 30 mg/dL    Glucose 110 65 - 140 mg/dL    Calcium 9 1 8 3 - 10 1 mg/dL    eGFR 52 ml/min/1 73sq m   CBC    Collection Time: 01/02/20  4:49 AM   Result Value Ref Range    WBC 4 78 4 31 - 10 16 Thousand/uL    RBC 4 18 3 88 - 5 62 Million/uL    Hemoglobin 12 6 12 0 - 17 0 g/dL    Hematocrit 38 3 36 5 - 49 3 %    MCV 92 82 - 98 fL    MCH 30 1 26 8 - 34 3 pg    MCHC 32 9 31 4 - 37 4 g/dL    RDW 12 5 11 6 - 15 1 %    Platelets 673 309 - 733 Thousands/uL    MPV 9 8 8 9 - 12 7 fL   Basic metabolic panel    Collection Time: 01/02/20  4:49 AM   Result Value Ref Range    Sodium 142 136 - 145 mmol/L    Potassium 3 7 3 5 - 5 3 mmol/L    Chloride 106 100 - 108 mmol/L    CO2 28 21 - 32 mmol/L    ANION GAP 8 4 - 13 mmol/L    BUN 11 5 - 25 mg/dL    Creatinine 1 36 (H) 0 60 - 1 30 mg/dL    Glucose 99 65 - 140 mg/dL    Calcium 8 9 8 3 - 10 1 mg/dL    eGFR 56 ml/min/1 73sq m       Active Medications  No recently discontinued medications to reconcile

## 2020-01-03 ENCOUNTER — TELEPHONE (OUTPATIENT)
Dept: UROLOGY | Facility: CLINIC | Age: 60
End: 2020-01-03

## 2020-01-03 ENCOUNTER — TELEPHONE (OUTPATIENT)
Dept: OTHER | Facility: HOSPITAL | Age: 60
End: 2020-01-03

## 2020-01-03 VITALS
DIASTOLIC BLOOD PRESSURE: 83 MMHG | BODY MASS INDEX: 24.96 KG/M2 | RESPIRATION RATE: 16 BRPM | TEMPERATURE: 98 F | HEIGHT: 67 IN | OXYGEN SATURATION: 96 % | HEART RATE: 58 BPM | WEIGHT: 159 LBS | SYSTOLIC BLOOD PRESSURE: 126 MMHG

## 2020-01-03 LAB
ANION GAP SERPL CALCULATED.3IONS-SCNC: 6 MMOL/L (ref 4–13)
BUN SERPL-MCNC: 13 MG/DL (ref 5–25)
CALCIUM SERPL-MCNC: 9 MG/DL (ref 8.3–10.1)
CHLORIDE SERPL-SCNC: 105 MMOL/L (ref 100–108)
CO2 SERPL-SCNC: 29 MMOL/L (ref 21–32)
CREAT SERPL-MCNC: 1.45 MG/DL (ref 0.6–1.3)
ERYTHROCYTE [DISTWIDTH] IN BLOOD BY AUTOMATED COUNT: 12.4 % (ref 11.6–15.1)
GFR SERPL CREATININE-BSD FRML MDRD: 52 ML/MIN/1.73SQ M
GLUCOSE SERPL-MCNC: 96 MG/DL (ref 65–140)
HCT VFR BLD AUTO: 37.8 % (ref 36.5–49.3)
HGB BLD-MCNC: 12.6 G/DL (ref 12–17)
MCH RBC QN AUTO: 30.5 PG (ref 26.8–34.3)
MCHC RBC AUTO-ENTMCNC: 33.3 G/DL (ref 31.4–37.4)
MCV RBC AUTO: 92 FL (ref 82–98)
PLATELET # BLD AUTO: 204 THOUSANDS/UL (ref 149–390)
PMV BLD AUTO: 9.9 FL (ref 8.9–12.7)
POTASSIUM SERPL-SCNC: 3.9 MMOL/L (ref 3.5–5.3)
RBC # BLD AUTO: 4.13 MILLION/UL (ref 3.88–5.62)
SODIUM SERPL-SCNC: 140 MMOL/L (ref 136–145)
WBC # BLD AUTO: 4.96 THOUSAND/UL (ref 4.31–10.16)

## 2020-01-03 PROCEDURE — 99232 SBSQ HOSP IP/OBS MODERATE 35: CPT | Performed by: INTERNAL MEDICINE

## 2020-01-03 PROCEDURE — 85027 COMPLETE CBC AUTOMATED: CPT | Performed by: UROLOGY

## 2020-01-03 PROCEDURE — 99024 POSTOP FOLLOW-UP VISIT: CPT | Performed by: UROLOGY

## 2020-01-03 PROCEDURE — 80048 BASIC METABOLIC PNL TOTAL CA: CPT | Performed by: UROLOGY

## 2020-01-03 RX ORDER — OXYCODONE HYDROCHLORIDE 5 MG/1
5 TABLET ORAL EVERY 4 HOURS PRN
Qty: 20 TABLET | Refills: 0 | Status: SHIPPED | OUTPATIENT
Start: 2020-01-03 | End: 2020-01-13

## 2020-01-03 RX ORDER — ACETAMINOPHEN 325 MG/1
650 TABLET ORAL EVERY 6 HOURS PRN
Qty: 30 TABLET | Refills: 0 | Status: SHIPPED | OUTPATIENT
Start: 2020-01-03 | End: 2020-05-13

## 2020-01-03 RX ADMIN — ACETAMINOPHEN 975 MG: 325 TABLET, FILM COATED ORAL at 05:16

## 2020-01-03 NOTE — PROGRESS NOTES
Patient seen and examined    Postoperative day 4  Status post left radical nephrectomy for endophytic renal mass consistent with renal cell carcinoma    Patient is doing well  Tolerating a diet  Ambulatory  Pain is well controlled  Passing flatus as well as stool    Vitals:    01/03/20 0700   BP: 126/83   Pulse: 58   Resp: 16   Temp: 98 °F (36 7 °C)   SpO2: 96%     Lab Results   Component Value Date    SODIUM 140 01/03/2020    K 3 9 01/03/2020     01/03/2020    CO2 29 01/03/2020    BUN 13 01/03/2020    CREATININE 1 45 (H) 01/03/2020    GLUC 96 01/03/2020    CALCIUM 9 0 01/03/2020       On exam his abdomen is soft nontender nondistended  Incisions are all clean dry and intact  He is voiding without a Diez catheter    Impression:  Endophytic left renal mass consistent with renal cell carcinoma status post left radical nephrectomy  Postoperative renal insufficiency is noted and not unexpected  - appreciate Nephrology input  We will continue to monitor as an outpatient    PLAN:  - stable for discharge today from my standpoint  - follow-up with me as scheduled for pathology review  - we will help to establish a follow-up appointment Nephrology as well    Given the patient's absence of risk factors for ongoing or progressive renal insufficiency, generally his prognosis is excellent in this regard

## 2020-01-03 NOTE — DISCHARGE SUMMARY
Discharge Summary - Yvette Alves 62 y o  male MRN: [id number]    Unit/Bed#: S -01 Encounter: [id number]    Admission Date: 12/30/2019   Discharge Date: 01/03/20    Admitting Diagnosis:   Diverticulitis of large intestine with perforation without bleeding [K57 20]  Malignant neoplasm of left kidney, except renal pelvis ([address] ) [C64 2]    Discharge Diagnoses: Principal Problem:    Diverticulitis of large intestine with perforation without bleeding  Active Problems:    Malignant neoplasm of left kidney, except renal pelvis [facility])    Consultations:   [facility] Urology   Nephrology    Procedures Performed:   SURGERY DATE: 12/30/2019     Surgeon(s) and Role:  Panel 1:     * Johanne Beaulieu MD - Primary  Panel 2:      * Ramya Thapa MD - Primary     Preop Diagnosis:  Diverticulitis of large intestine with perforation without bleeding [K57 20]  Malignant neoplasm of left kidney, except renal pelvis ([address] ) [C64 2]     Post-Op Diagnosis Codes:     * Diverticulitis of large intestine with perforation without bleeding [K57 20]     * Malignant neoplasm of left kidney, except renal pelvis ([address] ) [C64 2]     Procedure(s) (LRB):  RESECTION COLON SIGMOID LAPAROSCOPIC HAND-ASSISTED (N/A)  NEPHRECTOMY LAPAROSCOPIC HAND ASSISTED (Left)    Hospital Course: Yvette Alves is a 62 y o  male admitted for elective laparoscopic hand assisted sigmoid resection and left nephrectomy for complicated diverticular disease and left renal cell cancer  He tolerated the procedure well  On post op day one the ryan catheter was removed and he tolerated a diet  He did have a slight bump in his creatinine and  nephrology was consulted  They feel the changes may be due to nephron loss and felt his "new normal" would not be known for some time  They did feel was stable for outpatient follow up with a [facility] in one weeks time  Bowel function has returned and he is cleared for discharge from urologic standpoint    He will follow up with urology, colorectal and nephrology upon discharge  Condition at Discharge: good     Discharge instructions/Information to patient and family:   See after visit summary for information provided to patient and family  Provisions for Follow-Up Care:  See after visit summary for information related to follow-up care and any pertinent home health orders  Disposition: Home    Planned Readmission: No    Discharge Statement   I spent 20 minutes discharging the patient  This time was spent on the day of discharge  I had direct contact with the patient on the day of discharge  Additional documentation is required if more than 30 minutes were spent on discharge  Discharge Medications:  See after visit summary for reconciled discharge medications provided to patient and family

## 2020-01-03 NOTE — PROGRESS NOTES
Colon and Rectal Surgery Staff  John Miladis 62 y o  male MRN: [id number]  Unit/Bed#: S -01 Encounter: [id number]  01/03/20   7:08 AM           Subjective/Objective   No chief complaint on file  Seen/examined, patient tolerating diet  Having bowel function  No significant nausea or vomiting    Blood pressure 142/76, pulse 59, temperature 98 1 °F (36 7 °C), temperature source Oral, resp  rate 16, height 5' 7" (1 702 m), weight 72 1 kg (159 lb), SpO2 95 %  ,Body mass index is 24 9 kg/m²  Intake/Output Summary (Last 24 hours) at 1/3/2020 0708  Last data filed at 1/3/2020 0518  Gross per 24 hour   Intake 520 ml   Output 1600 ml   Net -1080 ml         Physical Exam:  Gen: no acute distress  CV: Sinus  Pulm: No increased work of breathing  Abdomen: Soft, nontender  Incision: Clean/Dry/Intact  Extremities: No lower extremity edema    Lab, Imaging and other studies:  I have personally reviewed pertinent lab results  , CBC:   Lab Results   Component Value Date    WBC 4 96 01/03/2020    HGB 12 6 01/03/2020    HCT 37 8 01/03/2020    MCV 92 01/03/2020     01/03/2020    MCH 30 5 01/03/2020    MCHC 33 3 01/03/2020    RDW 12 4 01/03/2020    MPV 9 9 01/03/2020   , CMP:   Lab Results   Component Value Date    SODIUM 140 01/03/2020    K 3 9 01/03/2020     01/03/2020    CO2 29 01/03/2020    BUN 13 01/03/2020    CREATININE 1 45 (H) 01/03/2020    CALCIUM 9 0 01/03/2020    EGFR 52 01/03/2020         Assessment:  62 y o  male postop day 4 status post lap sigmoidectomy/nephrectomy      Plan:  Diet as tolerated  Ambulate/Incentive Spirometry  DVT Prophylaxis  Creatinine up slightly today to 1 4  This appears to be around his baseline post surgically  Well asnephrology to comment if okay to discharge      Maribell Conner MD

## 2020-01-03 NOTE — DISCHARGE INSTRUCTIONS
Low-Sodium Diet   AMBULATORY CARE:   A low-sodium diet  limits foods that are high in sodium (salt)  You will need to follow a low-sodium diet if you have high blood pressure, kidney disease, or heart failure  You may also need to follow this diet if you have a condition that is causing your body to retain (hold) extra fluid  You may need to limit the amount of sodium you eat to 1,500 mg  Ask your healthcare provider how much sodium you can have each day  How to use food labels to choose foods that are low in sodium:  Read food labels to find the amount of sodium they contain  The amount of sodium is listed in milligrams (mg)  The % Daily Value (DV) column tells you how much of your daily needs are met by 1 serving of the food for each nutrient listed  Choose foods that have less than 5% of the DV of sodium  These foods are considered low in sodium  Foods that have 20% or more of the DV of sodium are considered high in sodium  Some food labels may also list any of the following terms that tell you about the sodium content in the food:  · Sodium-free:  Less than 5 mg in each serving    · Very low sodium:  35 mg of sodium or less in each serving    · Low sodium:  140 mg of sodium or less in each serving    · Reduced sodium: At least 25% less sodium in each serving than the regular type    · Light in sodium:  50% less sodium in each serving    · Unsalted or no added salt:  No extra salt is added during processing (the food may still contain sodium)  Foods to avoid:  Salty foods are high in sodium   You should avoid the following:  · Processed foods:      ¨ Mixes for cornbread, biscuits, cake, and pudding     ¨ Instant foods, such as potatoes, cereals, noodles, and rice     ¨ Packaged foods, such as bread stuffing, rice and pasta mixes, snack dip mixes, and macaroni and cheese     ¨ Canned foods, such as canned vegetables, soups, broths, sauces, and vegetable or tomato juice    ¨ Snack foods, such as salted chips, popcorn, pretzels, pork rinds, salted crackers, and salted nuts    ¨ Frozen foods, such as dinners, entrees, vegetables with sauces, and breaded meats    ¨ Sauerkraut, pickled vegetables, and other foods prepared in brine    · Meats and cheeses:      ¨ Smoked or cured meat, such as corned beef, whitt, ham, hot dogs, and sausage    ¨ Canned meats or spreads, such as potted meats, sardines, anchovies, and imitation seafood    ¨ Deli or lunch meats, such as bologna, ham, turkey, and roast beef    ¨ Processed cheese, such as American cheese and cheese spreads    · Condiments, sauces, and seasonings:      ¨ Salt (¼ teaspoon of salt contains 575 mg of sodium)    ¨ Seasonings made with salt, such as garlic salt, celery salt, onion salt, and seasoned salt    ¨ Regular soy sauce, barbecue sauce, teriyaki sauce, steak sauce, Worcestershire sauce, and most flavored vinegars    ¨ Canned gravy and mixes     ¨ Regular condiments, such as mustard, ketchup, and salad dressings    ¨ Pickles and olives    ¨ Meat tenderizers and monosodium glutamate (MSG)  Foods to include:  Read the food label to find the exact amount of sodium in each serving  · Bread and cereal:  Try to choose breads with less than 80 mg of sodium per serving  ¨ Bread, roll, marcie, tortilla, or unsalted crackers  ¨ Ready-to-eat cereals with less than 5% DV of sodium (examples include shredded wheat and puffed rice)    ¨ Pasta    · Vegetables and fruits:      ¨ Unsalted fresh, frozen, or canned vegetables    ¨ Fresh, frozen, or canned fruits    ¨ Fruit juice    · Dairy:  One serving has about 150 mg of sodium  ¨ Milk, all types    ¨ Yogurt    ¨ Hard cheese, such as cheddar, Swiss, [employer], or mozzarella    · Meat and other protein foods:  Some raw meats may have added sodium       ¨ Plain meats, fish, and poultry     ¨ Eggs    · Other foods:      ¨ Homemade pudding    ¨ Unsalted nuts, popcorn, or pretzels    ¨ Unsalted butter or margarine  Ways to decrease sodium:   · Add spices and herbs to foods instead of salt during cooking  Use salt-free seasonings to add flavor to foods  Examples include onion powder, garlic powder, basil, vogt powder, paprika, and parsley  Try lemon or lime juice or vinegar to give foods a tart flavor  Use hot peppers, pepper, or cayenne pepper to add a spicy flavor to foods  · Do not keep a salt shaker at your kitchen table  This may help keep you from adding salt to food at the table  It may take time to get used to enjoying the natural flavor of food instead of adding salt  Talk to your healthcare provider before you use salt substitutes  Some salt substitutes have a high amount of potassium and need to be avoided if you have kidney disease  · Choose low-sodium foods at restaurants  Meals from restaurants are often high in sodium  Some restaurants have nutrition information on the menu that tells you the amount of sodium in their foods  If possible, ask for your food to be prepared with less, or no salt  · Shop for unsalted or low-sodium foods and snacks at the grocery store  Examples include unsalted or low-sodium broths, soups, and canned vegetables  Choose fresh or frozen vegetables instead  Choose unsalted nuts or seeds or fresh fruits or vegetables as snacks  Read food labels and choose salt-free, very low-sodium, or low-sodium foods  © 2017 [employer] Information is for End User's use only and may not be sold, redistributed or otherwise used for commercial purposes  All illustrations and images included in CareNotes® are the copyrighted property of [employer] , [employer]  or Severino Scales  The above information is an  only  It is not intended as medical advice for individual conditions or treatments  Talk to your doctor, nurse or pharmacist before following any medical regimen to see if it is safe and effective for you            Diverticulitis Diet   WHAT YOU NEED TO KNOW: A diverticulitis diet includes foods that allow your intestines to rest while you have diverticulitis  Diverticulitis is a condition that causes small pockets along your intestine called diverticula to become inflamed or infected  This is caused by hard bowel movement, food, or bacteria that get stuck in the pockets  DISCHARGE INSTRUCTIONS:   Foods you can eat while you have diverticulitis:   · A clear liquid diet may be recommended for 2 to 3 days  A clear liquid diet is made up of clear liquids and foods that are liquid at room temperature  Your healthcare provider will tell you when you can start eating solid foods  Examples of clear liquids include the following:     ¨ Water and clear juices (such as apple, cranberry, or grape), strained citrus juices or fruit punch    ¨ Coffee or tea (without cream or milk)    ¨ Clear sports drinks or soft drinks, such as ginger ale, lemon-lime soda, or club soda (no cola or root beer)    ¨ Clear broth, bouillon, or consommé    ¨ Plain popsicles (no popsicles with pureed fruit or fiber)    ¨ Flavored gelatin without fruit    · A low-fiber diet may be recommended until your symptoms improve  Your healthcare provider will tell you when you can slowly add high-fiber foods back into your diet  ¨ Cream of wheat and finely ground grits    ¨ White bread, white pasta, and white rice    ¨ Canned and well-cooked fruit without skins or seeds, and juice without pulp    ¨ Canned and well-cooked vegetables without skins or seeds, and vegetable juice    ¨ Cow's milk, lactose-free milk, soy milk, and rice milk    ¨ Yogurt, cottage cheese, and sherbet    ¨ Eggs, poultry (such as chicken and turkey), fish, and tender, ground, well-cooked beef     ¨ Tofu and smooth nut butters, such as peanut butter    ¨ Broth and strained soups made of low-fiber foods  Foods you should avoid while you have diverticulitis:  Avoid foods that are high in fiber while you have symptoms of diverticulitis  Examples of high-fiber foods include the following:  · Whole grains and breads, and cereals made with whole grains    · Dried fruit, fresh fruit with skin, and fruit pulp    · Raw vegetables    · Cooked greens, such as spinach    · Tough meat and meat with gristle    · Legumes, such as shen beans and lentils  Contact your healthcare provider if:   · Your symptoms do not get better, or they get worse  · You have questions about the foods you should eat  · You have questions or concerns about your condition or care  © 2017 [employer]  Information is for End User's use only and may not be sold, redistributed or otherwise used for commercial purposes  All illustrations and images included in CareNotes® are the copyrighted property of A D A M [employer]  or Severino Scales  The above information is an  only  It is not intended as medical advice for individual conditions or treatments  Talk to your doctor, nurse or pharmacist before following any medical regimen to see if it is safe and effective for you

## 2020-01-03 NOTE — PROGRESS NOTES
[address] [facility] NOTE   Emeka Rivera 62 y o  male MRN: [id number]  Unit/Bed#: S -01 Encounter: [id number]  Reason for Consult: Acute kidney injury    ASSESSMENT and PLAN:  1  Acute kidney injury  · Admission creatinine of 0 91  · Slight bump today at 1 45 compared to 1 36 from yesterday  Seems to be still within the range of postoperative creatinine levels  · I&O: Urine output of 2000 mL, net -1240 mL  · Etiology:  Likely multifactorial, prerenal due to decreased oral intake/volume depletion in the setting of preop and intraoperative fluid losses, physiologic response to surgery and stress, and post nephrectomy status given nephron loss  Could also consider intrinsic (ischemia causing ATN) following surgical procedure and less likely postobstructive given good UO  · Creatinine seems to be stable at 1 3-1 4, within postoperative levels so far  ? Creatinine expected to bump a bit post surgery  No noted significant increase from baseline postop levels so far  ? Avoid hypoperfusion, avoid NSAIDs  ? Stable for discharge today, discussed with Dr Joaquin Dire  Recommend close follow up with Nephrology/PCP to monitor kidney function     2  Left renal mass  · Endophytic left renal mass consistent with RCC  · S/p left radical nephrectomy 12/30/2019 by Dr Mcmullen Prude  · Encourage ambulation, incentive spirometry  · Follow up with Urology as outpatient to discuss pathology results  · Follow-up with Nephrology for monitoring of kidney function     3  Diverticulitis s/p Laparoscopic sigmoid colectomy  · Last 12/30/2019 by Dr Davon Rios  · With reported (+) flatus and BM  · Tolerating current diet of low fiber low residue, without complaints of abdominal pain or nausea and vomiting  Advance diet as tolerated per Surgical team     4   Hyperlipidemia  · Continue home medication atorvastatin 20 mg daily  · Diet and lifestyle modification       DISPOSITION:  Stable for discharge today from renal standpoint, with follow-up as outpatient with Nephrology for close monitoring of kidney functions  Also follow up with Urology and Colorectal Surgery  SUBJECTIVE / INTERVAL HISTORY:  Patient seen examined today  Patient is not complaining of abdominal pain  Patient reports passage of flatus and BM  Patient currently tolerating a low-fiber low residue diet  No difficulties in urination  No dysuria or hematuria  Patient verbalized that he is eager and ready to go home today  OBJECTIVE:  Current Weight: Weight - Scale: 72 1 kg (159 lb)  Vitals:    01/02/20 1900 01/02/20 2300 01/03/20 0300 01/03/20 0700   BP: 144/79 138/83 142/76 126/83   BP Location: Left arm Left arm Left arm Left arm   Pulse: 81 63 59 58   Resp: 18 18 16 16   Temp: 98 °F (36 7 °C) 98 6 °F (37 °C) 98 1 °F (36 7 °C) 98 °F (36 7 °C)   TempSrc: Oral Oral Oral Oral   SpO2: 94% 95% 95% 96%   Weight:       Height:           Intake/Output Summary (Last 24 hours) at 1/3/2020 0932  Last data filed at 1/3/2020 0700  Gross per 24 hour   Intake 360 ml   Output 2000 ml   Net -1640 ml     Physical Exam   Constitutional: He is oriented to person, place, and time  He appears well-developed and well-nourished  No distress  HENT:   Head: Normocephalic  Mouth/Throat: Oropharynx is clear and moist  No oropharyngeal exudate  Eyes: Pupils are equal, round, and reactive to light  Conjunctivae and EOM are normal  No scleral icterus  Neck: Normal range of motion  Neck supple  No JVD present  Cardiovascular: Normal rate, regular rhythm, normal heart sounds and intact distal pulses  Pulmonary/Chest: Effort normal and breath sounds normal  No respiratory distress  Abdominal: Soft  Bowel sounds are normal  There is no tenderness  There is no guarding    (+) healing incision midline, clean and dry, no noted discharge   Musculoskeletal: Normal range of motion  He exhibits no edema or tenderness  Lymphadenopathy:     He has no cervical adenopathy     Neurological: He is alert and oriented to person, place, and time  No cranial nerve deficit or sensory deficit  He exhibits normal muscle tone  Skin: Skin is warm  He is not diaphoretic  No erythema  No pallor  Psychiatric: He has a normal mood and affect  His behavior is normal  Thought content normal    Nursing note and vitals reviewed        Medications:    Current Facility-Administered Medications:     acetaminophen (TYLENOL) tablet 650 mg, 650 mg, Oral, Q6H PRN, Dalila Patton, CRNP    acetaminophen (TYLENOL) tablet 975 mg, 975 mg, Oral, Q8H FABIANO, Sophie Andrade PA-C, 975 mg at 01/03/20 0516    atorvastatin (LIPITOR) tablet 20 mg, 20 mg, Oral, Daily With Dinner, Welby Seip, PA-C, 20 mg at 01/02/20 1623    dextrose 5 % and sodium chloride 0 45 % with KCl 20 mEq/L infusion, 50 mL/hr, Intravenous, Continuous, JULIETTE Mijares, Last Rate: 50 mL/hr at 01/02/20 2019, 50 mL/hr at 01/02/20 2019    diphenhydrAMINE (BENADRYL) injection 25 mg, 25 mg, Intravenous, Q6H PRN, Welby Seip, PA-C, 25 mg at 01/01/20 2138    enoxaparin (LOVENOX) subcutaneous injection 40 mg, 40 mg, Subcutaneous, Q24H [address], Ashleigh Leo MD, 40 mg at 01/02/20 0843    HYDROmorphone (DILAUDID) injection 0 5 mg, 0 5 mg, Intravenous, Q3H PRN, [employer], PA-C, 0 5 mg at 01/01/20 2137    naloxone (NARCAN) 0 04 mg/mL syringe 0 04 mg, 0 04 mg, Intravenous, Q3 min PRN, Welby Seip, PA-C    ondansetron [facility]) injection 4 mg, 4 mg, Intravenous, Q6H PRN, Welby Seip, PA-C    oxyCODONE (ROXICODONE) IR tablet 5 mg, 5 mg, Oral, Q4H PRN, [employer], PA-C, 5 mg at 01/02/20 0022    Laboratory Results:  Results from last 7 days   Lab Units 01/03/20  0450 01/02/20  0449 01/01/20  0500 12/31/19  0527 12/30/19  1303 12/30/19  1302   WBC Thousand/uL 4 96 4 78 6 20 6 95  --  11 42*   HEMOGLOBIN g/dL 12 6 12 6 13 1 13 4  --  13 5   HEMATOCRIT % 37 8 38 3 39 6 40 3  --  40 0   PLATELETS Thousands/uL 204 173 158 179  --  175 POTASSIUM mmol/L 3 9 3 7 3 8 4 0 3 5  --    CHLORIDE mmol/L 105 106 106 106 109*  --    CO2 mmol/L 29 28 28 25 25  --    BUN mg/dL 13 11 12 12 10  --    CREATININE mg/dL 1 45* 1 36* 1 43* 1 35* 0 91  --    CALCIUM mg/dL 9 0 8 9 9 1 8 4 7 9*  --

## 2020-01-03 NOTE — TELEPHONE ENCOUNTER
Post Op Note-spoke with patient's wife, patient was in the shower  Lorina Prader is a 62 y o  male s/p LAPAROSCOPIC HAND-ASSISTED SIGMOID COLECTOMY; LAPAROSCOPIC MOBILIZATION OF SPLENIC FLEXURE (N/A Abdomen)      NEPHRECTOMY LAPAROSCOPIC HAND ASSISTED (Left Abdomen) performed 12/30/19  Lorina Prader is a patient of Dr Sesar Cat and is seen at the [facility] office  How would you rate your pain on a scale from 1 to 10, 10 being the worst pain ever? 3  Have you had a fever? No  Have your bowel movements been regular? Yes  Do you have any difficulty urinating? No  If the patient has an incision- do you have any redness around the incision or any drainage from the incision? No  If the patient has a drain- are you having any issues with the drain? NA  If the patient has a ryan- are you comfortable caring for your ryan? NA  Do you have any other questions or concerns that I can address at this time? Reviewed medications, discharge instructions and follow up appointment  Advised a call back if the patient has any further questions or concerns

## 2020-01-03 NOTE — TELEPHONE ENCOUNTER
Given the relatively stable renal function, patient was discharged this morning per the primary team   Primary team had message to discuss    I have sent a message to our office for lab work and appointment in 1-2 weeks with our office also

## 2020-01-06 ENCOUNTER — TRANSITIONAL CARE MANAGEMENT (OUTPATIENT)
Dept: INTERNAL MEDICINE CLINIC | Facility: CLINIC | Age: 60
End: 2020-01-06

## 2020-01-07 ENCOUNTER — APPOINTMENT (OUTPATIENT)
Dept: LAB | Age: 60
End: 2020-01-07
Payer: COMMERCIAL

## 2020-01-07 DIAGNOSIS — Z11.59 NEED FOR HEPATITIS C SCREENING TEST: ICD-10-CM

## 2020-01-07 DIAGNOSIS — N17.9 AKI (ACUTE KIDNEY INJURY) (HCC): ICD-10-CM

## 2020-01-07 LAB
ANION GAP SERPL CALCULATED.3IONS-SCNC: 2 MMOL/L (ref 4–13)
BUN SERPL-MCNC: 18 MG/DL (ref 5–25)
CALCIUM SERPL-MCNC: 9.4 MG/DL (ref 8.3–10.1)
CHLORIDE SERPL-SCNC: 107 MMOL/L (ref 100–108)
CO2 SERPL-SCNC: 30 MMOL/L (ref 21–32)
CREAT SERPL-MCNC: 1.35 MG/DL (ref 0.6–1.3)
GFR SERPL CREATININE-BSD FRML MDRD: 56 ML/MIN/1.73SQ M
GLUCOSE P FAST SERPL-MCNC: 82 MG/DL (ref 65–99)
HCV AB SER QL: NORMAL
POTASSIUM SERPL-SCNC: 4.9 MMOL/L (ref 3.5–5.3)
SODIUM SERPL-SCNC: 139 MMOL/L (ref 136–145)

## 2020-01-07 PROCEDURE — 86803 HEPATITIS C AB TEST: CPT

## 2020-01-07 PROCEDURE — 80048 BASIC METABOLIC PNL TOTAL CA: CPT

## 2020-01-07 PROCEDURE — 36415 COLL VENOUS BLD VENIPUNCTURE: CPT

## 2020-01-09 ENCOUNTER — OFFICE VISIT (OUTPATIENT)
Dept: NEPHROLOGY | Facility: CLINIC | Age: 60
End: 2020-01-09
Payer: COMMERCIAL

## 2020-01-09 VITALS
BODY MASS INDEX: 23.73 KG/M2 | SYSTOLIC BLOOD PRESSURE: 120 MMHG | HEART RATE: 82 BPM | WEIGHT: 151.2 LBS | HEIGHT: 67 IN | DIASTOLIC BLOOD PRESSURE: 74 MMHG

## 2020-01-09 DIAGNOSIS — N17.9 ACUTE KIDNEY INJURY (HCC): Primary | ICD-10-CM

## 2020-01-09 DIAGNOSIS — Z90.5 SOLITARY KIDNEY, ACQUIRED: ICD-10-CM

## 2020-01-09 DIAGNOSIS — E78.2 MIXED HYPERLIPIDEMIA: ICD-10-CM

## 2020-01-09 PROCEDURE — 1111F DSCHRG MED/CURRENT MED MERGE: CPT | Performed by: INTERNAL MEDICINE

## 2020-01-09 PROCEDURE — 99214 OFFICE O/P EST MOD 30 MIN: CPT | Performed by: INTERNAL MEDICINE

## 2020-01-09 NOTE — PROGRESS NOTES
NEPHROLOGY OUTPATIENT PROGRESS NOTE   Mariano Rosario 62 y o  male MRN: [id number]  DATE: 1/9/2020  Reason for visit:   Chief Complaint   Patient presents with    Follow-up     elevated creatinine       ASSESSMENT and PLAN:  Acute kidney injury  -elevated creatinine likely due to decreased nephron mass from left nephrectomy done for renal cell carcinoma left kidney  -preop creatinine was 0 8-0 9 but has been elevated and stable at 1 3 to 1 4 since 12/31 likely due to decreased nephron mass  Last creatinine from 01/07 was 1 35   -discussed about avoiding nephrotoxins like NSAIDs and IV contrast   Patient will need renal prep with IV fluid before any CT with IV contrast and this was discussed with patient in detail  -will check BMP in 1 month to monitor renal function and follow up in 4 months with repeat labs  -previous UA from October was bland  Repeat UA and quantify proteinuria before next office visit  -if creatinine remains elevated at 1 3 to 1 4 with GFR below 60, will call it was chronic kidney disease stage III  - appears euvolemic  - recommended low K diet as K 4 9- list provided  Also discussed 2 gm sodium diet  Solitary right kidney, status post left nephrectomy for renal cell carcinoma-left nephrectomy was done on 12/30/19 by Dr James Sanchez pathology results suggestive of renal cell carcinoma-conventional clear cell type  Patient was told about the biopsy result and recommended discussion with urologist for further information  Hyperlipidemia: Lipid panel improving  Last LDL was 106 from 12/02  Stressed on daily exercise and active life style  On Statin and fish oil per PCP  Diagnoses and all orders for this visit:    Elevated serum creatinine  -     Basic metabolic panel; Future  -     Basic metabolic panel; Future  -     Phosphorus; Future  -     Protein / creatinine ratio, urine; Future  -     Urinalysis with microscopic; Future  -     Magnesium; Future  -     CBC;  Future    Solitary kidney, acquired    Mixed hyperlipidemia        Patient Instructions   Renal function is stable  Recommended low-potassium diet  Blood pressure is acceptable and is at goal   Also recommend low-sodium diet    Recommend daily exercise, drink plenty of water    Blood work in 1 month, follow-up in 4 months with repeat labs      SUBJECTIVE / HPI:  Jose Moraes is a 62 y o   male who underwent elective laparoscopy hand assisted sigmoid resection and left nephrectomy for complicated diverticular disease and left renal cell carcinoma on 12/30/2019  Nephrology was consulted for increased creatinine postop  Preoperative creatinine was 0 9  Postop creatinine stabilized at 1 4 and elevated creatinine was thought to be due to nephron loss  Labs from 01/07/2020 showed creatinine 1 35, potassium 4 9, EGFR 56  Renal function has been stable at 1 3-1 4 which is likely the new baseline  He denies any lower extremity edema or shortness of Breath  Denies dizziness  Has follow-up with Urology coming up  REVIEW OF SYSTEMS:    Review of Systems   Constitutional: Negative for activity change, appetite change, chills, diaphoresis, fatigue and fever  HENT: Negative for congestion, facial swelling and nosebleeds  Eyes: Negative for pain and visual disturbance  Respiratory: Negative for cough, chest tightness and shortness of breath  Cardiovascular: Negative for chest pain and palpitations  Gastrointestinal: Negative for abdominal distention, abdominal pain, diarrhea, nausea and vomiting  Genitourinary: Negative for difficulty urinating, dysuria, flank pain, frequency and hematuria  Musculoskeletal: Negative for arthralgias, back pain and joint swelling  Skin: Negative for rash  Neurological: Negative for dizziness, seizures, syncope, weakness and headaches  Psychiatric/Behavioral: Negative for agitation and confusion  The patient is not nervous/anxious          More than 10 point review of systems were obtained and discussed in length with the patient  Complete review of systems were negative / unremarkable except mentioned above         PAST MEDICAL HISTORY:  Past Medical History:   Diagnosis Date    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 [address] ) 10/13/2019    Renal cell carcinoma ([facility] )        PAST SURGICAL HISTORY:  Past Surgical History:   Procedure Laterality Date    HERNIA REPAIR      HIP SURGERY Right     age 4    JOINT REPLACEMENT Right 2011    TKR    IN LAP, RADICAL NEPHRECTOMY Left 12/30/2019    Procedure: NEPHRECTOMY LAPAROSCOPIC HAND ASSISTED;  Surgeon: Shyla Carter MD;  [facility];  Service: Urology    IN LAP,SURG,COLECTOMY, PARTIAL, Yessica Asters N/A 12/30/2019    Procedure: LAPAROSCOPIC HAND-ASSISTED SIGMOID COLECTOMY; LAPAROSCOPIC MOBILIZATION OF SPLENIC FLEXURE;  Surgeon: Kelvin Sandoval MD;  [facility];  Service: Colorectal    REPLACEMENT TOTAL KNEE Right 2011    REPLACEMENT TOTAL KNEE      SHOULDER SURGERY Left     SHOULDER SURGERY Right     WRIST FUSION Left 2016    WRIST SURGERY         SOCIAL HISTORY:  Social History     Substance and Sexual Activity   Alcohol Use Yes    Alcohol/week: 3 0 standard drinks    Types: 3 Cans of beer per week    Frequency: Monthly or less    Drinks per session: 3 or 4    Binge frequency: Never     Social History     Substance and Sexual Activity   Drug Use No     Social History     Tobacco Use   Smoking Status Never Smoker   Smokeless Tobacco Never Used       FAMILY HISTORY:  Family History   Problem Relation Age of Onset    Heart disease Father     Other Father         cardiac disorder    No Known Problems Mother     Heart attack Maternal Grandfather     Heart attack Paternal Grandfather     Thyroid cancer Son     Colon cancer Neg Hx        MEDICATIONS:    Current Outpatient Medications:     acetaminophen (TYLENOL) 325 mg tablet, Take 2 tablets (650 mg total) by mouth every 6 (six) hours as needed for mild pain, Disp: 30 tablet, Rfl: 0    aspirin 81 MG tablet, Take 1 tablet by mouth daily, Disp: , Rfl:     atorvastatin (LIPITOR) 20 mg tablet, Take 1 tablet (20 mg total) by mouth daily, Disp: 90 tablet, Rfl: 1    multivitamin (THERAGRAN) TABS, Take 1 tablet by mouth daily  , Disp: , Rfl:     Omega-3 Fatty Acids (FISH OIL) 1200 MG CAPS, Take by mouth , Disp: , Rfl:     ketoconazole (NIZORAL) 2 % shampoo, Apply 1 application topically once for 1 dose, Disp: 120 mL, Rfl: 0    oxyCODONE (ROXICODONE) 5 mg immediate release tablet, Take 1 tablet (5 mg total) by mouth every 4 (four) hours as needed for moderate pain for up to 10 daysMax Daily Amount: 30 mg, Disp: 20 tablet, Rfl: 0      PHYSICAL EXAM:  Vitals:    01/09/20 1044 01/09/20 1109   BP: 102/62 120/74   BP Location: Left arm    Patient Position: Sitting    Cuff Size: Adult    Pulse: 82    Weight: 68 6 kg (151 lb 3 2 oz)    Height: 5' 7" (1 702 m)      Body mass index is 23 68 kg/m²  Physical Exam   Constitutional: He is oriented to person, place, and time  Vital signs are normal  He appears well-developed  No distress  HENT:   Head: Normocephalic and atraumatic  Mouth/Throat: Oropharynx is clear and moist    Eyes: Pupils are equal, round, and reactive to light  Conjunctivae are normal  No scleral icterus  Neck: Neck supple  No thyromegaly present  Cardiovascular: Normal rate, regular rhythm and normal heart sounds  Exam reveals no friction rub  No murmur heard  Pulmonary/Chest: Effort normal and breath sounds normal  No respiratory distress  He has no wheezes  He has no rales  Abdominal: Soft  Bowel sounds are normal  He exhibits no distension  There is no tenderness  Musculoskeletal: He exhibits no edema or deformity  Lymphadenopathy:     He has no cervical adenopathy     Neurological: He is alert and oriented to person, place, and time  He is not disoriented  Skin: He is not diaphoretic  No cyanosis  No pallor  Nails show no clubbing  Psychiatric: He has a normal mood and affect  His mood appears not anxious  His affect is not inappropriate  Lab Results:   Results for orders placed or performed in visit on 01/07/20   Hepatitis C antibody   Result Value Ref Range    Hepatitis C Ab Non-reactive Non-reactive   Basic metabolic panel   Result Value Ref Range    Sodium 139 136 - 145 mmol/L    Potassium 4 9 3 5 - 5 3 mmol/L    Chloride 107 100 - 108 mmol/L    CO2 30 21 - 32 mmol/L    ANION GAP 2 (L) 4 - 13 mmol/L    BUN 18 5 - 25 mg/dL    Creatinine 1 35 (H) 0 60 - 1 30 mg/dL    Glucose, Fasting 82 65 - 99 mg/dL    Calcium 9 4 8 3 - 10 1 mg/dL    eGFR 56 ml/min/1 73sq m     Lab Results:   Results from last 7 days   Lab Units 01/07/20  0858 01/03/20  0450   WBC Thousand/uL  --  4 96   HEMOGLOBIN g/dL  --  12 6   HEMATOCRIT %  --  37 8   PLATELETS Thousands/uL  --  204   POTASSIUM mmol/L 4 9 3 9   CHLORIDE mmol/L 107 105   CO2 mmol/L 30 29   BUN mg/dL 18 13   CREATININE mg/dL 1 35* 1 45*   CALCIUM mg/dL 9 4 9 0       Laboratory Results:  Lab Results   Component Value Date    WBC 4 96 01/03/2020    HGB 12 6 01/03/2020    HCT 37 8 01/03/2020    MCV 92 01/03/2020     01/03/2020     Lab Results   Component Value Date    SODIUM 139 01/07/2020    K 4 9 01/07/2020     01/07/2020    CO2 30 01/07/2020    BUN 18 01/07/2020    CREATININE 1 35 (H) 01/07/2020    GLUC 96 01/03/2020    CALCIUM 9 4 01/07/2020     Lab Results   Component Value Date    CALCIUM 9 4 01/07/2020     No results found for: LABPROT  [unfilled]  Lab Results   Component Value Date    CALCIUM 9 4 01/07/2020     [unfilled]       IMPRESSION:        1  Perforated sigmoid diverticulitis with focal free air  Surgical consult recommended    2  2 5 cm left renal midpole enhancing mass protruding into the calyceal system / renal pelvis containing peripheral calcification concerning for renal cell carcinoma/renal neoplasm  Renal ultrasound and CT urogram is recommended for further evaluation  Renal vein appears patent  No evidence of perirenal infiltration or medial invasion at this time  3  Diffuse bladder wall thickening could relate to underdistention, cystitis or outlet obstruction   Correlate with urinalysis      CT renal protocol  IMPRESSION:     2 8 x 2 6 cm enhancing left midpole parapelvic renal mass consistent with renal cell carcinoma      Acute uncomplicated proximal sigmoid diverticulitis again seen

## 2020-01-09 NOTE — PATIENT INSTRUCTIONS
Renal function is stable  Recommended low-potassium diet    Blood pressure is acceptable and is at goal   Also recommend low-sodium diet    Recommend daily exercise, drink plenty of water    Blood work in 1 month, follow-up in 4 months with repeat labs

## 2020-01-13 ENCOUNTER — OFFICE VISIT (OUTPATIENT)
Dept: INTERNAL MEDICINE CLINIC | Age: 60
End: 2020-01-13
Payer: COMMERCIAL

## 2020-01-13 VITALS
BODY MASS INDEX: 23.57 KG/M2 | WEIGHT: 150.2 LBS | HEIGHT: 67 IN | DIASTOLIC BLOOD PRESSURE: 70 MMHG | TEMPERATURE: 97 F | SYSTOLIC BLOOD PRESSURE: 112 MMHG | HEART RATE: 95 BPM | OXYGEN SATURATION: 97 %

## 2020-01-13 DIAGNOSIS — N18.30 CKD (CHRONIC KIDNEY DISEASE) STAGE 3, GFR 30-59 ML/MIN (HCC): ICD-10-CM

## 2020-01-13 DIAGNOSIS — M19.041 OSTEOARTHRITIS OF FINGER OF RIGHT HAND: Primary | ICD-10-CM

## 2020-01-13 DIAGNOSIS — C64.2 RENAL CELL CANCER, LEFT (HCC): ICD-10-CM

## 2020-01-13 DIAGNOSIS — R73.9 HYPERGLYCEMIA: ICD-10-CM

## 2020-01-13 DIAGNOSIS — E78.2 MIXED HYPERLIPIDEMIA: ICD-10-CM

## 2020-01-13 PROBLEM — Z01.818 PREOP EXAM FOR INTERNAL MEDICINE: Status: RESOLVED | Noted: 2019-12-10 | Resolved: 2020-01-13

## 2020-01-13 PROCEDURE — 1111F DSCHRG MED/CURRENT MED MERGE: CPT | Performed by: INTERNAL MEDICINE

## 2020-01-13 PROCEDURE — 99495 TRANSJ CARE MGMT MOD F2F 14D: CPT | Performed by: INTERNAL MEDICINE

## 2020-01-13 NOTE — PROGRESS NOTES
fever       The following portions of the patient's history were reviewed and updated as appropriate: allergies, current medications, past family history, past medical history, past social history, past surgical history and problem list     Review of Systems   Constitutional: Negative for fatigue and fever  HENT: Negative for congestion, ear discharge, ear pain, postnasal drip, sinus pressure, sore throat, tinnitus and trouble swallowing  Eyes: Negative for discharge, itching and visual disturbance  Respiratory: Negative for cough and shortness of breath  Cardiovascular: Negative for chest pain and palpitations  Gastrointestinal: Negative for abdominal pain, diarrhea, nausea and vomiting  Endocrine: Negative for cold intolerance and polyuria  Genitourinary: Negative for difficulty urinating, dysuria and urgency  Musculoskeletal: Negative for arthralgias and neck pain  Skin: Negative for rash  Allergic/Immunologic: Negative for environmental allergies  Neurological: Negative for dizziness, weakness, light-headedness and headaches  Psychiatric/Behavioral: The patient is not nervous/anxious            Past Medical History:   Diagnosis Date    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-    Arthritis     Diverticulitis of large intestine with perforation without bleeding 10/13/2019    Hyperlipidemia     Renal cell cancer, left ([facility]) 10/13/2019    Renal cell carcinoma ([facility])          Current Outpatient Medications:     acetaminophen (TYLENOL) 325 mg tablet, Take 2 tablets (650 mg total) by mouth every 6 (six) hours as needed for mild pain, Disp: 30 tablet, Rfl: 0    aspirin 81 MG tablet, Take 1 tablet by mouth daily, Disp: , Rfl:     atorvastatin (LIPITOR) 20 mg tablet, Take 1 tablet (20 mg total) by mouth daily, Disp: 90 tablet, Rfl: 1    ketoconazole (NIZORAL) 2 % shampoo, Apply 1 application topically once for 1 dose, Disp: 120 mL, Rfl: 0    multivitamin (THERAGRAN) TABS, Take 1 tablet by mouth daily  , Disp: , Rfl:     Omega-3 Fatty Acids (FISH OIL) 1200 MG CAPS, Take by mouth , Disp: , Rfl:     oxyCODONE (ROXICODONE) 5 mg immediate release tablet, Take 1 tablet (5 mg total) by mouth every 4 (four) hours as needed for moderate pain for up to 10 daysMax Daily Amount: 30 mg, Disp: 20 tablet, Rfl: 0    No Known Allergies    Social History   Past Surgical History:   Procedure Laterality Date    HERNIA REPAIR      HIP SURGERY Right     age 4    JOINT REPLACEMENT Right 2011    TKR    ID LAP, RADICAL NEPHRECTOMY Left 12/30/2019    Procedure: NEPHRECTOMY LAPAROSCOPIC HAND ASSISTED;  Surgeon: Troy Ko MD;  [facility];  Service: Urology    ID LAP,SURG,COLECTOMY, PARTIAL, Nathaneil Maser N/A 12/30/2019    Procedure: LAPAROSCOPIC HAND-ASSISTED SIGMOID COLECTOMY; LAPAROSCOPIC MOBILIZATION OF SPLENIC FLEXURE;  Surgeon: Marylou Boland MD;  [facility];  Service: Colorectal    REPLACEMENT TOTAL KNEE Right 2011    REPLACEMENT TOTAL KNEE      SHOULDER SURGERY Left     SHOULDER SURGERY Right     WRIST FUSION Left 2016    WRIST SURGERY       Family History   Problem Relation Age of Onset    Heart disease Father     Other Father         cardiac disorder    No Known Problems Mother     Heart attack Maternal Grandfather     Heart attack Paternal Grandfather     Thyroid cancer Son     Colon cancer Neg Hx        Objective:  /70 (BP Location: Left arm, Patient Position: Sitting, Cuff Size: Standard)   Pulse 95   Temp (!) 97 °F (36 1 °C) (Tympanic)   Ht 5' 7" (1 702 m) Comment: shoes on  Wt 68 1 kg (150 lb 3 2 oz) Comment: shoes on  SpO2 97%   BMI 23 52 kg/m²   Body mass index is 23 52 kg/m²  Physical Exam   Constitutional: He appears well-developed  HENT:   Head: Normocephalic  Right Ear: External ear normal    Left Ear: External ear normal    Mouth/Throat: Oropharynx is clear and moist    Eyes: Pupils are equal, round, and reactive to light   No scleral icterus  Neck: Normal range of motion  Neck supple  No tracheal deviation present  No thyromegaly present  Cardiovascular: Normal rate, regular rhythm and normal heart sounds  Pulmonary/Chest: Effort normal and breath sounds normal  No respiratory distress  He exhibits no tenderness  Abdominal: Soft  Bowel sounds are normal  He exhibits no mass  There is no tenderness  Musculoskeletal: Normal range of motion  Lymphadenopathy:     He has no cervical adenopathy  Neurological: He is alert  No cranial nerve deficit  Skin: Skin is warm  Abdominal wound from surgical sites healing well  No sign of infection  Psychiatric: He has a normal mood and affect

## 2020-01-15 ENCOUNTER — TELEPHONE (OUTPATIENT)
Dept: UROLOGY | Facility: CLINIC | Age: 60
End: 2020-01-15

## 2020-01-15 ENCOUNTER — OFFICE VISIT (OUTPATIENT)
Dept: UROLOGY | Facility: CLINIC | Age: 60
End: 2020-01-15

## 2020-01-15 ENCOUNTER — APPOINTMENT (OUTPATIENT)
Dept: LAB | Facility: AMBULARY SURGERY CENTER | Age: 60
End: 2020-01-15
Attending: UROLOGY
Payer: COMMERCIAL

## 2020-01-15 VITALS
SYSTOLIC BLOOD PRESSURE: 114 MMHG | DIASTOLIC BLOOD PRESSURE: 68 MMHG | WEIGHT: 151 LBS | HEIGHT: 67 IN | BODY MASS INDEX: 23.7 KG/M2 | HEART RATE: 60 BPM

## 2020-01-15 DIAGNOSIS — C64.2 RENAL CELL CANCER, LEFT (HCC): ICD-10-CM

## 2020-01-15 DIAGNOSIS — C64.2 RENAL CELL CANCER, LEFT (HCC): Primary | ICD-10-CM

## 2020-01-15 LAB
CREAT SERPL-MCNC: 1.3 MG/DL (ref 0.6–1.3)
GFR SERPL CREATININE-BSD FRML MDRD: 59 ML/MIN/1.73SQ M

## 2020-01-15 PROCEDURE — 99024 POSTOP FOLLOW-UP VISIT: CPT | Performed by: UROLOGY

## 2020-01-15 PROCEDURE — 82565 ASSAY OF CREATININE: CPT

## 2020-01-15 PROCEDURE — 36415 COLL VENOUS BLD VENIPUNCTURE: CPT

## 2020-01-15 NOTE — LETTER
January 15, 2020     Donald Jack MD  [address] [place] [address]    Patient: Sherri Woodruff   YOB: 1960   Date of Visit: 1/15/2020       Dear Dr Matheus Godoy: Thank you for referring Clementina Jansen to me for evaluation  Below are my notes for this consultation  If you have questions, please do not hesitate to call me  I look forward to following your patient along with you  Sincerely,        Moody Evans MD        CC: MD Mellisa Mccormick MD Wardell Decent, MD  1/15/2020 12:14 PM  Sign at close encounter      ASSESSMENT/PLAN:     1  Stage I clear cell renal cell carcinoma  - pT1a G2, N0 M0 R0, clear cell  - s/p lap left radical nephrectomy January 2020    2  CKD  - postop GFR currently 600 [facility] has done incredibly well with his laparoscopic left radical nephrectomy for kidney cancer performed as a concomitant procedure with a colectomy for complicated diverticulitis  I was so happy to review the pathology report with he and his wife which reveals low-grade stage I disease confined to the kidney and excised with negative surgical margins  This represents low risk disease and his prognosis from an oncologic perspective is incredibly excellent  We discussed his renal function at this point  I expect this will improve further given the normal radiographic appearance of the contralateral kidney in the absence of any ongoing risk factors for progressive chronic kidney disease  He has scheduled follow-up with Nephrology and he will return to Urology in 6 months with a repeat CT abdomen pelvis as well as a chest x-ray  We will order this study with contrast but if his renal function prior to the scan preclude that then we will use a non contrasted approach  Moody Evans MD    History of Present Illness:     Clementina Jansen is a 62 y o  returns in follow-up  He is status post left radical nephrectomy    He tolerated surgery very well  He is pain-free  Tolerating regular food moving his bowels fully ambulatory  He is accompanied by his wife  Past Medical, Past Surgical History:     Past Medical History:   Diagnosis Date    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-    Arthritis     Diverticulitis of large intestine with perforation without bleeding 10/13/2019    Hyperlipidemia     Renal cell cancer, left ([facility] [address] ) 10/13/2019    Renal cell carcinoma ([facility] [address] )    :    Past Surgical History:   Procedure Laterality Date    HERNIA REPAIR      HIP SURGERY Right     age 4    JOINT REPLACEMENT Right 2011    TKR    [place] LAP, RADICAL NEPHRECTOMY Left 12/30/2019    Procedure: NEPHRECTOMY LAPAROSCOPIC HAND ASSISTED;  Surgeon: Natalie Laguerre MD;  [facility];  Service: Urology    [place] LAP,SURG,COLECTOMY, PARTIAL, Norm Huang N/A 12/30/2019    Procedure: LAPAROSCOPIC HAND-ASSISTED SIGMOID COLECTOMY; LAPAROSCOPIC MOBILIZATION OF SPLENIC FLEXURE;  Surgeon: Elton Ayoub MD;  [facility];  Service: Colorectal    REPLACEMENT TOTAL KNEE Right 2011    REPLACEMENT TOTAL KNEE      SHOULDER SURGERY Left     SHOULDER SURGERY Right     WRIST FUSION Left 2016    WRIST SURGERY     :    Medications, Allergies:     Current Outpatient Medications:     aspirin 81 MG tablet, Take 1 tablet by mouth daily, Disp: , Rfl:     atorvastatin (LIPITOR) 20 mg tablet, Take 1 tablet (20 mg total) by mouth daily, Disp: 90 tablet, Rfl: 1    multivitamin (THERAGRAN) TABS, Take 1 tablet by mouth daily  , Disp: , Rfl:     Omega-3 Fatty Acids (FISH OIL) 1200 MG CAPS, Take by mouth , Disp: , Rfl:     acetaminophen (TYLENOL) 325 mg tablet, Take 2 tablets (650 mg total) by mouth every 6 (six) hours as needed for mild pain, Disp: 30 tablet, Rfl: 0    ketoconazole (NIZORAL) 2 % shampoo, Apply 1 application topically once for 1 dose, Disp: 120 mL, Rfl: 0    Allergies:  No Known Allergies:    Social and Family History:   Social History:   Social History Tobacco Use    Smoking status: Never Smoker    Smokeless tobacco: Never Used   Substance Use Topics    Alcohol use: Yes     Alcohol/week: 3 0 standard drinks     Types: 3 Cans of beer per week     Frequency: Monthly or less     Drinks per session: 3 or 4     Binge frequency: Never    Drug use: No        Social History     Tobacco Use   Smoking Status Never Smoker   Smokeless Tobacco Never Used       Family History:  Family History   Problem Relation Age of Onset    Heart disease Father     Other Father         cardiac disorder    No Known Problems Mother     Heart attack Maternal Grandfather     Heart attack Paternal Grandfather     Thyroid cancer Son     Colon cancer Neg Hx    :     Review of Systems:     General: Fever, chills, or night sweats: positive  Cardiac: Negative for chest pain  Pulmonary: Negative for shortness of breath  Gastrointestinal: Abdominal pain positive  Nausea, vomiting, or diarrhea negative,  Genitourinary: See HPI above  Patient does not have hematuria  All other systems queried were negative  Physical Exam:   General: Patient is pleasant and in NAD  Awake and alert  /68 (BP Location: Left arm, Patient Position: Sitting, Cuff Size: Standard)   Pulse 60   Ht 5' 7" (1 702 m)   Wt 68 5 kg (151 lb)   BMI 23 65 kg/m²    Cardiac: Peripheral edema: positive  Pulmonary: Non-labored breathing  Abdomen: Soft, non-tender, non-distended  No surgical scars  No masses, tenderness, hernias noted  Genitourinary: Negative CVA tenderness, positive suprapubic tenderness    All incisions are well healed with no palpable hernia      Labs:     Lab Results   Component Value Date    HGB 12 6 01/03/2020    HCT 37 8 01/03/2020    WBC 4 96 01/03/2020     01/03/2020   ]    Lab Results   Component Value Date    K 4 9 01/07/2020     01/07/2020    CO2 30 01/07/2020    BUN 18 01/07/2020    CREATININE 1 35 (H) 01/07/2020    CALCIUM 9 4 01/07/2020   ]    Final Diagnosis A  Left kidney, laparoscopic radical nephrectomy (521 grams):  - Renal cell carcinoma, conventional clear cell type, 2 9 x 2 7 x 2 7 cm, unifocal and confined to renal cortex - see Note and see synoptic report  * no sarcomatoid or rhabdoid variant histology seen  * no lymphovascular invasion identified  * no renal sinus fat invasion identified  * ureteral & vascular resection margins are uninvolved by carcinoma  Electronically signed by Marcelle Rob MD on 1/8/2020 at 11:56 AM   Note    Routine H&E sections demonstrate an encapsulated clear cell epithelioid neoplasm in which neoplastic cells are arranged in lobular architecture surrounded by delicate and richly vascular capillaries  Tumor cell nucleoli are inconspicuous but clearly present at high power  Immunohistochemical stains document the following tumor cell immunophenotype:   * Positive: carbonic anhydrase A, CD10 (each diffusely strong cytoplasmic), RCC (diffusely moderate, cytoplasmic membrane), PAX8(faint nuclear in 10% subset)  * Negative: CK7, , HMB-45 & TFE3  Combined morphologic & immunophenotypic findings support the diagnosis of renal cell carcinoma, conventional clear cell type  Intradepartmental consultation concurs with the diagnosis of renal cell carcinoma, conventional clear cell type  Dr Mellisa Mackay notified of final report in [facility] via [address] @ 11:55 AM, 1/08/2020  Additional Information    All controls performed with the immunohistochemical stains reported above reacted appropriately  These tests were developed and their performance characteristics determined by [address] [place] [address] or [facility]  They may not be cleared or approved by the [place]  Food and Drug Administration  The FDA has determined that such clearance or approval is not necessary  These tests are used for clinical purposes  They should not be regarded as investigational or for research   This laboratory has been approved by [place] [id number], designated as a high-complexity laboratory and is qualified to perform these tests  Synoptic Checklist   KIDNEY: Nephrectomy   Kidney Res - All Specimens   SPECIMEN   Procedure  Radical nephrectomy    Specimen Laterality  Left    TUMOR   Tumor Site  Middle    Histologic Type  Clear cell renal cell carcinoma    Histologic Grade (WHO / ISUP Grade)  G2: Nucleoli conspicuous and eosinophilic at 790L magnification, visible but not prominent at 100x magnification    Tumor Size  Greatest dimension in Centimeters (cm): 2 9 Centimeters (cm)   Additional Dimension in Centimeters (cm)  2 7 Centimeters (cm)     2 7 Centimeters (cm)   Tumor Focality  Unifocal    Tumor Extent     Tumor Extension  Tumor limited to kidney    Accessory Findings     Sarcomatoid Features  Not identified    Rhabdoid Features  Not identified    Tumor Necrosis  Not identified    Lymphovascular Invasion  Not identified    MARGINS   Margins  Uninvolved by invasive carcinoma    LYMPH NODES   Regional Lymph Nodes  No lymph nodes submitted or found    PATHOLOGIC STAGE CLASSIFICATION (pTNM, AJCC 8th Edition)   TNM Descriptors  Not applicable    Primary Tumor (pT)  pT1a    Regional Lymph Nodes (pN)  pNX    ADDITIONAL FINDINGS   Pathologic Findings in Nonneoplastic Kidney  None identified    Comment(s)   Comment(s)  see Note for immunophenotype      Imaging:   I personally reviewed the images and report of the following studies, and reviewed them with the patient:    FINDINGS:     ABDOMEN     LOWER CHEST:  No clinically significant abnormality identified in the visualized lower chest      LIVER/BILIARY TREE:  Unremarkable      GALLBLADDER:  No calcified gallstones   No pericholecystic inflammatory change      SPLEEN:  Unremarkable      PANCREAS:  Unremarkable      ADRENAL GLANDS:  Unremarkable      KIDNEYS/URETERS:  2 5 cm suspect left renal mass protruding into the calyceal system / renal pelvis containing peripheral calcification concerning for renal cell carcinoma/renal neoplasm  Renal ultrasound is recommended for further evaluation  Renal vein   appears patent  No evidence of perirenal infiltration or medial invasion at this time       STOMACH AND BOWEL:  Fatty infiltration about the proximal sigmoid colon consistent with sigmoid diverticulitis  Few foci of free air identified along the superior aspect best seen on series 2 image 62 and 61 and 64 concerning for focal perforation       APPENDIX:  No findings to suggest appendicitis      ABDOMINOPELVIC CAVITY:  Small foci of free air identified around the sigmoid diverticulitis  No significant free fluid No lymphadenopathy      VESSELS:  Unremarkable for patient's age      PELVIS     REPRODUCTIVE ORGANS:  Unremarkable for patient's age      URINARY BLADDER:  Diffuse bladder wall thickening could relate to underdistention, cystitis or outlet obstruction  Correlate with urinalysis      ABDOMINAL WALL/INGUINAL REGIONS:  Unremarkable      OSSEOUS STRUCTURES:  No acute fracture or destructive osseous lesion      IMPRESSION:        1  Perforated sigmoid diverticulitis with focal free air  Surgical consult recommended  2  2 5 cm left renal midpole enhancing mass protruding into the calyceal system / renal pelvis containing peripheral calcification concerning for renal cell carcinoma/renal neoplasm  Renal ultrasound and CT urogram is recommended for further evaluation  Renal vein appears patent  No evidence of perirenal infiltration or medial invasion at this time  3  Diffuse bladder wall thickening could relate to underdistention, cystitis or outlet obstruction  Correlate with urinalysis

## 2020-01-15 NOTE — TELEPHONE ENCOUNTER
Called and left a message informing pt that renal function is improving and to please call the office with any questions     Office number left

## 2020-01-15 NOTE — PROGRESS NOTES
ASSESSMENT/PLAN:     1  Stage I clear cell renal cell carcinoma  - pT1a G2, N0 M0 R0, clear cell  - s/p lap left radical nephrectomy January 2020    2  CKD  - postop GFR currently [address] [facility] has done incredibly well with his laparoscopic left radical nephrectomy for kidney cancer performed as a concomitant procedure with a colectomy for complicated diverticulitis  I was so happy to review the pathology report with he and his wife which reveals low-grade stage I disease confined to the kidney and excised with negative surgical margins  This represents low risk disease and his prognosis from an oncologic perspective is incredibly excellent  We discussed his renal function at this point  I expect this will improve further given the normal radiographic appearance of the contralateral kidney in the absence of any ongoing risk factors for progressive chronic kidney disease  He has scheduled follow-up with Nephrology and he will return to Urology in 6 months with a repeat CT abdomen pelvis as well as a chest x-ray  We will order this study with contrast but if his renal function prior to the scan preclude that then we will use a non contrasted approach  Ashleigh Leo MD    History of Present Illness:     Everette Mason is a 62 y o  returns in follow-up  He is status post left radical nephrectomy  He tolerated surgery very well  He is pain-free  Tolerating regular food moving his bowels fully ambulatory  He is accompanied by his wife      Past Medical, Past Surgical History:     Past Medical History:   Diagnosis Date    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-    Arthritis     Diverticulitis of large intestine with perforation without bleeding 10/13/2019    Hyperlipidemia     Renal cell cancer, left ([address] ) 10/13/2019    Renal cell carcinoma (HCC)    :    Past Surgical History:   Procedure Laterality Date    HERNIA REPAIR      HIP SURGERY Right     age 4    JOINT REPLACEMENT Right 2011 TKR    [place] LAP, RADICAL NEPHRECTOMY Left 12/30/2019    Procedure: NEPHRECTOMY LAPAROSCOPIC HAND ASSISTED;  Surgeon: Jesenia Garner MD;  [facility];  Service: Urology    [place] LAP,SURG,COLECTOMY, PARTIAL, Joey Twin N/A 12/30/2019    Procedure: LAPAROSCOPIC HAND-ASSISTED SIGMOID COLECTOMY; LAPAROSCOPIC MOBILIZATION OF SPLENIC FLEXURE;  Surgeon: Kaushik Larson MD;  [facility];  Service: Colorectal    REPLACEMENT TOTAL KNEE Right 2011    REPLACEMENT TOTAL KNEE      SHOULDER SURGERY Left     SHOULDER SURGERY Right     WRIST FUSION Left 2016    WRIST SURGERY     :    Medications, Allergies:     Current Outpatient Medications:     aspirin 81 MG tablet, Take 1 tablet by mouth daily, Disp: , Rfl:     atorvastatin (LIPITOR) 20 mg tablet, Take 1 tablet (20 mg total) by mouth daily, Disp: 90 tablet, Rfl: 1    multivitamin (THERAGRAN) TABS, Take 1 tablet by mouth daily  , Disp: , Rfl:     Omega-3 Fatty Acids (FISH OIL) 1200 MG CAPS, Take by mouth , Disp: , Rfl:     acetaminophen (TYLENOL) 325 mg tablet, Take 2 tablets (650 mg total) by mouth every 6 (six) hours as needed for mild pain, Disp: 30 tablet, Rfl: 0    ketoconazole (NIZORAL) 2 % shampoo, Apply 1 application topically once for 1 dose, Disp: 120 mL, Rfl: 0    Allergies:  No Known Allergies:    Social and Family History:   Social History:   Social History     Tobacco Use    Smoking status: Never Smoker    Smokeless tobacco: Never Used   Substance Use Topics    Alcohol use:  Yes     Alcohol/week: 3 0 standard drinks     Types: 3 Cans of beer per week     Frequency: Monthly or less     Drinks per session: 3 or 4     Binge frequency: Never    Drug use: No        Social History     Tobacco Use   Smoking Status Never Smoker   Smokeless Tobacco Never Used       Family History:  Family History   Problem Relation Age of Onset    Heart disease Father     Other Father         cardiac disorder    No Known Problems Mother     Heart attack Maternal Grandfather     Heart attack Paternal Grandfather     Thyroid cancer Son     Colon cancer Neg Hx    :     Review of Systems:     General: Fever, chills, or night sweats: positive  Cardiac: Negative for chest pain  Pulmonary: Negative for shortness of breath  Gastrointestinal: Abdominal pain positive  Nausea, vomiting, or diarrhea negative,  Genitourinary: See HPI above  Patient does not have hematuria  All other systems queried were negative  Physical Exam:   General: Patient is pleasant and in NAD  Awake and alert  /68 (BP Location: Left arm, Patient Position: Sitting, Cuff Size: Standard)   Pulse 60   Ht 5' 7" (1 702 m)   Wt 68 5 kg (151 lb)   BMI 23 65 kg/m²   Cardiac: Peripheral edema: positive  Pulmonary: Non-labored breathing  Abdomen: Soft, non-tender, non-distended  No surgical scars  No masses, tenderness, hernias noted  Genitourinary: Negative CVA tenderness, positive suprapubic tenderness  All incisions are well healed with no palpable hernia      Labs:     Lab Results   Component Value Date    HGB 12 6 01/03/2020    HCT 37 8 01/03/2020    WBC 4 96 01/03/2020     01/03/2020   ]    Lab Results   Component Value Date    K 4 9 01/07/2020     01/07/2020    CO2 30 01/07/2020    BUN 18 01/07/2020    CREATININE 1 35 (H) 01/07/2020    CALCIUM 9 4 01/07/2020   ]    Final Diagnosis   A  Left kidney, laparoscopic radical nephrectomy (521 grams):  - Renal cell carcinoma, conventional clear cell type, 2 9 x 2 7 x 2 7 cm, unifocal and confined to renal cortex - see Note and see synoptic report  * no sarcomatoid or rhabdoid variant histology seen  * no lymphovascular invasion identified  * no renal sinus fat invasion identified  * ureteral & vascular resection margins are uninvolved by carcinoma     Electronically signed by Rhiannon South MD on 1/8/2020 at 11:56 AM   Note    Routine H&E sections demonstrate an encapsulated clear cell epithelioid neoplasm in which neoplastic cells are arranged in lobular architecture surrounded by delicate and richly vascular capillaries  Tumor cell nucleoli are inconspicuous but clearly present at high power  Immunohistochemical stains document the following tumor cell immunophenotype:   * Positive: carbonic anhydrase A, CD10 (each diffusely strong cytoplasmic), RCC (diffusely moderate, cytoplasmic membrane), PAX8(faint nuclear in 10% subset)  * Negative: CK7, , HMB-45 & TFE3  Combined morphologic & immunophenotypic findings support the diagnosis of renal cell carcinoma, conventional clear cell type  Intradepartmental consultation concurs with the diagnosis of renal cell carcinoma, conventional clear cell type  Dr Arcelia Bhakta notified of final report in [facility] via [address] @ 11:55 AM, 1/08/2020  Additional Information    All controls performed with the immunohistochemical stains reported above reacted appropriately  These tests were developed and their performance characteristics determined by [facility] or [facility]  They may not be cleared or approved by the [place]  Food and Drug Administration  The FDA has determined that such clearance or approval is not necessary  These tests are used for clinical purposes  They should not be regarded as investigational or for research  This laboratory has been approved by [place], designated as a high-complexity laboratory and is qualified to perform these tests     Synoptic Checklist   KIDNEY: Nephrectomy   Kidney Res - All Specimens   SPECIMEN   Procedure  Radical nephrectomy    Specimen Laterality  Left    TUMOR   Tumor Site  Middle    Histologic Type  Clear cell renal cell carcinoma    Histologic Grade (WHO / ISUP Grade)  G2: Nucleoli conspicuous and eosinophilic at 825C magnification, visible but not prominent at 100x magnification    Tumor Size  Greatest dimension in Centimeters (cm): 2 9 Centimeters (cm)   Additional Dimension in Centimeters (cm) 2 7 Centimeters (cm)     2 7 Centimeters (cm)   Tumor Focality  Unifocal    Tumor Extent     Tumor Extension  Tumor limited to kidney    Accessory Findings     Sarcomatoid Features  Not identified    Rhabdoid Features  Not identified    Tumor Necrosis  Not identified    Lymphovascular Invasion  Not identified    MARGINS   Margins  Uninvolved by invasive carcinoma    LYMPH NODES   Regional Lymph Nodes  No lymph nodes submitted or found    PATHOLOGIC STAGE CLASSIFICATION (pTNM, AJCC 8th Edition)   TNM Descriptors  Not applicable    Primary Tumor (pT)  pT1a    Regional Lymph Nodes (pN)  pNX    ADDITIONAL FINDINGS   Pathologic Findings in Nonneoplastic Kidney  None identified    Comment(s)   Comment(s)  see Note for immunophenotype      Imaging:   I personally reviewed the images and report of the following studies, and reviewed them with the patient:    FINDINGS:     ABDOMEN     LOWER CHEST:  No clinically significant abnormality identified in the visualized lower chest      LIVER/BILIARY TREE:  Unremarkable      GALLBLADDER:  No calcified gallstones  No pericholecystic inflammatory change      SPLEEN:  Unremarkable      PANCREAS:  Unremarkable      ADRENAL GLANDS:  Unremarkable      KIDNEYS/URETERS:  2 5 cm suspect left renal mass protruding into the calyceal system / renal pelvis containing peripheral calcification concerning for renal cell carcinoma/renal neoplasm  Renal ultrasound is recommended for further evaluation  Renal vein   appears patent  No evidence of perirenal infiltration or medial invasion at this time       STOMACH AND BOWEL:  Fatty infiltration about the proximal sigmoid colon consistent with sigmoid diverticulitis   Few foci of free air identified along the superior aspect best seen on series 2 image 62 and 61 and 64 concerning for focal perforation       APPENDIX:  No findings to suggest appendicitis      ABDOMINOPELVIC CAVITY:  Small foci of free air identified around the sigmoid diverticulitis  No significant free fluid No lymphadenopathy      VESSELS:  Unremarkable for patient's age      PELVIS     REPRODUCTIVE ORGANS:  Unremarkable for patient's age      URINARY BLADDER:  Diffuse bladder wall thickening could relate to underdistention, cystitis or outlet obstruction  Correlate with urinalysis      ABDOMINAL WALL/INGUINAL REGIONS:  Unremarkable      OSSEOUS STRUCTURES:  No acute fracture or destructive osseous lesion      IMPRESSION:        1  Perforated sigmoid diverticulitis with focal free air  Surgical consult recommended  2  2 5 cm left renal midpole enhancing mass protruding into the calyceal system / renal pelvis containing peripheral calcification concerning for renal cell carcinoma/renal neoplasm  Renal ultrasound and CT urogram is recommended for further evaluation  Renal vein appears patent  No evidence of perirenal infiltration or medial invasion at this time  3  Diffuse bladder wall thickening could relate to underdistention, cystitis or outlet obstruction  Correlate with urinalysis

## 2020-02-07 ENCOUNTER — APPOINTMENT (OUTPATIENT)
Dept: LAB | Age: 60
End: 2020-02-07
Payer: COMMERCIAL

## 2020-02-07 DIAGNOSIS — N17.9 ACUTE KIDNEY INJURY (HCC): ICD-10-CM

## 2020-02-07 LAB
ANION GAP SERPL CALCULATED.3IONS-SCNC: 5 MMOL/L (ref 4–13)
BUN SERPL-MCNC: 20 MG/DL (ref 5–25)
CALCIUM SERPL-MCNC: 9.1 MG/DL (ref 8.3–10.1)
CHLORIDE SERPL-SCNC: 107 MMOL/L (ref 100–108)
CO2 SERPL-SCNC: 30 MMOL/L (ref 21–32)
CREAT SERPL-MCNC: 1.25 MG/DL (ref 0.6–1.3)
GFR SERPL CREATININE-BSD FRML MDRD: 62 ML/MIN/1.73SQ M
GLUCOSE P FAST SERPL-MCNC: 81 MG/DL (ref 65–99)
POTASSIUM SERPL-SCNC: 4.3 MMOL/L (ref 3.5–5.3)
SODIUM SERPL-SCNC: 142 MMOL/L (ref 136–145)

## 2020-02-07 PROCEDURE — 36415 COLL VENOUS BLD VENIPUNCTURE: CPT

## 2020-02-07 PROCEDURE — 80048 BASIC METABOLIC PNL TOTAL CA: CPT

## 2020-02-10 ENCOUNTER — TELEPHONE (OUTPATIENT)
Dept: NEPHROLOGY | Facility: CLINIC | Age: 60
End: 2020-02-10

## 2020-02-10 NOTE — TELEPHONE ENCOUNTER
----- Message from Yana Renner MD sent at 2/7/2020  3:45 PM EST -----  Please inform patient that renal function actually improved to creatinine 1 25  Electrolytes are stable  Continue current treatment and follow-up as planned with repeat labs

## 2020-02-10 NOTE — TELEPHONE ENCOUNTER
Pt would like to know if he is able to take amoxicillin  This is being prescribed by his dentist prior to a root canal  Dr Simms President please advise

## 2020-02-10 NOTE — TELEPHONE ENCOUNTER
Called and informed pt of improved results, he says he has been following all of Dr Sridhar Monahan recommendations and feels great  Will continue everything he has been doing and f/u at next appt

## 2020-04-30 ENCOUNTER — TELEPHONE (OUTPATIENT)
Dept: NEPHROLOGY | Facility: CLINIC | Age: 60
End: 2020-04-30

## 2020-05-05 ENCOUNTER — APPOINTMENT (OUTPATIENT)
Dept: LAB | Age: 60
End: 2020-05-05
Payer: COMMERCIAL

## 2020-05-05 DIAGNOSIS — N17.9 ACUTE KIDNEY INJURY (HCC): ICD-10-CM

## 2020-05-05 LAB
ANION GAP SERPL CALCULATED.3IONS-SCNC: 6 MMOL/L (ref 4–13)
BACTERIA UR QL AUTO: NORMAL /HPF
BILIRUB UR QL STRIP: NEGATIVE
BUN SERPL-MCNC: 21 MG/DL (ref 5–25)
CALCIUM SERPL-MCNC: 8.6 MG/DL (ref 8.3–10.1)
CHLORIDE SERPL-SCNC: 110 MMOL/L (ref 100–108)
CLARITY UR: CLEAR
CO2 SERPL-SCNC: 26 MMOL/L (ref 21–32)
COLOR UR: YELLOW
CREAT SERPL-MCNC: 1.31 MG/DL (ref 0.6–1.3)
CREAT UR-MCNC: 153 MG/DL
ERYTHROCYTE [DISTWIDTH] IN BLOOD BY AUTOMATED COUNT: 13 % (ref 11.6–15.1)
GFR SERPL CREATININE-BSD FRML MDRD: 58 ML/MIN/1.73SQ M
GLUCOSE P FAST SERPL-MCNC: 80 MG/DL (ref 65–99)
GLUCOSE UR STRIP-MCNC: NEGATIVE MG/DL
HCT VFR BLD AUTO: 41.4 % (ref 36.5–49.3)
HGB BLD-MCNC: 13.6 G/DL (ref 12–17)
HGB UR QL STRIP.AUTO: NEGATIVE
HYALINE CASTS #/AREA URNS LPF: NORMAL /LPF
KETONES UR STRIP-MCNC: NEGATIVE MG/DL
LEUKOCYTE ESTERASE UR QL STRIP: NEGATIVE
MAGNESIUM SERPL-MCNC: 2.5 MG/DL (ref 1.6–2.6)
MCH RBC QN AUTO: 30.4 PG (ref 26.8–34.3)
MCHC RBC AUTO-ENTMCNC: 32.9 G/DL (ref 31.4–37.4)
MCV RBC AUTO: 93 FL (ref 82–98)
NITRITE UR QL STRIP: NEGATIVE
NON-SQ EPI CELLS URNS QL MICRO: NORMAL /HPF
PH UR STRIP.AUTO: 6 [PH]
PHOSPHATE SERPL-MCNC: 3.5 MG/DL (ref 2.3–4.1)
PLATELET # BLD AUTO: 209 THOUSANDS/UL (ref 149–390)
PMV BLD AUTO: 10.3 FL (ref 8.9–12.7)
POTASSIUM SERPL-SCNC: 4.1 MMOL/L (ref 3.5–5.3)
PROT UR STRIP-MCNC: NEGATIVE MG/DL
PROT UR-MCNC: 14 MG/DL
PROT/CREAT UR: 0.09 MG/G{CREAT} (ref 0–0.1)
RBC # BLD AUTO: 4.47 MILLION/UL (ref 3.88–5.62)
RBC #/AREA URNS AUTO: NORMAL /HPF
SODIUM SERPL-SCNC: 142 MMOL/L (ref 136–145)
SP GR UR STRIP.AUTO: 1.02 (ref 1–1.03)
UROBILINOGEN UR QL STRIP.AUTO: 0.2 E.U./DL
WBC # BLD AUTO: 4.56 THOUSAND/UL (ref 4.31–10.16)
WBC #/AREA URNS AUTO: NORMAL /HPF

## 2020-05-05 PROCEDURE — 83735 ASSAY OF MAGNESIUM: CPT

## 2020-05-05 PROCEDURE — 84100 ASSAY OF PHOSPHORUS: CPT

## 2020-05-05 PROCEDURE — 82570 ASSAY OF URINE CREATININE: CPT

## 2020-05-05 PROCEDURE — 85027 COMPLETE CBC AUTOMATED: CPT

## 2020-05-05 PROCEDURE — 36415 COLL VENOUS BLD VENIPUNCTURE: CPT

## 2020-05-05 PROCEDURE — 80048 BASIC METABOLIC PNL TOTAL CA: CPT

## 2020-05-05 PROCEDURE — 81001 URINALYSIS AUTO W/SCOPE: CPT

## 2020-05-05 PROCEDURE — 84156 ASSAY OF PROTEIN URINE: CPT

## 2020-05-12 ENCOUNTER — TELEPHONE (OUTPATIENT)
Dept: NEPHROLOGY | Facility: CLINIC | Age: 60
End: 2020-05-12

## 2020-05-13 ENCOUNTER — TELEMEDICINE (OUTPATIENT)
Dept: NEPHROLOGY | Facility: CLINIC | Age: 60
End: 2020-05-13
Payer: COMMERCIAL

## 2020-05-13 DIAGNOSIS — E78.2 MIXED HYPERLIPIDEMIA: ICD-10-CM

## 2020-05-13 DIAGNOSIS — Z90.5 SOLITARY KIDNEY, ACQUIRED: ICD-10-CM

## 2020-05-13 DIAGNOSIS — N18.30 CHRONIC KIDNEY DISEASE, STAGE 3 (HCC): Primary | ICD-10-CM

## 2020-05-13 PROCEDURE — 99214 OFFICE O/P EST MOD 30 MIN: CPT | Performed by: INTERNAL MEDICINE

## 2020-05-17 DIAGNOSIS — E78.2 MIXED HYPERLIPIDEMIA: ICD-10-CM

## 2020-05-19 RX ORDER — ATORVASTATIN CALCIUM 20 MG/1
TABLET, FILM COATED ORAL
Qty: 90 TABLET | Refills: 1 | OUTPATIENT
Start: 2020-05-19

## 2020-05-29 ENCOUNTER — TELEPHONE (OUTPATIENT)
Dept: INTERNAL MEDICINE CLINIC | Facility: CLINIC | Age: 60
End: 2020-05-29

## 2020-05-29 DIAGNOSIS — E78.2 MIXED HYPERLIPIDEMIA: Primary | ICD-10-CM

## 2020-07-01 ENCOUNTER — APPOINTMENT (OUTPATIENT)
Dept: LAB | Facility: CLINIC | Age: 60
End: 2020-07-01
Payer: COMMERCIAL

## 2020-07-01 ENCOUNTER — TELEPHONE (OUTPATIENT)
Dept: OTHER | Facility: HOSPITAL | Age: 60
End: 2020-07-01

## 2020-07-01 DIAGNOSIS — C64.2 RENAL CELL CANCER, LEFT (HCC): ICD-10-CM

## 2020-07-01 DIAGNOSIS — E78.2 MIXED HYPERLIPIDEMIA: ICD-10-CM

## 2020-07-01 DIAGNOSIS — N18.30 CHRONIC KIDNEY DISEASE, STAGE 3 (HCC): ICD-10-CM

## 2020-07-01 DIAGNOSIS — N18.30 CHRONIC KIDNEY DISEASE, STAGE 3 (HCC): Primary | ICD-10-CM

## 2020-07-01 LAB
ANION GAP SERPL CALCULATED.3IONS-SCNC: 4 MMOL/L (ref 4–13)
BUN SERPL-MCNC: 25 MG/DL (ref 5–25)
CALCIUM SERPL-MCNC: 8.9 MG/DL (ref 8.3–10.1)
CHLORIDE SERPL-SCNC: 108 MMOL/L (ref 100–108)
CHOLEST SERPL-MCNC: 140 MG/DL (ref 50–200)
CO2 SERPL-SCNC: 30 MMOL/L (ref 21–32)
CREAT SERPL-MCNC: 1.45 MG/DL (ref 0.6–1.3)
GFR SERPL CREATININE-BSD FRML MDRD: 52 ML/MIN/1.73SQ M
GLUCOSE P FAST SERPL-MCNC: 91 MG/DL (ref 65–99)
HDLC SERPL-MCNC: 45 MG/DL
LDLC SERPL CALC-MCNC: 81 MG/DL (ref 0–100)
POTASSIUM SERPL-SCNC: 4.1 MMOL/L (ref 3.5–5.3)
SODIUM SERPL-SCNC: 142 MMOL/L (ref 136–145)
TRIGL SERPL-MCNC: 69 MG/DL

## 2020-07-01 PROCEDURE — 80048 BASIC METABOLIC PNL TOTAL CA: CPT

## 2020-07-01 PROCEDURE — 80061 LIPID PANEL: CPT

## 2020-07-01 PROCEDURE — 36415 COLL VENOUS BLD VENIPUNCTURE: CPT

## 2020-07-01 NOTE — TELEPHONE ENCOUNTER
BMP has been mailed out for the patient      Orlin Macdonald MA
Reviewed labs from 7/1  Cr slightly increased to 1 45    Electrolytes stable    Not able to reach patient on the phone, left message    Stressed on oral hydration, repeat BMP in 1 month
11-May-2018 23:14

## 2020-07-02 ENCOUNTER — TELEPHONE (OUTPATIENT)
Dept: UROLOGY | Facility: CLINIC | Age: 60
End: 2020-07-02

## 2020-07-02 DIAGNOSIS — C64.2 RENAL CELL CANCER, LEFT (HCC): Primary | ICD-10-CM

## 2020-07-02 NOTE — TELEPHONE ENCOUNTER
Please work with Radiology to change his upcoming CT scan to without contrast due to his elevated creatinine    Order has been placed

## 2020-07-02 NOTE — TELEPHONE ENCOUNTER
Called and spoke to Flavio Remy with central scheduling to advise patient scheduled CT would need to be changed to without contrast due to patients creatine      Flavio Remy advised the CT with contast was unlinked and the CT without contrast was linked and patient will not be having a CT scan without contrast

## 2020-07-08 ENCOUNTER — OFFICE VISIT (OUTPATIENT)
Dept: INTERNAL MEDICINE CLINIC | Age: 60
End: 2020-07-08
Payer: COMMERCIAL

## 2020-07-08 VITALS
HEART RATE: 49 BPM | WEIGHT: 147 LBS | TEMPERATURE: 98.1 F | DIASTOLIC BLOOD PRESSURE: 60 MMHG | OXYGEN SATURATION: 98 % | HEIGHT: 67 IN | BODY MASS INDEX: 23.07 KG/M2 | SYSTOLIC BLOOD PRESSURE: 100 MMHG

## 2020-07-08 DIAGNOSIS — R73.9 HYPERGLYCEMIA: ICD-10-CM

## 2020-07-08 DIAGNOSIS — E78.2 MIXED HYPERLIPIDEMIA: ICD-10-CM

## 2020-07-08 DIAGNOSIS — N18.30 CKD (CHRONIC KIDNEY DISEASE) STAGE 3, GFR 30-59 ML/MIN (HCC): Primary | ICD-10-CM

## 2020-07-08 PROCEDURE — 99214 OFFICE O/P EST MOD 30 MIN: CPT | Performed by: INTERNAL MEDICINE

## 2020-07-08 PROCEDURE — 3008F BODY MASS INDEX DOCD: CPT | Performed by: INTERNAL MEDICINE

## 2020-07-08 PROCEDURE — 1036F TOBACCO NON-USER: CPT | Performed by: INTERNAL MEDICINE

## 2020-07-08 RX ORDER — ATORVASTATIN CALCIUM 20 MG/1
20 TABLET, FILM COATED ORAL DAILY
Qty: 90 TABLET | Refills: 1 | Status: SHIPPED | OUTPATIENT
Start: 2020-07-08 | End: 2020-11-16 | Stop reason: SDUPTHER

## 2020-07-08 NOTE — PROGRESS NOTES
Assessment/Plan:    1  Hyperlipidemia  Lipid profile is in excellent range  Will continue with Lipitor 20 mg daily  Continue with healthy lifestyle exercise  2  CKD stage 3  Renal function is slightly worse than previous 1  Patient is also under the care of nephrologist   Advised for adequate hydration  3  Hyperglycemia  Hemoglobin A1c is within normal range  Continue with the low sugar diet exercise  Diagnoses and all orders for this visit:    CKD (chronic kidney disease) stage 3, GFR 30-59 ml/min (Former[facility])    Mixed hyperlipidemia  -     atorvastatin (LIPITOR) 20 mg tablet; Take 1 tablet (20 mg total) by mouth daily    Hyperglycemia               Subjective:          Patient ID: Karina Joyner is a 62 y o  male  Patient is here for regular follow-up  Does have blood work done last month would like to discuss results  He was also seen by his nephrologist and his kidney function was slightly worse than before  The following portions of the patient's history were reviewed and updated as appropriate: allergies, current medications, past family history, past medical history, past social history, past surgical history and problem list     Review of Systems   Constitutional: Negative for fatigue and fever  HENT: Negative for congestion, ear discharge, ear pain, postnasal drip, sinus pressure, sore throat, tinnitus and trouble swallowing  Eyes: Negative for discharge, itching and visual disturbance  Respiratory: Negative for cough and shortness of breath  Cardiovascular: Negative for chest pain and palpitations  Gastrointestinal: Negative for abdominal pain, diarrhea, nausea and vomiting  Endocrine: Negative for cold intolerance and polyuria  Genitourinary: Negative for difficulty urinating, dysuria and urgency  Musculoskeletal: Negative for arthralgias and neck pain  Skin: Negative for rash  Allergic/Immunologic: Negative for environmental allergies     Neurological: Negative for dizziness, weakness and headaches  Psychiatric/Behavioral: Negative for agitation and behavioral problems  The patient is not nervous/anxious  Past Medical History:   Diagnosis Date    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-    Arthritis     Cancer ([facility] [address] ) 10/13/2019    kidney    Diverticulitis of colon 10/13/2019    Diverticulitis of large intestine with perforation without bleeding 10/13/2019    Hyperlipidemia     Inflammatory bowel disease 10/13/2019    Renal cell cancer, left ([facility] [address] ) 10/13/2019    Renal cell carcinoma (HCC)          Current Outpatient Medications:     aspirin 81 MG tablet, Take 1 tablet by mouth daily, Disp: , Rfl:     atorvastatin (LIPITOR) 20 mg tablet, Take 1 tablet (20 mg total) by mouth daily, Disp: 90 tablet, Rfl: 1    multivitamin (THERAGRAN) TABS, Take 1 tablet by mouth daily  , Disp: , Rfl:     Omega-3 Fatty Acids (FISH OIL) 1200 MG CAPS, Take by mouth , Disp: , Rfl:     No Known Allergies    Social History   Past Surgical History:   Procedure Laterality Date    HERNIA REPAIR      HIP SURGERY Right     age 4    JOINT REPLACEMENT Right 2011    TKR    [place] LAP, RADICAL NEPHRECTOMY Left 12/30/2019    Procedure: NEPHRECTOMY LAPAROSCOPIC HAND ASSISTED;  Surgeon: Cory Vaughn MD;  [facility];  Service: Urology    [place] LAP,SURG,COLECTOMY, PARTIAL, [place] Course N/A 12/30/2019    Procedure: LAPAROSCOPIC HAND-ASSISTED SIGMOID COLECTOMY; LAPAROSCOPIC MOBILIZATION OF SPLENIC FLEXURE;  Surgeon: Jenna Dwyer MD;  [facility];  Service: Colorectal    REPLACEMENT TOTAL KNEE Right 2011    REPLACEMENT TOTAL KNEE      SHOULDER SURGERY Left     SHOULDER SURGERY Right     WRIST FUSION Left 2016    WRIST SURGERY       Family History   Problem Relation Age of Onset    Heart disease Father     Other Father         cardiac disorder    No Known Problems Mother     Heart attack Maternal Grandfather     Heart attack Paternal Grandfather     Thyroid cancer Son     Colon cancer Neg Hx        Objective:  /60 (BP Location: Left arm, Patient Position: Sitting, Cuff Size: Adult)   Pulse (!) 49   Temp 98 1 °F (36 7 °C) (Temporal)   Ht 5' 7" (1 702 m)   Wt 66 7 kg (147 lb)   SpO2 98%   BMI 23 02 kg/m²   Body mass index is 23 02 kg/m²  Physical Exam   Constitutional: He appears well-developed  HENT:   Head: Normocephalic  Right Ear: External ear normal    Left Ear: External ear normal    Mouth/Throat: Oropharynx is clear and moist    Eyes: Pupils are equal, round, and reactive to light  No scleral icterus  Neck: Normal range of motion  Neck supple  No tracheal deviation present  No thyromegaly present  Cardiovascular: Normal rate, regular rhythm and normal heart sounds  Pulmonary/Chest: Effort normal and breath sounds normal  No respiratory distress  He exhibits no tenderness  Abdominal: Soft  Bowel sounds are normal  He exhibits no mass  There is no tenderness  Musculoskeletal: Normal range of motion  Lymphadenopathy:     He has no cervical adenopathy  Neurological: He is alert  No cranial nerve deficit  Skin: Skin is warm  Psychiatric: He has a normal mood and affect   His behavior is normal

## 2020-07-09 ENCOUNTER — HOSPITAL ENCOUNTER (OUTPATIENT)
Dept: CT IMAGING | Facility: HOSPITAL | Age: 60
Discharge: HOME | End: 2020-07-09
Attending: UROLOGY
Payer: COMMERCIAL

## 2020-07-09 ENCOUNTER — HOSPITAL ENCOUNTER (OUTPATIENT)
Dept: RADIOLOGY | Facility: HOSPITAL | Age: 60
Discharge: HOME | End: 2020-07-09
Attending: UROLOGY
Payer: COMMERCIAL

## 2020-07-09 DIAGNOSIS — C64.2 RENAL CELL CANCER, LEFT (HCC): ICD-10-CM

## 2020-07-09 PROCEDURE — 74176 CT ABD & PELVIS W/O CONTRAST: CPT

## 2020-07-09 PROCEDURE — 71046 X-RAY EXAM CHEST 2 VIEWS: CPT

## 2020-07-31 ENCOUNTER — LAB (OUTPATIENT)
Dept: LAB | Facility: CLINIC | Age: 60
End: 2020-07-31
Payer: COMMERCIAL

## 2020-07-31 ENCOUNTER — OFFICE VISIT (OUTPATIENT)
Dept: UROLOGY | Facility: CLINIC | Age: 60
End: 2020-07-31
Payer: COMMERCIAL

## 2020-07-31 VITALS
DIASTOLIC BLOOD PRESSURE: 80 MMHG | HEIGHT: 67 IN | TEMPERATURE: 98.1 F | WEIGHT: 149.2 LBS | HEART RATE: 53 BPM | SYSTOLIC BLOOD PRESSURE: 120 MMHG | BODY MASS INDEX: 23.42 KG/M2

## 2020-07-31 DIAGNOSIS — N18.30 CHRONIC KIDNEY DISEASE, STAGE 3 (HCC): ICD-10-CM

## 2020-07-31 DIAGNOSIS — C64.2 RENAL CELL CANCER, LEFT (HCC): Primary | ICD-10-CM

## 2020-07-31 LAB
ANION GAP SERPL CALCULATED.3IONS-SCNC: 4 MMOL/L (ref 4–13)
BUN SERPL-MCNC: 24 MG/DL (ref 5–25)
CALCIUM SERPL-MCNC: 9.3 MG/DL (ref 8.3–10.1)
CHLORIDE SERPL-SCNC: 106 MMOL/L (ref 100–108)
CO2 SERPL-SCNC: 30 MMOL/L (ref 21–32)
CREAT SERPL-MCNC: 1.36 MG/DL (ref 0.6–1.3)
GFR SERPL CREATININE-BSD FRML MDRD: 56 ML/MIN/1.73SQ M
GLUCOSE SERPL-MCNC: 89 MG/DL (ref 65–140)
POTASSIUM SERPL-SCNC: 4.4 MMOL/L (ref 3.5–5.3)
SODIUM SERPL-SCNC: 140 MMOL/L (ref 136–145)

## 2020-07-31 PROCEDURE — 3008F BODY MASS INDEX DOCD: CPT | Performed by: UROLOGY

## 2020-07-31 PROCEDURE — 80048 BASIC METABOLIC PNL TOTAL CA: CPT

## 2020-07-31 PROCEDURE — 1036F TOBACCO NON-USER: CPT | Performed by: UROLOGY

## 2020-07-31 PROCEDURE — 99214 OFFICE O/P EST MOD 30 MIN: CPT | Performed by: UROLOGY

## 2020-07-31 PROCEDURE — 36415 COLL VENOUS BLD VENIPUNCTURE: CPT

## 2020-07-31 NOTE — PROGRESS NOTES
ASSESSMENT/PLAN:     1  Stage I clear cell renal cell carcinoma  - pT1a G2, N0 M0 R0, clear cell  - s/p lap left radical nephrectomy January 2020    2  CKD  - GFR currently 56    Is good to see my doing so well  He is free of any recurrent disease on recent CT  From an oncologic standpoint his prognosis is excellent and we will plan for a repeat CT in 1 year's time and chest x-ray  We discussed his renal function  Believe his GFR is most likely at its steady state, currently 56 representing chronic kidney disease  He follows closely with Dr Mili Maynard and appears to be doing well limiting his dietary intake of sodium  I applauded his efforts in this regard as well as continued exercise    He will return in 1 year's time with 1 of our advanced practitioners with a non con CT and a chest x-ray and creatinine prior to the visit        Buck Spaulding MD    History of Present Illness:     Clark Jett is a 62 y o  with a history left renal cell carcinoma status post left radical nephrectomy (01/2020) presenting for follow-up      He has done very well clinically  He has no pain or any incisional concerns  He presents with an updated CT which shows no evidence of recurrent disease  He has acquired chronic kidney disease  His most recent creatinine is 1 36 down from 1 45  He is asymptomatic from a urologic standpoint    He has done well regarding his colectomy, which was performed concomitantly at the time of the nephrectomy for diverticular disease    Past Medical, Past Surgical History:     Past Medical History:   Diagnosis Date    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 [address] ) 10/13/2019    kidney    Diverticulitis of colon 10/13/2019    Diverticulitis of large intestine with perforation without bleeding 10/13/2019    Hyperlipidemia     Inflammatory bowel disease 10/13/2019    Renal cell cancer, left ([address] ) 10/13/2019    Renal cell carcinoma ([facility] [address] )    :    Past Surgical History:   Procedure Laterality Date    HERNIA REPAIR      HIP SURGERY Right     age 4    JOINT REPLACEMENT Right 2011    TKR    [place] LAP, RADICAL NEPHRECTOMY Left 12/30/2019    Procedure: NEPHRECTOMY LAPAROSCOPIC HAND ASSISTED;  Surgeon: Paramjit Askew MD;  [facility];  Service: Urology    [place] LAP,SURG,COLECTOMY, PARTIAL, Pat Most N/A 12/30/2019    Procedure: LAPAROSCOPIC HAND-ASSISTED SIGMOID COLECTOMY; LAPAROSCOPIC MOBILIZATION OF SPLENIC FLEXURE;  Surgeon: Jarred Mercer MD;  [facility];  Service: Colorectal    REPLACEMENT TOTAL KNEE Right 2011    REPLACEMENT TOTAL KNEE      SHOULDER SURGERY Left     SHOULDER SURGERY Right     WRIST FUSION Left 2016    WRIST SURGERY     :    Medications, Allergies:     Current Outpatient Medications:     aspirin 81 MG tablet, Take 1 tablet by mouth daily, Disp: , Rfl:     atorvastatin (LIPITOR) 20 mg tablet, Take 1 tablet (20 mg total) by mouth daily, Disp: 90 tablet, Rfl: 1    multivitamin (THERAGRAN) TABS, Take 1 tablet by mouth daily  , Disp: , Rfl:     Omega-3 Fatty Acids (FISH OIL) 1200 MG CAPS, Take by mouth , Disp: , Rfl:     Allergies:  No Known Allergies:    Social and Family History:   Social History:   Social History     Tobacco Use    Smoking status: Never Smoker    Smokeless tobacco: Never Used   Substance Use Topics    Alcohol use:  Yes     Alcohol/week: 2 0 standard drinks     Types: 2 Cans of beer per week     Frequency: Monthly or less     Drinks per session: 3 or 4     Binge frequency: Never    Drug use: No        Social History     Tobacco Use   Smoking Status Never Smoker   Smokeless Tobacco Never Used       Family History:  Family History   Problem Relation Age of Onset    Heart disease Father     Other Father         cardiac disorder    No Known Problems Mother     Heart attack Maternal Grandfather     Heart attack Paternal Grandfather     Thyroid cancer Son     Colon cancer Neg Hx    :     Review of Systems:     General: Fever, chills, or night sweats: positive  Cardiac: Negative for chest pain  Pulmonary: Negative for shortness of breath  Gastrointestinal: Abdominal pain positive  Nausea, vomiting, or diarrhea negative,  Genitourinary: See HPI above  Patient does not have hematuria  All other systems queried were negative  Physical Exam:   General: Patient is pleasant and in NAD  Awake and alert  /80 (BP Location: Left arm, Patient Position: Sitting, Cuff Size: Standard)   Pulse (!) 53   Temp 98 1 °F (36 7 °C)   Ht 5' 7" (1 702 m)   Wt 67 7 kg (149 lb 3 2 oz)   BMI 23 37 kg/m²   Cardiac: Peripheral edema: positive  Pulmonary: Non-labored breathing  Abdomen: Soft, non-tender, non-distended  No surgical scars  No masses, tenderness, hernias noted  Genitourinary: Negative CVA tenderness, positive suprapubic tenderness  All incisions are well healed with no palpable hernia      Labs:     Lab Results   Component Value Date    HGB 13 6 05/05/2020    HCT 41 4 05/05/2020    WBC 4 56 05/05/2020     05/05/2020   ]    Lab Results   Component Value Date    K 4 4 07/31/2020     07/31/2020    CO2 30 07/31/2020    BUN 24 07/31/2020    CREATININE 1 36 (H) 07/31/2020    CALCIUM 9 3 07/31/2020   ]  FINDINGS:     ABDOMEN     LOWER CHEST:  No clinically significant abnormality identified in the visualized lower chest      LIVER/BILIARY TREE:  Unremarkable      GALLBLADDER:  No calcified gallstones  No pericholecystic inflammatory change      SPLEEN:  Unremarkable      PANCREAS:  Unremarkable      ADRENAL GLANDS:  Unremarkable      KIDNEYS/URETERS:  Status post left nephrectomy  No definite evidence for recurrence in the surgical bed  No hydronephrosis      STOMACH AND BOWEL:  No evidence for bowel obstruction    The patient is again noted to be status post partial colectomy with anastomotic sutures in the sigmoid colon      APPENDIX:  No findings to suggest appendicitis      ABDOMINOPELVIC CAVITY:  No ascites  No pneumoperitoneum  No lymphadenopathy      VESSELS:  There is calcific atherosclerosis of the abdominal aorta without evidence for aneurysmal dilatation      PELVIS     REPRODUCTIVE ORGANS:  Unremarkable for patient's age      URINARY BLADDER:  Unremarkable      ABDOMINAL WALL/INGUINAL REGIONS:  Unremarkable      OSSEOUS STRUCTURES:  No acute fracture or destructive osseous lesion  There is degenerative disc disease at L5-S1      IMPRESSION:     Status post left nephrectomy  No evidence for recurrent or metastatic disease in the abdomen or pelvis within the limits of this unenhanced examination    FINDINGS:     Cardiomediastinal silhouette appears unremarkable      The lungs are clear    No pneumothorax or pleural effusion      Degenerative thoracic kyphosis with mid thoracic contiguous chronic appearing mild wedge deformities      IMPRESSION:     No acute cardiopulmonary disease

## 2020-07-31 NOTE — LETTER
July 31, 2020     Anabel Lange MD  [address] [place] [address]    Patient: Amalia Jasmine   YOB: 1960   Date of Visit: 7/31/2020       Dear Dr Filemon Alves: Thank you for referring Buster White to me for evaluation  Below are my notes for this consultation  If you have questions, please do not hesitate to call me  I look forward to following your patient along with you  Sincerely,        Patricio Mari MD        CC: MD Jasmina Villalpando MD Donnajean Kand, MD  7/31/2020  3:34 PM  Sign at close encounter      ASSESSMENT/PLAN:     1  Stage I clear cell renal cell carcinoma  - pT1a G2, N0 M0 R0, clear cell  - s/p lap left radical nephrectomy January 2020    2  CKD  - GFR currently 56    Is good to see my doing so well  He is free of any recurrent disease on recent CT  From an oncologic standpoint his prognosis is excellent and we will plan for a repeat CT in 1 year's time and chest x-ray  We discussed his renal function  Believe his GFR is most likely at its steady state, currently 56 representing chronic kidney disease  He follows closely with Dr Jada Junior and appears to be doing well limiting his dietary intake of sodium  I applauded his efforts in this regard as well as continued exercise    He will return in 1 year's time with 1 of our advanced practitioners with a non con CT and a chest x-ray and creatinine prior to the visit        Patricio Mari MD    History of Present Illness:     Buster White is a 62 y o  with a history left renal cell carcinoma status post left radical nephrectomy (01/2020) presenting for follow-up      He has done very well clinically  He has no pain or any incisional concerns  He presents with an updated CT which shows no evidence of recurrent disease  He has acquired chronic kidney disease  His most recent creatinine is 1 36 down from 1 45  He is asymptomatic from a urologic standpoint    He has done well regarding his colectomy, which was performed concomitantly at the time of the nephrectomy for diverticular disease    Past Medical, Past Surgical History:     Past Medical History:   Diagnosis Date    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-    Arthritis     Cancer ([facility] [address] ) 10/13/2019    kidney    Diverticulitis of colon 10/13/2019    Diverticulitis of large intestine with perforation without bleeding 10/13/2019    Hyperlipidemia     Inflammatory bowel disease 10/13/2019    Renal cell cancer, left ([facility] [address] ) 10/13/2019    Renal cell carcinoma ([facility] [address] )    :    Past Surgical History:   Procedure Laterality Date    HERNIA REPAIR      HIP SURGERY Right     age 4    JOINT REPLACEMENT Right 2011    TKR    [place] LAP, RADICAL NEPHRECTOMY Left 12/30/2019    Procedure: NEPHRECTOMY LAPAROSCOPIC HAND ASSISTED;  Surgeon: Arnold Elise MD;  [facility];  Service: Urology    [place] LAP,SURG,COLECTOMY, PARTIAL, Ruben Barry N/A 12/30/2019    Procedure: LAPAROSCOPIC HAND-ASSISTED SIGMOID COLECTOMY; LAPAROSCOPIC MOBILIZATION OF SPLENIC FLEXURE;  Surgeon: Duran Johnson MD;  [facility];  Service: Colorectal    REPLACEMENT TOTAL KNEE Right 2011    REPLACEMENT TOTAL KNEE      SHOULDER SURGERY Left     SHOULDER SURGERY Right     WRIST FUSION Left 2016    WRIST SURGERY     :    Medications, Allergies:     Current Outpatient Medications:     aspirin 81 MG tablet, Take 1 tablet by mouth daily, Disp: , Rfl:     atorvastatin (LIPITOR) 20 mg tablet, Take 1 tablet (20 mg total) by mouth daily, Disp: 90 tablet, Rfl: 1    multivitamin (THERAGRAN) TABS, Take 1 tablet by mouth daily  , Disp: , Rfl:     Omega-3 Fatty Acids (FISH OIL) 1200 MG CAPS, Take by mouth , Disp: , Rfl:     Allergies:  No Known Allergies:    Social and Family History:   Social History:   Social History     Tobacco Use    Smoking status: Never Smoker    Smokeless tobacco: Never Used   Substance Use Topics    Alcohol use:  Yes Alcohol/week: 2 0 standard drinks     Types: 2 Cans of beer per week     Frequency: Monthly or less     Drinks per session: 3 or 4     Binge frequency: Never    Drug use: No        Social History     Tobacco Use   Smoking Status Never Smoker   Smokeless Tobacco Never Used       Family History:  Family History   Problem Relation Age of Onset    Heart disease Father     Other Father         cardiac disorder    No Known Problems Mother     Heart attack Maternal Grandfather     Heart attack Paternal Grandfather     Thyroid cancer Son     Colon cancer Neg Hx    :     Review of Systems:     General: Fever, chills, or night sweats: positive  Cardiac: Negative for chest pain  Pulmonary: Negative for shortness of breath  Gastrointestinal: Abdominal pain positive  Nausea, vomiting, or diarrhea negative,  Genitourinary: See HPI above  Patient does not have hematuria  All other systems queried were negative  Physical Exam:   General: Patient is pleasant and in NAD  Awake and alert  /80 (BP Location: Left arm, Patient Position: Sitting, Cuff Size: Standard)   Pulse (!) 53   Temp 98 1 °F (36 7 °C)   Ht 5' 7" (1 702 m)   Wt 67 7 kg (149 lb 3 2 oz)   BMI 23 37 kg/m²    Cardiac: Peripheral edema: positive  Pulmonary: Non-labored breathing  Abdomen: Soft, non-tender, non-distended  No surgical scars  No masses, tenderness, hernias noted  Genitourinary: Negative CVA tenderness, positive suprapubic tenderness    All incisions are well healed with no palpable hernia      Labs:     Lab Results   Component Value Date    HGB 13 6 05/05/2020    HCT 41 4 05/05/2020    WBC 4 56 05/05/2020     05/05/2020   ]    Lab Results   Component Value Date    K 4 4 07/31/2020     07/31/2020    CO2 30 07/31/2020    BUN 24 07/31/2020    CREATININE 1 36 (H) 07/31/2020    CALCIUM 9 3 07/31/2020   ]  FINDINGS:     ABDOMEN     LOWER CHEST:  No clinically significant abnormality identified in the visualized lower chest      LIVER/BILIARY TREE:  Unremarkable      GALLBLADDER:  No calcified gallstones  No pericholecystic inflammatory change      SPLEEN:  Unremarkable      PANCREAS:  Unremarkable      ADRENAL GLANDS:  Unremarkable      KIDNEYS/URETERS:  Status post left nephrectomy  No definite evidence for recurrence in the surgical bed  No hydronephrosis      STOMACH AND BOWEL:  No evidence for bowel obstruction  The patient is again noted to be status post partial colectomy with anastomotic sutures in the sigmoid colon      APPENDIX:  No findings to suggest appendicitis      ABDOMINOPELVIC CAVITY:  No ascites  No pneumoperitoneum  No lymphadenopathy      VESSELS:  There is calcific atherosclerosis of the abdominal aorta without evidence for aneurysmal dilatation      PELVIS     REPRODUCTIVE ORGANS:  Unremarkable for patient's age      URINARY BLADDER:  Unremarkable      ABDOMINAL WALL/INGUINAL REGIONS:  Unremarkable      OSSEOUS STRUCTURES:  No acute fracture or destructive osseous lesion  There is degenerative disc disease at L5-S1      IMPRESSION:     Status post left nephrectomy  No evidence for recurrent or metastatic disease in the abdomen or pelvis within the limits of this unenhanced examination    FINDINGS:     Cardiomediastinal silhouette appears unremarkable      The lungs are clear    No pneumothorax or pleural effusion      Degenerative thoracic kyphosis with mid thoracic contiguous chronic appearing mild wedge deformities      IMPRESSION:     No acute cardiopulmonary disease

## 2020-08-03 ENCOUNTER — TELEPHONE (OUTPATIENT)
Dept: NEPHROLOGY | Facility: CLINIC | Age: 60
End: 2020-08-03

## 2020-08-03 NOTE — TELEPHONE ENCOUNTER
----- Message from Marcio Gutierrez MD sent at 8/3/2020  2:47 PM EDT -----  Please inform patient that renal function is stable at creatinine 1 36    Electrolytes are stable

## 2020-08-04 NOTE — TELEPHONE ENCOUNTER
Patient is aware of his lab test results and he has no further questions at this time        Toby Narvaez MA

## 2020-08-21 ENCOUNTER — TELEPHONE (OUTPATIENT)
Dept: NEPHROLOGY | Facility: CLINIC | Age: 60
End: 2020-08-21

## 2020-08-21 NOTE — TELEPHONE ENCOUNTER
Left message for patient to move appointment on 9/28   Dr Mili Maynard had a change in his schedule and will no longer be available

## 2020-09-25 ENCOUNTER — APPOINTMENT (OUTPATIENT)
Dept: LAB | Facility: CLINIC | Age: 60
End: 2020-09-25
Payer: COMMERCIAL

## 2020-09-25 DIAGNOSIS — N18.30 CHRONIC KIDNEY DISEASE, STAGE 3 (HCC): ICD-10-CM

## 2020-09-25 LAB
ANION GAP SERPL CALCULATED.3IONS-SCNC: 2 MMOL/L (ref 4–13)
BACTERIA UR QL AUTO: NORMAL /HPF
BILIRUB UR QL STRIP: NEGATIVE
BUN SERPL-MCNC: 17 MG/DL (ref 5–25)
CALCIUM SERPL-MCNC: 9.2 MG/DL (ref 8.3–10.1)
CHLORIDE SERPL-SCNC: 106 MMOL/L (ref 100–108)
CLARITY UR: CLEAR
CO2 SERPL-SCNC: 32 MMOL/L (ref 21–32)
COLOR UR: YELLOW
CREAT SERPL-MCNC: 1.36 MG/DL (ref 0.6–1.3)
CREAT UR-MCNC: 113 MG/DL
GFR SERPL CREATININE-BSD FRML MDRD: 55 ML/MIN/1.73SQ M
GLUCOSE P FAST SERPL-MCNC: 90 MG/DL (ref 65–99)
GLUCOSE UR STRIP-MCNC: NEGATIVE MG/DL
HGB UR QL STRIP.AUTO: NEGATIVE
HYALINE CASTS #/AREA URNS LPF: NORMAL /LPF
KETONES UR STRIP-MCNC: NEGATIVE MG/DL
LEUKOCYTE ESTERASE UR QL STRIP: NEGATIVE
MAGNESIUM SERPL-MCNC: 2.8 MG/DL (ref 1.6–2.6)
NITRITE UR QL STRIP: NEGATIVE
NON-SQ EPI CELLS URNS QL MICRO: NORMAL /HPF
PH UR STRIP.AUTO: 7 [PH]
PHOSPHATE SERPL-MCNC: 3.4 MG/DL (ref 2.3–4.1)
POTASSIUM SERPL-SCNC: 4.3 MMOL/L (ref 3.5–5.3)
PROT UR STRIP-MCNC: NEGATIVE MG/DL
PROT UR-MCNC: 12 MG/DL
PROT/CREAT UR: 0.11 MG/G{CREAT} (ref 0–0.1)
PTH-INTACT SERPL-MCNC: 42.1 PG/ML (ref 18.4–80.1)
RBC #/AREA URNS AUTO: NORMAL /HPF
SODIUM SERPL-SCNC: 140 MMOL/L (ref 136–145)
SP GR UR STRIP.AUTO: 1.02 (ref 1–1.03)
UROBILINOGEN UR QL STRIP.AUTO: 0.2 E.U./DL
WBC #/AREA URNS AUTO: NORMAL /HPF

## 2020-09-25 PROCEDURE — 84156 ASSAY OF PROTEIN URINE: CPT

## 2020-09-25 PROCEDURE — 82570 ASSAY OF URINE CREATININE: CPT

## 2020-09-25 PROCEDURE — 84100 ASSAY OF PHOSPHORUS: CPT

## 2020-09-25 PROCEDURE — 80048 BASIC METABOLIC PNL TOTAL CA: CPT

## 2020-09-25 PROCEDURE — 81001 URINALYSIS AUTO W/SCOPE: CPT

## 2020-09-25 PROCEDURE — 83735 ASSAY OF MAGNESIUM: CPT

## 2020-09-25 PROCEDURE — 36415 COLL VENOUS BLD VENIPUNCTURE: CPT

## 2020-09-25 PROCEDURE — 83970 ASSAY OF PARATHORMONE: CPT

## 2020-10-01 ENCOUNTER — OFFICE VISIT (OUTPATIENT)
Dept: NEPHROLOGY | Facility: CLINIC | Age: 60
End: 2020-10-01
Payer: COMMERCIAL

## 2020-10-01 VITALS
WEIGHT: 150 LBS | HEIGHT: 67 IN | SYSTOLIC BLOOD PRESSURE: 144 MMHG | DIASTOLIC BLOOD PRESSURE: 80 MMHG | HEART RATE: 56 BPM | RESPIRATION RATE: 16 BRPM | BODY MASS INDEX: 23.54 KG/M2 | TEMPERATURE: 97.9 F

## 2020-10-01 DIAGNOSIS — Z90.5 SOLITARY KIDNEY, ACQUIRED: ICD-10-CM

## 2020-10-01 DIAGNOSIS — E83.41 HYPERMAGNESEMIA: ICD-10-CM

## 2020-10-01 DIAGNOSIS — E78.2 MIXED HYPERLIPIDEMIA: ICD-10-CM

## 2020-10-01 DIAGNOSIS — R03.0 ELEVATED BLOOD PRESSURE READING IN OFFICE WITHOUT DIAGNOSIS OF HYPERTENSION: ICD-10-CM

## 2020-10-01 DIAGNOSIS — N18.31 STAGE 3A CHRONIC KIDNEY DISEASE (HCC): Primary | ICD-10-CM

## 2020-10-01 PROCEDURE — 1036F TOBACCO NON-USER: CPT | Performed by: INTERNAL MEDICINE

## 2020-10-01 PROCEDURE — 99214 OFFICE O/P EST MOD 30 MIN: CPT | Performed by: INTERNAL MEDICINE

## 2020-11-16 ENCOUNTER — TELEMEDICINE (OUTPATIENT)
Dept: INTERNAL MEDICINE CLINIC | Age: 60
End: 2020-11-16
Payer: COMMERCIAL

## 2020-11-16 VITALS — SYSTOLIC BLOOD PRESSURE: 120 MMHG | DIASTOLIC BLOOD PRESSURE: 82 MMHG

## 2020-11-16 DIAGNOSIS — N18.31 STAGE 3A CHRONIC KIDNEY DISEASE (HCC): Primary | ICD-10-CM

## 2020-11-16 DIAGNOSIS — E78.2 MIXED HYPERLIPIDEMIA: ICD-10-CM

## 2020-11-16 DIAGNOSIS — R73.9 HYPERGLYCEMIA: ICD-10-CM

## 2020-11-16 PROCEDURE — 99213 OFFICE O/P EST LOW 20 MIN: CPT | Performed by: INTERNAL MEDICINE

## 2020-11-16 PROCEDURE — 3725F SCREEN DEPRESSION PERFORMED: CPT | Performed by: INTERNAL MEDICINE

## 2020-11-16 PROCEDURE — 1036F TOBACCO NON-USER: CPT | Performed by: INTERNAL MEDICINE

## 2020-11-16 RX ORDER — ATORVASTATIN CALCIUM 20 MG/1
20 TABLET, FILM COATED ORAL DAILY
Qty: 90 TABLET | Refills: 1 | Status: SHIPPED | OUTPATIENT
Start: 2020-11-16 | End: 2021-06-17 | Stop reason: SDUPTHER

## 2020-12-18 ENCOUNTER — TELEPHONE (OUTPATIENT)
Dept: NEPHROLOGY | Facility: CLINIC | Age: 60
End: 2020-12-18

## 2021-01-12 ENCOUNTER — OFFICE VISIT (OUTPATIENT)
Dept: OBGYN CLINIC | Facility: CLINIC | Age: 61
End: 2021-01-12
Payer: COMMERCIAL

## 2021-01-12 ENCOUNTER — APPOINTMENT (OUTPATIENT)
Dept: RADIOLOGY | Facility: AMBULARY SURGERY CENTER | Age: 61
End: 2021-01-12
Attending: ORTHOPAEDIC SURGERY
Payer: COMMERCIAL

## 2021-01-12 VITALS
WEIGHT: 158.2 LBS | HEART RATE: 54 BPM | DIASTOLIC BLOOD PRESSURE: 77 MMHG | BODY MASS INDEX: 24.83 KG/M2 | SYSTOLIC BLOOD PRESSURE: 153 MMHG | HEIGHT: 67 IN

## 2021-01-12 DIAGNOSIS — M19.071 PRIMARY OSTEOARTHRITIS OF RIGHT FOOT: Primary | ICD-10-CM

## 2021-01-12 DIAGNOSIS — M72.2 PLANTAR FASCIITIS OF RIGHT FOOT: ICD-10-CM

## 2021-01-12 DIAGNOSIS — M79.671 PAIN IN RIGHT FOOT: ICD-10-CM

## 2021-01-12 DIAGNOSIS — M25.571 PAIN, JOINT, ANKLE AND FOOT, RIGHT: ICD-10-CM

## 2021-01-12 PROCEDURE — 3008F BODY MASS INDEX DOCD: CPT | Performed by: ORTHOPAEDIC SURGERY

## 2021-01-12 PROCEDURE — 99213 OFFICE O/P EST LOW 20 MIN: CPT | Performed by: ORTHOPAEDIC SURGERY

## 2021-01-12 PROCEDURE — 1036F TOBACCO NON-USER: CPT | Performed by: ORTHOPAEDIC SURGERY

## 2021-01-12 PROCEDURE — 73630 X-RAY EXAM OF FOOT: CPT

## 2021-01-12 PROCEDURE — 73610 X-RAY EXAM OF ANKLE: CPT

## 2021-01-12 NOTE — PROGRESS NOTES
ROGER Heard  Attending, Orthopaedic Surgery  Foot and [address] [facility] [address] [facility]        ORTHOPAEDIC FOOT AND ANKLE CLINIC VISIT     Assessment:     Encounter Diagnoses   Name Primary?  Pain in right foot     Primary osteoarthritis of right foot Yes    Plantar fasciitis of right foot               Plan:   · The patient verbalized understanding of exam findings and treatment plan  We engaged in the shared decision-making process and treatment options were discussed at length with the patient  Surgical and conservative management discussed today along with risks and benefits  · Discussed activity modification  · Wear good supportive shoe  · Start physical therapy  · Reduce impact on the heel, ie  Silicone heel cup  · Plantar fascia night splint  · If symptoms persist we will consider an Fluoro guided injection for his midfoot OA  Sometime this can present as plantar pain instead of the typical dorsal pain  Return in about 3 months (around 4/12/2021)  History of Present Illness:   Chief Complaint: right plantar heel pain  Clfif Escobar is a 56 y o  male who is being seen for right arch and ankle pain  Start a few month ago  Patient denies any injury  He notes increased pain with prolonged WB activities  Pain is localized at ache with minimal radiating and described dull and achy but can be sharp and severe  Patient denies numbness, tingling or radicular pain  Denies history of neuropathy  Patient does not smoke, does not have diabetes and does not take blood thinners  Patient denies family history of anesthesia complications and has not had any complications with anesthesia       Pain/symptom timing:  Worse during the day when active  Pain/symptom context:  Worse with activites and work  Pain/symptom modifying factors:  Rest makes better, activities make worse  Pain/symptom associated signs/symptoms: none    Prior treatment   · NSAIDsNo    · Injections No · Bracing/Orthotics No   · Physical Therapy No     Orthopedic Surgical History:   See below    Past Medical, Surgical and Social History:  Past Medical History:  has a past medical history of Aneurysm ([facility] ) (2017), Arthritis, Cancer ([facility] ) (10/13/2019), CKD (chronic kidney disease), Diverticulitis of colon (10/13/2019), Diverticulitis of large intestine with perforation without bleeding (10/13/2019), Hyperlipidemia, Inflammatory bowel disease (10/13/2019), Renal cell cancer, left ([facility] ) (10/13/2019), and Renal cell carcinoma ([place] [address] )  Problem List: does not have any pertinent problems on file  Past Surgical History:  has a past surgical history that includes Replacement total knee (Right, 2011); Wrist fusion (Left, 2016); Hernia repair; Hip surgery (Right); Replacement total knee; Shoulder surgery (Left); Shoulder surgery (Right); Wrist surgery; Joint replacement (Right, 2011); pr lap,surg,colectomy, partial, w/anast (N/A, 12/30/2019); pr lap, radical nephrectomy (Left, 12/30/2019); and Colonoscopy (2015)  Family History: family history includes Heart attack in his maternal grandfather and paternal grandfather; Heart disease in his father; No Known Problems in his mother; Other in his father; Thyroid cancer in his son  Social History:  reports that he has never smoked  He has never used smokeless tobacco  He reports current alcohol use of about 2 0 standard drinks of alcohol per week  He reports that he does not use drugs  Current Medications: has a current medication list which includes the following prescription(s): aspirin, atorvastatin, multivitamin, and fish oil  Allergies: has No Known Allergies       Review of Systems:  General- denies fever/chills  HEENT- denies hearing loss or sore throat  Eyes- denies eye pain or visual disturbances, denies red eyes  Respiratory- denies cough or SOB  Cardio- denies chest pain or palpitations  GI- denies abdominal pain  Endocrine- denies urinary frequency  Urinary- denies pain with urination  Musculoskeletal- Negative except noted above  Skin- denies rashes or wounds  Neurological- denies dizziness or headache  Psychiatric- denies anxiety or difficulty concentrating    Physical Exam:   /77   Pulse (!) 54   Ht 5' 7" (1 702 m)   Wt 71 8 kg (158 lb 3 2 oz)   BMI 24 78 kg/m²   General/Constitutional: No apparent distress: well-nourished and well developed  Eyes: normal ocular motion  Cardio: RRR, Normal S1S2, No m/r/g  Lymphatic: No appreciable lymphadenopathy  Respiratory: Non-labored breathing, CTA b/l no w/c/r  Vascular: No edema, swelling or tenderness, except as noted in detailed exam   Integumentary: No impressive skin lesions present, except as noted in detailed exam   Neuro: No ataxia or tremors noted  Psych: Normal mood and affect, oriented to person, place and time  Appropriate affect  Musculoskeletal: Normal, except as noted in detailed exam and in HPI  Examination    Right    Gait Normal   Musculoskeletal Non tender to palpation    Skin Normal       Nails Normal    Range of Motion  20 degrees dorsiflexion, 30 degrees plantarflexion  Subtalar motion: WNL    Stability Stable    Muscle Strength 5/5 tibialis anterior  5/5 gastrocnemius-soleus  5/5 posterior tibialis  5/5 peroneal/eversion strength  5/5 EHL  5/5 FHL    Neurologic Normal    Sensation  Intact to light touch throughout sural, saphenous, superficial peroneal, deep peroneal and medial/lateral plantar nerve distributions  [place] 5 07 filament (10g) testing  deferred  Cardiovascular Brisk capillary refill < 2 seconds,intact DP and PT pulses    Special Tests None      Imaging Studies:   3 views of the right ankle and foot were taken, reviewed and interpreted independently that demonstrate no fracture or dislocation moderate midfoot degenerative changes, 1st MPT degenerative changes  Reviewed by me personally  Umberto Diamond Lachman, MD  Foot & Ankle Surgery   Department of Orthopaedic [address] [facility]      I personally performed the service  Octaviano Cyphers Lachman, MD

## 2021-01-12 NOTE — PATIENT INSTRUCTIONS
· Discussed activity modification  · Wear good supportive shoe  · Start physical therapy  · Reduce impact on the heel, ie   Silicone heel cup  · Plantar fascia night splint

## 2021-01-19 ENCOUNTER — EVALUATION (OUTPATIENT)
Dept: PHYSICAL THERAPY | Facility: REHABILITATION | Age: 61
End: 2021-01-19
Payer: COMMERCIAL

## 2021-01-19 DIAGNOSIS — M19.071 PRIMARY OSTEOARTHRITIS OF RIGHT FOOT: ICD-10-CM

## 2021-01-19 DIAGNOSIS — M79.671 PAIN IN RIGHT FOOT: ICD-10-CM

## 2021-01-19 DIAGNOSIS — M72.2 PLANTAR FASCIITIS OF RIGHT FOOT: Primary | ICD-10-CM

## 2021-01-19 PROCEDURE — 97161 PT EVAL LOW COMPLEX 20 MIN: CPT | Performed by: PHYSICAL THERAPIST

## 2021-01-19 PROCEDURE — 97110 THERAPEUTIC EXERCISES: CPT | Performed by: PHYSICAL THERAPIST

## 2021-01-19 NOTE — PROGRESS NOTES
PT Evaluation     Today's date: 2021  Patient name: Tito Esposito  : 1960  MRN: [id number]  Referring provider: Cisco Godfrey MD  Dx:   Encounter Diagnosis     ICD-10-CM    1  Pain in right foot  M79 [id number] Ambulatory referral to Physical Therapy   2  Primary osteoarthritis of right foot  M19 071 Ambulatory referral to Physical Therapy   3  Plantar fasciitis of right foot  M72 2 Ambulatory referral to Physical Therapy                  Assessment  Assessment details: He complains of right foot pain starting a couple months ago  Started biking and walking over the summer after kidney removal  Wears heel inserts during the day and boot at night,which relieves his pain in the morning  He locates his pain on right medial arch of foot  Pain is worsened with walking and driving  Pain is relieved with rest and light stretching  He had x-rays of right foot, results were unremarkable  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, he has no problems with job tasks  Patient has functional limitations with walking long distance and driving  Patient would benefit from skilled physical therapy to address limitations and deficits  Patient provided with HEP  Patient made aware of condition as well as the proposed treatment plan, including risks, benefits and alternatives  Impairments: abnormal movement, impaired physical strength and pain with function     Prognosis: good    Goals  ST  Demonstrate competency with HEP within 1 week  2  Improve pain at worst rating to 4/10 to improve function, within 2 weeks  3  Demonstrate pain management skills by wearing proper footwear, within 2 weeks  LT  Increase FOTO score to specified value to show improved function, within 6 weeks  2  Be pain free after walking 30 minutes within 6 weeks  3  Report no pain in foot after golfing, within 8 weeks      Plan  Plan details: Physical therapy focusing on improving range of motion and strength,as well as reducing painful symptoms   Patient would benefit from: skilled physical therapy  Planned modality interventions: cryotherapy  Planned therapy interventions: joint mobilization, manual therapy, ADL training, balance/weight bearing training, neuromuscular re-education, patient education, therapeutic exercise, therapeutic activities, stretching, strengthening, flexibility, functional ROM exercises and home exercise program  Frequency: 1x week  Duration in weeks: 8  Plan of Care beginning date: 2021  Plan of Care expiration date: 3/16/2021        Subjective Evaluation    History of Present Illness  Mechanism of injury: Chronic onset           Not a recurrent problem   Quality of life: good    Pain  Current pain ratin  At best pain ratin  At worst pain ratin  Location: right medial arch  Quality: sharp  Relieving factors: rest  Aggravating factors: walking and standing  Progression: improved      Diagnostic Tests  X-ray: normal  Treatments  No previous or current treatments  Patient Goals  Patient goals for therapy: decreased pain, increased motion and return to sport/leisure activities  Patient goal: walk with no pain  Objective     General Comments: Ankle/Foot Comments   Ttp at medial arch moving centrally towards midfoot    rom  PF - WFL b/l  DF - WFL b/l  Great toe ext WB - WFL b/l, no reproduction of pain  Great toe ext NWB - WFL b/l, no reproduction of pain  Inversion - WFL b/l  Eversion - WFL b/l    MMT  PF - R 4/5 L 4+/5  DF - 4+/5 b/l  Hip abd - 4/5 b/l    Function  Squat - normal biomechanics, no reproduction of pain  HR 3 way - strength normal, no reproduction of pain    Joint play  TC R- slightly hypomobile  Subtalar R - slightly hypomobile laterally       Patient was treated by KYLER Feliz under supervision of Nohemi Sandoval DPT           Precautions: Ca      Manuals             IASTM R PF f/b calf stretch Neuro Re-Ed                                                                                                        Ther Ex             Bike             Gastroc/soleus stretches stand             Towel crunches             Arch lifts w heel raise             Ankle TB 4-way             SLR abd/ext    nv                                   Ther Activity                                       Gait Training             Step down (fwd, lat)             Heel drop             Side steps    nv         Modalities

## 2021-01-20 ENCOUNTER — APPOINTMENT (OUTPATIENT)
Dept: LAB | Facility: CLINIC | Age: 61
End: 2021-01-20
Payer: COMMERCIAL

## 2021-01-20 DIAGNOSIS — E83.41 HYPERMAGNESEMIA: ICD-10-CM

## 2021-01-20 DIAGNOSIS — N18.31 STAGE 3A CHRONIC KIDNEY DISEASE (HCC): ICD-10-CM

## 2021-01-20 DIAGNOSIS — C64.2 RENAL CELL CANCER, LEFT (HCC): ICD-10-CM

## 2021-01-20 LAB
ANION GAP SERPL CALCULATED.3IONS-SCNC: 3 MMOL/L (ref 4–13)
BUN SERPL-MCNC: 20 MG/DL (ref 5–25)
CALCIUM SERPL-MCNC: 9.1 MG/DL (ref 8.3–10.1)
CHLORIDE SERPL-SCNC: 105 MMOL/L (ref 100–108)
CO2 SERPL-SCNC: 31 MMOL/L (ref 21–32)
CREAT SERPL-MCNC: 1.34 MG/DL (ref 0.6–1.3)
CREAT UR-MCNC: 22.4 MG/DL
GFR SERPL CREATININE-BSD FRML MDRD: 56 ML/MIN/1.73SQ M
GLUCOSE SERPL-MCNC: 105 MG/DL (ref 65–140)
MAGNESIUM SERPL-MCNC: 2.5 MG/DL (ref 1.6–2.6)
PHOSPHATE SERPL-MCNC: 2.7 MG/DL (ref 2.3–4.1)
POTASSIUM SERPL-SCNC: 3.9 MMOL/L (ref 3.5–5.3)
PROT UR-MCNC: <6 MG/DL
PROT/CREAT UR: <0.27 MG/G{CREAT} (ref 0–0.1)
SODIUM SERPL-SCNC: 139 MMOL/L (ref 136–145)

## 2021-01-20 PROCEDURE — 84100 ASSAY OF PHOSPHORUS: CPT

## 2021-01-20 PROCEDURE — 36415 COLL VENOUS BLD VENIPUNCTURE: CPT

## 2021-01-20 PROCEDURE — 80048 BASIC METABOLIC PNL TOTAL CA: CPT

## 2021-01-20 PROCEDURE — 84156 ASSAY OF PROTEIN URINE: CPT

## 2021-01-20 PROCEDURE — 82570 ASSAY OF URINE CREATININE: CPT

## 2021-01-20 PROCEDURE — 83735 ASSAY OF MAGNESIUM: CPT

## 2021-01-21 ENCOUNTER — TELEPHONE (OUTPATIENT)
Dept: NEPHROLOGY | Facility: HOSPITAL | Age: 61
End: 2021-01-21

## 2021-01-21 NOTE — TELEPHONE ENCOUNTER
----- Message from Luzmaria Joiner MD sent at 1/20/2021  6:17 PM EST -----  Please inform patient that renal function is stable at creatinine 1 3, There is some protein in the urine, will discuss during office visit

## 2021-01-22 ENCOUNTER — TELEPHONE (OUTPATIENT)
Dept: NEPHROLOGY | Facility: CLINIC | Age: 61
End: 2021-01-22

## 2021-01-22 NOTE — TELEPHONE ENCOUNTER
----- Message from Phoenix Dennison MD sent at 1/20/2021  6:17 PM EST -----  Please inform patient that renal function is stable at creatinine 1 3, There is some protein in the urine, will discuss during office visit

## 2021-01-22 NOTE — TELEPHONE ENCOUNTER
I spoke to the patient and he is aware his kidney function is stable and results will be gone over in more detail at his upcoming appointment

## 2021-01-26 ENCOUNTER — APPOINTMENT (OUTPATIENT)
Dept: PHYSICAL THERAPY | Facility: REHABILITATION | Age: 61
End: 2021-01-26
Payer: COMMERCIAL

## 2021-02-02 ENCOUNTER — APPOINTMENT (OUTPATIENT)
Dept: PHYSICAL THERAPY | Facility: REHABILITATION | Age: 61
End: 2021-02-02
Payer: COMMERCIAL

## 2021-02-02 ENCOUNTER — TELEMEDICINE (OUTPATIENT)
Dept: NEPHROLOGY | Facility: CLINIC | Age: 61
End: 2021-02-02
Payer: COMMERCIAL

## 2021-02-02 DIAGNOSIS — I12.9 BENIGN HYPERTENSION WITH CHRONIC KIDNEY DISEASE, STAGE III (HCC): ICD-10-CM

## 2021-02-02 DIAGNOSIS — N18.31 STAGE 3A CHRONIC KIDNEY DISEASE (HCC): Primary | ICD-10-CM

## 2021-02-02 DIAGNOSIS — E83.41 HYPERMAGNESEMIA: ICD-10-CM

## 2021-02-02 DIAGNOSIS — N18.30 BENIGN HYPERTENSION WITH CHRONIC KIDNEY DISEASE, STAGE III (HCC): ICD-10-CM

## 2021-02-02 DIAGNOSIS — Z90.5 SOLITARY KIDNEY, ACQUIRED: ICD-10-CM

## 2021-02-02 DIAGNOSIS — R80.1 PERSISTENT PROTEINURIA: ICD-10-CM

## 2021-02-02 PROCEDURE — 99214 OFFICE O/P EST MOD 30 MIN: CPT | Performed by: INTERNAL MEDICINE

## 2021-02-02 RX ORDER — LOSARTAN POTASSIUM 25 MG/1
25 TABLET ORAL DAILY
Qty: 90 TABLET | Refills: 3 | Status: SHIPPED | OUTPATIENT
Start: 2021-02-02 | End: 2021-12-22 | Stop reason: SDUPTHER

## 2021-02-02 NOTE — PATIENT INSTRUCTIONS
BP elevated  Started on losartan 25 mg daily  -Recommend 2 g sodium diet  -Recommend daily exercise and weight loss  -Discussed home monitoring of BP and maintaining a log of blood pressure   -Recommend goal BP less than 130/80  Please inform me if SBP below 110 or above 140's persistently    -Recommended low potassium diet     -Renal Function is stable  -You have Chronic Kidney Disease Stage 3a  -Avoid NSAIDs like Ibuprofen/Motrin, Naproxen/Aleve, Celebrex, meloxicam/Mobic, Diclofenac and other NSAIDs   -Okay to take Acetaminophen/Tylenol if you do not have any liver problems  -Avoid IV contrast used for CT scan and cardiac catheterization     -If plan for any study with IV contrast, please let me know so we could hydrate with fluids before and after IV contrast     BMP in two weeks  Follow up in 4 months with repeat labs

## 2021-02-02 NOTE — PROGRESS NOTES
Virtual Regular Visit/ Follow up       Assessment/Plan:  Chronic kidney disease stage 3a  -baseline creatinine 1 2-1 4   Renal function stable on last blood work at creatinine 1 34, EGFR 56  -Chronic kidney disease likely due to decreased nephron mass from left nephrectomy done for renal cell carcinoma left kidney  -preop creatinine was 0 8-0 9 but has been elevated and stable at  1 2-1 4  likely due to decreased nephron mass from left nephrectomy   -discussed about avoiding nephrotoxins like NSAIDs and IV contrast     -Patient will need renal prep with IV fluid before any CT with IV contrast and this was discussed with patient in detail    -last CT abd pelvis in July 2020:  Status post left nephrectomy, no hydronephrosis  No evidence of recurrent or metastatic disease  CT abdomen was done without contrast  -UA was bland and upc ratio slightly increased to <0 27, would continue to monitor proteinuria    -Last  PTH within normal limit  - appears euvolemic  - continue low-potassium diet, potassium stable at 3 9  -Continue 2 g sodium diet     Plan:  Started on losartan 25 mg daily due to  Elevated blood pressure and worsening proteinuria  Check BMP in 2 weeks  Follow-up in 4 months with repeat labs  Primary hypertension with chronic kidney disease stage 3  - blood pressure was elevated during recent orthopedic visit  Patient does not check blood pressure at home, recommended home monitoring of blood pressure with goal blood pressure less than 130/80  - also had elevated blood pressure during previous office visit with me and at that time was attributed to stress  - started on losartan 25 mg daily  - check BMP in 2 weeks, discussed about side effects of losartan like hyperkalemia, anaphylaxis, worsening creatinine and patient verbalized understanding   -  Depending on further blood pressure control would adjust the dose of losartan    Expect improvement in proteinuria with the use of losartan     Solitary right kidney  -status post left nephrectomy for renal cell carcinoma-done on 12/30/19 by Dr Enid Shipman pathology results suggestive of renal cell carcinoma-conventional clear cell type    -continue follow-up with Urology, last visit was in July 2020  Noted plan for yearly follow-up  Persistent proteinuria  - Proteinuria worsening to upc ratio less than 0 27 from previous level of 0 11  Worsening proteinuria likely due to elevated blood pressure as well as from compensatory hypertrophy in solitary kidney   -  Recommended goal blood pressure less than 130/80  - started on losartan 25 mg daily     Hypermagnesemia  -Hypermagnesemia resolved with decreasing multivitamin to every other day, last magnesium level was 2 5  Continue to monitor     Hyperlipidemia:   -Lipid panel improving   -last lipid panel from July 1, 2020 showed LDL 81 from previous level of 106, triglyceride within normal limit  -continue statin as well as fish oil per PCP  -recommend goal LDL less than 100, continue monitoring and management per PCP  -recommend daily exercise         BMP in 2 weeks, follow-up in 4 months with repeat labs    Patient Instructions   BP elevated  Started on losartan 25 mg daily  -Recommend 2 g sodium diet  -Recommend daily exercise and weight loss  -Discussed home monitoring of BP and maintaining a log of blood pressure   -Recommend goal BP less than 130/80  Please inform me if SBP below 110 or above 140's persistently    -Recommended low potassium diet     -Renal Function is stable  -You have Chronic Kidney Disease Stage 3a  -Avoid NSAIDs like Ibuprofen/Motrin, Naproxen/Aleve, Celebrex, meloxicam/Mobic, Diclofenac and other NSAIDs   -Okay to take Acetaminophen/Tylenol if you do not have any liver problems  -Avoid IV contrast used for CT scan and cardiac catheterization     -If plan for any study with IV contrast, please let me know so we could hydrate with fluids before and after IV contrast BMP in two weeks  Follow up in 4 months with repeat labs           Problem List Items Addressed This Visit        Cardiovascular and Mediastinum    Benign hypertension with chronic kidney disease, stage III    Relevant Medications    losartan (COZAAR) 25 mg tablet    Other Relevant Orders    Basic metabolic panel       Genitourinary    CKD (chronic kidney disease) stage 3, GFR 30-59 ml/min - Primary    Relevant Orders    Basic metabolic panel    Basic metabolic panel    Magnesium    Phosphorus    CBC    Protein / creatinine ratio, urine    Urinalysis with microscopic    PTH, intact       Other    Persistent proteinuria    Relevant Orders    Protein / creatinine ratio, urine    Hypermagnesemia    Relevant Orders    Magnesium    Solitary kidney, acquired               Reason for visit is   Chief Complaint   Patient presents with    Virtual Regular Visit    Follow-up    Chronic Kidney Disease        Encounter provider Renetta Barragan MD    Provider located at [address] [place] [id number]      Recent Visits  No visits were found meeting these conditions  Showing recent visits within past 7 days and meeting all other requirements     Today's Visits  Date Type Provider Dept   02/02/21 Telemedicine Renetta Barragan, [facility] today's visits and meeting all other requirements     Future Appointments  No visits were found meeting these conditions  Showing future appointments within next 150 days and meeting all other requirements        The patient was identified by name and date of birth  Matthew Tovar was informed that this is a telemedicine visit and that the visit is being conducted through [facility] and patient was informed that this is a secure, HIPAA-compliant platform  He agrees to proceed     My office door was closed  No one else was in the room    He acknowledged consent and understanding of privacy and security of the video platform  The patient has agreed to participate and understands they can discontinue the visit at any time  Patient is aware this is a billable service  Subjective  Idris Dumont is a 56 y o  male who underwent elective laparoscopy hand assisted sigmoid resection and left nephrectomy for complicated diverticular disease and left renal cell carcinoma on 12/30/2019   Nephrology was consulted for increased creatinine postop   Preoperative creatinine was 0 9   Postop creatinine stabilized at 1 4 and elevated creatinine was thought to be due to nephron loss  Since then renal function has been stable at creatinine 1 2-1 3      labs from January 20, 2021: Creatinine 1 34  Bicarb level 31, magnesium 2 5, phosphorus 2 7, upc ratio less than 0 27  Compared to UPC ratio of 0 11 previously  No new complaints, no chest pain or SOB         HPI     Past Medical History:   Diagnosis Date    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-    Arthritis     Cancer ([facility] ) 10/13/2019    kidney    CKD (chronic kidney disease)     Diverticulitis of colon 10/13/2019    Diverticulitis of large intestine with perforation without bleeding 10/13/2019    Hyperlipidemia     Inflammatory bowel disease 10/13/2019    Renal cell cancer, left ([address] ) 10/13/2019    Renal cell carcinoma ([facility] )        Past Surgical History:   Procedure Laterality Date    COLONOSCOPY  2015    HERNIA REPAIR      HIP SURGERY Right     age 4    JOINT REPLACEMENT Right 2011    TKR    AR LAP, RADICAL NEPHRECTOMY Left 12/30/2019    Procedure: NEPHRECTOMY LAPAROSCOPIC HAND ASSISTED;  Surgeon: Jennifer Zimmerman MD;  [facility];  Service: Urology    AR LAP,SURG,COLECTOMY, PARTIAL, Verlene Renee N/A 12/30/2019    Procedure: LAPAROSCOPIC HAND-ASSISTED SIGMOID COLECTOMY; LAPAROSCOPIC MOBILIZATION OF SPLENIC FLEXURE;  Surgeon: Greg Gonzalez MD;  [facility];  Service: Colorectal    REPLACEMENT TOTAL KNEE Right 2011    REPLACEMENT TOTAL KNEE      SHOULDER SURGERY Left     SHOULDER SURGERY Right     WRIST FUSION Left 2016    WRIST SURGERY         Current Outpatient Medications   Medication Sig Dispense Refill    aspirin 81 MG tablet Take 1 tablet by mouth daily      atorvastatin (LIPITOR) 20 mg tablet Take 1 tablet (20 mg total) by mouth daily 90 tablet 1    multivitamin (THERAGRAN) TABS Take 1 tablet by mouth every other day      Omega-3 Fatty Acids (FISH OIL) 1200 MG CAPS Take by mouth   losartan (COZAAR) 25 mg tablet Take 1 tablet (25 mg total) by mouth daily 90 tablet 3     No current facility-administered medications for this visit  No Known Allergies    Review of Systems   Constitutional: Negative for activity change, appetite change, chills, diaphoresis, fatigue and fever  HENT: Negative for congestion, facial swelling and nosebleeds  Eyes: Negative for pain and visual disturbance  Respiratory: Negative for cough, chest tightness and shortness of breath  Cardiovascular: Negative for chest pain and palpitations  Gastrointestinal: Negative for abdominal distention, abdominal pain, diarrhea, nausea and vomiting  Genitourinary: Negative for difficulty urinating, dysuria, flank pain, frequency and hematuria  Musculoskeletal: Negative for arthralgias, back pain and joint swelling  Skin: Negative for rash  Neurological: Negative for dizziness, seizures, syncope, weakness and headaches  Psychiatric/Behavioral: Negative for agitation and confusion  The patient is not nervous/anxious  Video Exam    There were no vitals filed for this visit  Physical Exam  Constitutional:       General: He is not in acute distress  Appearance: He is well-developed  He is not diaphoretic  HENT:      Head: Normocephalic and atraumatic  Eyes:      General: No scleral icterus  Neck:      Musculoskeletal: Normal range of motion  Vascular: No JVD     Pulmonary:      Effort: Pulmonary effort is normal  No respiratory distress  Musculoskeletal:      Right lower leg: No edema  Left lower leg: No edema  Skin:     Findings: No rash  Neurological:      Mental Status: He is alert and oriented to person, place, and time  Psychiatric:         Behavior: Behavior normal           I spent 18 minutes directly with the patient during this visit      VIRTUAL VISIT DISCLAIMER    Emeka Miguel acknowledges that he has consented to an online visit or consultation  He understands that the online visit is based solely on information provided by him, and that, in the absence of a face-to-face physical evaluation by the physician, the diagnosis he receives is both limited and provisional in terms of accuracy and completeness  This is not intended to replace a full medical face-to-face evaluation by the physician  Emeka Rivera understands and accepts these terms

## 2021-02-03 ENCOUNTER — OFFICE VISIT (OUTPATIENT)
Dept: PHYSICAL THERAPY | Facility: REHABILITATION | Age: 61
End: 2021-02-03
Payer: COMMERCIAL

## 2021-02-03 DIAGNOSIS — M79.671 PAIN IN RIGHT FOOT: Primary | ICD-10-CM

## 2021-02-03 DIAGNOSIS — M19.071 PRIMARY OSTEOARTHRITIS OF RIGHT FOOT: ICD-10-CM

## 2021-02-03 DIAGNOSIS — M72.2 PLANTAR FASCIITIS OF RIGHT FOOT: ICD-10-CM

## 2021-02-03 PROCEDURE — 97140 MANUAL THERAPY 1/> REGIONS: CPT | Performed by: PHYSICAL THERAPIST

## 2021-02-03 PROCEDURE — 97110 THERAPEUTIC EXERCISES: CPT | Performed by: PHYSICAL THERAPIST

## 2021-02-03 PROCEDURE — 97116 GAIT TRAINING THERAPY: CPT | Performed by: PHYSICAL THERAPIST

## 2021-02-03 NOTE — PROGRESS NOTES
Daily Note     Today's date: 2/3/2021  Patient name: Mickey Dowell  : 1960  MRN: [id number]  Referring provider: Clementina Youssef MD  Dx:   Encounter Diagnosis     ICD-10-CM    1  Pain in right foot  M79 671    2  Plantar fasciitis of right foot  M72 2    3  Primary osteoarthritis of right foot  M19 071                   Subjective: Patient reports that he has only felt pain once in his foot since his evaluation 2 weeks ago  Notes that pain was due to having to wear snow boots and not his normal sneakers  Patient has new sneakers and continues to wear brace at night  Objective: See treatment diary below      Assessment: Tolerated treatment well, no aggravation of symptoms throughout session  Patient to schedule next appointment for 2 weeks from now, if patient still has no pain he will be discharged with HEP  Plan: Continue per plan of care  Patient was treated by KYLER Miramontes under supervision of Robin Turner DPT         Precautions: Ca      Manuals 2/3            IASTM R PF f/b calf stretch 8'                                                   Neuro Re-Ed                                                                                                        Ther Ex             Bike 10'            Gastroc/soleus stretches stand 10x10"            Towel crunches 2x20            Arch lifts w heel raise Arch lift seated 20x            Ankle TB 4-way 20x ea BTB            SLR abd/ext    nv                                   Ther Activity                                       Gait Training             Step down (fwd, lat) 6" 20x ea             Heel drop 20x            Side steps    nv         Modalities

## 2021-02-12 ENCOUNTER — TELEPHONE (OUTPATIENT)
Dept: INTERNAL MEDICINE CLINIC | Facility: CLINIC | Age: 61
End: 2021-02-12

## 2021-02-12 DIAGNOSIS — E78.2 MIXED HYPERLIPIDEMIA: Primary | ICD-10-CM

## 2021-02-12 NOTE — TELEPHONE ENCOUNTER
Patient calling because he has an appointment on 2/22 and wants to know if you would like to order labs to check his cholesterol before he comes in

## 2021-02-17 ENCOUNTER — LAB (OUTPATIENT)
Dept: LAB | Facility: CLINIC | Age: 61
End: 2021-02-17
Payer: COMMERCIAL

## 2021-02-17 DIAGNOSIS — E78.2 MIXED HYPERLIPIDEMIA: ICD-10-CM

## 2021-02-17 DIAGNOSIS — N18.31 STAGE 3A CHRONIC KIDNEY DISEASE (HCC): ICD-10-CM

## 2021-02-17 LAB
ALT SERPL W P-5'-P-CCNC: 27 U/L (ref 12–78)
ANION GAP SERPL CALCULATED.3IONS-SCNC: 4 MMOL/L (ref 4–13)
AST SERPL W P-5'-P-CCNC: 22 U/L (ref 5–45)
BUN SERPL-MCNC: 22 MG/DL (ref 5–25)
CALCIUM SERPL-MCNC: 9.2 MG/DL (ref 8.3–10.1)
CHLORIDE SERPL-SCNC: 110 MMOL/L (ref 100–108)
CHOLEST SERPL-MCNC: 144 MG/DL (ref 50–200)
CO2 SERPL-SCNC: 30 MMOL/L (ref 21–32)
CREAT SERPL-MCNC: 1.3 MG/DL (ref 0.6–1.3)
GFR SERPL CREATININE-BSD FRML MDRD: 58 ML/MIN/1.73SQ M
GLUCOSE P FAST SERPL-MCNC: 94 MG/DL (ref 65–99)
HDLC SERPL-MCNC: 42 MG/DL
LDLC SERPL CALC-MCNC: 88 MG/DL (ref 0–100)
POTASSIUM SERPL-SCNC: 4.2 MMOL/L (ref 3.5–5.3)
SODIUM SERPL-SCNC: 144 MMOL/L (ref 136–145)
TRIGL SERPL-MCNC: 71 MG/DL

## 2021-02-17 PROCEDURE — 84460 ALANINE AMINO (ALT) (SGPT): CPT

## 2021-02-17 PROCEDURE — 84450 TRANSFERASE (AST) (SGOT): CPT

## 2021-02-17 PROCEDURE — 80048 BASIC METABOLIC PNL TOTAL CA: CPT

## 2021-02-17 PROCEDURE — 36415 COLL VENOUS BLD VENIPUNCTURE: CPT

## 2021-02-17 PROCEDURE — 80061 LIPID PANEL: CPT

## 2021-02-18 ENCOUNTER — TELEPHONE (OUTPATIENT)
Dept: NEPHROLOGY | Facility: CLINIC | Age: 61
End: 2021-02-18

## 2021-02-18 NOTE — TELEPHONE ENCOUNTER
Spoke with patient and made him aware of recent lab results as mentioned  Advised patient to continue current treatment  Patient would like me to note to Dr Devang Peterson that since starting on bp medication  Bps have been 130s/50s  No further questions or concerns

## 2021-02-18 NOTE — TELEPHONE ENCOUNTER
----- Message from Samia Lozano MD sent at 2/17/2021  3:45 PM EST -----  Please inform patient that renal function is stable at creatinine 1 3, electrolytes are stable, continue current treatment

## 2021-02-22 ENCOUNTER — OFFICE VISIT (OUTPATIENT)
Dept: PHYSICAL THERAPY | Facility: REHABILITATION | Age: 61
End: 2021-02-22
Payer: COMMERCIAL

## 2021-02-22 ENCOUNTER — OFFICE VISIT (OUTPATIENT)
Dept: INTERNAL MEDICINE CLINIC | Facility: CLINIC | Age: 61
End: 2021-02-22
Payer: COMMERCIAL

## 2021-02-22 VITALS
SYSTOLIC BLOOD PRESSURE: 110 MMHG | HEIGHT: 67 IN | RESPIRATION RATE: 20 BRPM | DIASTOLIC BLOOD PRESSURE: 70 MMHG | TEMPERATURE: 99.2 F | OXYGEN SATURATION: 98 % | HEART RATE: 62 BPM | BODY MASS INDEX: 24.08 KG/M2 | WEIGHT: 153.4 LBS

## 2021-02-22 DIAGNOSIS — E78.2 MIXED HYPERLIPIDEMIA: ICD-10-CM

## 2021-02-22 DIAGNOSIS — M79.671 PAIN IN RIGHT FOOT: Primary | ICD-10-CM

## 2021-02-22 DIAGNOSIS — M72.2 PLANTAR FASCIITIS OF RIGHT FOOT: ICD-10-CM

## 2021-02-22 DIAGNOSIS — R09.89 PULSE IRREGULARITY: ICD-10-CM

## 2021-02-22 DIAGNOSIS — I12.9 BENIGN HYPERTENSION WITH CHRONIC KIDNEY DISEASE, STAGE III (HCC): Primary | ICD-10-CM

## 2021-02-22 DIAGNOSIS — C64.2 MALIGNANT NEOPLASM OF LEFT KIDNEY, EXCEPT RENAL PELVIS (HCC): ICD-10-CM

## 2021-02-22 DIAGNOSIS — N18.31 STAGE 3A CHRONIC KIDNEY DISEASE (HCC): ICD-10-CM

## 2021-02-22 DIAGNOSIS — N18.30 BENIGN HYPERTENSION WITH CHRONIC KIDNEY DISEASE, STAGE III (HCC): Primary | ICD-10-CM

## 2021-02-22 DIAGNOSIS — M19.071 PRIMARY OSTEOARTHRITIS OF RIGHT FOOT: ICD-10-CM

## 2021-02-22 PROCEDURE — 99214 OFFICE O/P EST MOD 30 MIN: CPT | Performed by: INTERNAL MEDICINE

## 2021-02-22 PROCEDURE — 97140 MANUAL THERAPY 1/> REGIONS: CPT | Performed by: PHYSICAL THERAPIST

## 2021-02-22 PROCEDURE — 1036F TOBACCO NON-USER: CPT | Performed by: INTERNAL MEDICINE

## 2021-02-22 PROCEDURE — 97110 THERAPEUTIC EXERCISES: CPT | Performed by: PHYSICAL THERAPIST

## 2021-02-22 PROCEDURE — 97116 GAIT TRAINING THERAPY: CPT | Performed by: PHYSICAL THERAPIST

## 2021-02-22 PROCEDURE — 93000 ELECTROCARDIOGRAM COMPLETE: CPT | Performed by: INTERNAL MEDICINE

## 2021-02-22 PROCEDURE — 3008F BODY MASS INDEX DOCD: CPT | Performed by: INTERNAL MEDICINE

## 2021-02-22 NOTE — PROGRESS NOTES
PT Discharge Note     Today's date: 2021  Patient name: Lord Brownlee  : 1960  MRN: [id number]  Referring provider: Stephani Champion MD  Dx:   Encounter Diagnosis     ICD-10-CM    1  Pain in right foot  M79 671    2  Plantar fasciitis of right foot  M72 2    3  Primary osteoarthritis of right foot  M19 071                   Subjective: Patient has not felt any pain in foot since last visit 2 weeks ago  He continues to wear night brace while sleeping and wears heel lifts in shoes  Notes he is comfortable with today being his last visit and continuing with HEP  Objective: See treatment diary below      Assessment: Tolerated treatment well  Demonstrated competency with HEP  Responded well to IASTM, no complaints of pain or discomfort  Patient would benefit from HEP to continue managing symptoms  Plan: Discharge to [facility]    Patient was treated by KYLER aMria under supervision of Pari Rico DPT         Precautions: Ca      Manuals 2/3 2/22           IASTM R PF f/b calf stretch 8' CC 8'                                                  Neuro Re-Ed                                                                                                        Ther Ex             Bike 10' 10'           Gastroc/soleus stretches stand 10x10" 10x10"           Towel crunches 2x20 np           Arch lifts w heel raise Arch lift seated 20x 20x stand           Ankle TB 4-way 20x ea BTB np           SLR abd/ext    nv                                   Ther Activity                                       Gait Training             Step down (fwd, lat) 6" 20x ea  6" 20x ea           Heel drop 20x 20x           Side steps    nv         Modalities

## 2021-02-22 NOTE — PROGRESS NOTES
Assessment/Plan:    1  Hyperlipidemia  Lipid profile is in excellent range  Will continue with Lipitor 20 mg daily  Continue with low-fat diet and exercise    2  Essential hypertension  Blood pressure is stable on present regimen    3  CKD stage 3  Renal function is stable  Being followed by nephrologist    4  Irregular pulse  On palpation patient does have irregular pulse  On EKG done in office today is sinus bradycardia  No PVCs appreciated on EKG  5  Malignant neoplasm of left kidney status post surgery  Stable  Being followed by nephrologist      Diagnoses and all orders for this visit:    Benign hypertension with chronic kidney disease, stage III    Malignant neoplasm of left kidney, except renal pelvis (HCC)    Stage 3a chronic kidney disease    Mixed hyperlipidemia    Pulse irregularity               Subjective:          Patient ID: Jose Moraes is a 56 y o  male  Patient is here for regular follow-up  Does have blood work done last week would like to discuss results  As per patient on his blood pressure machines there are couple of readings of missing beats/irregular pulse  He denied any palpitation  The following portions of the patient's history were reviewed and updated as appropriate: allergies, current medications, past family history, past medical history, past social history, past surgical history and problem list     Review of Systems   Constitutional: Negative for fatigue and fever  HENT: Negative for congestion, ear discharge, ear pain, postnasal drip, sinus pressure, sore throat, tinnitus and trouble swallowing  Eyes: Negative for discharge, itching and visual disturbance  Respiratory: Negative for cough and shortness of breath  Cardiovascular: Negative for chest pain and palpitations  Gastrointestinal: Negative for abdominal pain, diarrhea, nausea and vomiting  Endocrine: Negative for cold intolerance and polyuria     Genitourinary: Negative for difficulty urinating, dysuria and urgency  Musculoskeletal: Negative for arthralgias and neck pain  Skin: Negative for rash  Allergic/Immunologic: Negative for environmental allergies  Neurological: Negative for dizziness, weakness and headaches  Psychiatric/Behavioral: Negative for agitation and behavioral problems  The patient is not nervous/anxious            Past Medical History:   Diagnosis Date    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-    Arthritis     Cancer ([place] [address] ) 10/13/2019    kidney    CKD (chronic kidney disease)     Diverticulitis of colon 10/13/2019    Diverticulitis of large intestine with perforation without bleeding 10/13/2019    Hyperlipidemia     Hypertension     Inflammatory bowel disease 10/13/2019    Renal cell cancer, left ([facility] ) 10/13/2019    Renal cell carcinoma (HCC)          Current Outpatient Medications:     aspirin 81 MG tablet, Take 1 tablet by mouth daily, Disp: , Rfl:     atorvastatin (LIPITOR) 20 mg tablet, Take 1 tablet (20 mg total) by mouth daily, Disp: 90 tablet, Rfl: 1    losartan (COZAAR) 25 mg tablet, Take 1 tablet (25 mg total) by mouth daily, Disp: 90 tablet, Rfl: 3    multivitamin (THERAGRAN) TABS, Take 1 tablet by mouth every other day, Disp: , Rfl:     Omega-3 Fatty Acids (FISH OIL) 1200 MG CAPS, Take by mouth , Disp: , Rfl:     No Known Allergies    Social History   Past Surgical History:   Procedure Laterality Date    COLONOSCOPY  2015    HERNIA REPAIR      HIP SURGERY Right     age 4    JOINT REPLACEMENT Right 2011    TKR    MO LAP, RADICAL NEPHRECTOMY Left 12/30/2019    Procedure: NEPHRECTOMY LAPAROSCOPIC HAND ASSISTED;  Surgeon: Guero Allen MD;  [facility];  Service: Urology    MO LAP,SURG,COLECTOMY, PARTIAL, Waleska Canter N/A 12/30/2019    Procedure: LAPAROSCOPIC HAND-ASSISTED SIGMOID COLECTOMY; LAPAROSCOPIC MOBILIZATION OF SPLENIC FLEXURE;  Surgeon: Evelyn Mongtomery MD;  [facility];  Service: Colorectal    REPLACEMENT TOTAL KNEE Right 2011    REPLACEMENT TOTAL KNEE      SHOULDER SURGERY Left     SHOULDER SURGERY Right     WRIST FUSION Left 2016    WRIST SURGERY       Family History   Problem Relation Age of Onset    Heart disease Father     Other Father         cardiac disorder    No Known Problems Mother     Heart attack Maternal Grandfather     Heart attack Paternal Grandfather     Thyroid cancer Son     Colon cancer Neg Hx        Objective:  /70 (BP Location: Left arm, Patient Position: Sitting, Cuff Size: Standard)   Pulse 62   Temp 99 2 °F (37 3 °C) (Tympanic)   Resp 20   Ht 5' 7" (1 702 m)   Wt 69 6 kg (153 lb 6 4 oz)   SpO2 98%   BMI 24 03 kg/m²   Body mass index is 24 03 kg/m²  Physical Exam  Constitutional:       Appearance: He is well-developed  HENT:      Head: Normocephalic  Right Ear: External ear normal       Left Ear: External ear normal       Mouth/Throat:      Pharynx: No posterior oropharyngeal erythema  Eyes:      General: No scleral icterus  Pupils: Pupils are equal, round, and reactive to light  Neck:      Musculoskeletal: Normal range of motion and neck supple  Thyroid: No thyromegaly  Trachea: No tracheal deviation  Cardiovascular:      Rate and Rhythm: Normal rate  Rhythm irregular  Heart sounds: Normal heart sounds  Pulmonary:      Effort: Pulmonary effort is normal  No respiratory distress  Breath sounds: Normal breath sounds  Chest:      Chest wall: No tenderness  Abdominal:      General: Bowel sounds are normal       Palpations: Abdomen is soft  There is no mass  Tenderness: There is no abdominal tenderness  Musculoskeletal: Normal range of motion  Right lower leg: No edema  Left lower leg: No edema  Lymphadenopathy:      Cervical: No cervical adenopathy  Skin:     General: Skin is warm  Neurological:      Mental Status: He is alert and oriented to person, place, and time        Cranial Nerves: No cranial nerve deficit  Psychiatric:         Mood and Affect: Mood normal          Behavior: Behavior normal          Thought Content:  Thought content normal          Judgment: Judgment normal

## 2021-03-30 DIAGNOSIS — Z23 ENCOUNTER FOR IMMUNIZATION: ICD-10-CM

## 2021-04-01 ENCOUNTER — IMMUNIZATIONS (OUTPATIENT)
Dept: FAMILY MEDICINE CLINIC | Facility: HOSPITAL | Age: 61
End: 2021-04-01

## 2021-04-01 DIAGNOSIS — Z23 ENCOUNTER FOR IMMUNIZATION: Primary | ICD-10-CM

## 2021-04-01 PROCEDURE — 91300 SARS-COV-2 / COVID-19 MRNA VACCINE (PFIZER-BIONTECH) 30 MCG: CPT

## 2021-04-01 PROCEDURE — 0001A SARS-COV-2 / COVID-19 MRNA VACCINE (PFIZER-BIONTECH) 30 MCG: CPT

## 2021-04-14 ENCOUNTER — TELEPHONE (OUTPATIENT)
Dept: NEPHROLOGY | Facility: CLINIC | Age: 61
End: 2021-04-14

## 2021-04-14 NOTE — TELEPHONE ENCOUNTER
I called and left a detail message asking patient to contact the office to schedule the follow up appointment at the [place] office with [facility] patient from the recall list  Please schedule when patient returns the call back

## 2021-04-22 ENCOUNTER — IMMUNIZATIONS (OUTPATIENT)
Dept: FAMILY MEDICINE CLINIC | Facility: HOSPITAL | Age: 61
End: 2021-04-22

## 2021-04-22 DIAGNOSIS — Z23 ENCOUNTER FOR IMMUNIZATION: Primary | ICD-10-CM

## 2021-04-22 PROCEDURE — 0002A SARS-COV-2 / COVID-19 MRNA VACCINE (PFIZER-BIONTECH) 30 MCG: CPT | Performed by: NURSE PRACTITIONER

## 2021-04-22 PROCEDURE — 91300 SARS-COV-2 / COVID-19 MRNA VACCINE (PFIZER-BIONTECH) 30 MCG: CPT | Performed by: NURSE PRACTITIONER

## 2021-05-24 DIAGNOSIS — E78.2 MIXED HYPERLIPIDEMIA: ICD-10-CM

## 2021-05-25 RX ORDER — ATORVASTATIN CALCIUM 20 MG/1
TABLET, FILM COATED ORAL
Qty: 90 TABLET | Refills: 1 | OUTPATIENT
Start: 2021-05-25

## 2021-06-15 ENCOUNTER — TELEPHONE (OUTPATIENT)
Dept: NEPHROLOGY | Facility: CLINIC | Age: 61
End: 2021-06-15

## 2021-06-15 NOTE — TELEPHONE ENCOUNTER
Left a message to reschedule pt's appointment on 8/17   Lahoma Homans will no longer be in the office that day

## 2021-06-17 DIAGNOSIS — E78.2 MIXED HYPERLIPIDEMIA: ICD-10-CM

## 2021-06-17 RX ORDER — ATORVASTATIN CALCIUM 20 MG/1
20 TABLET, FILM COATED ORAL DAILY
Qty: 90 TABLET | Refills: 1 | Status: SHIPPED | OUTPATIENT
Start: 2021-06-17 | End: 2021-12-22 | Stop reason: SDUPTHER

## 2021-06-28 ENCOUNTER — HOSPITAL ENCOUNTER (OUTPATIENT)
Dept: RADIOLOGY | Age: 61
Discharge: HOME | End: 2021-06-28
Payer: COMMERCIAL

## 2021-06-28 ENCOUNTER — APPOINTMENT (OUTPATIENT)
Dept: LAB | Age: 61
End: 2021-06-28
Payer: COMMERCIAL

## 2021-06-28 ENCOUNTER — OFFICE VISIT (OUTPATIENT)
Dept: INTERNAL MEDICINE CLINIC | Age: 61
End: 2021-06-28
Payer: COMMERCIAL

## 2021-06-28 ENCOUNTER — TELEPHONE (OUTPATIENT)
Dept: INTERNAL MEDICINE CLINIC | Age: 61
End: 2021-06-28

## 2021-06-28 VITALS
SYSTOLIC BLOOD PRESSURE: 116 MMHG | HEIGHT: 67 IN | WEIGHT: 147 LBS | TEMPERATURE: 98.7 F | DIASTOLIC BLOOD PRESSURE: 58 MMHG | OXYGEN SATURATION: 97 % | BODY MASS INDEX: 23.07 KG/M2 | HEART RATE: 61 BPM

## 2021-06-28 DIAGNOSIS — I67.1 ANEURYSM OF CAVERNOUS PORTION OF RIGHT INTERNAL CAROTID ARTERY: ICD-10-CM

## 2021-06-28 DIAGNOSIS — C64.2 RENAL CELL CANCER, LEFT (HCC): ICD-10-CM

## 2021-06-28 DIAGNOSIS — R22.1 MASS OF RIGHT SIDE OF NECK: ICD-10-CM

## 2021-06-28 DIAGNOSIS — R59.1 LYMPHADENOPATHY OF HEAD AND NECK: ICD-10-CM

## 2021-06-28 DIAGNOSIS — R59.1 LYMPHADENOPATHY OF HEAD AND NECK: Primary | ICD-10-CM

## 2021-06-28 DIAGNOSIS — N18.31 STAGE 3A CHRONIC KIDNEY DISEASE (HCC): ICD-10-CM

## 2021-06-28 DIAGNOSIS — E04.1 THYROID NODULE: Primary | ICD-10-CM

## 2021-06-28 LAB
ALBUMIN SERPL BCP-MCNC: 3.8 G/DL (ref 3.5–5)
ALP SERPL-CCNC: 78 U/L (ref 46–116)
ALT SERPL W P-5'-P-CCNC: 38 U/L (ref 12–78)
ANION GAP SERPL CALCULATED.3IONS-SCNC: 9 MMOL/L (ref 4–13)
AST SERPL W P-5'-P-CCNC: 30 U/L (ref 5–45)
BACTERIA UR QL AUTO: ABNORMAL /HPF
BASOPHILS # BLD AUTO: 0.04 THOUSANDS/ΜL (ref 0–0.1)
BASOPHILS NFR BLD AUTO: 1 % (ref 0–1)
BILIRUB SERPL-MCNC: 0.56 MG/DL (ref 0.2–1)
BILIRUB UR QL STRIP: NEGATIVE
BUN SERPL-MCNC: 27 MG/DL (ref 5–25)
CALCIUM SERPL-MCNC: 8.5 MG/DL (ref 8.3–10.1)
CHLORIDE SERPL-SCNC: 104 MMOL/L (ref 100–108)
CLARITY UR: CLEAR
CO2 SERPL-SCNC: 25 MMOL/L (ref 21–32)
COLOR UR: YELLOW
CREAT SERPL-MCNC: 1.37 MG/DL (ref 0.6–1.3)
EOSINOPHIL # BLD AUTO: 0.08 THOUSAND/ΜL (ref 0–0.61)
EOSINOPHIL NFR BLD AUTO: 2 % (ref 0–6)
ERYTHROCYTE [DISTWIDTH] IN BLOOD BY AUTOMATED COUNT: 12.9 % (ref 11.6–15.1)
GFR SERPL CREATININE-BSD FRML MDRD: 55 ML/MIN/1.73SQ M
GLUCOSE SERPL-MCNC: 107 MG/DL (ref 65–140)
GLUCOSE UR STRIP-MCNC: NEGATIVE MG/DL
HCT VFR BLD AUTO: 40.7 % (ref 36.5–49.3)
HGB BLD-MCNC: 13.7 G/DL (ref 12–17)
HGB UR QL STRIP.AUTO: NEGATIVE
IMM GRANULOCYTES # BLD AUTO: 0.02 THOUSAND/UL (ref 0–0.2)
IMM GRANULOCYTES NFR BLD AUTO: 0 % (ref 0–2)
KETONES UR STRIP-MCNC: NEGATIVE MG/DL
LEUKOCYTE ESTERASE UR QL STRIP: ABNORMAL
LYMPHOCYTES # BLD AUTO: 1.11 THOUSANDS/ΜL (ref 0.6–4.47)
LYMPHOCYTES NFR BLD AUTO: 22 % (ref 14–44)
MCH RBC QN AUTO: 31 PG (ref 26.8–34.3)
MCHC RBC AUTO-ENTMCNC: 33.7 G/DL (ref 31.4–37.4)
MCV RBC AUTO: 92 FL (ref 82–98)
MONOCYTES # BLD AUTO: 0.55 THOUSAND/ΜL (ref 0.17–1.22)
MONOCYTES NFR BLD AUTO: 11 % (ref 4–12)
NEUTROPHILS # BLD AUTO: 3.2 THOUSANDS/ΜL (ref 1.85–7.62)
NEUTS SEG NFR BLD AUTO: 64 % (ref 43–75)
NITRITE UR QL STRIP: NEGATIVE
NON-SQ EPI CELLS URNS QL MICRO: ABNORMAL /HPF
NRBC BLD AUTO-RTO: 0 /100 WBCS
PH UR STRIP.AUTO: 6 [PH]
PLATELET # BLD AUTO: 240 THOUSANDS/UL (ref 149–390)
PMV BLD AUTO: 9.5 FL (ref 8.9–12.7)
POTASSIUM SERPL-SCNC: 4.1 MMOL/L (ref 3.5–5.3)
PROT SERPL-MCNC: 7.4 G/DL (ref 6.4–8.2)
PROT UR STRIP-MCNC: NEGATIVE MG/DL
RBC # BLD AUTO: 4.42 MILLION/UL (ref 3.88–5.62)
RBC #/AREA URNS AUTO: ABNORMAL /HPF
SODIUM SERPL-SCNC: 138 MMOL/L (ref 136–145)
SP GR UR STRIP.AUTO: 1.01 (ref 1–1.03)
TSH SERPL DL<=0.05 MIU/L-ACNC: 1.04 UIU/ML (ref 0.36–3.74)
UROBILINOGEN UR QL STRIP.AUTO: 0.2 E.U./DL
WBC # BLD AUTO: 5 THOUSAND/UL (ref 4.31–10.16)
WBC #/AREA URNS AUTO: ABNORMAL /HPF

## 2021-06-28 PROCEDURE — 85025 COMPLETE CBC W/AUTO DIFF WBC: CPT

## 2021-06-28 PROCEDURE — 76536 US EXAM OF HEAD AND NECK: CPT

## 2021-06-28 PROCEDURE — 84443 ASSAY THYROID STIM HORMONE: CPT

## 2021-06-28 PROCEDURE — 99214 OFFICE O/P EST MOD 30 MIN: CPT | Performed by: INTERNAL MEDICINE

## 2021-06-28 PROCEDURE — 80053 COMPREHEN METABOLIC PANEL: CPT

## 2021-06-28 PROCEDURE — 81001 URINALYSIS AUTO W/SCOPE: CPT

## 2021-06-28 PROCEDURE — 36415 COLL VENOUS BLD VENIPUNCTURE: CPT

## 2021-06-28 PROCEDURE — 3725F SCREEN DEPRESSION PERFORMED: CPT | Performed by: INTERNAL MEDICINE

## 2021-06-28 NOTE — PROGRESS NOTES
I called and discussed the result of pt's ultrasound of the neck with him  He has a thyroid nodule with a TIRADS 4 classification and we will order a thyroid biopsy and follow up with the result  Pt will  the order and make an appointment for a thyroid biopsy

## 2021-06-28 NOTE — TELEPHONE ENCOUNTER
Significant findings on thyroid US    IMPRESSION:     Palpable abnormality corresponds to a right thyroid nodule  This meets biopsy criteria  There are punctate echogenic foci along the superior aspect of the nodule, which should be targeted during biopsy            Reference: ACR Thyroid Imaging, Reporting and Data System (TI-RADS): White Paper of the [employer]   J AM Fany Radiol 5797;21:884-340  (additional recommendations based on American Thyroid Association 2015 guidelines )        Workstation performed: [id number]

## 2021-06-28 NOTE — PROGRESS NOTES
Assessment/Plan:    Mass of right side of the neck   - will order a stat ultrasound of the head and neck soft tissue  - will order CBC and a TSH  - follow-up in 1 week with PCP    Lymphadenopathy   - order CBC as well as an ultrasound of the neck to assess the lymph nodes especially with patient's history of recent kidney carcinoma       CKD 3 with a history of Renal cell carcinoma  - s/p excision of the left kidney  -last BMP done on February 17th, 2021 showed a creatinine level of 1 30 and EGFR of 58   - will order a CMP and urinalysis to evaluate Renal function and liver function especially report of bright yellow urine in spite of copious amounts of water    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  -  Seen on CTA done in 2017  There was a questionable small left intractable anus carotid aneurysm, less than 2-3 mm   -stable and asymptomatic   -continue to monitor patient clinically  - will order a CT brain if patient becomes symptomatic     Diagnoses and all orders for this visit:    Lymphadenopathy of head and neck  -     US head neck soft tissue; Future  -     TSH, 3rd generation with Free T4 reflex; Future  -     CBC and differential; Future    Mass of right side of neck  -     US head neck soft tissue; Future  -     TSH, 3rd generation with Free T4 reflex; Future  -     CBC and differential; Future  -     Comprehensive metabolic panel; Future    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  -     Comprehensive metabolic panel; Future    Renal cell cancer, left (HCC)  -     Comprehensive metabolic panel; Future    Stage 3a chronic kidney disease (HCC)          Subjective:      Patient ID: Ayah Rhoades is a 56 y o  male  HPI    Patient presents with complaints of a swelling on the right side of his neck for the past month and a half    He states that he 1st noticed the swelling about a month and a half ago and states that sometimes it feels as though it grows larger and then other times feels that it is not bigger  He denies any pain, difficulty swallowing but admits to a change in his voice from time to time  He states that his voice gets a bit raspy sometimes  He is concerned because there is a family history of thyroid cancer in his son and he has a personal history of kidney cancer status post excision of his left kidney about a year and half ago  He never smoked or used recreational drugs but admits that he is a social drinker and drank more often when he was younger  He states that he lost some weight at about the time his kidney cancer was diagnosed but since then his weight has plateaued  However, a review of medical records shows that patient has lost 11 lb in 6 months  He admits to occasional dizziness especially when he stands up and states that he even had syncope about a year ago  He admits to thoracic back pain which is somewhat new for him  He states that he usually has lower back pain but has been having more of thoracic back pain recently  He denies fever, chills, night sweats, chest pain, shortness of breath, palpitations, nausea, vomiting, abdominal pain, diarrhea, constipation but admits to arthralgias after working  Of note, patient states that he drinks a lot of water, about 4-5 20 lb contain as of water but that recently when he was working out in the sun, even after drinking some much water, his urine was bright yellow  The following portions of the patient's history were reviewed and updated as appropriate:   He  has a past medical history of Aneurysm ([facility] [address] ) (2017), Arthritis, Cancer ([facility] [address] ) (10/13/2019), CKD (chronic kidney disease), Diverticulitis of colon (10/13/2019), Diverticulitis of large intestine with perforation without bleeding (10/13/2019), Hyperlipidemia, Hypertension, Inflammatory bowel disease (10/13/2019), Renal cell cancer, left ([address] ) (10/13/2019), and Renal cell carcinoma ([facility] [address] )    He   Patient Active Problem List Diagnosis Date Noted    Lymphadenopathy of head and neck 06/28/2021    Mass of right side of neck 06/28/2021    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 06/28/2021    Pulse irregularity 02/22/2021    Persistent proteinuria 02/02/2021    Hypermagnesemia 02/02/2021    Benign hypertension with chronic kidney disease, stage III ([facility] [address] ) 02/02/2021    Solitary kidney, acquired 02/02/2021    CKD (chronic kidney disease) stage 3, GFR 30-59 ml/min ([facility] [address] ) 01/13/2020    Hyperglycemia 01/13/2020    Tinea versicolor 12/10/2019    [facility] 10/23/2019    Cluster headache 10/15/2019    Diverticulitis of large intestine with perforation without bleeding 10/13/2019    [facility] 10/13/2019    Mixed hyperlipidemia 09/05/2018    Osteoarthritis of finger of right hand 02/21/2018     He  has a past surgical history that includes Replacement total knee (Right, 2011); Wrist fusion (Left, 2016); Hernia repair; Hip surgery (Right); Replacement total knee; Shoulder surgery (Left); Shoulder surgery (Right); Wrist surgery; Joint replacement (Right, 2011); pr lap,surg,colectomy, partial, w/anast (N/A, 12/30/2019); pr lap, radical nephrectomy (Left, 12/30/2019); and Colonoscopy (2015)  His family history includes Heart attack in his maternal grandfather and paternal grandfather; Heart disease in his father; No Known Problems in his mother; Other in his father; Thyroid cancer in his son  He  reports that he has never smoked  He has never used smokeless tobacco  He reports current alcohol use of about 2 0 standard drinks of alcohol per week  He reports that he does not use drugs    Current Outpatient Medications   Medication Sig Dispense Refill    aspirin 81 MG tablet Take 1 tablet by mouth daily      atorvastatin (LIPITOR) 20 mg tablet Take 1 tablet (20 mg total) by mouth daily 90 tablet 1    losartan (COZAAR) 25 mg tablet Take 1 tablet (25 mg total) by mouth daily 90 tablet 3    multivitamin (THERAGRAN) TABS Take 1 tablet by mouth every other day      Omega-3 Fatty Acids (FISH OIL) 1200 MG CAPS Take by mouth  No current facility-administered medications for this visit  Current Outpatient Medications on File Prior to Visit   Medication Sig    aspirin 81 MG tablet Take 1 tablet by mouth daily    atorvastatin (LIPITOR) 20 mg tablet Take 1 tablet (20 mg total) by mouth daily    losartan (COZAAR) 25 mg tablet Take 1 tablet (25 mg total) by mouth daily    multivitamin (THERAGRAN) TABS Take 1 tablet by mouth every other day    Omega-3 Fatty Acids (FISH OIL) 1200 MG CAPS Take by mouth  No current facility-administered medications on file prior to visit  He has No Known Allergies       Review of Systems   Constitutional: Positive for unexpected weight change ( weight loss of about 11 lb in 6 months)  Negative for activity change, chills, fatigue and fever  HENT: Positive for voice change  Negative for ear pain, postnasal drip, rhinorrhea, sinus pressure, sore throat and trouble swallowing  Neck swelling for one and ahalf months   Eyes: Negative for pain  Respiratory: Negative for cough, choking, chest tightness, shortness of breath and wheezing  Cardiovascular: Negative for chest pain, palpitations and leg swelling  Gastrointestinal: Negative for abdominal pain, constipation, diarrhea, nausea and vomiting  Genitourinary: Negative for dysuria and hematuria  Musculoskeletal: Positive for arthralgias and back pain (a physical worker, chronic, usually lumbar but now thoracic)  Negative for gait problem, joint swelling, myalgias and neck stiffness  Skin: Negative for pallor and rash  Neurological: Positive for dizziness (occasionally on getting up with a hx of syncope about a year ago)  Negative for tremors, seizures, syncope, light-headedness and headaches  Hematological: Negative for adenopathy  Psychiatric/Behavioral: Negative for behavioral problems  Objective:      /58 (BP Location: Left arm, Patient Position: Sitting, Cuff Size: Standard)   Pulse 61   Temp 98 7 °F (37 1 °C) (Temporal)   Ht 5' 7" (1 702 m)   Wt 66 7 kg (147 lb)   SpO2 97%   BMI 23 02 kg/m²          Physical Exam  Constitutional:       General: He is not in acute distress  Appearance: He is well-developed  He is not diaphoretic  Comments:  Cachectic with bitemporal wasting   HENT:      Head: Normocephalic and atraumatic  Right Ear: External ear normal       Left Ear: External ear normal       Nose: Nose normal       Mouth/Throat:      Mouth: Mucous membranes are dry  Pharynx: No oropharyngeal exudate  Eyes:      General: No scleral icterus  Right eye: No discharge  Left eye: No discharge  Conjunctiva/sclera: Conjunctivae normal       Pupils: Pupils are equal, round, and reactive to light  Neck:      Thyroid: Thyroid mass (Right-sided) present  No thyromegaly  Vascular: No JVD  Trachea: No tracheal deviation  Comments: Right-sided neck mass, about 4 cm in diameter, moves on swallowing and appears to be connected to the thyroid, nontender  Cardiovascular:      Rate and Rhythm: Normal rate and regular rhythm  Heart sounds: Normal heart sounds  No murmur heard  No friction rub  No gallop  Pulmonary:      Effort: Pulmonary effort is normal  No respiratory distress  Breath sounds: Normal breath sounds  No wheezing or rales  Chest:      Chest wall: No tenderness  Abdominal:      General: Bowel sounds are normal  There is no distension  Palpations: Abdomen is soft  There is no mass  Tenderness: There is no abdominal tenderness  There is no guarding or rebound  Musculoskeletal:         General: No tenderness or deformity  Normal range of motion  Cervical back: Normal range of motion and neck supple     Lymphadenopathy:      Head: Right side of head: Submandibular adenopathy present  Left side of head: Submandibular adenopathy present  Cervical: Cervical adenopathy present  Right cervical: Superficial cervical adenopathy present  Skin:     General: Skin is warm and dry  Coloration: Skin is not pale  Findings: No erythema or rash  Neurological:      Mental Status: He is alert and oriented to person, place, and time  Cranial Nerves: No cranial nerve deficit  Motor: No abnormal muscle tone  Coordination: Coordination normal       Deep Tendon Reflexes: Reflexes are normal and symmetric  Psychiatric:         Behavior: Behavior normal            Lab on 02/17/2021   Component Date Value Ref Range Status    Sodium 02/17/2021 144  136 - 145 mmol/L Final    Potassium 02/17/2021 4 2  3 5 - 5 3 mmol/L Final    Chloride 02/17/2021 110* 100 - 108 mmol/L Final    CO2 02/17/2021 30  21 - 32 mmol/L Final    ANION GAP 02/17/2021 4  4 - 13 mmol/L Final    BUN 02/17/2021 22  5 - 25 mg/dL Final    Creatinine 02/17/2021 1 30  0 60 - 1 30 mg/dL Final    Standardized to IDMS reference method    Glucose, Fasting 02/17/2021 94  65 - 99 mg/dL Final    Specimen collection should occur prior to Sulfasalazine administration due to the potential for falsely depressed results  Specimen collection should occur prior to Sulfapyridine administration due to the potential for falsely elevated results      Calcium 02/17/2021 9 2  8 3 - 10 1 mg/dL Final    eGFR 02/17/2021 58  ml/min/1 73sq m Final    Cholesterol 02/17/2021 144  50 - 200 mg/dL Final    Cholesterol:       Desirable         <200 mg/dl       Borderline         200-239 mg/dl       High              >239           Triglycerides 02/17/2021 71  <=150 mg/dL Final    Triglyceride:     Normal          <150 mg/dl     Borderline High 150-199 mg/dl     High            200-499 mg/dl        Very High       >499 mg/dl    Specimen collection should occur prior to N-Acetylcysteine or Metamizole administration due to the potential for falsely depressed results   HDL, Direct 02/17/2021 42  >=40 mg/dL Final    HDL Cholesterol:       Low     <41 mg/dL  Specimen collection should occur prior to Metamizole administration due to the potential for falsley depressed results   LDL Calculated 02/17/2021 88  0 - 100 mg/dL Final    LDL Cholesterol:     Optimal           <100 mg/dl     Near Optimal      100-129 mg/dl     Above Optimal       Borderline High 130-159 mg/dl       High            160-189 mg/dl       Very High       >189 mg/dl         This screening LDL is a calculated result  It does not have the accuracy of the Direct Measured LDL in the monitoring of patients with hyperlipidemia and/or statin therapy  Direct Measure LDL (PDQ737) must be ordered separately in these patients   ALT 02/17/2021 27  12 - 78 U/L Final    AST 02/17/2021 22  5 - 45 U/L Final    Specimen collection should occur prior to Sulfasalazine and/or Sulfapyridine administration due to the potential for falsely depressed results  Appointment on 01/20/2021   Component Date Value Ref Range Status    Sodium 01/20/2021 139  136 - 145 mmol/L Final    Potassium 01/20/2021 3 9  3 5 - 5 3 mmol/L Final    Chloride 01/20/2021 105  100 - 108 mmol/L Final    CO2 01/20/2021 31  21 - 32 mmol/L Final    ANION GAP 01/20/2021 3* 4 - 13 mmol/L Final    BUN 01/20/2021 20  5 - 25 mg/dL Final    Creatinine 01/20/2021 1 34* 0 60 - 1 30 mg/dL Final    Standardized to IDMS reference method    Glucose 01/20/2021 105  65 - 140 mg/dL Final    If the patient is fasting, the ADA then defines impaired fasting glucose as > 100 mg/dL and diabetes as > or equal to 123 mg/dL  Specimen collection should occur prior to Sulfasalazine administration due to the potential for falsely depressed results  Specimen collection should occur prior to Sulfapyridine administration due to the potential for falsely elevated results      Calcium 01/20/2021 9 1  8 3 - 10 1 mg/dL Final    eGFR 01/20/2021 56  ml/min/1 73sq m Final    Magnesium 01/20/2021 2 5  1 6 - 2 6 mg/dL Final    Phosphorus 01/20/2021 2 7  2 3 - 4 1 mg/dL Final    Creatinine, Ur 01/20/2021 22 4  mg/dL Final    Protein Urine Random 01/20/2021 <6  mg/dL Final    Prot/Creat Ratio, Ur 01/20/2021 <0 27* 0 00 - 0 10 Final   Appointment on 09/25/2020   Component Date Value Ref Range Status    Sodium 09/25/2020 140  136 - 145 mmol/L Final    Potassium 09/25/2020 4 3  3 5 - 5 3 mmol/L Final    Chloride 09/25/2020 106  100 - 108 mmol/L Final    CO2 09/25/2020 32  21 - 32 mmol/L Final    ANION GAP 09/25/2020 2* 4 - 13 mmol/L Final    BUN 09/25/2020 17  5 - 25 mg/dL Final    Creatinine 09/25/2020 1 36* 0 60 - 1 30 mg/dL Final    Standardized to IDMS reference method    Glucose, Fasting 09/25/2020 90  65 - 99 mg/dL Final    Specimen collection should occur prior to Sulfasalazine administration due to the potential for falsely depressed results  Specimen collection should occur prior to Sulfapyridine administration due to the potential for falsely elevated results      Calcium 09/25/2020 9 2  8 3 - 10 1 mg/dL Final    eGFR 09/25/2020 55  ml/min/1 73sq m Final    Creatinine, Ur 09/25/2020 113 0  mg/dL Final    Protein Urine Random 09/25/2020 12  mg/dL Final    Prot/Creat Ratio, Ur 09/25/2020 0 11* 0 00 - 0 10 Final    Phosphorus 09/25/2020 3 4  2 3 - 4 1 mg/dL Final    Magnesium 09/25/2020 2 8* 1 6 - 2 6 mg/dL Final    PTH 09/25/2020 42 1  18 4 - 80 1 pg/mL Final    Clarity, UA 09/25/2020 Clear   Final    Color, UA 09/25/2020 Yellow   Final    Specific Drury, UA 09/25/2020 1 017  1 003 - 1 030 Final    pH, UA 09/25/2020 7 0  4 5, 5 0, 5 5, 6 0, 6 5, 7 0, 7 5, 8 0 Final    Glucose, UA 09/25/2020 Negative  Negative mg/dl Final    Ketones, UA 09/25/2020 Negative  Negative mg/dl Final    Blood, UA 09/25/2020 Negative  Negative Final    Protein, UA 09/25/2020 Negative Negative mg/dl Final    Nitrite, UA 09/25/2020 Negative  Negative Final    Bilirubin, UA 09/25/2020 Negative  Negative Final    Urobilinogen, UA 09/25/2020 0 2  0 2, 1 0 E U /dl E U /dl Final    Leukocytes, UA 09/25/2020 Negative  Negative Final    WBC, UA 09/25/2020 None Seen  None Seen, 2-4 /hpf Final    RBC, UA 09/25/2020 None Seen  None Seen, 2-4 /hpf Final    Hyaline Casts, UA 09/25/2020 None Seen  None Seen /lpf Final    Bacteria, UA 09/25/2020 None Seen  None Seen, Occasional /hpf Final    Epithelial Cells 09/25/2020 None Seen  None Seen, Occasional /hpf Final   Lab on 07/31/2020   Component Date Value Ref Range Status    Sodium 07/31/2020 140  136 - 145 mmol/L Final    Potassium 07/31/2020 4 4  3 5 - 5 3 mmol/L Final    Chloride 07/31/2020 106  100 - 108 mmol/L Final    CO2 07/31/2020 30  21 - 32 mmol/L Final    ANION GAP 07/31/2020 4  4 - 13 mmol/L Final    BUN 07/31/2020 24  5 - 25 mg/dL Final    Creatinine 07/31/2020 1 36* 0 60 - 1 30 mg/dL Final    Standardized to IDMS reference method    Glucose 07/31/2020 89  65 - 140 mg/dL Final      If the patient is fasting, the ADA then defines impaired fasting glucose as > 100 mg/dL and diabetes as > or equal to 123 mg/dL  Specimen collection should occur prior to Sulfasalazine administration due to the potential for falsely depressed results  Specimen collection should occur prior to Sulfapyridine administration due to the potential for falsely elevated results      Calcium 07/31/2020 9 3  8 3 - 10 1 mg/dL Final    eGFR 07/31/2020 56  ml/min/1 73sq m Final   Appointment on 07/01/2020   Component Date Value Ref Range Status    Sodium 07/01/2020 142  136 - 145 mmol/L Final    Potassium 07/01/2020 4 1  3 5 - 5 3 mmol/L Final    Chloride 07/01/2020 108  100 - 108 mmol/L Final    CO2 07/01/2020 30  21 - 32 mmol/L Final    ANION GAP 07/01/2020 4  4 - 13 mmol/L Final    BUN 07/01/2020 25  5 - 25 mg/dL Final    Creatinine 07/01/2020 1 45* 0 60 - 1 30 mg/dL Final    Standardized to IDMS reference method    Glucose, Fasting 07/01/2020 91  65 - 99 mg/dL Final      Specimen collection should occur prior to Sulfasalazine administration due to the potential for falsely depressed results  Specimen collection should occur prior to Sulfapyridine administration due to the potential for falsely elevated results   Calcium 07/01/2020 8 9  8 3 - 10 1 mg/dL Final    eGFR 07/01/2020 52  ml/min/1 73sq m Final    Cholesterol 07/01/2020 140  50 - 200 mg/dL Final      Cholesterol:       Desirable         <200 mg/dl       Borderline         200-239 mg/dl       High              >239           Triglycerides 07/01/2020 69  <=150 mg/dL Final      Triglyceride:     Normal          <150 mg/dl     Borderline High 150-199 mg/dl     High            200-499 mg/dl        Very High       >499 mg/dl    Specimen collection should occur prior to N-Acetylcysteine or Metamizole administration due to the potential for falsely depressed results   HDL, Direct 07/01/2020 45  >=40 mg/dL Final      HDL Cholesterol:       Low     <41 mg/dL  Specimen collection should occur prior to Metamizole administration due to the potential for falsley depressed results   LDL Calculated 07/01/2020 81  0 - 100 mg/dL Final      LDL Cholesterol:     Optimal           <100 mg/dl     Near Optimal      100-129 mg/dl     Above Optimal       Borderline High 130-159 mg/dl       High            160-189 mg/dl       Very High       >189 mg/dl         This screening LDL is a calculated result  It does not have the accuracy of the Direct Measured LDL in the monitoring of patients with hyperlipidemia and/or statin therapy  Direct Measure LDL (ZZZ703) must be ordered separately in these patients

## 2021-07-01 NOTE — NURSING NOTE
RN attempted to contact patient regarding upcoming US guided thyroid biopsy with [facility] [address] Radiology  Message left  Radiology RN's name and call back number provided in message

## 2021-07-01 NOTE — NURSING NOTE
Received a call back from patient to discuss upcoming procedure  Allergies reviewed  Verified with patient current anticoagulant medication of ASA 81 mg daily, but not required to stop per Periprocedural Management of Coagulation Status and Hemostasis Risk in Percutaneous Image Guided Procedures  Pre procedure instructions including diet and taking own medications discussed with patient  Procedure and post procedure expectations and instructions reviewed with the patient  Patient verbalizes understanding  Per patient no further questions at this time  Patient reminded of the location, date and time of the expected procedure  Contact number provided in case patient has any further questions

## 2021-07-09 ENCOUNTER — HOSPITAL ENCOUNTER (OUTPATIENT)
Dept: ULTRASOUND IMAGING | Facility: HOSPITAL | Age: 61
Discharge: HOME | End: 2021-07-09
Attending: INTERNAL MEDICINE | Admitting: RADIOLOGY
Payer: COMMERCIAL

## 2021-07-09 DIAGNOSIS — E04.1 THYROID NODULE: ICD-10-CM

## 2021-07-09 DIAGNOSIS — R22.1 MASS OF RIGHT SIDE OF NECK: ICD-10-CM

## 2021-07-09 PROCEDURE — 88173 CYTOPATH EVAL FNA REPORT: CPT | Performed by: PATHOLOGY

## 2021-07-09 PROCEDURE — 10005 FNA BX W/US GDN 1ST LES: CPT

## 2021-07-09 PROCEDURE — 88172 CYTP DX EVAL FNA 1ST EA SITE: CPT | Performed by: PATHOLOGY

## 2021-07-09 RX ORDER — LIDOCAINE HYDROCHLORIDE 10 MG/ML
5 INJECTION, SOLUTION EPIDURAL; INFILTRATION; INTRACAUDAL; PERINEURAL ONCE
Status: COMPLETED | OUTPATIENT
Start: 2021-07-09 | End: 2021-07-09

## 2021-07-09 RX ADMIN — LIDOCAINE HYDROCHLORIDE 5 ML: 10 INJECTION, SOLUTION EPIDURAL; INFILTRATION; INTRACAUDAL; PERINEURAL at 08:33

## 2021-07-13 ENCOUNTER — TELEPHONE (OUTPATIENT)
Dept: INTERNAL MEDICINE CLINIC | Age: 61
End: 2021-07-13

## 2021-07-13 DIAGNOSIS — D49.7 FOLLICULAR NEOPLASM OF THYROID: Primary | ICD-10-CM

## 2021-07-13 DIAGNOSIS — E04.1 THYROID NODULE: ICD-10-CM

## 2021-07-13 NOTE — TELEPHONE ENCOUNTER
I called and discussed the results of patient's thyroid biopsy with him  I will send a message to his PCP to see him ASAP and also refer him to surgical oncology  He expressed his understanding and agreement with the plan

## 2021-07-14 ENCOUNTER — OFFICE VISIT (OUTPATIENT)
Dept: INTERNAL MEDICINE CLINIC | Facility: CLINIC | Age: 61
End: 2021-07-14
Payer: COMMERCIAL

## 2021-07-14 VITALS
OXYGEN SATURATION: 98 % | SYSTOLIC BLOOD PRESSURE: 130 MMHG | TEMPERATURE: 98.5 F | BODY MASS INDEX: 24.11 KG/M2 | WEIGHT: 150 LBS | HEIGHT: 66 IN | HEART RATE: 62 BPM | DIASTOLIC BLOOD PRESSURE: 70 MMHG

## 2021-07-14 DIAGNOSIS — C64.2 MALIGNANT NEOPLASM OF LEFT KIDNEY, EXCEPT RENAL PELVIS (HCC): ICD-10-CM

## 2021-07-14 DIAGNOSIS — D49.7 FOLLICULAR NEOPLASM OF THYROID: Primary | ICD-10-CM

## 2021-07-14 DIAGNOSIS — N18.31 STAGE 3A CHRONIC KIDNEY DISEASE (HCC): ICD-10-CM

## 2021-07-14 DIAGNOSIS — N18.30 BENIGN HYPERTENSION WITH CHRONIC KIDNEY DISEASE, STAGE III (HCC): ICD-10-CM

## 2021-07-14 DIAGNOSIS — I12.9 BENIGN HYPERTENSION WITH CHRONIC KIDNEY DISEASE, STAGE III (HCC): ICD-10-CM

## 2021-07-14 PROCEDURE — 3008F BODY MASS INDEX DOCD: CPT | Performed by: INTERNAL MEDICINE

## 2021-07-14 PROCEDURE — 99213 OFFICE O/P EST LOW 20 MIN: CPT | Performed by: INTERNAL MEDICINE

## 2021-07-14 PROCEDURE — 1036F TOBACCO NON-USER: CPT | Performed by: INTERNAL MEDICINE

## 2021-07-14 NOTE — PROGRESS NOTES
Assessment/Plan:    1  Follicular thyroid carcinoma  Will refer patient to oncologic surgeon  Our office will make appointment to be seen as soon as possible  Further imaging study will leave up to the oncologic surgeon  Discussed with patient and his wife and all questions answered  2  Hyperlipidemia  Continue with the Lipitor 20 mg daily    3  CKD stage III  Renal function is relatively stable  Also being followed by nephrologist    4  Malignant neoplasm of left kidney  Being followed by urologist      Diagnoses and all orders for this visit:    Follicular neoplasm of thyroid  -     Ambulatory referral to Surgical Oncology; Future    Benign hypertension with chronic kidney disease, stage III ([address] )    Malignant neoplasm of left kidney, except renal pelvis (HCC)    Stage 3a chronic kidney disease (HCC)               Subjective:          Patient ID: Edilia Lo is a 56 y o  male  Patient came here for follow-up after thyroid node biopsy which is positive for follicular carcinoma of thyroid gland and would like to discuss referral to oncologic surgeon  He denied any difficulty with the swallowing  No significant weight loss  No fever chills  The following portions of the patient's history were reviewed and updated as appropriate: allergies, current medications, past family history, past medical history, past social history, past surgical history and problem list     Review of Systems   Constitutional: Negative for fatigue and fever  HENT: Negative for congestion, ear discharge, ear pain, postnasal drip, sinus pressure, sore throat, tinnitus and trouble swallowing  Eyes: Negative for discharge, itching and visual disturbance  Respiratory: Negative for cough and shortness of breath  Cardiovascular: Negative for chest pain and palpitations  Gastrointestinal: Negative for abdominal pain, diarrhea, nausea and vomiting  Endocrine: Negative for cold intolerance and polyuria  Genitourinary: Negative for difficulty urinating, dysuria and urgency  Musculoskeletal: Negative for arthralgias and neck pain  Skin: Negative for rash  Allergic/Immunologic: Negative for environmental allergies  Neurological: Negative for dizziness, weakness and headaches  Psychiatric/Behavioral: The patient is not nervous/anxious            Past Medical History:   Diagnosis Date    Aneurysm ([facility] ) 2017    behind right eye-    Arthritis     Cancer ([place] [address] ) 10/13/2019    kidney    CKD (chronic kidney disease)     Diverticulitis of colon 10/13/2019    Diverticulitis of large intestine with perforation without bleeding 10/13/2019    Hyperlipidemia     Hypertension     Inflammatory bowel disease 10/13/2019    Renal cell cancer, left ([facility] ) 10/13/2019    Renal cell carcinoma (HCC)          Current Outpatient Medications:     aspirin 81 MG tablet, Take 1 tablet by mouth daily, Disp: , Rfl:     atorvastatin (LIPITOR) 20 mg tablet, Take 1 tablet (20 mg total) by mouth daily, Disp: 90 tablet, Rfl: 1    losartan (COZAAR) 25 mg tablet, Take 1 tablet (25 mg total) by mouth daily, Disp: 90 tablet, Rfl: 3    multivitamin (THERAGRAN) TABS, Take 1 tablet by mouth every other day, Disp: , Rfl:     Omega-3 Fatty Acids (FISH OIL) 1200 MG CAPS, Take by mouth , Disp: , Rfl:     No Known Allergies    Social History   Past Surgical History:   Procedure Laterality Date    COLONOSCOPY  2015    HERNIA REPAIR      HIP SURGERY Right     age 4    JOINT REPLACEMENT Right 2011    TKR    GA LAP, RADICAL NEPHRECTOMY Left 12/30/2019    Procedure: NEPHRECTOMY LAPAROSCOPIC HAND ASSISTED;  Surgeon: Elissa Jordan MD;  [facility];  Service: Urology    GA LAP,SURG,COLECTOMY, PARTIAL, [place] N/A 12/30/2019    Procedure: LAPAROSCOPIC HAND-ASSISTED SIGMOID COLECTOMY; LAPAROSCOPIC MOBILIZATION OF SPLENIC FLEXURE;  Surgeon: Sarah Mabry MD;  [facility];  Service: Colorectal    REPLACEMENT TOTAL KNEE Right 2011    REPLACEMENT TOTAL KNEE      SHOULDER SURGERY Left     SHOULDER SURGERY Right     US GUIDED THYROID BIOPSY  7/9/2021    WRIST FUSION Left 2016    WRIST SURGERY       Family History   Problem Relation Age of Onset    Heart disease Father     Other Father         cardiac disorder    No Known Problems Mother     Heart attack Maternal Grandfather     Heart attack Paternal Grandfather     Thyroid cancer Son     Colon cancer Neg Hx        Objective:  /70 (BP Location: Left arm, Patient Position: Sitting, Cuff Size: Adult)   Pulse 62   Temp 98 5 °F (36 9 °C) (Tympanic)   Ht 5' 5 71" (1 669 m)   Wt 68 kg (150 lb)   SpO2 98% Comment: room air  BMI 24 43 kg/m²   Body mass index is 24 43 kg/m²  Physical Exam  Constitutional:       Appearance: He is well-developed  HENT:      Head: Normocephalic  Right Ear: External ear normal       Left Ear: External ear normal       Nose: No rhinorrhea  Eyes:      General: No scleral icterus  Pupils: Pupils are equal, round, and reactive to light  Neck:      Thyroid: No thyromegaly  Trachea: No tracheal deviation  Comments: Right lower lobe of thyroid palpable nodules present  Nontender  No other lymphadenopathy noted  Cardiovascular:      Rate and Rhythm: Normal rate and regular rhythm  Heart sounds: Normal heart sounds  Pulmonary:      Effort: Pulmonary effort is normal  No respiratory distress  Breath sounds: Normal breath sounds  Chest:      Chest wall: No tenderness  Abdominal:      General: Bowel sounds are normal       Palpations: Abdomen is soft  There is no mass  Tenderness: There is no abdominal tenderness  Musculoskeletal:         General: Normal range of motion  Cervical back: Normal range of motion and neck supple  Right lower leg: No edema  Left lower leg: No edema  Lymphadenopathy:      Cervical: No cervical adenopathy  Skin:     General: Skin is warm  Neurological:      Mental Status: He is alert and oriented to person, place, and time  Cranial Nerves: No cranial nerve deficit  Psychiatric:         Mood and Affect: Mood normal          Behavior: Behavior normal          Thought Content:  Thought content normal          Judgment: Judgment normal

## 2021-07-15 ENCOUNTER — TELEPHONE (OUTPATIENT)
Dept: HEMATOLOGY ONCOLOGY | Facility: CLINIC | Age: 61
End: 2021-07-15

## 2021-07-15 NOTE — TELEPHONE ENCOUNTER
New Patient Encounter    New Patient Intake Form   Patient Details:  Sandrita Pike  1960  [id number]    Background Information:  [address] starts by opening a telephone encounter and gathering the following information   Who is calling to schedule? If not self, relationship to patient? Patient   Referring Provider Dr Felipa Han   What is the diagnosis? Follicular neoplasm of thyroid   Is this Cancer or Non-Cancer? Cancer   Is this diagnosis confirmed? Yes   When was the diagnosis? 07/2021   Is there a confirmed diagnosis from a biopsy/tissue reviewed by pathology? Yes   Were outside slides requested? No   Is patient aware of diagnosis? Yes   Is there a personal history and what kind? Yes - Kidney cancer 12/19   Is there a family history and what kind? No   Reason for visit? New Diagnosis   Have you had any imaging or labs done? If so: when, where? yes  At    Are records in [facility]? yes   If patient has a prior history of cancer were old records obtained? NA   Was the patient told to bring a disk? No   Does the patient smoke or Vape? No   If yes, how many packs or cartridges per day? n/a   Scheduling Information:   [facility]:  [address] [place] [address]     Are there any dates/time the patient cannot be seen? 7/22   Miscellaneous:    After completing the above information, please route to Financial Counselor and the appropriate Nurse Navigator for review

## 2021-07-15 NOTE — TELEPHONE ENCOUNTER
New Patient Request   Patient Details:     Juan Garrido      1960      [id number]      Reason for Appointment   Who is calling to schedule? José from pcp office    If not Patient, what is their name? What is the diagnosis? Follicular neoplasm of thyroid   Who is the referring doctor? Dr Jenny Boss are you scheduling with ? surg onc    Preferred [facility]/ any    Best Number to call back on? If calling from the [facility], use the Nurse number   [phone]   Miscellaneous Information: Office requesting a call with appt also  [phone]   Please advise the patient, a new patient  will be calling them back within 1 business day

## 2021-07-19 ENCOUNTER — TELEPHONE (OUTPATIENT)
Dept: INTERNAL MEDICINE CLINIC | Age: 61
End: 2021-07-19

## 2021-07-19 NOTE — TELEPHONE ENCOUNTER
----- Message from Carey Allen DO sent at 7/13/2021  2:29 PM EDT -----  Hi Dr Anju Shetty,  Josue Griffin has a follicular Neoplasm of the thyroid and I have referred him to surgical oncology  I told him that it would be better if you see him sooner rather that later and he was agreeable  Marisel Moreira, please can you schedule pt to see Dr Anju Shetty ASAJEMIMA? Georgette Ormond Thanks!

## 2021-07-21 ENCOUNTER — APPOINTMENT (OUTPATIENT)
Dept: LAB | Age: 61
End: 2021-07-21
Payer: COMMERCIAL

## 2021-07-21 DIAGNOSIS — R22.1 NECK MASS: ICD-10-CM

## 2021-07-21 DIAGNOSIS — C64.2 MALIGNANT NEOPLASM OF LEFT KIDNEY, EXCEPT RENAL PELVIS (HCC): ICD-10-CM

## 2021-07-21 DIAGNOSIS — I67.1 CEREBRAL ANEURYSM, NONRUPTURED: ICD-10-CM

## 2021-07-21 LAB
CREAT SERPL-MCNC: 1.23 MG/DL (ref 0.6–1.3)
GFR SERPL CREATININE-BSD FRML MDRD: 63 ML/MIN/1.73SQ M

## 2021-07-21 PROCEDURE — 82565 ASSAY OF CREATININE: CPT

## 2021-07-21 PROCEDURE — 36415 COLL VENOUS BLD VENIPUNCTURE: CPT

## 2021-07-26 ENCOUNTER — HOSPITAL ENCOUNTER (OUTPATIENT)
Dept: CT IMAGING | Facility: HOSPITAL | Age: 61
Discharge: HOME | End: 2021-07-26
Attending: UROLOGY
Payer: COMMERCIAL

## 2021-07-26 ENCOUNTER — HOSPITAL ENCOUNTER (OUTPATIENT)
Dept: RADIOLOGY | Facility: HOSPITAL | Age: 61
Discharge: HOME | End: 2021-07-26
Attending: UROLOGY
Payer: COMMERCIAL

## 2021-07-26 DIAGNOSIS — C64.2 RENAL CELL CANCER, LEFT (HCC): ICD-10-CM

## 2021-07-26 PROCEDURE — 74176 CT ABD & PELVIS W/O CONTRAST: CPT

## 2021-07-26 PROCEDURE — 71046 X-RAY EXAM CHEST 2 VIEWS: CPT

## 2021-07-28 ENCOUNTER — CONSULT (OUTPATIENT)
Dept: SURGICAL ONCOLOGY | Facility: CLINIC | Age: 61
End: 2021-07-28
Payer: COMMERCIAL

## 2021-07-28 VITALS
HEART RATE: 67 BPM | SYSTOLIC BLOOD PRESSURE: 130 MMHG | RESPIRATION RATE: 18 BRPM | DIASTOLIC BLOOD PRESSURE: 76 MMHG | WEIGHT: 147 LBS | HEIGHT: 65 IN | BODY MASS INDEX: 24.49 KG/M2

## 2021-07-28 DIAGNOSIS — D49.7 FOLLICULAR NEOPLASM OF THYROID: ICD-10-CM

## 2021-07-28 DIAGNOSIS — E04.1 THYROID NODULE: Primary | ICD-10-CM

## 2021-07-28 PROCEDURE — 99244 OFF/OP CNSLTJ NEW/EST MOD 40: CPT | Performed by: SURGERY

## 2021-07-28 NOTE — PROGRESS NOTES
Surgical Oncology Consult       [facility] [address] [id number] [phone]    Gricel Cat  1960  [id number]  [facility] [place]  [address] [facility]  [facility] [address]  [phone]    Diagnoses and all orders for this visit:    Thyroid nodule  -     Ambulatory referral to Surgical Oncology  -     US thyroid; Future    Follicular neoplasm of thyroid  -     Ambulatory referral to Surgical Oncology  -     Ambulatory referral to Surgical Oncology        Chief Complaint   Patient presents with    Consult       Return in about 1 year (around 7/28/2022) for Office Visit, Imaging - See orders  Oncology History    No history exists  History of Present Illness:  17-year-old male who recently noticed a nodule in his throat  He has a son with a history of thyroid cancer at age 23  Thyroid ultrasound on June 28th revealed a right thyroid nodule measuring 3 6 x 2 7 x 2 6 cm  Biopsy was performed and this was suspicious for a Hurthle cell neoplasm  Afirma was negative  He comes in now to discuss further therapy  He denies any dysphagia  He has occasional hoarseness that comes and goes  He has never had any other thyroid problems  No history of radiation to his head or neck  His thyroid function studies are normal     Review of Systems  Complete ROS Surg Onc:   Constitutional: The patient denies new or recent history of general fatigue, no recent weight loss, no change in appetite  Eyes: No complaints of visual problems, no scleral icterus  ENT: no complaints of ear pain, no hoarseness, no difficulty swallowing,  no tinnitus and no new masses in head, oral cavity, or neck  Cardiovascular: No complaints of chest pain, no palpitations, no ankle edema  Respiratory: No complaints of shortness of breath, no cough  Gastrointestinal: No complaints of jaundice, no bloody stools, no pale stools  Genitourinary: No complaints of dysuria, no hematuria, no nocturia, no frequent urination, no urethral discharge  Musculoskeletal: No complaints of weakness, paralysis, joint stiffness or arthralgias  Integumentary: No complaints of rash, no new lesions  Neurological: No complaints of convulsions, no seizures, no dizziness  Hematologic/Lymphatic: No complaints of easy bruising  Endocrine:  No hot or cold intolerance  No polydipsia, polyphagia, or polyuria  Allergy/immunology:  No environmental allergies  No food allergies  Not immunocompromised  Skin:  No pallor or rash  No wound            Patient Active Problem List   Diagnosis    Osteoarthritis of finger of right hand    Mixed hyperlipidemia    Diverticulitis of large intestine with perforation without bleeding    [facility] cancer[facility] )    Cluster headache    Malignant neoplasm of left kidney, except renal pelvis (HCC)    Tinea versicolor    CKD (chronic kidney disease) stage 3, GFR 30-59 ml/min (HCC)    Hyperglycemia    Persistent proteinuria    Hypermagnesemia    Benign hypertension with chronic kidney disease, stage III (HCC)    Solitary kidney, acquired    Pulse irregularity    Lymphadenopathy of head and neck    Mass of right side of neck    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    Thyroid nodule    Follicular neoplasm of thyroid     Past Medical History:   Diagnosis Date    [facility] ) 2017    behind right eye-    Arthritis     Cancer ([facility] ) 10/13/2019    kidney    CKD (chronic kidney disease)     Diverticulitis of colon 10/13/2019    Diverticulitis of large intestine with perforation without bleeding 10/13/2019    Hyperlipidemia     Hypertension     Inflammatory bowel disease 10/13/2019    Renal cell cancer, left ([facility] ) 10/13/2019    Renal cell carcinoma ([facility] )      Past Surgical History:   Procedure Laterality Date    COLONOSCOPY  2015    HERNIA REPAIR      HIP SURGERY Right     age 4    JOINT REPLACEMENT Right 2011    TKR    MD LAP, RADICAL NEPHRECTOMY Left 12/30/2019    Procedure: NEPHRECTOMY LAPAROSCOPIC HAND ASSISTED;  Surgeon: Berta Roa MD;  [facility];  Service: Urology    MD LAP,SURG,COLECTOMY, PARTIAL, [place] N/A 12/30/2019    Procedure: LAPAROSCOPIC HAND-ASSISTED SIGMOID COLECTOMY; LAPAROSCOPIC MOBILIZATION OF SPLENIC FLEXURE;  Surgeon: Janet Hoover MD;  [facility];  Service: Colorectal    REPLACEMENT TOTAL KNEE Right 2011    REPLACEMENT TOTAL KNEE      SHOULDER SURGERY Left     SHOULDER SURGERY Right     US GUIDED THYROID BIOPSY  7/9/2021    WRIST FUSION Left 2016    WRIST SURGERY       Family History   Problem Relation Age of Onset    Heart disease Father     Other Father         cardiac disorder    No Known Problems Mother     Heart attack Maternal Grandfather     Heart attack Paternal Grandfather     Thyroid cancer Son     Colon cancer Neg Hx      Social History     Socioeconomic History    Marital status: /Civil Union     Spouse name: Not on file    Number of children: 4    Years of education: high school or GED    Highest education level: Not on file   Occupational History    Occupation:      Comment: [employer]   Tobacco Use    Smoking status: Never Smoker    Smokeless tobacco: Never Used   Vaping Use    Vaping Use: Never used   Substance and Sexual Activity    Alcohol use:  Yes     Alcohol/week: 2 0 standard drinks     Types: 2 Cans of beer per week    Drug use: No    Sexual activity: Yes     Partners: Female     Birth control/protection: None   Other Topics Concern    Not on file   Social History Narrative    Not on file     Social Determinants of Health     Financial Resource Strain:     Difficulty of Paying Living Expenses:    Food Insecurity:     Worried About Running Out of Food in the Last Year:     Ran Out of Food in the Last Year:    Transportation Needs:     Lack of Transportation (Medical):  Lack of Transportation (Non-Medical):    Physical Activity:     Days of Exercise per Week:     Minutes of Exercise per Session:    Stress:     Feeling of Stress :    Social Connections:     Frequency of Communication with Friends and Family:     Frequency of Social Gatherings with Friends and Family:     Attends Synagogue Services:     Active Member of Clubs or Organizations:     Attends Club or Organization Meetings:     Marital Status:    Intimate Partner Violence:     Fear of Current or Ex-Partner:     Emotionally Abused:     Physically Abused:     Sexually Abused:        Current Outpatient Medications:     aspirin 81 MG tablet, Take 1 tablet by mouth daily, Disp: , Rfl:     atorvastatin (LIPITOR) 20 mg tablet, Take 1 tablet (20 mg total) by mouth daily, Disp: 90 tablet, Rfl: 1    losartan (COZAAR) 25 mg tablet, Take 1 tablet (25 mg total) by mouth daily, Disp: 90 tablet, Rfl: 3    multivitamin (THERAGRAN) TABS, Take 1 tablet by mouth every other day, Disp: , Rfl:     Omega-3 Fatty Acids (FISH OIL) 1200 MG CAPS, Take by mouth , Disp: , Rfl:   No Known Allergies  Vitals:    07/28/21 1314   BP: 130/76   Pulse: 67   Resp: 18       Physical Exam   Constitutional: General appearance: The Patient is well-developed and well-nourished who appears the stated age in no acute distress  Patient is pleasant and talkative  HEENT:  Normocephalic  Sclerae are anicteric  Mucous membranes are moist  Neck is supple without adenopathy  No JVD  there is a palpable right thyroid nodule  This is not fixed to any underlying structures  Chest: The lungs are clear to auscultation  Cardiac: Heart is regular rate  Abdomen: Abdomen is soft, non-tender, non-distended and without masses  Extremities: There is no clubbing or cyanosis  There is no edema  Symmetric    Neuro: Grossly nonfocal  Gait is normal      Lymphatic: No evidence of cervical adenopathy bilaterally  Skin: Warm, anicteric  Psych:  Patient is pleasant and talkative  Breasts:      Pathology:  Final Diagnosis   A  & B  Thyroid, Right, Mid: (Thin-Prep and smears)  Suspicious for follicular neoplasm, 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)  - See note  Cellular aspirate with consisting predominantly of oncocytes in sheets and crowded clusters  Rare microfollicles present  Occasional irregular nuclear membranes  Mixed inflammatory cells and macrophages      Satisfactory for evaluation      Note:  (1) As reported in the [facility] for Reporting Thyroid Cytopathology* this diagnostic category has demonstrated anywhere from 25-40% risk of malignancy being found in subsequent resections and/or FNA  This risk of malignancy is expected to change due to the usage of the surgical pathology diagnosis of non-invasive follicular thyroid neoplasm with papillary-like nuclear features (NIFTP)    The anticipated risk of malignancy secondary to NIFTP is 10-40%  The histologic follow-up of cases diagnosed as follicular neoplasm/suspicious for follicular neoplasm includes follicular adenoma, follicular carcinoma, and follicular variant of papillary thyroid carcinoma including the recently described indolent counterpart, NIFTP  The manual reports that the usual management following this diagnosis is genetic testing or lobectomy   Ultimately, clinical/imaging correlation for this patient is needed in arriving at the actual management plan      *The [place] System for Reporting Thyroid Cytopathology, Geraldo Sanchez Daily Elian Walters ), 2018 (2nd ed )    Electronically signed by Karyn Lester MD on 7/13/2021 at 11:08 AM     Afirma benign    Labs:  Lab Results   Component Value Date    [id number] 1 036 06/28/2021         Imaging  US guided thyroid biopsy with [facility] LOIS    Result Date: 7/9/2021  Narrative: ULTRASOUND-GUIDED THYROID BIOPSY HISTORY: 56year-old male with a history of bilateral thyroid nodules, prior history of renal cell carcinoma status post left nephrectomy, and family history of thyroid carcinoma (son)  COMPARISON: Thyroid ultrasound on 6/28/2021  FINDINGS: Prior ultrasound images were reviewed  On prior thyroid ultrasound from 6/28/2021, there was a right mid gland thyroid nodule measuring 3 6 x 2 7 x 2 6 cm that met criteria for FNA  The patient presents today with a prescription to undergo FNA with [employer] testing of the right mid gland thyroid nodule  On today's limited thyroid ultrasound, the right mid gland thyroid nodule measured 3 4 x 2 5 x 2 6 cm  The procedure was explained to the patient, including risks of hemorrhage, infection and local injury  Informed consent was freely obtained  The patient verbalized expressed understanding of the above risks and wished to proceed with the procedure  Final standard "time-out" procedure performed  PROCEDURE: The neck was prepped and draped in normal sterile fashion  Under real-time ultrasound guidance and local anesthesia 5 passes with a 25 gauge needle were made through the right mid gland thyroid nodule  Cytopathology was present and deemed the  specimens adequate for evaluation  3 specimens were sent to cytology and 2 specimens were held for [employer] testing  The patient tolerated the procedure well  Postprocedure instructions were provided for the patient  I asked the patient to call us with any questions, concerns, or acute problems  The patient expressed understanding of the above  Impression: Status post successful ultrasound-guided thyroid biopsy with [employer] testing of the right mid gland thyroid nodule  I reviewed the above findings and procedure with Dr Joseph Liu  PERFORMED, DICTATED AND SIGNED BY: Monica Richards PA-C Workstation performed: [id number]     I reviewed the above laboratory and imaging data      Discussion/Summary:  28-year-old male with a family history of thyroid cancer and a single right thyroid nodule that was benign by Afirma  The initial cytology revealed that this was suspicious for Hurthle cell neoplasm  He is euthyroid  We discussed that a [place] 4 biopsy has a 20% risk of being malignant  With his Afirma being negative, the false negative rate is 4%  He was concerned that this would be related to his kidney cancer, but there was no evidence of this being kidney cancer by cytology nor was it related to renal cell carcinoma by Afirma  I have told him I have seen the scenarios and both the cytology and Afirma were suspicious for renal cell carcinoma rather than any type of thyroid carcinoma  Consequently we discussed surgical intervention since the lesion is visible and palpable  I also discussed that since the lesion is benign by Afirma and is under 4 cm in size, we could consider observation with a 4% false negative risk  After some discussion he would prefer observation  I think this is reasonable  We will plan on repeating his thyroid ultrasound in 1 year  I will see him again at that time for another clinical exam   He is agreeable to this  All his questions were answered

## 2021-08-02 ENCOUNTER — TELEPHONE (OUTPATIENT)
Dept: UROLOGY | Facility: CLINIC | Age: 61
End: 2021-08-02

## 2021-08-02 ENCOUNTER — OFFICE VISIT (OUTPATIENT)
Dept: UROLOGY | Facility: CLINIC | Age: 61
End: 2021-08-02
Payer: COMMERCIAL

## 2021-08-02 VITALS
HEART RATE: 49 BPM | SYSTOLIC BLOOD PRESSURE: 122 MMHG | HEIGHT: 65 IN | WEIGHT: 151.4 LBS | BODY MASS INDEX: 25.22 KG/M2 | DIASTOLIC BLOOD PRESSURE: 70 MMHG

## 2021-08-02 DIAGNOSIS — C64.2 RENAL CELL CANCER, LEFT (HCC): Primary | ICD-10-CM

## 2021-08-02 DIAGNOSIS — Z12.5 SCREENING FOR PROSTATE CANCER: ICD-10-CM

## 2021-08-02 PROCEDURE — 3008F BODY MASS INDEX DOCD: CPT | Performed by: PHYSICIAN ASSISTANT

## 2021-08-02 PROCEDURE — 1036F TOBACCO NON-USER: CPT | Performed by: PHYSICIAN ASSISTANT

## 2021-08-02 PROCEDURE — 99213 OFFICE O/P EST LOW 20 MIN: CPT | Performed by: PHYSICIAN ASSISTANT

## 2021-08-02 NOTE — PROGRESS NOTES
ASSESSMENT/PLAN:     1  Stage I clear cell renal cell carcinoma  - pT1a G2, N0 M0 R0, clear cell  - s/p lap left radical nephrectomy January 2020    2  CKD     I was happy to review with the patient his CT and chest x-ray results which were negative for evidence of recurrent or residual disease  We will continue with annual surveillance with a CT with and without contrast next year as well as chest x-ray  We discussed the need for IV hydration pre and post imaging  Will also obtain BMP and PSA prior to follow-up  All questions answered  Elsi Thorpe PA-C    History of Present Illness:     Lux Landaverde is a 56 y o  with a history left renal cell carcinoma status post left radical nephrectomy (01/2020) presenting for follow-up      He has done very well clinically  He has no pain or any incisional concerns  He presents with an updated CT and CXR which shows no evidence of recurrent disease  He has acquired chronic kidney disease  His most recent creatinine is 1 23 with eGFR 63  Patient denies any dysuria, gross hematuria, suprapubic pressure, flank pain  Denies any voiding concerns  Denies any pain at previous surgical incisions  He is asymptomatic from a urologic standpoint  Recently noticed swelling his neck  This was ultimately found to be a thyroid nodule 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  Following with surgical Oncology      Past Medical, Past Surgical History:     Past Medical History:   Diagnosis Date    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-    Arthritis     Cancer ([address] ) 10/13/2019    kidney    CKD (chronic kidney disease)     Diverticulitis of colon 10/13/2019    Diverticulitis of large intestine with perforation without bleeding 10/13/2019    Hyperlipidemia     Hypertension     Inflammatory bowel disease 10/13/2019    Renal cell cancer, left ([address] ) 10/13/2019    Renal cell carcinoma (HCC)    :    Past Surgical History: Procedure Laterality Date    COLONOSCOPY  2015    HERNIA REPAIR      HIP SURGERY Right     age 4    JOINT REPLACEMENT Right 2011    TKR    [place] LAP, RADICAL NEPHRECTOMY Left 12/30/2019    Procedure: NEPHRECTOMY LAPAROSCOPIC HAND ASSISTED;  Surgeon: Jay Worley MD;  [facility];  Service: Urology    HI LAP,SURG,COLECTOMY, PARTIAL, Cha Lipps N/A 12/30/2019    Procedure: LAPAROSCOPIC HAND-ASSISTED SIGMOID COLECTOMY; LAPAROSCOPIC MOBILIZATION OF SPLENIC FLEXURE;  Surgeon: Naomi Henry MD;  [facility];  Service: Colorectal    REPLACEMENT TOTAL KNEE Right 2011    REPLACEMENT TOTAL KNEE      SHOULDER SURGERY Left     SHOULDER SURGERY Right     US GUIDED THYROID BIOPSY  7/9/2021    WRIST FUSION Left 2016    WRIST SURGERY     :    Medications, Allergies:     Current Outpatient Medications:     aspirin 81 MG tablet, Take 1 tablet by mouth daily, Disp: , Rfl:     atorvastatin (LIPITOR) 20 mg tablet, Take 1 tablet (20 mg total) by mouth daily, Disp: 90 tablet, Rfl: 1    losartan (COZAAR) 25 mg tablet, Take 1 tablet (25 mg total) by mouth daily, Disp: 90 tablet, Rfl: 3    multivitamin (THERAGRAN) TABS, Take 1 tablet by mouth every other day, Disp: , Rfl:     Omega-3 Fatty Acids (FISH OIL) 1200 MG CAPS, Take by mouth , Disp: , Rfl:     Allergies:  No Known Allergies:    Social and Family History:   Social History:   Social History     Tobacco Use    Smoking status: Never Smoker    Smokeless tobacco: Never Used   Vaping Use    Vaping Use: Never used   Substance Use Topics    Alcohol use:  Yes     Alcohol/week: 2 0 standard drinks     Types: 2 Cans of beer per week    Drug use: No        Social History     Tobacco Use   Smoking Status Never Smoker   Smokeless Tobacco Never Used       Family History:  Family History   Problem Relation Age of Onset    Heart disease Father     Other Father         cardiac disorder    No Known Problems Mother     Heart attack Maternal Grandfather     Heart attack Paternal Grandfather     Thyroid cancer Son     Colon cancer Neg Hx    :     Review of Systems:     General: Fever, chills, or night sweats: negative  Cardiac: Negative for chest pain  Pulmonary: Negative for shortness of breath  Gastrointestinal: Abdominal pain positive  Nausea, vomiting, or diarrhea negative,  Genitourinary: See HPI above  Patient does not have hematuria  All other systems queried were negative  Physical Exam:   General: Patient is pleasant and in NAD  Awake and alert  There were no vitals taken for this visit  Cardiac: Peripheral edema: negative  Pulmonary: Non-labored breathing  Abdomen: Soft, non-tender, non-distended  No surgical scars  No masses, tenderness, hernias noted  Genitourinary: Negative CVA tenderness, negative suprapubic tenderness  All incisions are well healed with no palpable hernia      Labs:     Lab Results   Component Value Date    HGB 13 7 06/28/2021    HCT 40 7 06/28/2021    WBC 5 00 06/28/2021     06/28/2021   ]    Lab Results   Component Value Date    K 4 1 06/28/2021     06/28/2021    CO2 25 06/28/2021    BUN 27 (H) 06/28/2021    CREATININE 1 23 07/21/2021    CALCIUM 8 5 06/28/2021   ]  CT ABDOMEN AND PELVIS WITHOUT IV CONTRAST - LOW DOSE RENAL STONE      INDICATION:   C64 2: Malignant neoplasm of left kidney, except renal pelvis      COMPARISON:  CT dated 7/9/2020     TECHNIQUE:  Low dose thin section CT examination of the abdomen and pelvis was performed without intravenous or oral contrast according to a protocol specifically designed to evaluate for urinary tract calculus  Axial, sagittal, and coronal 2D   reformatted images were created from the source data and submitted for interpretation  Evaluation for pathology in the abdomen and pelvis that is unrelated to urinary tract calculi is limited       Radiation dose length product (DLP) for this visit:  269 mGy-cm     This examination, like all CT scans performed in the [facility], was performed utilizing techniques to minimize radiation dose exposure, including the use of iterative   reconstruction and automated exposure control       FINDINGS:     The lack of intravenous contrast limits evaluation of certain processes, particularly inflammatory, infectious and neoplastic processes      RIGHT KIDNEY AND URETER:  No urinary tract calculi  No hydronephrosis or hydroureter      LEFT KIDNEY AND URETER:  Status post left nephrectomy  No nodularity at the nephrectomy bed to suggest local recurrence      URINARY BLADDER:   Unremarkable      No significant abnormality in the visualized lung bases      Limited low radiation dose noncontrast CT evaluation demonstrates no clinically significant abnormality of liver, spleen, pancreas, or adrenal glands  No calcified gallstones or gallbladder wall thickening noted  No ascites or bulky lymphadenopathy on this limited noncontrast study  Postoperative changes in the sigmoid colon  Limited evaluation demonstrates no evidence to suggest acute appendicitis  No acute fracture or destructive osseous lesion is identified         IMPRESSION:     Status post left nephrectomy  No evidence of local recurrence or metastatic disease in the abdomen or pelvis within the limits of this unenhanced CT            CHEST      INDICATION:   C64 2: Malignant neoplasm of left kidney, except renal pelvis      COMPARISON:  Chest radiograph and abdomen CT from 7/9/2020      EXAM PERFORMED/VIEWS:  XR CHEST PA & LATERAL  DUAL ENERGY SUBTRACTION        FINDINGS:     Cardiomediastinal silhouette appears unremarkable      The lungs are clear    No pneumothorax or pleural effusion      Osseous structures appear within normal limits for patient age      IMPRESSION:     No acute cardiopulmonary disease

## 2021-08-02 NOTE — TELEPHONE ENCOUNTER
Per Stewart Ortiz, 1 year (around 8/2/2022) for BMP, PSA, CT, and CXR 1 year, cancer surveillance  Apt was added to the recall list  I tried to schedule the ct but pt needs hydration pre and post CT  Please place orders for infusion

## 2021-08-18 NOTE — TELEPHONE ENCOUNTER
Patient called stating he would like to know the status of his appointment for his IV Hydration appointment for next year in July  He would like a call back to confirm date and time

## 2021-08-24 NOTE — TELEPHONE ENCOUNTER
Lm for pt letting him know to schedule CT  Once I see a CT apt, I will call infusion and coordinate hydration apt

## 2021-08-25 ENCOUNTER — APPOINTMENT (OUTPATIENT)
Dept: LAB | Age: 61
End: 2021-08-25
Payer: COMMERCIAL

## 2021-08-25 DIAGNOSIS — E83.41 HYPERMAGNESEMIA: ICD-10-CM

## 2021-08-25 DIAGNOSIS — R80.1 PERSISTENT PROTEINURIA: ICD-10-CM

## 2021-08-25 DIAGNOSIS — I12.9 BENIGN HYPERTENSION WITH CHRONIC KIDNEY DISEASE, STAGE III (HCC): ICD-10-CM

## 2021-08-25 DIAGNOSIS — N18.31 STAGE 3A CHRONIC KIDNEY DISEASE (HCC): ICD-10-CM

## 2021-08-25 DIAGNOSIS — N18.30 BENIGN HYPERTENSION WITH CHRONIC KIDNEY DISEASE, STAGE III (HCC): ICD-10-CM

## 2021-08-25 LAB
ANION GAP SERPL CALCULATED.3IONS-SCNC: 2 MMOL/L (ref 4–13)
BACTERIA UR QL AUTO: NORMAL /HPF
BILIRUB UR QL STRIP: NEGATIVE
BUN SERPL-MCNC: 29 MG/DL (ref 5–25)
CALCIUM SERPL-MCNC: 9.1 MG/DL (ref 8.3–10.1)
CHLORIDE SERPL-SCNC: 107 MMOL/L (ref 100–108)
CLARITY UR: CLEAR
CO2 SERPL-SCNC: 29 MMOL/L (ref 21–32)
COLOR UR: YELLOW
CREAT SERPL-MCNC: 1.34 MG/DL (ref 0.6–1.3)
CREAT UR-MCNC: 18.4 MG/DL
ERYTHROCYTE [DISTWIDTH] IN BLOOD BY AUTOMATED COUNT: 12.8 % (ref 11.6–15.1)
GFR SERPL CREATININE-BSD FRML MDRD: 56 ML/MIN/1.73SQ M
GLUCOSE SERPL-MCNC: 76 MG/DL (ref 65–140)
GLUCOSE UR STRIP-MCNC: NEGATIVE MG/DL
HCT VFR BLD AUTO: 44.3 % (ref 36.5–49.3)
HGB BLD-MCNC: 14.6 G/DL (ref 12–17)
HGB UR QL STRIP.AUTO: NEGATIVE
HYALINE CASTS #/AREA URNS LPF: NORMAL /LPF
KETONES UR STRIP-MCNC: NEGATIVE MG/DL
LEUKOCYTE ESTERASE UR QL STRIP: NEGATIVE
MAGNESIUM SERPL-MCNC: 2.5 MG/DL (ref 1.6–2.6)
MCH RBC QN AUTO: 31.2 PG (ref 26.8–34.3)
MCHC RBC AUTO-ENTMCNC: 33 G/DL (ref 31.4–37.4)
MCV RBC AUTO: 95 FL (ref 82–98)
NITRITE UR QL STRIP: NEGATIVE
NON-SQ EPI CELLS URNS QL MICRO: NORMAL /HPF
PH UR STRIP.AUTO: 6.5 [PH]
PHOSPHATE SERPL-MCNC: 3.7 MG/DL (ref 2.3–4.1)
PLATELET # BLD AUTO: 228 THOUSANDS/UL (ref 149–390)
PMV BLD AUTO: 10.1 FL (ref 8.9–12.7)
POTASSIUM SERPL-SCNC: 4.4 MMOL/L (ref 3.5–5.3)
PROT UR STRIP-MCNC: NEGATIVE MG/DL
PROT UR-MCNC: <6 MG/DL
PROT/CREAT UR: <0.33 MG/G{CREAT} (ref 0–0.1)
PTH-INTACT SERPL-MCNC: 39.1 PG/ML (ref 18.4–80.1)
RBC # BLD AUTO: 4.68 MILLION/UL (ref 3.88–5.62)
RBC #/AREA URNS AUTO: NORMAL /HPF
SODIUM SERPL-SCNC: 138 MMOL/L (ref 136–145)
SP GR UR STRIP.AUTO: 1.01 (ref 1–1.03)
UROBILINOGEN UR QL STRIP.AUTO: 0.2 E.U./DL
WBC # BLD AUTO: 5.63 THOUSAND/UL (ref 4.31–10.16)
WBC #/AREA URNS AUTO: NORMAL /HPF

## 2021-08-25 PROCEDURE — 82570 ASSAY OF URINE CREATININE: CPT

## 2021-08-25 PROCEDURE — 81001 URINALYSIS AUTO W/SCOPE: CPT

## 2021-08-25 PROCEDURE — 83735 ASSAY OF MAGNESIUM: CPT

## 2021-08-25 PROCEDURE — 80048 BASIC METABOLIC PNL TOTAL CA: CPT

## 2021-08-25 PROCEDURE — 36415 COLL VENOUS BLD VENIPUNCTURE: CPT

## 2021-08-25 PROCEDURE — 85027 COMPLETE CBC AUTOMATED: CPT

## 2021-08-25 PROCEDURE — 84100 ASSAY OF PHOSPHORUS: CPT

## 2021-08-25 PROCEDURE — 83970 ASSAY OF PARATHORMONE: CPT

## 2021-08-25 PROCEDURE — 84156 ASSAY OF PROTEIN URINE: CPT

## 2021-08-30 NOTE — TELEPHONE ENCOUNTER
I called the infusion center and pt is scheduled for 8:00 on 7/21/22  Ct needs to be moved to 10:00 so it is right after hydration and there is still time for hydration after CT  I called central scheduling and she stated she would have to send it to the  to schedule  I left message for pt explaining all of this and told him he would need to reschedule the thyroid u/s he has for that day  The hydration and Ct will take up the entire day

## 2021-09-01 NOTE — TELEPHONE ENCOUNTER
Patient called in confirmed appointment with him  Patient advised that the appointment are in 2 different locations and he would like to know if this is right  Please advise

## 2021-09-02 NOTE — TELEPHONE ENCOUNTER
Patient called stating if he had a choice he would like to have everything done at [facility]    Any questions call him back

## 2021-09-02 NOTE — TELEPHONE ENCOUNTER
I spoke to central scheduling and ask for the ct to be rescheduled to [place]  She stated the schedule for next year is blocked but she would call radiology directly to change the apt  I will check patient's apts to make sure it gets changed

## 2021-09-02 NOTE — TELEPHONE ENCOUNTER
Lm for pt to c/b and let us know where he would like everything done so I can reschedule one of the apts

## 2021-09-13 ENCOUNTER — OFFICE VISIT (OUTPATIENT)
Dept: NEPHROLOGY | Facility: CLINIC | Age: 61
End: 2021-09-13
Payer: COMMERCIAL

## 2021-09-13 VITALS
BODY MASS INDEX: 25.46 KG/M2 | WEIGHT: 152.8 LBS | DIASTOLIC BLOOD PRESSURE: 70 MMHG | SYSTOLIC BLOOD PRESSURE: 110 MMHG | HEIGHT: 65 IN | HEART RATE: 62 BPM

## 2021-09-13 DIAGNOSIS — Z90.5 SOLITARY KIDNEY, ACQUIRED: ICD-10-CM

## 2021-09-13 DIAGNOSIS — N18.31 STAGE 3A CHRONIC KIDNEY DISEASE (HCC): Primary | ICD-10-CM

## 2021-09-13 DIAGNOSIS — I12.9 BENIGN HYPERTENSION WITH CHRONIC KIDNEY DISEASE, STAGE III (HCC): ICD-10-CM

## 2021-09-13 DIAGNOSIS — R80.1 PERSISTENT PROTEINURIA: ICD-10-CM

## 2021-09-13 DIAGNOSIS — N18.30 BENIGN HYPERTENSION WITH CHRONIC KIDNEY DISEASE, STAGE III (HCC): ICD-10-CM

## 2021-09-13 PROCEDURE — 99213 OFFICE O/P EST LOW 20 MIN: CPT | Performed by: PHYSICIAN ASSISTANT

## 2021-09-13 PROCEDURE — 3008F BODY MASS INDEX DOCD: CPT | Performed by: PHYSICIAN ASSISTANT

## 2021-09-13 PROCEDURE — 1036F TOBACCO NON-USER: CPT | Performed by: PHYSICIAN ASSISTANT

## 2021-09-13 NOTE — PROGRESS NOTES
OFFICE FOLLOW UP - Nephrology   Jillian Juarez 59 y o  male MRN: [id number]       ASSESSMENT/PLAN:  1  CKD stage IIIA: Baseline creatinine 1 2-1 4  Likely due to decreased nephron mass in the setting of left nephrectomy as preoperatively creatinine was less than 1 but postoperatively has been 1 2-1 4  · Last labs on 08/25:   Creatinine stable at 1 34  UA bland  · He is going to have a contrast CT 7/21/22 and is being scheduled prior for hydration   · Recommended repeating a BMP pre and post contrast CT and also holding losartan  2  Hypertension:  Blood pressure well controlled on losartan  3   Solitary right kidney:  Status post left nephrectomy due to renal cell carcinoma in 2019  ·   Last CT 07/26/2021 did not show any evidence of local recurrence or metastatic disease  4  Persistent proteinuria:  Was started on losartan in February  Most recent urine protein creatinine ratio < 0 33 g      PATIENT INSTRUCTIONS:  Patient Instructions   · Avoid all NSAIDs (ibuprofen, Motrin, Aleve, Advil, Naproxen   ) but it is ok to take tylenol   · Prior to CT with dye would recommend holding losartan the day prior and day of the CT scan, Check kidney blood test again a few days prior to CT scan and again 2-3 days after the CT scan   · Repeat labs in 4 months and follow up after that  HPI: Jillian Juarez is a 59 y o  male who is here for CKD follow up   Recently found to have nodule in throat which was found to be thyroid nodule and biopsy was suspicions for Hurthle cell neoplasm but Afirma was negative for malignancy so he is going to have continued surveillance with repeat thyroid ultrasound next year  Otherwise he is doing well  He continues to follow a healthy diet and bikes for exercise and overall stays very active  He denies any recent chest pain, shortness of breath, nausea, vomiting or diarrhea  He has no hematuria or foaming in his urine  He has no issues with edema    He does not use any NSAIDs  ROS:   A complete review of systems was done  Pertinent positives and negatives as noted in the HPI, otherwise the review of systems is negative  Allergies: Patient has no known allergies      Medications:   Current Outpatient Medications:     aspirin 81 MG tablet, Take 1 tablet by mouth daily, Disp: , Rfl:     atorvastatin (LIPITOR) 20 mg tablet, Take 1 tablet (20 mg total) by mouth daily, Disp: 90 tablet, Rfl: 1    losartan (COZAAR) 25 mg tablet, Take 1 tablet (25 mg total) by mouth daily, Disp: 90 tablet, Rfl: 3    multivitamin (THERAGRAN) TABS, Take 1 tablet by mouth every other day, Disp: , Rfl:     Omega-3 Fatty Acids (FISH OIL) 1200 MG CAPS, Take by mouth , Disp: , Rfl:     Past Medical History:   Diagnosis Date    Aneur[address]m [address] [facility] ) 2017    behind right eye-    Arthritis     Cancer ([facility] [facility] ) 10/13/2019    kidney    CKD (chronic kidney disease)     Diverticulitis of colon 10/13/2019    Diverticulitis of large intestine with perforation without bleeding 10/13/2019    Hyperlipidemia     Hypertension     Inflammatory bowel disease 10/13/2019    Renal cell cancer, left ([address] ) 10/13/2019    Renal cell carcinoma ([facility] [address] )      Past Surgical History:   Procedure Laterality Date    COLONOSCOPY  2015    HERNIA REPAIR      HIP SURGERY Right     age 4    JOINT REPLACEMENT Right 2011    TKR    [place] LAP, RADICAL NEPHRECTOMY Left 12/30/2019    Procedure: NEPHRECTOMY LAPAROSCOPIC HAND ASSISTED;  Surgeon: Nandini Sears MD;  [facility];  Service: Urology    [place] LAP,SURG,COLECTOMY, PARTIAL, Bobby Coleand N/A 12/30/2019    Procedure: LAPAROSCOPIC HAND-ASSISTED SIGMOID COLECTOMY; LAPAROSCOPIC MOBILIZATION OF SPLENIC FLEXURE;  Surgeon: Oracio العراقي MD;  [facility];  Service: Colorectal    REPLACEMENT TOTAL KNEE Right 2011    REPLACEMENT TOTAL KNEE      SHOULDER SURGERY Left     SHOULDER SURGERY Right     US GUIDED THYROID BIOPSY  7/9/2021    WRIST FUSION Left 2016    WRIST SURGERY       Family History   Problem Relation Age of Onset    Heart disease Father     Other Father         cardiac disorder    No Known Problems Mother     Heart attack Maternal Grandfather     Heart attack Paternal Grandfather     Thyroid cancer Son     Colon cancer Neg Hx       reports that he has never smoked  He has never used smokeless tobacco  He reports current alcohol use of about 2 0 standard drinks of alcohol per week  He reports that he does not use drugs  Physical Exam:   Vitals:    09/13/21 1451   BP: 110/70   BP Location: Left arm   Patient Position: Sitting   Cuff Size: Adult   Pulse: 62   Weight: 69 3 kg (152 lb 12 8 oz)   Height: 5' 5" (1 651 m)     Body mass index is 25 43 kg/m²      General: no acute distress   Eyes: conjunctivae pink, anicteric sclerae  ENT: mucous membranes moist  Neck: supple, no JVD  Chest: clear to auscultation bilaterally with no wheezes, rale or rhochi  CVS: regular rate and rhythm   Abdomen: soft, non-tender, non-distended  Extremities: no lower extremity edema   Skin: no rash  Neuro: awake and alert       Lab Results:  Results for orders placed or performed in visit on 48/05/31   Basic metabolic panel   Result Value Ref Range    Sodium 138 136 - 145 mmol/L    Potassium 4 4 3 5 - 5 3 mmol/L    Chloride 107 100 - 108 mmol/L    CO2 29 21 - 32 mmol/L    ANION GAP 2 (L) 4 - 13 mmol/L    BUN 29 (H) 5 - 25 mg/dL    Creatinine 1 34 (H) 0 60 - 1 30 mg/dL    Glucose 76 65 - 140 mg/dL    Calcium 9 1 8 3 - 10 1 mg/dL    eGFR 56 ml/min/1 73sq m   Magnesium   Result Value Ref Range    Magnesium 2 5 1 6 - 2 6 mg/dL   Phosphorus   Result Value Ref Range    Phosphorus 3 7 2 3 - 4 1 mg/dL   CBC   Result Value Ref Range    WBC 5 63 4 31 - 10 16 Thousand/uL    RBC 4 68 3 88 - 5 62 Million/uL    Hemoglobin 14 6 12 0 - 17 0 g/dL    Hematocrit 44 3 36 5 - 49 3 %    MCV 95 82 - 98 fL    MCH 31 2 26 8 - 34 3 pg    MCHC 33 0 31 4 - 37 4 g/dL    RDW 12 8 11 6 - 15 1 %    Platelets 796 149 - 390 Thousands/uL    MPV 10 1 8 9 - 12 7 fL   Protein / creatinine ratio, urine   Result Value Ref Range    Creatinine, Ur 18 4 mg/dL    Protein Urine Random <6 mg/dL    Prot/Creat Ratio, Ur <0 33 (H) 0 00 - 0 10   Urinalysis with microscopic   Result Value Ref Range    Clarity, UA Clear     Color, UA Yellow     Specific Gravity, UA 1 006 1 003 - 1 030    pH, UA 6 5 4 5, 5 0, 5 5, 6 0, 6 5, 7 0, 7 5, 8 0    Glucose, UA Negative Negative mg/dl    Ketones, UA Negative Negative mg/dl    Blood, UA Negative Negative    Protein, UA Negative Negative mg/dl    Nitrite, UA Negative Negative    Bilirubin, UA Negative Negative    Urobilinogen, UA 0 2 0 2, 1 0 E U /dl E U /dl    Leukocytes, UA Negative Negative    WBC, UA None Seen None Seen, 2-4, 5-60 /hpf    RBC, UA None Seen None Seen, 2-4 /hpf    Hyaline Casts, UA None Seen None Seen /lpf    Bacteria, UA None Seen None Seen, Occasional /hpf    Epithelial Cells None Seen None Seen, Occasional /hpf   PTH, intact   Result Value Ref Range    PTH 39 1 18 4 - 80 1 pg/mL             Invalid input(s): ALBUMIN      Portions of the record may have been created with voice recognition software  Occasional wrong word or "sound a like" substitutions may have occurred due to the inherent limitations of voice recognition software  Read the chart carefully and recognize, using context, where substitutions have occurred  If you have any questions, please contact the dictating provider

## 2021-09-13 NOTE — PATIENT INSTRUCTIONS
· Avoid all NSAIDs (ibuprofen, Motrin, Aleve, Advil, Naproxen   ) but it is ok to take tylenol   · Prior to CT with dye would recommend holding losartan the day prior and day of the CT scan, Check kidney blood test again a few days prior to CT scan and again 2-3 days after the CT scan   · Repeat labs in 4 months and follow up after that

## 2021-12-22 ENCOUNTER — TELEPHONE (OUTPATIENT)
Dept: INTERNAL MEDICINE CLINIC | Facility: OTHER | Age: 61
End: 2021-12-22

## 2021-12-22 DIAGNOSIS — N18.30 BENIGN HYPERTENSION WITH CHRONIC KIDNEY DISEASE, STAGE III (HCC): ICD-10-CM

## 2021-12-22 DIAGNOSIS — I12.9 BENIGN HYPERTENSION WITH CHRONIC KIDNEY DISEASE, STAGE III (HCC): ICD-10-CM

## 2021-12-22 DIAGNOSIS — E78.2 MIXED HYPERLIPIDEMIA: ICD-10-CM

## 2021-12-22 RX ORDER — LOSARTAN POTASSIUM 25 MG/1
25 TABLET ORAL DAILY
Qty: 90 TABLET | Refills: 1 | Status: SHIPPED | OUTPATIENT
Start: 2021-12-22 | End: 2022-06-13 | Stop reason: SDUPTHER

## 2021-12-22 RX ORDER — ATORVASTATIN CALCIUM 20 MG/1
20 TABLET, FILM COATED ORAL DAILY
Qty: 90 TABLET | Refills: 1 | Status: SHIPPED | OUTPATIENT
Start: 2021-12-22 | End: 2022-06-13 | Stop reason: SDUPTHER

## 2022-01-07 ENCOUNTER — RA CDI HCC (OUTPATIENT)
Dept: OTHER | Facility: HOSPITAL | Age: 62
End: 2022-01-07

## 2022-01-07 NOTE — PROGRESS NOTES
[facility] [address] [facility]  coding opportunities          Number of diagnosis code(s) already on the problem list added to FYI flag: 3                     Patients insurance company: [employer] (Medicare Advantage and Commercial)     Visit status: Patient canceled the appointment        [facility] [address]  coding opportunities          Number of diagnosis code(s) already on the problem list added to [employer] flag: 3                     Patients insurance company: [employer] [address] [place] [address] ([employer])           With the beginning of the new year, this is a reminder to address ALL HCC (risk adjustment) codes for 2022 as patient scores reset to zero JOSE    1) I12 9, N18 30 Benign hypertension with chronic kidney disease, stage III (HCC)  2) N18 30 CKD (chronic kidney disease) stage 3, GFR 30-59 ml/min (HCC)  3) C64 2 Malignant neoplasm of left kidney, except renal pelvis ([facility] [address] )

## 2022-01-15 ENCOUNTER — IMMUNIZATIONS (OUTPATIENT)
Dept: FAMILY MEDICINE CLINIC | Facility: HOSPITAL | Age: 62
End: 2022-01-15

## 2022-01-15 DIAGNOSIS — Z23 ENCOUNTER FOR IMMUNIZATION: Primary | ICD-10-CM

## 2022-01-15 PROCEDURE — 91300 COVID-19 PFIZER VACC 0.3 ML: CPT

## 2022-01-15 PROCEDURE — 0001A COVID-19 PFIZER VACC 0.3 ML: CPT

## 2022-01-27 ENCOUNTER — OFFICE VISIT (OUTPATIENT)
Dept: INTERNAL MEDICINE CLINIC | Age: 62
End: 2022-01-27
Payer: COMMERCIAL

## 2022-01-27 VITALS
TEMPERATURE: 97.8 F | WEIGHT: 153.8 LBS | BODY MASS INDEX: 24.72 KG/M2 | SYSTOLIC BLOOD PRESSURE: 114 MMHG | DIASTOLIC BLOOD PRESSURE: 76 MMHG | OXYGEN SATURATION: 98 % | HEART RATE: 70 BPM | HEIGHT: 66 IN

## 2022-01-27 DIAGNOSIS — M25.552 HIP PAIN, BILATERAL: ICD-10-CM

## 2022-01-27 DIAGNOSIS — I12.9 BENIGN HYPERTENSION WITH CHRONIC KIDNEY DISEASE, STAGE III (HCC): ICD-10-CM

## 2022-01-27 DIAGNOSIS — J45.20 MILD INTERMITTENT ASTHMATIC BRONCHITIS WITHOUT COMPLICATION: ICD-10-CM

## 2022-01-27 DIAGNOSIS — E04.1 THYROID NODULE: Primary | ICD-10-CM

## 2022-01-27 DIAGNOSIS — M25.551 HIP PAIN, BILATERAL: ICD-10-CM

## 2022-01-27 DIAGNOSIS — I67.1 ANEURYSM OF CAVERNOUS PORTION OF RIGHT INTERNAL CAROTID ARTERY: ICD-10-CM

## 2022-01-27 DIAGNOSIS — N18.30 BENIGN HYPERTENSION WITH CHRONIC KIDNEY DISEASE, STAGE III (HCC): ICD-10-CM

## 2022-01-27 DIAGNOSIS — C64.2 RENAL CELL CANCER, LEFT (HCC): ICD-10-CM

## 2022-01-27 DIAGNOSIS — E78.2 MIXED HYPERLIPIDEMIA: ICD-10-CM

## 2022-01-27 PROBLEM — J45.909 ASTHMATIC BRONCHITIS: Status: ACTIVE | Noted: 2022-01-27

## 2022-01-27 PROCEDURE — 99214 OFFICE O/P EST MOD 30 MIN: CPT | Performed by: INTERNAL MEDICINE

## 2022-01-27 PROCEDURE — 3008F BODY MASS INDEX DOCD: CPT | Performed by: INTERNAL MEDICINE

## 2022-01-27 PROCEDURE — 1036F TOBACCO NON-USER: CPT | Performed by: INTERNAL MEDICINE

## 2022-01-27 PROCEDURE — 3725F SCREEN DEPRESSION PERFORMED: CPT | Performed by: INTERNAL MEDICINE

## 2022-01-27 RX ORDER — BUDESONIDE AND FORMOTEROL FUMARATE DIHYDRATE 160; 4.5 UG/1; UG/1
2 AEROSOL RESPIRATORY (INHALATION) 2 TIMES DAILY
Qty: 10.2 G | Refills: 1 | Status: SHIPPED | OUTPATIENT
Start: 2022-01-27 | End: 2022-01-28 | Stop reason: SDUPTHER

## 2022-01-27 RX ORDER — PREDNISONE 20 MG/1
20 TABLET ORAL DAILY
Qty: 5 TABLET | Refills: 0 | Status: SHIPPED | OUTPATIENT
Start: 2022-01-27 | End: 2022-01-28 | Stop reason: SDUPTHER

## 2022-01-27 NOTE — PROGRESS NOTES
Assessment/Plan:    1  Acute asthmatic bronchitis  Will prescribe prednisone 40 mg daily for 5 days and Symbicort inhaler 2 puff b i d   Can take over-the-counter Robitussin cough syrup if needed  2  Hyperlipidemia  Continue with Lipitor 20 mg daily  Will request lipid profile to be checked    3  Essential hypertension  Blood pressure is stable on present regimen    3  CKD stage 3  Renal function stable  Continue to monitor  Being followed by nephrologist    5  Thyroid nodule  Being followed by oncologic surgeon  Patient is due for repeat ultrasound of thyroid       Diagnoses and all orders for this visit:    Thyroid nodule    Benign hypertension with chronic kidney disease, stage III (HCC)    Renal cell cancer, left (HCC)    Hip pain, bilateral  -     XR hip/pelv 2-3 vws left if performed; Future  -     XR hip/pelv 2-3 vws right if performed; Future    Mixed hyperlipidemia  -     Lipid Panel with Direct LDL reflex; Future    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    Mild intermittent asthmatic bronchitis without complication  -     predniSONE 20 mg tablet; Take 1 tablet (20 mg total) by mouth daily for 5 days  -     budesonide-formoterol (Symbicort) 160-4 5 mcg/act inhaler; Inhale 2 puffs 2 (two) times a day Rinse mouth after use  Depression Screening and Follow-up Plan: Patient was screened for depression during today's encounter  They screened negative with a PHQ-2 score of 0  Subjective:          Patient ID: Hubert Chester is a 59 y o  male  Patient is a for regular follow-up  Also complaining of cough and occasional wheezing  Couple of weeks ago he developed URI which is better now but cough is persistence  Also complaining of bilateral hip pain worse with the ambulation        The following portions of the patient's history were reviewed and updated as appropriate: allergies, current medications, past family history, past medical history, past social history, past surgical history and problem list     Review of Systems   Constitutional: Negative for fatigue and fever  HENT: Negative for congestion, ear discharge, ear pain, postnasal drip, sinus pressure, sore throat, tinnitus and trouble swallowing  Eyes: Negative for discharge, itching and visual disturbance  Respiratory: Positive for cough  Negative for shortness of breath  Cardiovascular: Negative for chest pain and palpitations  Gastrointestinal: Negative for abdominal pain, diarrhea, nausea and vomiting  Endocrine: Negative for cold intolerance and polyuria  Genitourinary: Negative for difficulty urinating, dysuria and urgency  Musculoskeletal: Positive for arthralgias  Negative for neck pain  Skin: Negative for rash  Allergic/Immunologic: Negative for environmental allergies  Neurological: Negative for dizziness, weakness and headaches  Psychiatric/Behavioral: Negative for agitation and behavioral problems  The patient is not nervous/anxious            Past Medical History:   Diagnosis Date    Aneurysm ([place] [address] ) 2017    behind right eye-    Arthritis     Cancer ([place] [address] ) 10/13/2019    kidney    CKD (chronic kidney disease)     Diverticulitis of colon 10/13/2019    Diverticulitis of large intestine with perforation without bleeding 10/13/2019    Hyperlipidemia     Hypertension     Inflammatory bowel disease 10/13/2019    Renal cell cancer, left ([facility] ) 10/13/2019    Renal cell carcinoma (HCC)          Current Outpatient Medications:     aspirin 81 MG tablet, Take 1 tablet by mouth daily, Disp: , Rfl:     atorvastatin (LIPITOR) 20 mg tablet, Take 1 tablet (20 mg total) by mouth daily, Disp: 90 tablet, Rfl: 1    losartan (COZAAR) 25 mg tablet, Take 1 tablet (25 mg total) by mouth daily, Disp: 90 tablet, Rfl: 1    multivitamin (THERAGRAN) TABS, Take 1 tablet by mouth every other day, Disp: , Rfl:     Omega-3 Fatty Acids (FISH OIL) 1200 MG CAPS, Take by mouth , Disp: , Rfl:    budesonide-formoterol (Symbicort) 160-4 5 mcg/act inhaler, Inhale 2 puffs 2 (two) times a day Rinse mouth after use , Disp: 10 2 g, Rfl: 1    predniSONE 20 mg tablet, Take 1 tablet (20 mg total) by mouth daily for 5 days, Disp: 5 tablet, Rfl: 0    No Known Allergies    Social History   Past Surgical History:   Procedure Laterality Date    COLONOSCOPY  2015    HERNIA REPAIR      HIP SURGERY Right     age 4    JOINT REPLACEMENT Right 2011    TKR    IN LAP, RADICAL NEPHRECTOMY Left 12/30/2019    Procedure: NEPHRECTOMY LAPAROSCOPIC HAND ASSISTED;  Surgeon: Carroll Henao MD;  [facility];  Service: Urology    IN LAP,SURG,COLECTOMY, PARTIAL, Emanuel Rakers N/A 12/30/2019    Procedure: LAPAROSCOPIC HAND-ASSISTED SIGMOID COLECTOMY; LAPAROSCOPIC MOBILIZATION OF SPLENIC FLEXURE;  Surgeon: Regina Farmer MD;  [facility];  Service: Colorectal    REPLACEMENT TOTAL KNEE Right 2011    REPLACEMENT TOTAL KNEE      SHOULDER SURGERY Left     SHOULDER SURGERY Right     US GUIDED THYROID BIOPSY  7/9/2021    WRIST FUSION Left 2016    WRIST SURGERY       Family History   Problem Relation Age of Onset    Heart disease Father     Other Father         cardiac disorder    No Known Problems Mother     Heart attack Maternal Grandfather     Heart attack Paternal Grandfather     Thyroid cancer Son     Colon cancer Neg Hx        Objective:  /76 (BP Location: Left arm, Patient Position: Sitting, Cuff Size: Standard)   Pulse 70   Temp 97 8 °F (36 6 °C) (Tympanic)   Ht 5' 6 5" (1 689 m)   Wt 69 8 kg (153 lb 12 8 oz)   SpO2 98%   BMI 24 46 kg/m²   Body mass index is 24 46 kg/m²  Physical Exam  Constitutional:       Appearance: He is well-developed  HENT:      Head: Normocephalic  Right Ear: External ear normal       Left Ear: External ear normal       Nose: No rhinorrhea  Mouth/Throat:      Pharynx: No posterior oropharyngeal erythema  Eyes:      General: No scleral icterus  Pupils: Pupils are equal, round, and reactive to light  Neck:      Thyroid: No thyromegaly  Trachea: No tracheal deviation  Cardiovascular:      Rate and Rhythm: Normal rate and regular rhythm  Heart sounds: Normal heart sounds  No murmur heard  Pulmonary:      Effort: Pulmonary effort is normal  No respiratory distress  Breath sounds: Rhonchi present  Chest:      Chest wall: No tenderness  Abdominal:      General: Bowel sounds are normal       Palpations: Abdomen is soft  There is no mass  Tenderness: There is no abdominal tenderness  Musculoskeletal:         General: Normal range of motion  Cervical back: Normal range of motion and neck supple  Right lower leg: No edema  Left lower leg: No edema  Lymphadenopathy:      Cervical: No cervical adenopathy  Skin:     General: Skin is warm  Neurological:      Mental Status: He is alert and oriented to person, place, and time  Cranial Nerves: No cranial nerve deficit  Psychiatric:         Mood and Affect: Mood normal          Behavior: Behavior normal          Thought Content:  Thought content normal

## 2022-01-28 ENCOUNTER — APPOINTMENT (OUTPATIENT)
Dept: LAB | Age: 62
End: 2022-01-28
Payer: COMMERCIAL

## 2022-01-28 ENCOUNTER — TELEPHONE (OUTPATIENT)
Dept: INTERNAL MEDICINE CLINIC | Age: 62
End: 2022-01-28

## 2022-01-28 ENCOUNTER — APPOINTMENT (OUTPATIENT)
Dept: RADIOLOGY | Age: 62
End: 2022-01-28
Payer: COMMERCIAL

## 2022-01-28 DIAGNOSIS — M25.552 HIP PAIN, BILATERAL: ICD-10-CM

## 2022-01-28 DIAGNOSIS — M25.551 HIP PAIN, BILATERAL: ICD-10-CM

## 2022-01-28 DIAGNOSIS — N18.31 STAGE 3A CHRONIC KIDNEY DISEASE (HCC): ICD-10-CM

## 2022-01-28 DIAGNOSIS — E78.2 MIXED HYPERLIPIDEMIA: ICD-10-CM

## 2022-01-28 DIAGNOSIS — J45.20 MILD INTERMITTENT ASTHMATIC BRONCHITIS WITHOUT COMPLICATION: ICD-10-CM

## 2022-01-28 DIAGNOSIS — Z12.5 SCREENING FOR PROSTATE CANCER: ICD-10-CM

## 2022-01-28 LAB
25(OH)D3 SERPL-MCNC: 39 NG/ML (ref 30–100)
ANION GAP SERPL CALCULATED.3IONS-SCNC: 2 MMOL/L (ref 4–13)
BACTERIA UR QL AUTO: NORMAL /HPF
BILIRUB UR QL STRIP: NEGATIVE
BUN SERPL-MCNC: 19 MG/DL (ref 5–25)
CALCIUM SERPL-MCNC: 9.3 MG/DL (ref 8.3–10.1)
CHLORIDE SERPL-SCNC: 108 MMOL/L (ref 100–108)
CHOLEST SERPL-MCNC: 139 MG/DL
CLARITY UR: CLEAR
CO2 SERPL-SCNC: 31 MMOL/L (ref 21–32)
COLOR UR: YELLOW
CREAT SERPL-MCNC: 1.49 MG/DL (ref 0.6–1.3)
CREAT UR-MCNC: 131 MG/DL
ERYTHROCYTE [DISTWIDTH] IN BLOOD BY AUTOMATED COUNT: 12 % (ref 11.6–15.1)
GFR SERPL CREATININE-BSD FRML MDRD: 49 ML/MIN/1.73SQ M
GLUCOSE P FAST SERPL-MCNC: 98 MG/DL (ref 65–99)
GLUCOSE UR STRIP-MCNC: NEGATIVE MG/DL
HCT VFR BLD AUTO: 45.1 % (ref 36.5–49.3)
HDLC SERPL-MCNC: 34 MG/DL
HGB BLD-MCNC: 14.5 G/DL (ref 12–17)
HGB UR QL STRIP.AUTO: NEGATIVE
HYALINE CASTS #/AREA URNS LPF: NORMAL /LPF
KETONES UR STRIP-MCNC: NEGATIVE MG/DL
LDLC SERPL CALC-MCNC: 89 MG/DL (ref 0–100)
LEUKOCYTE ESTERASE UR QL STRIP: NEGATIVE
MAGNESIUM SERPL-MCNC: 2.5 MG/DL (ref 1.6–2.6)
MCH RBC QN AUTO: 29.8 PG (ref 26.8–34.3)
MCHC RBC AUTO-ENTMCNC: 32.2 G/DL (ref 31.4–37.4)
MCV RBC AUTO: 93 FL (ref 82–98)
NITRITE UR QL STRIP: NEGATIVE
NON-SQ EPI CELLS URNS QL MICRO: NORMAL /HPF
PH UR STRIP.AUTO: 6.5 [PH]
PHOSPHATE SERPL-MCNC: 3.2 MG/DL (ref 2.3–4.1)
PLATELET # BLD AUTO: 222 THOUSANDS/UL (ref 149–390)
PMV BLD AUTO: 9.9 FL (ref 8.9–12.7)
POTASSIUM SERPL-SCNC: 4.7 MMOL/L (ref 3.5–5.3)
PROT UR STRIP-MCNC: NEGATIVE MG/DL
PROT UR-MCNC: 12 MG/DL
PROT/CREAT UR: 0.09 MG/G{CREAT} (ref 0–0.1)
PSA SERPL-MCNC: 0.4 NG/ML (ref 0–4)
PTH-INTACT SERPL-MCNC: 36 PG/ML (ref 18.4–80.1)
RBC # BLD AUTO: 4.86 MILLION/UL (ref 3.88–5.62)
RBC #/AREA URNS AUTO: NORMAL /HPF
SODIUM SERPL-SCNC: 141 MMOL/L (ref 136–145)
SP GR UR STRIP.AUTO: 1.01 (ref 1–1.03)
TRIGL SERPL-MCNC: 78 MG/DL
UROBILINOGEN UR QL STRIP.AUTO: 0.2 E.U./DL
WBC # BLD AUTO: 4.45 THOUSAND/UL (ref 4.31–10.16)
WBC #/AREA URNS AUTO: NORMAL /HPF

## 2022-01-28 PROCEDURE — 84100 ASSAY OF PHOSPHORUS: CPT

## 2022-01-28 PROCEDURE — 83970 ASSAY OF PARATHORMONE: CPT

## 2022-01-28 PROCEDURE — 84156 ASSAY OF PROTEIN URINE: CPT

## 2022-01-28 PROCEDURE — 83735 ASSAY OF MAGNESIUM: CPT

## 2022-01-28 PROCEDURE — G0103 PSA SCREENING: HCPCS

## 2022-01-28 PROCEDURE — 80061 LIPID PANEL: CPT

## 2022-01-28 PROCEDURE — 80048 BASIC METABOLIC PNL TOTAL CA: CPT

## 2022-01-28 PROCEDURE — 82570 ASSAY OF URINE CREATININE: CPT

## 2022-01-28 PROCEDURE — 85027 COMPLETE CBC AUTOMATED: CPT

## 2022-01-28 PROCEDURE — 82306 VITAMIN D 25 HYDROXY: CPT

## 2022-01-28 PROCEDURE — 81001 URINALYSIS AUTO W/SCOPE: CPT

## 2022-01-28 PROCEDURE — 73521 X-RAY EXAM HIPS BI 2 VIEWS: CPT

## 2022-01-28 PROCEDURE — 36415 COLL VENOUS BLD VENIPUNCTURE: CPT

## 2022-01-28 RX ORDER — PREDNISONE 20 MG/1
20 TABLET ORAL DAILY
Qty: 5 TABLET | Refills: 0 | Status: SHIPPED | OUTPATIENT
Start: 2022-01-28 | End: 2022-02-02

## 2022-01-28 RX ORDER — BUDESONIDE AND FORMOTEROL FUMARATE DIHYDRATE 160; 4.5 UG/1; UG/1
2 AEROSOL RESPIRATORY (INHALATION) 2 TIMES DAILY
Qty: 10.2 G | Refills: 1 | Status: SHIPPED | OUTPATIENT
Start: 2022-01-28 | End: 2022-07-25 | Stop reason: HOSPADM

## 2022-01-28 NOTE — TELEPHONE ENCOUNTER
Patient needs his 2 medications sent to [facility] [address]  They were sent to mail order  Please resend

## 2022-02-17 ENCOUNTER — OFFICE VISIT (OUTPATIENT)
Dept: NEPHROLOGY | Facility: CLINIC | Age: 62
End: 2022-02-17
Payer: COMMERCIAL

## 2022-02-17 VITALS
BODY MASS INDEX: 24.11 KG/M2 | HEIGHT: 67 IN | HEART RATE: 67 BPM | SYSTOLIC BLOOD PRESSURE: 122 MMHG | DIASTOLIC BLOOD PRESSURE: 85 MMHG | WEIGHT: 153.6 LBS

## 2022-02-17 DIAGNOSIS — Z90.5 SOLITARY KIDNEY, ACQUIRED: ICD-10-CM

## 2022-02-17 DIAGNOSIS — R80.1 PERSISTENT PROTEINURIA: ICD-10-CM

## 2022-02-17 DIAGNOSIS — I12.9 BENIGN HYPERTENSION WITH CHRONIC KIDNEY DISEASE, STAGE III (HCC): ICD-10-CM

## 2022-02-17 DIAGNOSIS — N18.31 STAGE 3A CHRONIC KIDNEY DISEASE (HCC): Primary | ICD-10-CM

## 2022-02-17 DIAGNOSIS — N18.30 BENIGN HYPERTENSION WITH CHRONIC KIDNEY DISEASE, STAGE III (HCC): ICD-10-CM

## 2022-02-17 PROCEDURE — 3008F BODY MASS INDEX DOCD: CPT | Performed by: INTERNAL MEDICINE

## 2022-02-17 PROCEDURE — 1036F TOBACCO NON-USER: CPT | Performed by: INTERNAL MEDICINE

## 2022-02-17 PROCEDURE — 99214 OFFICE O/P EST MOD 30 MIN: CPT | Performed by: INTERNAL MEDICINE

## 2022-02-17 NOTE — PROGRESS NOTES
NEPHROLOGY OUTPATIENT PROGRESS NOTE   Argelia Amezcua 59 y o  male MRN: [id number]  DATE: 2/17/2022  Reason for visit:   Chief Complaint   Patient presents with    Follow-up    Chronic Kidney Disease       ASSESSMENT and PLAN:  Chronic kidney disease stage 3a  -baseline creatinine 1 2-1 4     -renal function was stable at cr 1 3 in 2021 but worsened on last blood work in [place] to cr 1 49  Possibility of prerenal vs CKD progression discussed with the patient  -stressed on oral hydration and will recheck in one month    -also noted plan for CT with iv contrast in July 2022, noted plan for pre and post contrast IV hydration  Will check BMP 2 weeks prior to CT with IV contrast repeat labs 1 week after the CT with contrast to monitor renal function  Discussed with patient about risk of contrast nephropathy and he verbalized understanding, also recommended to hold losartan on the day of IV contrast as well as the previous day to decrease the risk of contrast nephropathy   -Chronic kidney disease likely due to decreased nephron mass from left nephrectomy done for renal cell carcinoma left kidney  -preop creatinine was 0 8-0 9 but has been elevated and stable at  1 2-1 4  likely due to decreased nephron mass from left nephrectomy   -discussed about avoiding nephrotoxins like NSAIDs   -Patient will need renal prep with IV fluid before any CT with IV contrast and this was discussed with patient in detail    -Last  PTH within normal limit  -follow-up in 6 months with repeat blood work and urine test before the office visit      Primary Hypertension with CKD:   -Current medication: losartan 25 mg     -Current blood pressure: stable and at goal   -Plan:    ·  Continue losartan  -Recommend 2 g sodium diet      -Recommend daily exercise and weight loss  -Discussed home monitoring of BP and maintaining a log of blood pressure   -Recommend goal BP less than 130/80       Solitary right kidney  -status post left nephrectomy for renal cell carcinoma-done on 12/30/19 by Dr López Damon pathology results suggestive of renal cell carcinoma-conventional clear cell type    -continue follow-up with Urology, last visit was in August 2021, noted plan for annual surveillance with CT with and without contrast and noted plan for pre and post contrast IV hydration      Persistent proteinuria  - proteinuria improving from upc ratio less than 0 33 to last upc ratio of 0 09 and UA was bland, continue to monitor, proteinuria improving with use of losartan, continue losartan at current dose and continue to monitor renal function    - proteinuria likely due to HTN as well as from compensatory hypertrophy in solitary kidney   -  Recommended goal blood pressure less than 130/80    CKD/MBD:  Phosphorus is at normal range, PTH is at normal range at 36 0, continue to monitor     Hyperlipidemia:   -Lipid panel improving   -last lipid panel from January 2022 showed LDL at goal at 89, triglyceride 78  -continue statin as well as fish oil per PCP  -recommend goal LDL less than 100, continue monitoring and management per PCP  -stressed on daily exercise    Patient Instructions   -Renal Function is relatively stable, slight increase in cr   -You have Chronic Kidney Disease Stage 3  -Avoid NSAIDs like Ibuprofen/Motrin, Naproxen/Aleve, Celebrex, meloxicam/Mobic, Diclofenac and other NSAIDs   -Okay to take Acetaminophen/Tylenol if you do not have any liver problems  -Avoid IV contrast used for CT scan and cardiac catheterization     -If plan for any study with IV contrast, please let me know so we could hydrate with fluids before and after IV contrast  -Dosage  of certain medications may need to be adjusted depending on Kidney function  Stressed on oral hydration     BP stable    BMP in one month     BMP 1-2 weeks  before CT in July  BMP and other labs one week after CT with contrast in July  Follow up in august     Diagnoses and all orders for this visit:    Stage 3a chronic kidney disease ([facility] )  -     Basic metabolic panel; Future  -     Basic metabolic panel; Future  -     Basic metabolic panel; Future  -     Magnesium; Future  -     Phosphorus; Future  -     Protein / creatinine ratio, urine; Future  -     Urinalysis with microscopic; Future  -     PTH, intact; Future    Benign hypertension with chronic kidney disease, stage III (HCC)  -     Basic metabolic panel; Future  -     Basic metabolic panel; Future    Solitary kidney, acquired  -     Basic metabolic panel; Future    Persistent proteinuria  -     Protein / creatinine ratio, urine; Future  -     Urinalysis with microscopic; Future            SUBJECTIVE / HPI:  Nohemi Mccauley is a 59 y o   male who underwent elective laparoscopy hand assisted sigmoid resection and left nephrectomy for complicated diverticular disease and left renal cell carcinoma on 12/30/2019   Nephrology was consulted for increased creatinine postop   Preoperative creatinine was 0 9   Postop creatinine stabilized at 1 4 and elevated creatinine was thought to be due to nephron loss  Since then renal function has been stable at creatinine 1 2-1 3  Reviewed blood work from 01/28/2022, creatinine was 1 49 with stable electrolytes, slight increase in creatinine since last blood work in August 2021 when it was 1 3, patient denies any new symptoms        REVIEW OF SYSTEMS:    Review of Systems   Constitutional: Negative for activity change, appetite change, chills, diaphoresis, fatigue and fever  HENT: Negative for congestion, facial swelling and nosebleeds  Eyes: Negative for pain and visual disturbance  Respiratory: Negative for cough, chest tightness and shortness of breath  Cardiovascular: Negative for chest pain and palpitations  Gastrointestinal: Negative for abdominal distention, abdominal pain, diarrhea, nausea and vomiting  Genitourinary: Negative for difficulty urinating, dysuria, flank pain, frequency and hematuria  Musculoskeletal: Negative for arthralgias, back pain and joint swelling  Skin: Negative for rash  Neurological: Negative for dizziness, seizures, syncope, weakness and headaches  Psychiatric/Behavioral: Negative for agitation and confusion  The patient is not nervous/anxious  More than 10 point review of systems were obtained and discussed in length with the patient  Complete review of systems were negative / unremarkable except mentioned above         PAST MEDICAL HISTORY:  Past Medical History:   Diagnosis Date    Aneurysm ([facility] ) 2017    behind right eye-    Arthritis     Cancer ([facility] ) 10/13/2019    kidney    Chronic kidney disease 1/7/2020    stage 3    CKD (chronic kidney disease)     Diverticulitis of colon 10/13/2019    Diverticulitis of large intestine with perforation without bleeding 10/13/2019    Hyperlipidemia     Hypertension     Inflammatory bowel disease 10/13/2019    Renal cell cancer, left ([facility] ) 10/13/2019    Renal cell carcinoma ([facility] )        PAST SURGICAL HISTORY:  Past Surgical History:   Procedure Laterality Date    COLONOSCOPY  2015    HERNIA REPAIR      HIP SURGERY Right     age 4    JOINT REPLACEMENT Right 2011    TKR    NEPHRECTOMY  [id number]    left    [place] LAP, RADICAL NEPHRECTOMY Left 12/30/2019    Procedure: NEPHRECTOMY LAPAROSCOPIC HAND ASSISTED;  Surgeon: Darci Metcalf MD;  [facility];  Service: Urology    [place] LAP,SURG,COLECTOMY, PARTIAL, Rise Boros N/A 12/30/2019    Procedure: LAPAROSCOPIC HAND-ASSISTED SIGMOID COLECTOMY; LAPAROSCOPIC MOBILIZATION OF SPLENIC FLEXURE;  Surgeon: Kendrick Ahumada, MD;  [facility];  Service: Colorectal    REPLACEMENT TOTAL KNEE Right 2011    REPLACEMENT TOTAL KNEE      SHOULDER SURGERY Left     SHOULDER SURGERY Right     US GUIDED THYROID BIOPSY  7/9/2021    WRIST FUSION Left 2016    WRIST SURGERY         SOCIAL HISTORY:  Social History     Substance and Sexual Activity   Alcohol Use Yes    Alcohol/week: 2 0 standard drinks    Types: 2 Cans of beer per week     Social History     Substance and Sexual Activity   Drug Use No     Social History     Tobacco Use   Smoking Status Never Smoker   Smokeless Tobacco Never Used       FAMILY HISTORY:  Family History   Problem Relation Age of Onset    Heart disease Father     Other Father         cardiac disorder    No Known Problems Mother     Heart attack Maternal Grandfather     Heart attack Paternal Grandfather     Thyroid cancer Son     Colon cancer Neg Hx        MEDICATIONS:    Current Outpatient Medications:     aspirin 81 MG tablet, Take 1 tablet by mouth daily, Disp: , Rfl:     atorvastatin (LIPITOR) 20 mg tablet, Take 1 tablet (20 mg total) by mouth daily, Disp: 90 tablet, Rfl: 1    budesonide-formoterol (Symbicort) 160-4 5 mcg/act inhaler, Inhale 2 puffs 2 (two) times a day Rinse mouth after use  (Patient taking differently: Inhale 2 puffs if needed Rinse mouth after use  ), Disp: 10 2 g, Rfl: 1    losartan (COZAAR) 25 mg tablet, Take 1 tablet (25 mg total) by mouth daily, Disp: 90 tablet, Rfl: 1    multivitamin (THERAGRAN) TABS, Take 1 tablet by mouth every other day, Disp: , Rfl:     Omega-3 Fatty Acids (FISH OIL) 1200 MG CAPS, Take by mouth , Disp: , Rfl:       PHYSICAL EXAM:  Vitals:    02/17/22 1602   BP: 122/85   BP Location: Left arm   Patient Position: Sitting   Cuff Size: Standard   Pulse: 67   Weight: 69 7 kg (153 lb 9 6 oz)   Height: 5' 6 5" (1 689 m)     Body mass index is 24 42 kg/m²  Physical Exam  Constitutional:       General: He is not in acute distress  Appearance: He is well-developed  He is not diaphoretic  HENT:      Head: Normocephalic and atraumatic  Mouth/Throat:      Mouth: Mucous membranes are moist    Eyes:      General: No scleral icterus  Conjunctiva/sclera: Conjunctivae normal       Pupils: Pupils are equal, round, and reactive to light  Neck:      Thyroid: No thyromegaly     Cardiovascular: Rate and Rhythm: Normal rate and regular rhythm  Heart sounds: Normal heart sounds  No murmur heard  No friction rub  Pulmonary:      Effort: Pulmonary effort is normal  No respiratory distress  Breath sounds: Normal breath sounds  No wheezing or rales  Abdominal:      General: Bowel sounds are normal  There is no distension  Palpations: Abdomen is soft  Tenderness: There is no abdominal tenderness  Musculoskeletal:         General: No deformity  Cervical back: Neck supple  Right lower leg: No edema  Left lower leg: No edema  Lymphadenopathy:      Cervical: No cervical adenopathy  Skin:     Coloration: Skin is not pale  Nails: There is no clubbing  Neurological:      Mental Status: He is alert and oriented to person, place, and time  He is not disoriented  Psychiatric:         Mood and Affect: Mood normal  Mood is not anxious  Affect is not inappropriate  Behavior: Behavior normal          Thought Content:  Thought content normal           Lab Results:   Results for orders placed or performed in visit on 01/28/22   PSA, Total Screen   Result Value Ref Range    PSA 0 4 0 0 - 4 0 ng/mL   Basic metabolic panel   Result Value Ref Range    Sodium 141 136 - 145 mmol/L    Potassium 4 7 3 5 - 5 3 mmol/L    Chloride 108 100 - 108 mmol/L    CO2 31 21 - 32 mmol/L    ANION GAP 2 (L) 4 - 13 mmol/L    BUN 19 5 - 25 mg/dL    Creatinine 1 49 (H) 0 60 - 1 30 mg/dL    Glucose, Fasting 98 65 - 99 mg/dL    Calcium 9 3 8 3 - 10 1 mg/dL    eGFR 49 ml/min/1 73sq m   CBC   Result Value Ref Range    WBC 4 45 4 31 - 10 16 Thousand/uL    RBC 4 86 3 88 - 5 62 Million/uL    Hemoglobin 14 5 12 0 - 17 0 g/dL    Hematocrit 45 1 36 5 - 49 3 %    MCV 93 82 - 98 fL    MCH 29 8 26 8 - 34 3 pg    MCHC 32 2 31 4 - 37 4 g/dL    RDW 12 0 11 6 - 15 1 %    Platelets 325 852 - 358 Thousands/uL    MPV 9 9 8 9 - 12 7 fL   Protein / creatinine ratio, urine   Result Value Ref Range    Creatinine, Ur 131 0 mg/dL    Protein Urine Random 12 mg/dL    Prot/Creat Ratio, Ur 0 09 0 00 - 0 10   PTH, intact   Result Value Ref Range    PTH 36 0 18 4 - 80 1 pg/mL   Urinalysis with microscopic   Result Value Ref Range    Clarity, UA Clear     Color, UA Yellow     Specific [place], UA 1 010 1 003 - 1 030    pH, UA 6 5 4 5, 5 0, 5 5, 6 0, 6 5, 7 0, 7 5, 8 0    Glucose, UA Negative Negative mg/dl    Ketones, UA Negative Negative mg/dl    Blood, UA Negative     Protein, UA Negative Negative mg/dl    Nitrite, UA Negative Negative    Bilirubin, UA Negative Negative    Urobilinogen, UA 0 2 0 2, 1 0 E U /dl E U /dl    Leukocytes, UA Negative Negative    WBC, UA None Seen None Seen, 2-4, 5-60 /hpf    RBC, UA None Seen None Seen, 2-4 /hpf    Hyaline Casts, UA None Seen None Seen /lpf    Bacteria, UA None Seen None Seen, Occasional /hpf    Epithelial Cells None Seen None Seen, Occasional /hpf   Magnesium   Result Value Ref Range    Magnesium 2 5 1 6 - 2 6 mg/dL   Phosphorus   Result Value Ref Range    Phosphorus 3 2 2 3 - 4 1 mg/dL   Vitamin D 25 hydroxy   Result Value Ref Range    Vit D, 25-Hydroxy 39 0 30 0 - 100 0 ng/mL   Lipid Panel with Direct LDL reflex   Result Value Ref Range    Cholesterol 139 See Comment mg/dL    Triglycerides 78 See Comment mg/dL    HDL, Direct 34 (L) >=40 mg/dL    LDL Calculated 89 0 - 100 mg/dL

## 2022-02-17 NOTE — PATIENT INSTRUCTIONS
-Renal Function is relatively stable, slight increase in cr   -You have Chronic Kidney Disease Stage 3  -Avoid NSAIDs like Ibuprofen/Motrin, Naproxen/Aleve, Celebrex, meloxicam/Mobic, Diclofenac and other NSAIDs   -Okay to take Acetaminophen/Tylenol if you do not have any liver problems  -Avoid IV contrast used for CT scan and cardiac catheterization     -If plan for any study with IV contrast, please let me know so we could hydrate with fluids before and after IV contrast  -Dosage  of certain medications may need to be adjusted depending on Kidney function  Stressed on oral hydration     BP stable    BMP in one month     BMP 1-2 weeks  before CT in July  BMP and other labs one week after CT with contrast in July  Follow up in august

## 2022-03-14 ENCOUNTER — APPOINTMENT (OUTPATIENT)
Dept: LAB | Age: 62
End: 2022-03-14
Payer: COMMERCIAL

## 2022-03-14 DIAGNOSIS — I12.9 BENIGN HYPERTENSION WITH CHRONIC KIDNEY DISEASE, STAGE III (HCC): ICD-10-CM

## 2022-03-14 DIAGNOSIS — N18.31 STAGE 3A CHRONIC KIDNEY DISEASE (HCC): ICD-10-CM

## 2022-03-14 DIAGNOSIS — N18.30 BENIGN HYPERTENSION WITH CHRONIC KIDNEY DISEASE, STAGE III (HCC): ICD-10-CM

## 2022-03-14 LAB
ANION GAP SERPL CALCULATED.3IONS-SCNC: 4 MMOL/L (ref 4–13)
BUN SERPL-MCNC: 19 MG/DL (ref 5–25)
CALCIUM SERPL-MCNC: 9.3 MG/DL (ref 8.3–10.1)
CHLORIDE SERPL-SCNC: 109 MMOL/L (ref 100–108)
CO2 SERPL-SCNC: 27 MMOL/L (ref 21–32)
CREAT SERPL-MCNC: 1.42 MG/DL (ref 0.6–1.3)
GFR SERPL CREATININE-BSD FRML MDRD: 52 ML/MIN/1.73SQ M
GLUCOSE SERPL-MCNC: 114 MG/DL (ref 65–140)
POTASSIUM SERPL-SCNC: 4.1 MMOL/L (ref 3.5–5.3)
SODIUM SERPL-SCNC: 140 MMOL/L (ref 136–145)

## 2022-03-14 PROCEDURE — 36415 COLL VENOUS BLD VENIPUNCTURE: CPT

## 2022-03-14 PROCEDURE — 80048 BASIC METABOLIC PNL TOTAL CA: CPT

## 2022-03-16 ENCOUNTER — TELEPHONE (OUTPATIENT)
Dept: NEPHROLOGY | Facility: CLINIC | Age: 62
End: 2022-03-16

## 2022-03-16 NOTE — TELEPHONE ENCOUNTER
Called patient and left a voicemail stating the following   Labs are stable, no changes if he has any questions to please contact the office

## 2022-03-16 NOTE — TELEPHONE ENCOUNTER
----- Message from Subhash Camejo PA-C sent at 3/15/2022  2:16 PM EDT -----  Please let patient know recent labs are stable

## 2022-05-12 ENCOUNTER — OFFICE VISIT (OUTPATIENT)
Dept: OBGYN CLINIC | Facility: OTHER | Age: 62
End: 2022-05-12
Payer: COMMERCIAL

## 2022-05-12 ENCOUNTER — APPOINTMENT (OUTPATIENT)
Dept: RADIOLOGY | Facility: OTHER | Age: 62
End: 2022-05-12
Payer: COMMERCIAL

## 2022-05-12 VITALS
SYSTOLIC BLOOD PRESSURE: 151 MMHG | HEART RATE: 60 BPM | BODY MASS INDEX: 25.2 KG/M2 | DIASTOLIC BLOOD PRESSURE: 80 MMHG | WEIGHT: 156.8 LBS | HEIGHT: 66 IN

## 2022-05-12 DIAGNOSIS — M25.512 LEFT SHOULDER PAIN, UNSPECIFIED CHRONICITY: ICD-10-CM

## 2022-05-12 DIAGNOSIS — M75.42 SUBACROMIAL IMPINGEMENT OF LEFT SHOULDER: Primary | ICD-10-CM

## 2022-05-12 PROCEDURE — 99204 OFFICE O/P NEW MOD 45 MIN: CPT | Performed by: ORTHOPAEDIC SURGERY

## 2022-05-12 PROCEDURE — 3008F BODY MASS INDEX DOCD: CPT | Performed by: ORTHOPAEDIC SURGERY

## 2022-05-12 PROCEDURE — 73030 X-RAY EXAM OF SHOULDER: CPT

## 2022-05-12 PROCEDURE — 20610 DRAIN/INJ JOINT/BURSA W/O US: CPT | Performed by: ORTHOPAEDIC SURGERY

## 2022-05-12 PROCEDURE — 1036F TOBACCO NON-USER: CPT | Performed by: ORTHOPAEDIC SURGERY

## 2022-05-12 RX ORDER — BETAMETHASONE SODIUM PHOSPHATE AND BETAMETHASONE ACETATE 3; 3 MG/ML; MG/ML
6 INJECTION, SUSPENSION INTRA-ARTICULAR; INTRALESIONAL; INTRAMUSCULAR; SOFT TISSUE
Status: COMPLETED | OUTPATIENT
Start: 2022-05-12 | End: 2022-05-12

## 2022-05-12 RX ORDER — BUPIVACAINE HYDROCHLORIDE 2.5 MG/ML
2 INJECTION, SOLUTION INFILTRATION; PERINEURAL
Status: COMPLETED | OUTPATIENT
Start: 2022-05-12 | End: 2022-05-12

## 2022-05-12 RX ADMIN — BETAMETHASONE SODIUM PHOSPHATE AND BETAMETHASONE ACETATE 6 MG: 3; 3 INJECTION, SUSPENSION INTRA-ARTICULAR; INTRALESIONAL; INTRAMUSCULAR; SOFT TISSUE at 08:50

## 2022-05-12 RX ADMIN — BUPIVACAINE HYDROCHLORIDE 2 ML: 2.5 INJECTION, SOLUTION INFILTRATION; PERINEURAL at 08:50

## 2022-05-12 NOTE — PATIENT INSTRUCTIONS

## 2022-05-12 NOTE — PROGRESS NOTES
Assessment  Diagnoses and all orders for this visit:    Subacromial impingement of left shoulder    Discussion and Plan:    · The patient has an examination consistent with subacromial impingement syndrome of the left shoulder  I have discussed with the patient the pathophysiology of this diagnosis and reviewed how the examination correlates with this diagnosis  Treatment options were discussed at length and after discussing these treatment options, the patient elected for and received a subacromial injection of corticosteroid (as described in the procedure note) with a prescription for referral to physical therapy  · We will reevaluate the patient in 6-8 weeks  If the symptoms fail to improve with this treatment the patient would be indicated for further imaging in the form of an MRI scan of the shoulder to evaluate for a possible rotator cuff re tear  Subjective:   Patient ID: Idris Dumont is a 59 y o  male    The patient presents with a chief complaint of left shoulder pain  The pain began several month(s) ago and is not associated with an acute injury  The patient describes the pain as aching and dull in intensity,  intermittent, occurring with increasing frequency, awakening patient from sleep in timing, and localizes the pain to the  left subacromial joint, deltoid  The pain is worse with overhead work, overuse and raising arm over head and relieved by rest, ice, avoiding the painful activities  The pain is not associated with numbness and tingling  The pain is not associated with constitutional symptoms  The patient is awoken at night by the pain  The patient has had prior treatment in the form of a rotator cuff repair about 15 years ago      The following portions of the patient's history were reviewed and updated as appropriate: allergies, current medications, past family history, past medical history, past social history, past surgical history and problem list     Objective:  /80 Pulse 60   Ht 5' 6" (1 676 m)   Wt 71 1 kg (156 lb 12 8 oz)   BMI 25 31 kg/m²       Left Shoulder Exam     Tenderness   The patient is experiencing no tenderness  Range of Motion   External rotation: 60   Forward flexion: 160   Internal rotation 0 degrees: Lumbar     Muscle Strength   Abduction: 4/5   External rotation: 5/5     Tests   Stallworth test: positive  Impingement: positive    Other   Erythema: absent  Sensation: normal  Pulse: present     Comments:  Anterior incision from prior open rotator cuff repair that is well healed            Physical Exam  Constitutional:       General: He is not in acute distress  Appearance: He is well-developed  Eyes:      Conjunctiva/sclera: Conjunctivae normal       Pupils: Pupils are equal, round, and reactive to light  Cardiovascular:      Rate and Rhythm: Normal rate and regular rhythm  Pulmonary:      Effort: Pulmonary effort is normal       Breath sounds: Normal breath sounds  Abdominal:      General: Bowel sounds are normal       Palpations: Abdomen is soft  Musculoskeletal:      Cervical back: Normal range of motion and neck supple  Skin:     General: Skin is warm and dry  Findings: No erythema or rash  Neurological:      Mental Status: He is alert and oriented to person, place, and time  Deep Tendon Reflexes: Reflexes are normal and symmetric  Psychiatric:         Behavior: Behavior normal        Large joint arthrocentesis: L subacromial bursa  Universal Protocol:  Consent: Verbal consent obtained  Risks and benefits: risks, benefits and alternatives were discussed  Consent given by: patient  Time out: Immediately prior to procedure a "time out" was called to verify the correct patient, procedure, equipment, support staff and site/side marked as required    Site marked: the operative site was marked  Supporting Documentation  Indications: pain and diagnostic evaluation   Procedure Details  Location: shoulder - L subacromial bursa  Preparation: Patient was prepped and draped in the usual sterile fashion  Needle size: 22 G  Ultrasound guidance: no  Approach: lateral  Medications administered: 2 mL bupivacaine 0 25 %; 6 mg betamethasone acetate-betamethasone sodium phosphate 6 (3-3) mg/mL    Patient tolerance: patient tolerated the procedure well with no immediate complications  Dressing:  Sterile dressing applied        I have personally reviewed pertinent films in PACS and my interpretation is as follows  X Ray Left Shoulder 5/12/2022: Evidence of prior rotator cuff surgery with early glenohumeral joint degenerative changes   No other acute osseous abnormalities     Scribe Attestation    I,:  Shantal Nelson am acting as a scribe while in the presence of the attending physician :       I,:  Manuel Jamison MD personally performed the services described in this documentation    as scribed in my presence :

## 2022-05-24 ENCOUNTER — TELEPHONE (OUTPATIENT)
Dept: NEPHROLOGY | Facility: CLINIC | Age: 62
End: 2022-05-24

## 2022-06-09 ENCOUNTER — EVALUATION (OUTPATIENT)
Dept: PHYSICAL THERAPY | Age: 62
End: 2022-06-09
Payer: COMMERCIAL

## 2022-06-09 DIAGNOSIS — M75.42 SUBACROMIAL IMPINGEMENT OF LEFT SHOULDER: ICD-10-CM

## 2022-06-09 DIAGNOSIS — G89.29 CHRONIC LEFT SHOULDER PAIN: Primary | ICD-10-CM

## 2022-06-09 DIAGNOSIS — M25.512 CHRONIC LEFT SHOULDER PAIN: Primary | ICD-10-CM

## 2022-06-09 PROCEDURE — 97110 THERAPEUTIC EXERCISES: CPT | Performed by: PHYSICAL THERAPIST

## 2022-06-09 PROCEDURE — 97162 PT EVAL MOD COMPLEX 30 MIN: CPT | Performed by: PHYSICAL THERAPIST

## 2022-06-09 NOTE — PROGRESS NOTES
PT Evaluation     Today's date: 2022  Patient name: Tito Esposito  : 1960  MRN: [id number]  Referring provider: Cj Arredondo  Dx:   Encounter Diagnosis     ICD-10-CM    1  Chronic left shoulder pain  M25 512     G89 29                   Assessment  Assessment details: Patient presents complaining of which has been ongoing for a few months  He does have a history of a RTC repair on his L shoulder about 20 years ago  He reports having more pain at night  He is RHD  He denies any increase in activity or trauma prior to the pain beginning  He did have a CSI performed on , and had limited relief  He reports the pain is worse with any activity, including cutting grass, as well as getting his shirt on and reaching across his body  He locates his pain to his lateral L shoulder and some referred pain into his upper L arm  He denies any numbness into his L UE  He had x-rays performed which were (-)    He is currently working as a contractor, and is not limited by his shoulder pain  Patient presents with limitations in L shoulder range of motion and strength consistent with a L shoulder impingement  Patient would benefit from skilled physical therapy to address limitations and deficits  Patient provided with HEP  Patient made aware of condition as well as the proposed treatment plan, including risks, benefits and alternatives  Impairments: abnormal or restricted ROM, impaired physical strength, lacks appropriate home exercise program and pain with function     Prognosis: good    Goals  ST- demonstrate compliancy with HEP in 1 week     Decrease reported pain to 5/10 at worst and with activity, to improve functional capacity, within 3 weeks   Improve L shoulder range of motion to [facility], within 3 weeks     LT- Improve FOTO score to specified value to improve patients perceived benefit of therapy, in 8 weeks   Improve L shoulder strength to 4+/5, to improve ability to complete ADL's, within 8 weeks   Complete round of golf with no complaints of pain, within 10 weeks     Plan  Plan details: Physical therapy with focus on there ex and manual therapy to improve ability to complete tasks around the house and complete functional activities, use of modalities as needed     Patient would benefit from: skilled physical therapy  Referral necessary: No  Planned modality interventions: cryotherapy, TENS and thermotherapy: hydrocollator packs  Planned therapy interventions: ADL training, balance, balance/weight bearing training, gait training, manual therapy, joint mobilization, neuromuscular re-education, strengthening, stretching, therapeutic activities and therapeutic exercise  Frequency: 2x week  Duration in weeks: 12  Plan of Care beginning date: 2022  Plan of Care expiration date: 2022  Treatment plan discussed with: patient        Subjective Evaluation    History of Present Illness  Date of onset: 3/1/2022  Mechanism of injury: Chronic onset   Pain  Current pain ratin  At best pain ratin  At worst pain rating: 10  Location: L shoulder   Quality: sharp and dull ache  Relieving factors: relaxation, rest and change in position  Aggravating factors: lifting  Progression: worsening      Diagnostic Tests  X-ray: normal  Treatments  Previous treatment: physical therapy  Current treatment: injection treatment  Patient Goals  Patient goals for therapy: decreased pain and independence with ADLs/IADLs          Objective     General Comments:      Shoulder Comments   Ttp along michel lateral L shoulder     rom   Shoulder  Flexion L=150* R=WFL   Abduction L=110* R=WFL  ER scratch L=back of head* R=T3  IR scratch L=T3* R=T6      mmt  Shoulder  Flexion L=4 R=4+  Abduction L=4* R=4+  ER L=4 R=4+  IR L=4* R=4+    Special tests  (+) neers  (-) painful arc, drop arm, IR lag     Joint play   Some anterior translation limitation noted             Precautions: HTN       Manuals             L shoulder ROM L shoulder JM ant                                        Neuro Re-Ed             S/L flexion             S/L abduction             S/L ER              Prone I,Y,T             rows             LPD                          Ther Ex             UBE              Thoracic ext seated             Doorway stretch              Posterior capsule stretch                                                                  Ther Activity                                       Gait Training                                       Modalities

## 2022-06-13 ENCOUNTER — OFFICE VISIT (OUTPATIENT)
Dept: PHYSICAL THERAPY | Age: 62
End: 2022-06-13
Payer: COMMERCIAL

## 2022-06-13 DIAGNOSIS — N18.30 BENIGN HYPERTENSION WITH CHRONIC KIDNEY DISEASE, STAGE III (HCC): ICD-10-CM

## 2022-06-13 DIAGNOSIS — M75.42 SUBACROMIAL IMPINGEMENT OF LEFT SHOULDER: ICD-10-CM

## 2022-06-13 DIAGNOSIS — G89.29 CHRONIC LEFT SHOULDER PAIN: Primary | ICD-10-CM

## 2022-06-13 DIAGNOSIS — E78.2 MIXED HYPERLIPIDEMIA: ICD-10-CM

## 2022-06-13 DIAGNOSIS — M25.512 CHRONIC LEFT SHOULDER PAIN: Primary | ICD-10-CM

## 2022-06-13 DIAGNOSIS — I12.9 BENIGN HYPERTENSION WITH CHRONIC KIDNEY DISEASE, STAGE III (HCC): ICD-10-CM

## 2022-06-13 PROCEDURE — 97112 NEUROMUSCULAR REEDUCATION: CPT

## 2022-06-13 PROCEDURE — 97110 THERAPEUTIC EXERCISES: CPT

## 2022-06-13 PROCEDURE — 97140 MANUAL THERAPY 1/> REGIONS: CPT

## 2022-06-13 RX ORDER — ATORVASTATIN CALCIUM 20 MG/1
20 TABLET, FILM COATED ORAL DAILY
Qty: 90 TABLET | Refills: 1 | Status: SHIPPED | OUTPATIENT
Start: 2022-06-13

## 2022-06-13 RX ORDER — LOSARTAN POTASSIUM 25 MG/1
25 TABLET ORAL DAILY
Qty: 90 TABLET | Refills: 1 | Status: SHIPPED | OUTPATIENT
Start: 2022-06-13

## 2022-06-13 NOTE — PROGRESS NOTES
Daily Note     Today's date: 2022  Patient name: Norma Burroughs  : 1960  MRN: [id number]  Referring provider: Marj Alvarez  Dx:   Encounter Diagnosis     ICD-10-CM    1  Chronic left shoulder pain  M25 512     G89 29    2  Subacromial impingement of left shoulder  M75 42                   Subjective: pt reports night pain is slightly less and able to complete HEP but has dull ache after there ex, pt reports having HA after last session which lasted about 1 5 days    Objective: See treatment diary below  Pt ed for updated HEP and goals of PT/ther ex    Assessment:  Ex progressions as per PT POC, pt had pain with sidelying shoulder abduction which resolved somewhat with flexed elbow, cueing throughout to avoid UT activation during ex      Plan: Continue per plan of care  Progress treatment as tolerated         Precautions: HTN       Manuals 22            L shoulder ROM  VK            L shoulder JM ant  Ant glides CW                                      Neuro Re-Ed 22            S/L flexion Bent elbow 3x10            S/L abduction 3x10            S/L ER  2# 2x10/1# 10x            Prone I,Y,T T/Y 3x10 ea            rows nv            LPD nv                         Ther Ex 22            UBE  2'/2'            Thoracic ext seated             Doorway stretch  HEP            Posterior capsule stretch  manual                                                                Ther Activity                                       Gait Training                                       Modalities

## 2022-06-14 ENCOUNTER — TELEPHONE (OUTPATIENT)
Dept: NEPHROLOGY | Facility: CLINIC | Age: 62
End: 2022-06-14

## 2022-06-14 NOTE — TELEPHONE ENCOUNTER
Reschedule Appointment   Person speaking to  Patient   Date of original appointment 9/15    New appointment date 8/22   Patient on dialysis no   Location [facility]    Provider Dr Mcallister Broradha    Additional Information

## 2022-06-20 ENCOUNTER — OFFICE VISIT (OUTPATIENT)
Dept: PHYSICAL THERAPY | Age: 62
End: 2022-06-20
Payer: COMMERCIAL

## 2022-06-20 DIAGNOSIS — M25.512 CHRONIC LEFT SHOULDER PAIN: Primary | ICD-10-CM

## 2022-06-20 DIAGNOSIS — M75.42 SUBACROMIAL IMPINGEMENT OF LEFT SHOULDER: ICD-10-CM

## 2022-06-20 DIAGNOSIS — G89.29 CHRONIC LEFT SHOULDER PAIN: Primary | ICD-10-CM

## 2022-06-20 PROCEDURE — 97112 NEUROMUSCULAR REEDUCATION: CPT | Performed by: PHYSICAL THERAPIST

## 2022-06-20 PROCEDURE — 97140 MANUAL THERAPY 1/> REGIONS: CPT | Performed by: PHYSICAL THERAPIST

## 2022-06-20 PROCEDURE — 97110 THERAPEUTIC EXERCISES: CPT | Performed by: PHYSICAL THERAPIST

## 2022-06-20 NOTE — PROGRESS NOTES
Daily Note     Today's date: 2022  Patient name: Ivett Pratt  : 1960  MRN: [id number]  Referring provider: Sabine Perez  Dx:   Encounter Diagnosis     ICD-10-CM    1  Chronic left shoulder pain  M25 512     G89 29    2  Subacromial impingement of left shoulder  M75 42                   Subjective: "I feel about the same"       Objective: See treatment diary below      Assessment: Tolerated treatment well  Patient would benefit from continued PT, did well with exercises today with no aggravation of pain during exercises  Plan: Continue per plan of care        Precautions: HTN       Manuals 22            L shoulder ROM  VK CW           L shoulder JM ant  Ant glides CW CW                                      Neuro Re-Ed 22            S/L flexion Bent elbow 3x10 3x10            S/L abduction 3x10 3x10            S/L ER  2# 2x10/1# 10x 3x10 2#            Prone I,Y,T T/Y 3x10 ea 2x10 ea Y/T           rows nv 2x15 18#           LPD nv 2x15 13#           Serratus punches   2x15x5" 4#           Ther Ex 22            UBE  2'/2' 3/3'            Thoracic ext seated             Doorway stretch  HEP            Posterior capsule stretch  manual            Serratus wall slide                                                    Ther Activity                                       Gait Training                                       Modalities

## 2022-06-23 ENCOUNTER — APPOINTMENT (OUTPATIENT)
Dept: PHYSICAL THERAPY | Age: 62
End: 2022-06-23
Payer: COMMERCIAL

## 2022-06-23 ENCOUNTER — LAB (OUTPATIENT)
Dept: LAB | Age: 62
End: 2022-06-23
Payer: COMMERCIAL

## 2022-06-23 DIAGNOSIS — N18.30 BENIGN HYPERTENSION WITH CHRONIC KIDNEY DISEASE, STAGE III (HCC): ICD-10-CM

## 2022-06-23 DIAGNOSIS — N18.31 STAGE 3A CHRONIC KIDNEY DISEASE (HCC): ICD-10-CM

## 2022-06-23 DIAGNOSIS — I12.9 BENIGN HYPERTENSION WITH CHRONIC KIDNEY DISEASE, STAGE III (HCC): ICD-10-CM

## 2022-06-23 DIAGNOSIS — R80.1 PERSISTENT PROTEINURIA: ICD-10-CM

## 2022-06-23 DIAGNOSIS — Z90.5 SOLITARY KIDNEY, ACQUIRED: ICD-10-CM

## 2022-06-23 DIAGNOSIS — C64.2 RENAL CELL CANCER, LEFT (HCC): ICD-10-CM

## 2022-06-23 LAB
ANION GAP SERPL CALCULATED.3IONS-SCNC: 4 MMOL/L (ref 4–13)
BUN SERPL-MCNC: 23 MG/DL (ref 5–25)
CALCIUM SERPL-MCNC: 9.3 MG/DL (ref 8.3–10.1)
CHLORIDE SERPL-SCNC: 107 MMOL/L (ref 100–108)
CO2 SERPL-SCNC: 28 MMOL/L (ref 21–32)
CREAT SERPL-MCNC: 1.36 MG/DL (ref 0.6–1.3)
GFR SERPL CREATININE-BSD FRML MDRD: 54 ML/MIN/1.73SQ M
GLUCOSE P FAST SERPL-MCNC: 98 MG/DL (ref 65–99)
POTASSIUM SERPL-SCNC: 4.9 MMOL/L (ref 3.5–5.3)
SODIUM SERPL-SCNC: 139 MMOL/L (ref 136–145)

## 2022-06-23 PROCEDURE — 36415 COLL VENOUS BLD VENIPUNCTURE: CPT

## 2022-06-23 PROCEDURE — 80048 BASIC METABOLIC PNL TOTAL CA: CPT

## 2022-06-25 RX ORDER — SODIUM CHLORIDE 9 MG/ML
200 INJECTION, SOLUTION INTRAVENOUS CONTINUOUS
Status: CANCELLED | OUTPATIENT
Start: 2022-07-21

## 2022-06-25 RX ORDER — SODIUM CHLORIDE 9 MG/ML
100 INJECTION, SOLUTION INTRAVENOUS CONTINUOUS
Status: CANCELLED | OUTPATIENT
Start: 2022-07-21 | End: 2022-07-21

## 2022-06-25 NOTE — RESULT ENCOUNTER NOTE
Please inform patient that renal function is stable at creatinine 1 36 mg/dL electrolytes are stable    Noted that patient is plan for CT with IV contrast on July 21st, will order for pre and post contrast IV fluid, please arrange for IV fluid at infusion center at [facility]

## 2022-06-27 ENCOUNTER — TELEPHONE (OUTPATIENT)
Dept: NEPHROLOGY | Facility: CLINIC | Age: 62
End: 2022-06-27

## 2022-06-27 NOTE — TELEPHONE ENCOUNTER
----- Message from Gricelda Stewart MD sent at 6/25/2022  1:27 PM EDT -----  Please inform patient that renal function is stable at creatinine 1 36 mg/dL electrolytes are stable    Noted that patient is plan for CT with IV contrast on July 21st, will order for pre and post contrast IV fluid, please arrange for IV fluid at infusion center at [facility]

## 2022-06-27 NOTE — TELEPHONE ENCOUNTER
----- Message from Meli Yip MD sent at 6/25/2022  1:28 PM EDT -----  Also please ask patient to hold losartan on the day of CT with IV contrast

## 2022-06-27 NOTE — TELEPHONE ENCOUNTER
Spoke with patient and advised: Also please ask patient to hold losartan on the day of CT with IV contrast

## 2022-07-12 ENCOUNTER — OFFICE VISIT (OUTPATIENT)
Dept: PHYSICAL THERAPY | Age: 62
End: 2022-07-12
Payer: COMMERCIAL

## 2022-07-12 DIAGNOSIS — M25.512 CHRONIC LEFT SHOULDER PAIN: Primary | ICD-10-CM

## 2022-07-12 DIAGNOSIS — M75.42 SUBACROMIAL IMPINGEMENT OF LEFT SHOULDER: ICD-10-CM

## 2022-07-12 DIAGNOSIS — G89.29 CHRONIC LEFT SHOULDER PAIN: Primary | ICD-10-CM

## 2022-07-12 PROCEDURE — 97140 MANUAL THERAPY 1/> REGIONS: CPT | Performed by: PHYSICAL THERAPIST

## 2022-07-12 PROCEDURE — 97110 THERAPEUTIC EXERCISES: CPT | Performed by: PHYSICAL THERAPIST

## 2022-07-12 PROCEDURE — 97112 NEUROMUSCULAR REEDUCATION: CPT | Performed by: PHYSICAL THERAPIST

## 2022-07-12 NOTE — PROGRESS NOTES
Daily Note     Today's date: 2022  Patient name: Maite Garrison  : 1960  MRN: [id number]  Referring provider: Shane Cannon  Dx:   Encounter Diagnosis     ICD-10-CM    1  Chronic left shoulder pain  M25 512     G89 29    2  Subacromial impingement of left shoulder  M75 42                   Subjective: Reports no change in status coming in today  Objective: See treatment diary below      Assessment: Tolerated treatment well  Patient would benefit from continued PT, did well with exercises with no complaints throughout tx, patient will be returning to referring MD in two weeks and will monitor following  Plan: Continue per plan of care        Precautions: HTN       Manuals 22           L shoulder ROM  VK CW CW          L shoulder JM ant  Ant glides CW CW  CW                                     Neuro Re-Ed 22            S/L flexion Bent elbow 3x10 3x10  3x10          S/L abduction 3x10 3x10  3x10          S/L ER  2# 2x10/1# 10x 3x10 2#  2x15 2#           Prone I,Y,T T/Y 3x10 ea 2x10 ea Y/T 25x ea           rows nv 2x15 18# 2x20 18#           LPD nv 2x15 13# 2x20 13#          Serratus punches   2x15x5" 4# 2x15x5" 4#           Ther Ex 22            UBE  2'/2' 3/3'  4/4'           Thoracic ext seated             Doorway stretch  HEP            Posterior capsule stretch  manual            Serratus wall slide                                                    Ther Activity                                       Gait Training                                       Modalities

## 2022-07-18 ENCOUNTER — TELEPHONE (OUTPATIENT)
Dept: OTHER | Facility: OTHER | Age: 62
End: 2022-07-18

## 2022-07-18 NOTE — TELEPHONE ENCOUNTER
Called and spoke with patient at this time  Reviewed CT does require with and without IV hydration  Patient was wondering specifically about barium contrast  Advised he does not need barium contrast, IV only  He was thankful for call back and verbalized understanding

## 2022-07-19 ENCOUNTER — TELEPHONE (OUTPATIENT)
Dept: NEPHROLOGY | Facility: CLINIC | Age: 62
End: 2022-07-19

## 2022-07-19 NOTE — TELEPHONE ENCOUNTER
There are no lab orders in Epic for Margaux Eric  He is planning to go 7/25  Please order any needed labs   Thank you

## 2022-07-20 ENCOUNTER — TELEPHONE (OUTPATIENT)
Dept: INFUSION CENTER | Facility: HOSPITAL | Age: 62
End: 2022-07-20

## 2022-07-20 DIAGNOSIS — N18.31 STAGE 3A CHRONIC KIDNEY DISEASE (HCC): Primary | ICD-10-CM

## 2022-07-20 NOTE — TELEPHONE ENCOUNTER
Called to remind him of his appointment tomorrow in the [facility] at 7:30

## 2022-07-21 ENCOUNTER — APPOINTMENT (OUTPATIENT)
Dept: RADIOLOGY | Facility: HOSPITAL | Age: 62
End: 2022-07-21
Attending: SURGERY
Payer: COMMERCIAL

## 2022-07-21 ENCOUNTER — HOSPITAL ENCOUNTER (OUTPATIENT)
Dept: RADIOLOGY | Facility: HOSPITAL | Age: 62
Discharge: HOME | End: 2022-07-21
Payer: COMMERCIAL

## 2022-07-21 ENCOUNTER — HOSPITAL ENCOUNTER (OUTPATIENT)
Dept: INFUSION CENTER | Facility: HOSPITAL | Age: 62
Discharge: HOME | End: 2022-07-21
Payer: COMMERCIAL

## 2022-07-21 ENCOUNTER — APPOINTMENT (OUTPATIENT)
Dept: RADIOLOGY | Facility: HOSPITAL | Age: 62
End: 2022-07-21
Payer: COMMERCIAL

## 2022-07-21 VITALS
DIASTOLIC BLOOD PRESSURE: 78 MMHG | HEART RATE: 60 BPM | TEMPERATURE: 98 F | SYSTOLIC BLOOD PRESSURE: 130 MMHG | RESPIRATION RATE: 18 BRPM

## 2022-07-21 DIAGNOSIS — C64.2 RENAL CELL CANCER, LEFT (HCC): ICD-10-CM

## 2022-07-21 DIAGNOSIS — N18.31 STAGE 3A CHRONIC KIDNEY DISEASE (HCC): Primary | ICD-10-CM

## 2022-07-21 PROCEDURE — 71046 X-RAY EXAM CHEST 2 VIEWS: CPT

## 2022-07-21 PROCEDURE — G1004 CDSM NDSC: HCPCS

## 2022-07-21 PROCEDURE — 74178 CT ABD&PLV WO CNTR FLWD CNTR: CPT

## 2022-07-21 RX ORDER — SODIUM CHLORIDE 9 MG/ML
200 INJECTION, SOLUTION INTRAVENOUS CONTINUOUS
Status: DISPENSED | OUTPATIENT
Start: 2022-07-21 | End: 2022-07-21

## 2022-07-21 RX ORDER — SODIUM CHLORIDE 9 MG/ML
100 INJECTION, SOLUTION INTRAVENOUS CONTINUOUS
Status: DISCONTINUED | OUTPATIENT
Start: 2022-07-21 | End: 2022-07-21 | Stop reason: HOSPADM

## 2022-07-21 RX ORDER — SODIUM CHLORIDE 9 MG/ML
200 INJECTION, SOLUTION INTRAVENOUS CONTINUOUS
Status: CANCELLED | OUTPATIENT
Start: 2022-07-21

## 2022-07-21 RX ORDER — SODIUM CHLORIDE 9 MG/ML
100 INJECTION, SOLUTION INTRAVENOUS CONTINUOUS
Status: CANCELLED | OUTPATIENT
Start: 2022-07-21

## 2022-07-21 RX ADMIN — SODIUM CHLORIDE 200 ML/HR: 0.9 INJECTION, SOLUTION INTRAVENOUS at 07:45

## 2022-07-21 RX ADMIN — SODIUM CHLORIDE 100 ML/HR: 0.9 INJECTION, SOLUTION INTRAVENOUS at 10:05

## 2022-07-21 RX ADMIN — IOHEXOL 76 ML: 350 INJECTION, SOLUTION INTRAVENOUS at 10:15

## 2022-07-22 ENCOUNTER — HOSPITAL ENCOUNTER (OUTPATIENT)
Dept: ULTRASOUND IMAGING | Facility: HOSPITAL | Age: 62
Discharge: HOME | End: 2022-07-22
Attending: SURGERY
Payer: COMMERCIAL

## 2022-07-22 DIAGNOSIS — E04.1 THYROID NODULE: ICD-10-CM

## 2022-07-22 PROCEDURE — 76536 US EXAM OF HEAD AND NECK: CPT

## 2022-07-25 ENCOUNTER — OFFICE VISIT (OUTPATIENT)
Dept: OBGYN CLINIC | Facility: OTHER | Age: 62
End: 2022-07-25
Payer: COMMERCIAL

## 2022-07-25 ENCOUNTER — APPOINTMENT (OUTPATIENT)
Dept: LAB | Facility: CLINIC | Age: 62
End: 2022-07-25
Payer: COMMERCIAL

## 2022-07-25 VITALS
DIASTOLIC BLOOD PRESSURE: 74 MMHG | SYSTOLIC BLOOD PRESSURE: 124 MMHG | BODY MASS INDEX: 25.39 KG/M2 | HEIGHT: 66 IN | HEART RATE: 48 BPM | WEIGHT: 158 LBS

## 2022-07-25 DIAGNOSIS — N18.31 STAGE 3A CHRONIC KIDNEY DISEASE (HCC): ICD-10-CM

## 2022-07-25 DIAGNOSIS — M75.42 SUBACROMIAL IMPINGEMENT OF LEFT SHOULDER: Primary | ICD-10-CM

## 2022-07-25 LAB
ANION GAP SERPL CALCULATED.3IONS-SCNC: 2 MMOL/L (ref 4–13)
BACTERIA UR QL AUTO: NORMAL /HPF
BILIRUB UR QL STRIP: NEGATIVE
BUN SERPL-MCNC: 19 MG/DL (ref 5–25)
CALCIUM SERPL-MCNC: 9.2 MG/DL (ref 8.3–10.1)
CHLORIDE SERPL-SCNC: 109 MMOL/L (ref 96–108)
CLARITY UR: CLEAR
CO2 SERPL-SCNC: 28 MMOL/L (ref 21–32)
COLOR UR: NORMAL
CREAT SERPL-MCNC: 1.43 MG/DL (ref 0.6–1.3)
CREAT UR-MCNC: 145 MG/DL
GFR SERPL CREATININE-BSD FRML MDRD: 51 ML/MIN/1.73SQ M
GLUCOSE P FAST SERPL-MCNC: 104 MG/DL (ref 65–99)
GLUCOSE UR STRIP-MCNC: NEGATIVE MG/DL
HGB UR QL STRIP.AUTO: NEGATIVE
KETONES UR STRIP-MCNC: NEGATIVE MG/DL
LEUKOCYTE ESTERASE UR QL STRIP: NEGATIVE
MAGNESIUM SERPL-MCNC: 2.5 MG/DL (ref 1.6–2.6)
NITRITE UR QL STRIP: NEGATIVE
NON-SQ EPI CELLS URNS QL MICRO: NORMAL /HPF
PH UR STRIP.AUTO: 6 [PH]
PHOSPHATE SERPL-MCNC: 3 MG/DL (ref 2.3–4.1)
POTASSIUM SERPL-SCNC: 4.7 MMOL/L (ref 3.5–5.3)
PROT UR STRIP-MCNC: NEGATIVE MG/DL
PROT UR-MCNC: 9 MG/DL
PROT/CREAT UR: 0.06 MG/G{CREAT} (ref 0–0.1)
RBC #/AREA URNS AUTO: NORMAL /HPF
SODIUM SERPL-SCNC: 139 MMOL/L (ref 135–147)
SP GR UR STRIP.AUTO: 1.01 (ref 1–1.03)
UROBILINOGEN UR STRIP-ACNC: <2 MG/DL
WBC #/AREA URNS AUTO: NORMAL /HPF

## 2022-07-25 PROCEDURE — 99214 OFFICE O/P EST MOD 30 MIN: CPT | Performed by: PHYSICIAN ASSISTANT

## 2022-07-25 PROCEDURE — 84156 ASSAY OF PROTEIN URINE: CPT | Performed by: NURSE PRACTITIONER

## 2022-07-25 PROCEDURE — 81001 URINALYSIS AUTO W/SCOPE: CPT

## 2022-07-25 PROCEDURE — 36415 COLL VENOUS BLD VENIPUNCTURE: CPT

## 2022-07-25 PROCEDURE — 83735 ASSAY OF MAGNESIUM: CPT

## 2022-07-25 PROCEDURE — 84100 ASSAY OF PHOSPHORUS: CPT

## 2022-07-25 PROCEDURE — 80048 BASIC METABOLIC PNL TOTAL CA: CPT

## 2022-07-25 PROCEDURE — 82570 ASSAY OF URINE CREATININE: CPT | Performed by: NURSE PRACTITIONER

## 2022-07-25 NOTE — PROGRESS NOTES
Assessment  Diagnoses and all orders for this visit:    Subacromial impingement of left shoulder  -     MRI shoulder left wo contrast; Future        Discussion and Plan:    Shera Meigs continues to have pain despite activity modification, time, injection and physical therapy  Advanced imaging is indicated    1  MRI left shoulder  2  Follow up after MRI - to discuss treatment options based on results  3  Activities to tolerance  4  Tylenol PRN  5  Continue with HEP as instructed by therapy      Subjective:   Patient ID: Kong Mon is a 59 y o  male      Shera Meigs returns to the office in follow up of the left shoulder  He was diagnosed with impingement syndrome  Shera Meigs was provided a steroid injection and given a referral to physical therapy  Shera Meigs reports improvement in some of his nocturnal complaints with the injection  He feels as though therapy made his left shoulder pain worse  Pain is worse after activities  His ADLs and activities of enjoyment are not limited by pain as majority of symptoms occur at rest   Pain occurs with all crossbody motions  He does not take NSAIDs as he has one kidney  Does not take Tylenol because it "doesn't work"  Denies new injury or trauma  Has history of prior RCR 15 years ago  The following portions of the patient's history were reviewed and updated as appropriate: allergies, current medications, past family history, past medical history, past social history, past surgical history and problem list     Review of Systems   Constitutional: Negative for chills and fever  HENT: Negative for hearing loss  Eyes: Negative for visual disturbance  Respiratory: Negative for shortness of breath  Cardiovascular: Negative for chest pain  Gastrointestinal: Negative for abdominal pain  Musculoskeletal:        As reviewed in the HPI   Skin: Negative for rash  Neurological:        As reviewed in the HPI   Psychiatric/Behavioral: Negative for agitation         Objective:  BP 124/74 (BP Location: Right arm, Patient Position: Sitting, Cuff Size: Adult)   Pulse (!) 48   Ht 5' 6" (1 676 m)   Wt 71 7 kg (158 lb)   BMI 25 50 kg/m²       Left Shoulder Exam     Tenderness   The patient is experiencing no tenderness  Range of Motion   The patient has normal left shoulder ROM  Muscle Strength   External rotation: 5/5   Supraspinatus: 5/5     Tests   Apprehension: positive  Stallworth test: negative  Cross arm: positive  Impingement: positive  Drop arm: negative    Other   Erythema: absent  Sensation: normal  Pulse: present     Comments:    Positive Speeds  Positive [place]            Physical Exam  Constitutional:       Appearance: He is well-developed  HENT:      Head: Normocephalic  Pulmonary:      Effort: No respiratory distress  Breath sounds: No wheezing  Musculoskeletal:      Cervical back: Normal range of motion  Skin:     General: Skin is warm and dry  Neurological:      Mental Status: He is alert and oriented to person, place, and time  Psychiatric:         Behavior: Behavior normal          Thought Content:  Thought content normal          Judgment: Judgment normal

## 2022-07-28 ENCOUNTER — OFFICE VISIT (OUTPATIENT)
Dept: SURGICAL ONCOLOGY | Facility: CLINIC | Age: 62
End: 2022-07-28
Payer: COMMERCIAL

## 2022-07-28 ENCOUNTER — OFFICE VISIT (OUTPATIENT)
Dept: UROLOGY | Facility: CLINIC | Age: 62
End: 2022-07-28
Payer: COMMERCIAL

## 2022-07-28 VITALS
HEIGHT: 66 IN | WEIGHT: 156.6 LBS | DIASTOLIC BLOOD PRESSURE: 70 MMHG | HEART RATE: 92 BPM | SYSTOLIC BLOOD PRESSURE: 130 MMHG | OXYGEN SATURATION: 98 % | BODY MASS INDEX: 25.17 KG/M2

## 2022-07-28 VITALS
TEMPERATURE: 97 F | HEIGHT: 66 IN | HEART RATE: 53 BPM | OXYGEN SATURATION: 96 % | WEIGHT: 157 LBS | RESPIRATION RATE: 16 BRPM | SYSTOLIC BLOOD PRESSURE: 140 MMHG | BODY MASS INDEX: 25.23 KG/M2 | DIASTOLIC BLOOD PRESSURE: 80 MMHG

## 2022-07-28 DIAGNOSIS — C64.2 RENAL CELL CANCER, LEFT (HCC): Primary | ICD-10-CM

## 2022-07-28 DIAGNOSIS — E04.1 THYROID NODULE: Primary | ICD-10-CM

## 2022-07-28 DIAGNOSIS — Z12.5 SCREENING FOR PROSTATE CANCER: ICD-10-CM

## 2022-07-28 PROCEDURE — 99213 OFFICE O/P EST LOW 20 MIN: CPT | Performed by: PHYSICIAN ASSISTANT

## 2022-07-28 PROCEDURE — 99214 OFFICE O/P EST MOD 30 MIN: CPT | Performed by: SURGERY

## 2022-07-28 NOTE — PROGRESS NOTES
Surgical Oncology Follow Up       [address] [facility] [place]  [address]  [place] [address] [phone]    Tayler Johnson  1960  [id number]  [address] [facility] [place]  [address]  [place] [facility] [address] [phone]    Diagnoses and all orders for this visit:    Thyroid nodule        No chief complaint on file  No follow-ups on file  Oncology History    No history exists  Staging: Right thyroid nodule  Treatment history:  Right thyroid nodule biopsy, July 2021:  [place] 4, suspicious for follicular neoplasm, Hurthle cell type  Afirma negative  Family history of thyroid cancer in his son  Current treatment:  Observation  Disease status: Stable    History of Present Illness:  Patient returns in follow-up of his right thyroid nodule  He is doing relatively well  He does notice some occasional dysphagia daily  He also knows that this nodule is there  Thyroid ultrasound from July 22, 2022 reveals a stable 3 6 x 2 7 x 2 7 cm right thyroid nodule  Personally reviewed the films  Review of Systems  Complete ROS Surg Onc:   Complete ROS Surg Onc:   Constitutional: The patient denies new or recent history of general fatigue, no recent weight loss, no change in appetite  Eyes: No complaints of visual problems, no scleral icterus  ENT: no complaints of ear pain, no hoarseness, no difficulty swallowing,  no tinnitus and no new masses in head, oral cavity, or neck  Cardiovascular: No complaints of chest pain, no palpitations, no ankle edema  Respiratory: No complaints of shortness of breath, no cough  Gastrointestinal: No complaints of jaundice, no bloody stools, no pale stools  Genitourinary: No complaints of dysuria, no hematuria, no nocturia, no frequent urination, no urethral discharge     Musculoskeletal: No complaints of weakness, paralysis, joint stiffness or arthralgias  Integumentary: No complaints of rash, no new lesions  Neurological: No complaints of convulsions, no seizures, no dizziness  Hematologic/Lymphatic: No complaints of easy bruising  Endocrine:  No hot or cold intolerance  No polydipsia, polyphagia, or polyuria  Allergy/immunology:  No environmental allergies  No food allergies  Not immunocompromised  Skin:  No pallor or rash  No wound          Patient Active Problem List   Diagnosis    Osteoarthritis of finger of right hand    Mixed hyperlipidemia    Diverticulitis of large intestine with perforation without bleeding    [facility]    Cluster headache    Malignant neoplasm of left kidney, except renal pelvis (HCC)    Tinea versicolor    CKD (chronic kidney disease) stage 3, GFR 30-59 ml/min (HCC)    Hyperglycemia    Persistent proteinuria    Hypermagnesemia    Benign hypertension with chronic kidney disease, stage III (HCC)    Solitary kidney, acquired    Pulse irregularity    Lymphadenopathy of head and neck    Mass of right side of neck    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    Thyroid nodule    Follicular neoplasm of thyroid    Hip pain, bilateral    Asthmatic bronchitis    Subacromial impingement of left shoulder     Past Medical History:   Diagnosis Date    Aneurysm ([place] [address] ) 2017    behind right eye-    Arthritis     Cancer ([place] [address] ) 10/13/2019    kidney    Chronic kidney disease 1/7/2020    stage 3    CKD (chronic kidney disease)     Diverticulitis of colon 10/13/2019    Diverticulitis of large intestine with perforation without bleeding 10/13/2019    Hyperlipidemia     Hypertension     Inflammatory bowel disease 10/13/2019    Ren[facility] cancer[facility] left ([facility] ) 10/13/2019    Renal cell carcinoma (HCC)      Past Surgical History:   Procedure Laterality Date    COLONOSCOPY  2015    HERNIA REPAIR      HIP SURGERY Right     age 4    JOINT REPLACEMENT Right 2011    TKR    NEPHRECTOMY  [id number]    left    ND LAP, RADICAL NEPHRECTOMY Left 12/30/2019    Procedure: NEPHRECTOMY LAPAROSCOPIC HAND ASSISTED;  Surgeon: Oswald Marvin MD;  [facility];  Service: Urology    ND LAP,SURG,COLECTOMY, PARTIAL, Amanda Noor[place] N/A 12/30/2019    Procedure: LAPAROSCOPIC HAND-ASSISTED SIGMOID COLECTOMY; LAPAROSCOPIC MOBILIZATION OF SPLENIC FLEXURE;  Surgeon: Allan Terry MD;  [facility];  Service: Colorectal    REPLACEMENT TOTAL KNEE Right 2011    REPLACEMENT TOTAL KNEE      SHOULDER SURGERY Left     SHOULDER SURGERY Right     US GUIDED THYROID BIOPSY  7/9/2021    WRIST FUSION Left 2016    WRIST SURGERY       Family History   Problem Relation Age of Onset    Heart disease Father     Other Father         cardiac disorder    No Known Problems Mother     Heart attack Maternal Grandfather     Heart attack Paternal Grandfather     Thyroid cancer Son     Colon cancer Neg Hx      Social History     Socioeconomic History    Marital status: /Civil Union     Spouse name: Not on file    Number of children: 4    Years of education: high school or GED    Highest education level: Not on file   Occupational History    Occupation:      Comment: [employer]   Tobacco Use    Smoking status: Never Smoker    Smokeless tobacco: Never Used   Vaping Use    Vaping Use: Never used   Substance and Sexual Activity    Alcohol use:  Yes     Alcohol/week: 2 0 standard drinks     Types: 2 Cans of beer per week    Drug use: No    Sexual activity: Not Currently     Partners: Female     Birth control/protection: None   Other Topics Concern    Not on file   Social History Narrative    Not on file     Social Determinants of Health     Financial Resource Strain: Not on file   Food Insecurity: Not on file   Transportation Needs: Not on file   Physical Activity: Not on file   Stress: Not on file   Social Connections: Not on file   Intimate Partner Violence: Not on file   Housing Stability: Not on file       Current Outpatient Medications:     aspirin 81 MG tablet, Take 1 tablet by mouth daily, Disp: , Rfl:     atorvastatin (LIPITOR) 20 mg tablet, Take 1 tablet (20 mg total) by mouth daily, Disp: 90 tablet, Rfl: 1    losartan (COZAAR) 25 mg tablet, Take 1 tablet (25 mg total) by mouth daily, Disp: 90 tablet, Rfl: 1    multivitamin (THERAGRAN) TABS, Take 1 tablet by mouth every other day, Disp: , Rfl:     Omega-3 Fatty Acids (FISH OIL) 1200 MG CAPS, Take by mouth , Disp: , Rfl:   No Known Allergies  There were no vitals filed for this visit  Physical Exam  Constitutional: General appearance: The Patient is well-developed and well-nourished who appears the stated age in no acute distress  Patient is pleasant and talkative  HEENT:  Normocephalic  Sclerae are anicteric  Mucous membranes are moist  Neck is supple without adenopathy  No JVD  There is a palpable right thyroid nodule  This is not fixed to any underlying structures  Chest: The lungs are clear to auscultation  Cardiac: Heart is regular rate  Abdomen: Abdomen is soft, non-tender, non-distended and without masses  Extremities: There is no clubbing or cyanosis  There is no edema  Symmetric  Neuro: Grossly nonfocal  Gait is normal      Lymphatic: No evidence of cervical adenopathy bilaterally  No evidence of axillary adenopathy bilaterally  No evidence of inguinal adenopathy bilaterally  Skin: Warm, anicteric  Psych:  Patient is pleasant and talkative  Breasts:        Pathology:  [unfilled]    Labs:      Imaging  CT abdomen pelvis w wo contrast    Result Date: 7/25/2022  Narrative: CT ABDOMEN AND PELVIS WITH AND WITHOUT IV CONTRAST INDICATION:   C64 2: Malignant neoplasm of left kidney, except renal pelvis   COMPARISON:  6/26/2021 TECHNIQUE: Initial CT of the abdomen and pelvis was performed without intravenous contrast   Subsequent dynamic CT evaluation of the abdomen and pelvis was performed after the administration of intravenous contrast in both nephrographic and delayed phases after the administration of intravenous contrast    Axial, sagittal, and coronal 2D reformatted images were created from the source data and submitted for interpretation  Radiation dose length product (DLP) for this visit:  1151 mGy-cm   This examination, like all CT scans performed in the [facility], was performed utilizing techniques to minimize radiation dose exposure, including the use of iterative reconstruction and automated exposure control  IV Contrast:  76 mL of iohexol (OMNIPAQUE) Enteric Contrast:  Enteric contrast was not administered  FINDINGS: ABDOMEN RIGHT KIDNEY AND URETER: No solid renal mass  No detectable urothelial mass  No hydronephrosis or hydroureter  No urinary tract calculi  No perinephric collection  LEFT KIDNEY AND URETER: Patient is status post nephrectomy  No abnormal nodular thickening within the surgical bed to suggest disease recurrence  URINARY BLADDER: No bladder wall mass  No calculi  LOWER CHEST:  No clinically significant abnormality identified in the visualized lower chest  LIVER/BILIARY TREE:  Unremarkable  GALLBLADDER:  No calcified gallstones  No pericholecystic inflammatory change  SPLEEN:  Unremarkable  PANCREAS:  Unremarkable  ADRENAL GLANDS:  Unremarkable  STOMACH AND BOWEL: There is 4 6 cm duodenal diverticulum  No dilated loops of small bowel to suggest mechanical obstruction  Postsurgical changes about the sigmoid colon  APPENDIX:  No findings to suggest appendicitis  ABDOMINOPELVIC CAVITY:  No free air  No large volume ascites  No pathologically enlarged lymph nodes  VESSELS:  Unremarkable for patient's age  PELVIS REPRODUCTIVE ORGANS:  Unremarkable for patient's age  ABDOMINAL WALL/INGUINAL REGIONS:  There is a small fat-containing umbilical hernia, unchanged from previous examination   OSSEOUS STRUCTURES: Degenerative changes of the visualized spine  Postsurgical changes at the left inguinal region suggestive of prior repair     Impression: Status post nephrectomy  No abnormal nodular thickening within the left renal surgical bed to suggest disease recurrence  No evidence for metastasis in the abdomen or pelvis  Other findings as detailed above  Workstation performed: [id number]     XR chest pa & lateral    Result Date: 7/23/2022  Narrative: CHEST INDICATION:   C64 2: Malignant neoplasm of left kidney, except renal pelvis  COMPARISON:  7/26/2021 EXAM PERFORMED/VIEWS:  XR CHEST PA & LATERAL FINDINGS: Cardiomediastinal silhouette appears unremarkable  The lungs are clear  No pneumothorax or pleural effusion  Osseous structures appear within normal limits for patient age  Multilevel degenerative changes throughout the thoracic spine  No free air in the upper abdomen  Impression: No acute cardiopulmonary disease  Workstation performed: [id number]     US thyroid    Result Date: 7/27/2022  Narrative: THYROID ULTRASOUND INDICATION:    E04 1: Nontoxic single thyroid nodule  COMPARISON:  6/28/2021 TECHNIQUE:   Ultrasound of the thyroid was performed with a high frequency linear transducer in transverse and sagittal planes including volumetric imaging sweeps as well as traditional still imaging technique  FINDINGS:  Normal homogeneous smooth echotexture  Right lobe: 6 1 x 2 9 x 2 4 cm  Volume 21 0 mL Left lobe:  5 4 x 1 6 x 1 5 cm  Volume 6 2 mL Isthmus: 0 4  cm  Nodule #1  Image 24  RIGHT midgland nodule measuring 3 6 x 2 7 x 2 7 cm  Given differences in measuring technique, no significant change from prior  COMPOSITION:  2 points, solid or almost completely solid   ECHOGENICITY:  1 point, hyperechoic or isoechoic  SHAPE:  0 points, wider-than-tall  MARGIN: 0 points, smooth  ECHOGENIC FOCI:  0 points, none or large comet-tail artifacts  TI-RADS Classification: TR 3 (3 points), FNA if >2 5 cm  Follow if >1 5 cm  This nodule has had a previous [place] 4 biopsy (Afirma negative) on 7/9/2021  As it is stable, no further sampling recommended at this time  No new nodule  Impression: Stable 3 6 cm right midgland nodule  This is stable with previous non-malignant biopsy results as above  Based on 2015 [place] guidelines, additional followup ultrasound should be performed in 12 to 24 months, at the discretion of the referring physician  Reference: ACR Thyroid Imaging, Reporting and Data System (TI-RADS): White Paper of the [place]  J AM Fany Radiol 5653;80:410-576  (additional recommendations based on American Thyroid Association 2015 guidelines ) Workstation performed: [id number]     I reviewed the above laboratory and imaging data  Discussion/Summary:  28-year-old male with a family history of thyroid cancer and right thyroid nodule that was benign by Afirma  This is less than 4 cm and is stable  There is also a subcentimeter nodule in the isthmus that does not appear suspicious  We discussed continued observation with repeat thyroid ultrasound in 1 year  He is also thinking about having this surgically removed since it is becoming more symptomatic  I suspect that his symptoms will improve with surgical resection  I explained the risks of right thyroid lobectomy to include bleeding, infection, recurrence, need for further surgery, wound complications, permanent hypocalcemia and recurrent laryngeal nerve injury  Informed consent was obtained  He is going to think about this a little more and will contact us with his decision  He is agreeable to this  All his questions were answered

## 2022-07-28 NOTE — PROGRESS NOTES
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  - pT1a G2, N0 M0 R0, clear cell  - s/p lap left radical nephrectomy January 2020    2  CKD     I was happy to review with the patient his CT and chest x-ray results which were negative for evidence of recurrent or residual disease  We will continue with annual surveillance with a CT with and without contrast next year as well as chest x-ray  We discussed the need for IV hydration pre and post imaging  Will also obtain BMP and PSA prior to follow-up  All questions answered  Arcelia De Jesus PA-C    History of Present Illness:     Clary Wisdom is a 59 y o  with a history left renal cell carcinoma status post left radical nephrectomy (01/2020) presenting for follow-up      CT (7/21/22) negative for evidence of residual or recurrent disease)  CXR (7/21/22) no disease  Creatinine 1 43 and eGFR 51 (7/25/22)      Patient denies any dysuria, gross hematuria, suprapubic pressure, flank pain  Denies any voiding concerns  Denies any pain at previous surgical incisions  He is asymptomatic from a urologic standpoint        Past Medical, Past Surgical History:     Past Medical History:   Diagnosis Date    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-    Arthritis     Cancer ([address] ) 10/13/2019    kidney    Chronic kidney disease 1/7/2020    stage 3    CKD (chronic kidney disease)     Diverticulitis of colon 10/13/2019    Diverticulitis of large intestine with perforation without bleeding 10/13/2019    Hyperlipidemia     Hypertension     Inflammatory bowel disease 10/13/2019    Renal cell cancer, left ([address] ) 10/13/2019    Renal cell carcinoma ([address] )    :    Past Surgical History:   Procedure Laterality Date    COLONOSCOPY  2015    HERNIA REPAIR      HIP SURGERY Right     age [address] REPLACEMENT Right 2011    TKR    NEPHRECTOMY  [id number]    left    HI LAP, RADICAL NEPHRECTOMY Left 12/30/2019    Procedure: NEPHRECTOMY LAPAROSCOPIC HAND ASSISTED;  Surgeon: Brent Maguire MD;  [facility];  Service: Urology    HI LAP,SURG,COLECTOMY, PARTIAL, Jacquelin Blew N/A 12/30/2019    Procedure: LAPAROSCOPIC HAND-ASSISTED SIGMOID COLECTOMY; LAPAROSCOPIC MOBILIZATION OF SPLENIC FLEXURE;  Surgeon: Andrew Gracia MD;  [facility];  Service: Colorectal    REPLACEMENT TOTAL KNEE Right 2011    REPLACEMENT TOTAL KNEE      SHOULDER SURGERY Left     SHOULDER SURGERY Right     US GUIDED THYROID BIOPSY  7/9/2021    WRIST FUSION Left 2016    WRIST SURGERY     :    Medications, Allergies:     Current Outpatient Medications:     aspirin 81 MG tablet, Take 1 tablet by mouth daily, Disp: , Rfl:     atorvastatin (LIPITOR) 20 mg tablet, Take 1 tablet (20 mg total) by mouth daily, Disp: 90 tablet, Rfl: 1    losartan (COZAAR) 25 mg tablet, Take 1 tablet (25 mg total) by mouth daily, Disp: 90 tablet, Rfl: 1    multivitamin (THERAGRAN) TABS, Take 1 tablet by mouth every other day, Disp: , Rfl:     Omega-3 Fatty Acids (FISH OIL) 1200 MG CAPS, Take by mouth , Disp: , Rfl:     Allergies:  No Known Allergies:    Social and Family History:   Social History:   Social History     Tobacco Use    Smoking status: Never Smoker    Smokeless tobacco: Never Used   Vaping Use    Vaping Use: Never used   Substance Use Topics    Alcohol use:  Yes     Alcohol/week: 2 0 standard drinks     Types: 2 Cans of beer per week    Drug use: No        Social History     Tobacco Use   Smoking Status Never Smoker   Smokeless Tobacco Never Used       Family History:  Family History   Problem Relation Age of Onset    Heart disease Father     Other Father         cardiac disorder    No Known Problems Mother     Heart attack Maternal Grandfather     Heart attack Paternal Grandfather     Thyroid cancer Son  Colon cancer Neg Hx    :     Review of Systems:     General: Fever, chills, or night sweats: negative  Cardiac: Negative for chest pain  Pulmonary: Negative for shortness of breath  Gastrointestinal: Abdominal pain positive  Nausea, vomiting, or diarrhea negative,  Genitourinary: See HPI above  Patient does not have hematuria  All other systems queried were negative  Physical Exam:   General: Patient is pleasant and in NAD  Awake and alert  /70 (BP Location: Left arm, Patient Position: Sitting, Cuff Size: Standard)   Pulse 92   Ht 5' 6" (1 676 m)   Wt 71 kg (156 lb 9 6 oz)   SpO2 98%   BMI 25 28 kg/m²   Cardiac: Peripheral edema: negative  Pulmonary: Non-labored breathing  Abdomen: Soft, non-tender, non-distended  No surgical scars  No masses, tenderness, hernias noted  Genitourinary: Negative CVA tenderness, negative suprapubic tenderness  All incisions are well healed with no palpable hernia      Labs:     Lab Results   Component Value Date    HGB 14 5 01/28/2022    HCT 45 1 01/28/2022    WBC 4 45 01/28/2022     01/28/2022   ]    Lab Results   Component Value Date    K 4 7 07/25/2022     (H) 07/25/2022    CO2 28 07/25/2022    BUN 19 07/25/2022    CREATININE 1 43 (H) 07/25/2022    CALCIUM 9 2 07/25/2022   ]  CT ABDOMEN AND PELVIS WITHOUT IV CONTRAST - LOW DOSE RENAL STONE      INDICATION:   C64 2: Malignant neoplasm of left kidney, except renal pelvis      COMPARISON:  CT dated 7/9/2020     TECHNIQUE:  Low dose thin section CT examination of the abdomen and pelvis was performed without intravenous or oral contrast according to a protocol specifically designed to evaluate for urinary tract calculus  Axial, sagittal, and coronal 2D   reformatted images were created from the source data and submitted for interpretation     Evaluation for pathology in the abdomen and pelvis that is unrelated to urinary tract calculi is limited       Radiation dose length product (DLP) for this visit:  269 mGy-cm   This examination, like all CT scans performed in the [facility], was performed utilizing techniques to minimize radiation dose exposure, including the use of iterative   reconstruction and automated exposure control       FINDINGS:     The lack of intravenous contrast limits evaluation of certain processes, particularly inflammatory, infectious and neoplastic processes      RIGHT KIDNEY AND URETER:  No urinary tract calculi  No hydronephrosis or hydroureter      LEFT KIDNEY AND URETER:  Status post left nephrectomy  No nodularity at the nephrectomy bed to suggest local recurrence      URINARY BLADDER:   Unremarkable      No significant abnormality in the visualized lung bases      Limited low radiation dose noncontrast CT evaluation demonstrates no clinically significant abnormality of liver, spleen, pancreas, or adrenal glands  No calcified gallstones or gallbladder wall thickening noted  No ascites or bulky lymphadenopathy on this limited noncontrast study  Postoperative changes in the sigmoid colon  Limited evaluation demonstrates no evidence to suggest acute appendicitis  No acute fracture or destructive osseous lesion is identified         IMPRESSION:     Status post left nephrectomy  No evidence of local recurrence or metastatic disease in the abdomen or pelvis within the limits of this unenhanced CT            CHEST      INDICATION:   C64 2: Malignant neoplasm of left kidney, except renal pelvis      COMPARISON:  Chest radiograph and abdomen CT from 7/9/2020      EXAM PERFORMED/VIEWS:  XR CHEST PA & LATERAL  DUAL ENERGY SUBTRACTION        FINDINGS:     Cardiomediastinal silhouette appears unremarkable      The lungs are clear    No pneumothorax or pleural effusion      Osseous structures appear within normal limits for patient age      IMPRESSION:     No acute cardiopulmonary disease

## 2022-08-01 ENCOUNTER — OFFICE VISIT (OUTPATIENT)
Dept: INTERNAL MEDICINE CLINIC | Age: 62
End: 2022-08-01
Payer: COMMERCIAL

## 2022-08-01 VITALS
OXYGEN SATURATION: 98 % | HEART RATE: 64 BPM | BODY MASS INDEX: 25.3 KG/M2 | HEIGHT: 66 IN | TEMPERATURE: 98.6 F | WEIGHT: 157.4 LBS | DIASTOLIC BLOOD PRESSURE: 70 MMHG | SYSTOLIC BLOOD PRESSURE: 124 MMHG

## 2022-08-01 DIAGNOSIS — E04.1 THYROID NODULE: ICD-10-CM

## 2022-08-01 DIAGNOSIS — R73.9 HYPERGLYCEMIA: ICD-10-CM

## 2022-08-01 DIAGNOSIS — C64.2 MALIGNANT NEOPLASM OF LEFT KIDNEY, EXCEPT RENAL PELVIS (HCC): ICD-10-CM

## 2022-08-01 DIAGNOSIS — N18.31 STAGE 3A CHRONIC KIDNEY DISEASE (HCC): ICD-10-CM

## 2022-08-01 DIAGNOSIS — I12.9 BENIGN HYPERTENSION WITH CHRONIC KIDNEY DISEASE, STAGE III (HCC): ICD-10-CM

## 2022-08-01 DIAGNOSIS — N18.30 BENIGN HYPERTENSION WITH CHRONIC KIDNEY DISEASE, STAGE III (HCC): ICD-10-CM

## 2022-08-01 DIAGNOSIS — E78.2 MIXED HYPERLIPIDEMIA: Primary | ICD-10-CM

## 2022-08-01 DIAGNOSIS — C64.2 RENAL CELL CANCER, LEFT (HCC): ICD-10-CM

## 2022-08-01 PROCEDURE — 3725F SCREEN DEPRESSION PERFORMED: CPT | Performed by: INTERNAL MEDICINE

## 2022-08-01 PROCEDURE — 99214 OFFICE O/P EST MOD 30 MIN: CPT | Performed by: INTERNAL MEDICINE

## 2022-08-01 NOTE — ASSESSMENT & PLAN NOTE
Lipid profile is in acceptable range  Continue with atorvastatin 20 mg daily along with low-fat diet and exercise    Will check lipid profile before next visit Review of Systems/Medical History          Cardiovascular  Hyperlipidemia, Hypertension ,    Pulmonary       GI/Hepatic       Kidney stones,        Endo/Other    Obesity    GYN       Hematology   Musculoskeletal    Arthritis     Neurology   Psychology                Anesthesia Plan  ASA Score- 3     Anesthesia Type- IV sedation with anesthesia with ASA Monitors  Additional Monitors:   Airway Plan:         Plan Factors-    Induction- intravenous  Postoperative Plan-     Informed Consent- Anesthetic plan and risks discussed with patient  I personally reviewed this patient with the CRNA  Discussed and agreed on the Anesthesia Plan with the CRNA  Sherre Soulier

## 2022-08-01 NOTE — ASSESSMENT & PLAN NOTE
Lab Results   Component Value Date    EGFR 51 07/25/2022    EGFR 54 06/23/2022    EGFR 52 03/14/2022    CREATININE 1 43 (H) 07/25/2022    CREATININE 1 36 (H) 06/23/2022    CREATININE 1 42 (H) 03/14/2022   Renal function is relatively stable    Being followed by nephrologist

## 2022-08-01 NOTE — PROGRESS NOTES
Assessment/Plan:    Thyroid nodule  Being followed by the oncologic surgeon and is planning for surgical removal     Benign hypertension with chronic kidney disease, stage III [facility])  Lab Results   Component Value Date    EGFR 51 07/25/2022    EGFR 54 06/23/2022    EGFR 52 03/14/2022    CREATININE 1 43 (H) 07/25/2022    CREATININE 1 36 (H) 06/23/2022    CREATININE 1 42 (H) 03/14/2022   Renal function is relatively stable  Being followed by nephrologist     Renal cell cancer, left ([address] )  In remission  Being followed by urologist    Mixed hyperlipidemia  Lipid profile is in acceptable range  Continue with atorvastatin 20 mg daily along with low-fat diet and exercise  Will check lipid profile before next visit       Diagnoses and all orders for this visit:    Mixed hyperlipidemia  -     Lipid Panel with Direct LDL reflex; Future  -     CBC; Future    Benign hypertension with chronic kidney disease, stage III (HCC)  -     Lipid Panel with Direct LDL reflex; Future  -     CBC; Future    Thyroid nodule    Renal cell cancer, left (HCC)    Malignant neoplasm of left kidney, except renal pelvis (HCC)    Stage 3a chronic kidney disease (HCC)    Hyperglycemia          BMI Counseling: Body mass index is 25 41 kg/m²  The BMI is above normal  Nutrition recommendations include decreasing portion sizes, encouraging healthy choices of fruits and vegetables, decreasing fast food intake and consuming healthier snacks  Exercise recommendations include vigorous physical activity 75 minutes/week and exercising 3-5 times per week  No pharmacotherapy was ordered  Rationale for BMI follow-up plan is due to patient being overweight or obese  Depression Screening and Follow-up Plan: Patient was screened for depression during today's encounter  They screened negative with a PHQ-2 score of 0  Subjective:          Patient ID: Karina Joyner is a 59 y o  male  Patient is here for regular follow-up    Also would like to discuss blood work results  Otherwise no new complaints      The following portions of the patient's history were reviewed and updated as appropriate: allergies, current medications, past family history, past medical history, past social history, past surgical history and problem list     Review of Systems   Constitutional: Negative for fatigue and fever  HENT: Negative for congestion, ear discharge, ear pain, postnasal drip, sinus pressure, sore throat, tinnitus and trouble swallowing  Eyes: Negative for discharge, itching and visual disturbance  Respiratory: Negative for cough and shortness of breath  Cardiovascular: Negative for chest pain and palpitations  Gastrointestinal: Negative for abdominal pain, diarrhea, nausea and vomiting  Endocrine: Negative for cold intolerance and polyuria  Genitourinary: Negative for difficulty urinating, dysuria and urgency  Musculoskeletal: Negative for arthralgias and neck pain  Skin: Negative for rash  Allergic/Immunologic: Negative for environmental allergies  Neurological: Negative for dizziness, weakness and headaches  Psychiatric/Behavioral: Negative for agitation, behavioral problems and confusion  The patient is not nervous/anxious            Past Medical History:   Diagnosis Date    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-    Arthritis     Cancer ([place] [address] ) 10/13/2019    kidney    Chronic kidney disease 1/7/2020    stage 3    CKD (chronic kidney disease)     Diverticulitis of colon 10/13/2019    Diverticulitis of large intestine with perforation without bleeding 10/13/2019    Hyperlipidemia     Hypertension     Inflammatory bowel disease 10/13/2019    Renal [facility] cancer, [facility] ([facility] ) 10/13/2019    Renal cell carcinoma (HCC)          Current Outpatient Medications:     aspirin 81 MG tablet, Take 1 tablet by mouth daily, Disp: , Rfl:     atorvastatin (LIPITOR) 20 mg tablet, Take 1 tablet (20 mg total) by mouth daily, Disp: 90 tablet, Rfl: 1   losartan (COZAAR) 25 mg tablet, Take 1 tablet (25 mg total) by mouth daily, Disp: 90 tablet, Rfl: 1    multivitamin (THERAGRAN) TABS, Take 1 tablet by mouth every other day, Disp: , Rfl:     Omega-3 Fatty Acids (FISH OIL) 1200 MG CAPS, Take by mouth , Disp: , Rfl:     No Known Allergies    Social History   Past Surgical History:   Procedure Laterality Date    COLONOSCOPY  2015    HERNIA REPAIR      HIP SURGERY Right     age 4    JOINT REPLACEMENT Right 2011    TKR    NEPHRECTOMY  [id number]    left    MS LAP, RADICAL NEPHRECTOMY Left 12/30/2019    Procedure: NEPHRECTOMY LAPAROSCOPIC HAND ASSISTED;  Surgeon: Darci Metcalf MD;  [facility];  Service: Urology    MS LAP,SURG,COLECTOMY, PARTIAL, [place] Boros N/A 12/30/2019    Procedure: LAPAROSCOPIC HAND-ASSISTED SIGMOID COLECTOMY; LAPAROSCOPIC MOBILIZATION OF SPLENIC FLEXURE;  Surgeon: Kendrick Ahumada, MD;  [facility];  Service: Colorectal    REPLACEMENT TOTAL KNEE Right 2011    REPLACEMENT TOTAL KNEE      SHOULDER SURGERY Left     SHOULDER SURGERY Right     US GUIDED THYROID BIOPSY  7/9/2021    WRIST FUSION Left 2016    WRIST SURGERY       Family History   Problem Relation Age of Onset    Heart disease Father     Other Father         cardiac disorder    No Known Problems Mother     Heart attack Maternal Grandfather     Heart attack Paternal Grandfather     Thyroid cancer Son     Colon cancer Neg Hx        Objective:  /70 (BP Location: Left arm, Patient Position: Sitting, Cuff Size: Large)   Pulse 64   Temp 98 6 °F (37 °C) (Temporal)   Ht 5' 6" (1 676 m)   Wt 71 4 kg (157 lb 6 4 oz)   SpO2 98% Comment: RA  BMI 25 41 kg/m²   Body mass index is 25 41 kg/m²  Physical Exam  Constitutional:       Appearance: He is well-developed  HENT:      Head: Normocephalic  Right Ear: External ear normal  There is no impacted cerumen  Left Ear: External ear normal  There is no impacted cerumen        Mouth/Throat: Pharynx: No posterior oropharyngeal erythema  Eyes:      General: No scleral icterus  Pupils: Pupils are equal, round, and reactive to light  Neck:      Thyroid: No thyromegaly  Trachea: No tracheal deviation  Cardiovascular:      Rate and Rhythm: Normal rate and regular rhythm  Heart sounds: Normal heart sounds  Pulmonary:      Effort: Pulmonary effort is normal  No respiratory distress  Breath sounds: Normal breath sounds  Chest:      Chest wall: No tenderness  Abdominal:      General: Bowel sounds are normal       Palpations: Abdomen is soft  There is no mass  Tenderness: There is no abdominal tenderness  Musculoskeletal:         General: Normal range of motion  Cervical back: Normal range of motion and neck supple  Right lower leg: No edema  Left lower leg: No edema  Lymphadenopathy:      Cervical: No cervical adenopathy  Skin:     General: Skin is warm  Neurological:      Mental Status: He is alert and oriented to person, place, and time  Cranial Nerves: No cranial nerve deficit     Psychiatric:         Mood and Affect: Mood normal          Behavior: Behavior normal

## 2022-08-05 ENCOUNTER — HOSPITAL ENCOUNTER (OUTPATIENT)
Dept: RADIOLOGY | Facility: HOSPITAL | Age: 62
Discharge: HOME | End: 2022-08-05
Payer: COMMERCIAL

## 2022-08-05 DIAGNOSIS — M75.42 SUBACROMIAL IMPINGEMENT OF LEFT SHOULDER: ICD-10-CM

## 2022-08-05 PROCEDURE — 73221 MRI JOINT UPR EXTREM W/O DYE: CPT

## 2022-08-05 PROCEDURE — G1004 CDSM NDSC: HCPCS

## 2022-08-09 NOTE — PROGRESS NOTES
Goals  ST- demonstrate compliancy with HEP in 1 week   Met   Decrease reported pain to 5/10 at worst and with activity, to improve functional capacity, within 3 weeks partially met  Improve L shoulder range of motion to [facility], within 3 weeks     LT- Improve FOTO score to specified value to improve patients perceived benefit of therapy, in 8 weeks   Improve L shoulder strength to 4+/5, to improve ability to complete ADL's, within 8 weeks   Complete round of golf with no complaints of pain, within 10 weeks

## 2022-08-22 ENCOUNTER — OFFICE VISIT (OUTPATIENT)
Dept: NEPHROLOGY | Facility: CLINIC | Age: 62
End: 2022-08-22
Payer: COMMERCIAL

## 2022-08-22 ENCOUNTER — OFFICE VISIT (OUTPATIENT)
Dept: OBGYN CLINIC | Facility: OTHER | Age: 62
End: 2022-08-22
Payer: COMMERCIAL

## 2022-08-22 VITALS
HEART RATE: 54 BPM | WEIGHT: 156 LBS | SYSTOLIC BLOOD PRESSURE: 122 MMHG | DIASTOLIC BLOOD PRESSURE: 70 MMHG | OXYGEN SATURATION: 97 % | BODY MASS INDEX: 25.07 KG/M2 | HEIGHT: 66 IN

## 2022-08-22 VITALS
BODY MASS INDEX: 25.07 KG/M2 | HEART RATE: 60 BPM | DIASTOLIC BLOOD PRESSURE: 65 MMHG | WEIGHT: 156 LBS | SYSTOLIC BLOOD PRESSURE: 116 MMHG | HEIGHT: 66 IN

## 2022-08-22 DIAGNOSIS — I12.9 BENIGN HYPERTENSION WITH CHRONIC KIDNEY DISEASE, STAGE III (HCC): ICD-10-CM

## 2022-08-22 DIAGNOSIS — R80.1 PERSISTENT PROTEINURIA: ICD-10-CM

## 2022-08-22 DIAGNOSIS — M75.102 TEAR OF LEFT SUPRASPINATUS TENDON: Primary | ICD-10-CM

## 2022-08-22 DIAGNOSIS — N18.30 BENIGN HYPERTENSION WITH CHRONIC KIDNEY DISEASE, STAGE III (HCC): ICD-10-CM

## 2022-08-22 DIAGNOSIS — Z90.5 SOLITARY KIDNEY, ACQUIRED: ICD-10-CM

## 2022-08-22 DIAGNOSIS — N18.31 STAGE 3A CHRONIC KIDNEY DISEASE (HCC): Primary | ICD-10-CM

## 2022-08-22 PROCEDURE — 99214 OFFICE O/P EST MOD 30 MIN: CPT | Performed by: INTERNAL MEDICINE

## 2022-08-22 PROCEDURE — 99214 OFFICE O/P EST MOD 30 MIN: CPT | Performed by: ORTHOPAEDIC SURGERY

## 2022-08-22 RX ORDER — CHLORHEXIDINE GLUCONATE 0.12 MG/ML
15 RINSE ORAL ONCE
OUTPATIENT
Start: 2022-08-22 | End: 2022-08-22

## 2022-08-22 NOTE — PROGRESS NOTES
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    Discussion and Plan:    -Findings are consistent with a recurrent tear of the left rotator cuff (patient is status post primary repair 15 years ago)  I did review with patient that the radiologist does not necessarily state this clearly given the metal anchor and its artifact but to me the for MRI findings are very consistent with a recurrent tear of the supraspinatus  - Discussed with patient today that since he has seen minimal improvements with conservative treatments, the best option is a arthroscopic rotator cuff repair  Patient wishes to move forward with surgical intervention   -A thorough discussion was performed with the patient regarding the risks and benefit of operative and nonoperative treatment of their rotator cuff tear  Risks discussed include but were not limited to infection, neurovascular injury, recurrent tear, nonhealing of the repair, need for further surgery, need for biceps tenodesis or tenotomy, stiffness, need for prolonged rehabilitation, as well as the risk of anesthesia  After this discussion all questions were answered and informed consent was obtained in the office for arthroscopic rotator cuff repair of the right shoulder  - Patient was fitted with post-op ARC sling and post-op referral to physical was placed today     Subjective:   Patient ID: Octaviano Lan is a 59 y o  male      HPI     Patient presents today to discuss results of left shoulder MRI obtained 8/5/2022  Patient has not seen any improvements since last visit  Pain has been present for the last couple months  Patient states his pain is located posterior and radiates anterior  Pain is described as burning  Patient has tried physical therapy and CSI in the past with minimal relief  Patient states the pain is increased with excess shoulder motions and over head movements   Patient notes a previous rotator cuff repair about 15 years ago and has had off an on pain very since  The following portions of the patient's history were reviewed and updated as appropriate: allergies, current medications, past family history, past medical history, past social history, past surgical history and problem list     Review of Systems   Constitutional: Negative for chills and fever  HENT: Negative for ear pain and sore throat  Eyes: Negative for pain and visual disturbance  Respiratory: Negative for cough and shortness of breath  Cardiovascular: Negative for chest pain and palpitations  Gastrointestinal: Negative for abdominal pain and vomiting  Genitourinary: Negative for dysuria and hematuria  Musculoskeletal: Positive for arthralgias (left shoulder)  Negative for back pain  Skin: Negative for color change and rash  Neurological: Negative for seizures and syncope  All other systems reviewed and are negative  Objective:  /65 (BP Location: Right arm, Patient Position: Sitting, Cuff Size: Adult)   Pulse 60   Ht 5' 6" (1 676 m)   Wt 70 8 kg (156 lb)   BMI 25 18 kg/m²       Left Shoulder Exam     Tenderness   The patient is experiencing no tenderness  Range of Motion   The patient has normal left shoulder ROM  Muscle Strength   Abduction: 5/5   Supraspinatus: 5/5   Subscapularis: 5/5     Tests   Apprehension: positive  Stallworth test: negative  Cross arm: positive  Impingement: positive  Drop arm: negative  Sulcus: absent            Physical Exam  Vitals and nursing note reviewed  Constitutional:       Appearance: He is well-developed  HENT:      Head: Normocephalic and atraumatic  Eyes:      Pupils: Pupils are equal, round, and reactive to light  Cardiovascular:      Rate and Rhythm: Normal rate and regular rhythm  Pulses: Normal pulses  Heart sounds: Normal heart sounds  Pulmonary:      Effort: Pulmonary effort is normal  No respiratory distress        Breath sounds: Normal breath sounds  Abdominal:      General: There is no distension  Palpations: Abdomen is soft  Musculoskeletal:      Cervical back: Normal range of motion and neck supple  Skin:     General: Skin is warm and dry  Neurological:      Mental Status: He is alert and oriented to person, place, and time  Psychiatric:         Behavior: Behavior normal          Thought Content: Thought content normal          Judgment: Judgment normal            I have personally reviewed pertinent films in PACS and my interpretation is as follows   MRI of left shoulder shows presence of a metallic anchor from his prior repair with findings consistent with recurrent tear of the supraspinatus, no significant retraction but there is significant thinning of both the supraspinatus and infraspinatus tendon      Scribe Attestation    I,:  David Jin am acting as a scribe while in the presence of the attending physician :       I,:  Jacky Choi MD personally performed the services described in this documentation    as scribed in my presence :

## 2022-08-22 NOTE — PROGRESS NOTES
NEPHROLOGY OUTPATIENT PROGRESS NOTE   Hubert Chester 59 y o  male MRN: [id number]  DATE: 8/22/2022  Reason for visit:   Chief Complaint   Patient presents with    Follow-up    Chronic Kidney Disease       ASSESSMENT and PLAN:  Chronic kidney disease stage 3a  -baseline creatinine 1 2-1 4     -renal function stable at cr 1 43 mg/dl  S/p IV contrast with ct on 07/21 and no evidence of TOM  -Continue oral hydration  -Chronic kidney disease likely due to decreased nephron mass from left nephrectomy done for renal cell carcinoma left kidney  -preop creatinine was 0 8-0 9 but has been elevated and stable at  1 2-1 4  likely due to decreased nephron mass from left nephrectomy   -discussed about avoiding nephrotoxins like NSAIDs   -Patient will need renal prep with IV fluid before any CT with IV contrast and this was discussed with patient in detail    -no proteinuria   -Last  PTH within normal limit  -follow-up in 6 months       Primary Hypertension with CKD:   -Current medication: losartan 25 mg     -Current blood pressure: stable and at goal    -Plan:    ?  Continue Losartan  -Recommend 2 g sodium diet  -Recommend daily exercise and weight loss  -Discussed home monitoring of BP and maintaining a log of blood pressure   -Recommend goal BP less than 130/80  Preoperative clearance  -noted plan for thyroid surgery in October as per patient but not scheduled yet, okay from Nephrology side if renal function remains stable, will repeat BMP in October before surgery  Discussed about holding losartan on the day of surgery as well as the previous day   -I have personally discussed the risks and benefits of the surgery from a renal standpoint with the patient in depth, and advised the patient about the risk of ANDERS and the course of ANDERS if it occurs with the small probability of the need for renal replacement therapy in the worse case scenario  Patient voiced understanding       From a renal standpoint the patient is renally optimized for surgery with the following recommendations:   Fluids to administer: Normal Saline 250 cc total over 2 hours    Medication Recommendations:   Minimize any IV contrast use (If IV contrast is used, please check BMP POD # 1)   Hold NSAIDs for at least 10 days prior to surgery   Hold ACEi/ARB starting 1 days prior to surgery   Not on surgery   Hemodynamic Recommendations:   Ideally, target MAPs greater than 65 mmHg in the perioperative period, and minimize operative time with MAPs < 65 mmHg   Avoid intraop hemodynamic instability to decrease risk for ANDERS occurrence   Other Recommendations:   Please check a BMP POD day # 1 and call if any questions or if Creatinine is rising or electrolyte abnormalities present    Ordered bmp 2 weeks pre and post surgery      Solitary right kidney  -status post left nephrectomy for renal cell carcinoma-done on 12/30/19 by Dr Evan Lion pathology results suggestive of renal cell carcinoma-conventional clear cell type    -continue follow-up with Urology, last visit was in July 28, 2022, noted plan for annual surveillance with CT with and without contrast and noted plan for pre and post contrast IV hydration      Persistent proteinuria-resolved  - proteinuria improving from upc ratio less than 0 33 to last upc ratio of 0 09 and UA was bland, continue to monitor, proteinuria improving with use of losartan and now resolved  continue losartan at current dose     - proteinuria likely due to HTN as well as from compensatory hypertrophy in solitary kidney   - Recommended goal blood pressure less than 130/80     CKD/MBD:  Phosphorus is at normal range, PTH is at normal range at 36 0, continue to monitor     Hyperlipidemia:   -Lipid panel improving   -last lipid panel from January 2022 showed LDL at goal at 89, triglyceride 78  -continue statin as well as fish oil per PCP  -recommend goal LDL less than 100, continue monitoring and management per PCP  -Stressed on daily exercise  Patient Instructions   -Renal Function is  stable   -You have Chronic Kidney Disease Stage 3  -Avoid NSAIDs like Ibuprofen/Motrin, Naproxen/Aleve, Celebrex, meloxicam/Mobic, Diclofenac and other NSAIDs   -Okay to take Acetaminophen/Tylenol if you do not have any liver problems  -Avoid IV contrast used for CT scan and cardiac catheterization     -If plan for any study with IV contrast, please let me know so we could hydrate with fluids before and after IV contrast  -Dosage  of certain medications may need to be adjusted depending on Kidney function  Blood pressure is stable    -Recommend 2 g sodium diet  -Recommend daily exercise and weight loss  -Discussed home monitoring of BP and maintaining a log of blood pressure   -Recommend goal BP less than 130/80  Please inform me if SBP below 110 or above 140's persistently  Blood work in October about 2 weeks before the surgery as well as 2 weeks after the surgery to monitor renal function  Follow-up in 6 months with repeat labs  Recommending holding losartan on the day of surgery as well as previous day       Diagnoses and all orders for this visit:    Stage 3a chronic kidney disease ([facility] [address] )  -     Basic metabolic panel; Future  -     Basic metabolic panel; Future  -     Basic metabolic panel; Future  -     Protein / creatinine ratio, urine; Future  -     Urinalysis with microscopic; Future  -     PTH, intact; Future  -     Phosphorus; Future  -     Magnesium; Future    Benign hypertension with chronic kidney disease, stage III (HCC)  -     Basic metabolic panel; Future    Solitary kidney, acquired  -     Basic metabolic panel; Future    Persistent proteinuria  -     Protein / creatinine ratio, urine; Future  -     Urinalysis with microscopic;  Future            SUBJECTIVE / HPI:  Amalia Jasmine is a 59 y o   male  who underwent elective laparoscopy hand assisted sigmoid resection and left nephrectomy for complicated diverticular disease and left renal cell carcinoma on 12/30/2019   Nephrology was consulted for increased creatinine postop   Preoperative creatinine was 0 9   Postop creatinine stabilized at 1 4 and elevated creatinine was thought to be due to nephron loss  Since then renal function has been stable at creatinine 1 2-1 3  Reviewed labs from 07/25/2022:  Creatinine stable at 1 43 mg/dL with stable electrolyte  No microscopic hematuria and no proteinuria    Reviewed recent surgical Oncology note, patient was seen for thyroid nodule and has follow-up in September, he mentions about possible surgery in October  Also reviewed Urology note noted plan for follow-up in 1 year, recent CT without any evidence of tumor recurrence  He did receive contrast with IV fluid pre and post contrast and thankfully renal function remains stable        REVIEW OF SYSTEMS:    Review of Systems   Constitutional: Negative for activity change, appetite change, chills, diaphoresis, fatigue and fever  HENT: Negative for congestion, facial swelling and nosebleeds  Eyes: Negative for pain and visual disturbance  Respiratory: Negative for cough, chest tightness and shortness of breath  Cardiovascular: Negative for chest pain and palpitations  Gastrointestinal: Negative for abdominal distention, abdominal pain, diarrhea, nausea and vomiting  Genitourinary: Negative for difficulty urinating, dysuria, flank pain, frequency and hematuria  Musculoskeletal: Negative for arthralgias, back pain and joint swelling  Skin: Negative for rash  Neurological: Negative for dizziness, seizures, syncope, weakness and headaches  Psychiatric/Behavioral: Negative for agitation and confusion  The patient is not nervous/anxious  More than 10 point review of systems were obtained and discussed in length with the patient  Complete review of systems were negative / unremarkable except mentioned above         PAST MEDICAL HISTORY:  Past Medical History:   Diagnosis Date    [facility]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-    Arthritis     Cancer ([facility] [address] ) 10/13/2019    kidney    Chronic kidney disease 1/7/2020    stage 3    CKD (chronic kidney disease)     Diverticulitis of colon 10/13/2019    Diverticulitis of large intestine with perforation without bleeding 10/13/2019    Hyperlipidemia     Hypertension     Inflammatory bowel disease 10/13/2019    Renal cell cancer, left ([facility] [address] ) 10/13/2019    Renal cell carcinoma ([facility] [address] )        PAST SURGICAL HISTORY:  Past Surgical History:   Procedure Laterality Date    COLONOSCOPY  2015    HERNIA REPAIR      HIP SURGERY Right     age 4    JOINT REPLACEMENT Right 2011    TKR    NEPHRECTOMY  [id number]    left    IL LAP, RADICAL NEPHRECTOMY Left 12/30/2019    Procedure: NEPHRECTOMY LAPAROSCOPIC HAND ASSISTED;  Surgeon: Darci Metcalf MD;  [facility];  Service: Urology    IL LAP,SURG,COLECTOMY, PARTIAL, Rise Boros N/A 12/30/2019    Procedure: LAPAROSCOPIC HAND-ASSISTED SIGMOID COLECTOMY; LAPAROSCOPIC MOBILIZATION OF SPLENIC FLEXURE;  Surgeon: Kendrick Ahumada, MD;  [facility];  Service: Colorectal    REPLACEMENT TOTAL KNEE Right 2011    REPLACEMENT TOTAL KNEE      SHOULDER SURGERY Left     SHOULDER SURGERY Right     US GUIDED THYROID BIOPSY  7/9/2021    WRIST FUSION Left 2016    WRIST SURGERY         SOCIAL HISTORY:  Social History     Substance and Sexual Activity   Alcohol Use Yes    Alcohol/week: 2 0 standard drinks    Types: 2 Cans of beer per week     Social History     Substance and Sexual Activity   Drug Use No     Social History     Tobacco Use   Smoking Status Never Smoker   Smokeless Tobacco Never Used       FAMILY HISTORY:  Family History   Problem Relation Age of Onset    Heart disease Father     Other Father         cardiac disorder    No Known Problems Mother     Heart attack Maternal Grandfather     Heart attack Paternal Grandfather     Thyroid cancer Son     Colon cancer Neg Hx MEDICATIONS:    Current Outpatient Medications:     aspirin 81 MG tablet, Take 1 tablet by mouth daily, Disp: , Rfl:     atorvastatin (LIPITOR) 20 mg tablet, Take 1 tablet (20 mg total) by mouth daily, Disp: 90 tablet, Rfl: 1    losartan (COZAAR) 25 mg tablet, Take 1 tablet (25 mg total) by mouth daily, Disp: 90 tablet, Rfl: 1    multivitamin (THERAGRAN) TABS, Take 1 tablet by mouth every other day, Disp: , Rfl:     Omega-3 Fatty Acids (FISH OIL) 1200 MG CAPS, Take by mouth , Disp: , Rfl:       PHYSICAL EXAM:  Vitals:    08/22/22 1025 08/22/22 1037   BP: 138/80 122/70   BP Location: Left arm    Patient Position: Sitting    Cuff Size: Standard    Pulse: (!) 54    SpO2: 97%    Weight: 70 8 kg (156 lb)    Height: 5' 6" (1 676 m)      Body mass index is 25 18 kg/m²  Physical Exam  Constitutional:       General: He is not in acute distress  Appearance: He is well-developed  He is not diaphoretic  HENT:      Head: Normocephalic and atraumatic  Mouth/Throat:      Mouth: Mucous membranes are moist    Eyes:      General: No scleral icterus  Conjunctiva/sclera: Conjunctivae normal       Pupils: Pupils are equal, round, and reactive to light  Neck:      Thyroid: No thyromegaly  Cardiovascular:      Rate and Rhythm: Normal rate and regular rhythm  Heart sounds: Normal heart sounds  No murmur heard  No friction rub  Pulmonary:      Effort: Pulmonary effort is normal  No respiratory distress  Breath sounds: Normal breath sounds  No wheezing or rales  Abdominal:      General: Bowel sounds are normal  There is no distension  Palpations: Abdomen is soft  Tenderness: There is no abdominal tenderness  Musculoskeletal:         General: No deformity  Cervical back: Neck supple  Right lower leg: No edema  Left lower leg: No edema  Lymphadenopathy:      Cervical: No cervical adenopathy  Skin:     General: Skin is warm and dry        Coloration: Skin is not pale       Nails: There is no clubbing  Neurological:      Mental Status: He is alert and oriented to person, place, and time  He is not disoriented  Psychiatric:         Mood and Affect: Mood normal  Mood is not anxious  Affect is not inappropriate  Behavior: Behavior normal          Thought Content:  Thought content normal          Lab Results:   Results for orders placed or performed in visit on 42/36/73   Basic metabolic panel   Result Value Ref Range    Sodium 139 135 - 147 mmol/L    Potassium 4 7 3 5 - 5 3 mmol/L    Chloride 109 (H) 96 - 108 mmol/L    CO2 28 21 - 32 mmol/L    ANION GAP 2 (L) 4 - 13 mmol/L    BUN 19 5 - 25 mg/dL    Creatinine 1 43 (H) 0 60 - 1 30 mg/dL    Glucose, Fasting 104 (H) 65 - 99 mg/dL    Calcium 9 2 8 3 - 10 1 mg/dL    eGFR 51 ml/min/1 73sq m   Magnesium   Result Value Ref Range    Magnesium 2 5 1 6 - 2 6 mg/dL   Phosphorus   Result Value Ref Range    Phosphorus 3 0 2 3 - 4 1 mg/dL   Urinalysis with microscopic   Result Value Ref Range    Color, UA Light Yellow     Clarity, UA Clear     Specific [place], UA 1 015 1 003 - 1 030    pH, UA 6 0 4 5, 5 0, 5 5, 6 0, 6 5, 7 0, 7 5, 8 0    Leukocytes, UA Negative Negative    Nitrite, UA Negative Negative    Protein, UA Negative Negative mg/dl    Glucose, UA Negative Negative mg/dl    Ketones, UA Negative Negative mg/dl    Urobilinogen, UA <2 0 <2 0 mg/dl mg/dl    Bilirubin, UA Negative Negative    Occult Blood, UA Negative Negative    RBC, UA None Seen None Seen, 1-2 /hpf    WBC, UA 1-2 None Seen, 1-2 /hpf    Epithelial Cells Occasional None Seen, Occasional /hpf    Bacteria, UA None Seen None Seen, Occasional /hpf

## 2022-08-22 NOTE — PATIENT INSTRUCTIONS
-Renal Function is  stable   -You have Chronic Kidney Disease Stage 3  -Avoid NSAIDs like Ibuprofen/Motrin, Naproxen/Aleve, Celebrex, meloxicam/Mobic, Diclofenac and other NSAIDs   -Okay to take Acetaminophen/Tylenol if you do not have any liver problems  -Avoid IV contrast used for CT scan and cardiac catheterization     -If plan for any study with IV contrast, please let me know so we could hydrate with fluids before and after IV contrast  -Dosage  of certain medications may need to be adjusted depending on Kidney function  Blood pressure is stable    -Recommend 2 g sodium diet  -Recommend daily exercise and weight loss  -Discussed home monitoring of BP and maintaining a log of blood pressure   -Recommend goal BP less than 130/80  Please inform me if SBP below 110 or above 140's persistently  Blood work in October about 2 weeks before the surgery as well as 2 weeks after the surgery to monitor renal function    Follow-up in 6 months with repeat labs  Recommending holding losartan on the day of surgery as well as previous day

## 2022-08-22 NOTE — PATIENT INSTRUCTIONS
You are being scheduled for a shoulder arthroscopy to treat your symptoms  Below are some instructions and information on what to expect before and after your surgery  Pre-Surgical Preparation for Arthroscopic Shoulder Surgery: You will be contacted the evening prior to your surgery to confirm the scheduled time of the procedure and when to arrive at the hospital    Do not eat or drink anything after midnight the night before your surgery  Since you are having out-patient surgery, make sure that you have someone who can drive you home later in the day  Also, prepare that person for a long day, as the process of safely preparing for and recovering from the procedure is more time consuming than the actual procedure! As you will be in a sling after surgery, please wear or bring a loose fitting button-down shirt so that you can easily place this over the sling when you leave the surgical suite  This avoids having to place the operative arm in a sleeve  Most patients find that this is the easiest outfit to wear for the first week or so after surgery so you may want to plan accordingly  Most patients find that lying down in bed after shoulder surgery accentuates their discomfort  This is likely related to the effect of gravity on the swelling in the shoulder  As a result, most patients sleep better in a recliner or in bed with pillows propped up behind their back for the first few days or weeks after surgery  It is a good idea to plan for this ahead of time so there will be less hassle getting things set up the night after surgery  What to Expect After Arthroscopic Shoulder Surgery: It is normal to have swelling and discomfort in the shoulder for several days or a week after surgery  It is also normal to have a small amount of drainage from the surgical wounds (especially the first few days after surgery), as we put fluid into the shoulder to visualize the structures during surgery  It is NOT normal to have foul smelling, purulent drainage and if this is noted, please contact the office immediately or proceed to the emergency room for evaluation as this may indicate an infection  Applying ice bags to the shoulder may help with pain that is not controlled by the regional block  Ice should be applied 20-30 minutes at a time, every hour or two  Make sure to put a thin towel or T-shirt next to your skin to avoid direct contact of the ice with the skin  Icing is most helpful in the first 48 hours, although many people find that continuing past this time frame lessens their postoperative pain  Please note that your post-operative dressing is not conductive to ice, so if you need to, it is okay to remove that dressing even the night after surgery and place band-aids over the wounds in order for the ice to take effect  Pain Control    Most patients will receive a nerve block, the local anesthetic may keep your whole arm numb for up to 4 days  You will be given a prescription for narcotic pain medication when you are discharged from the hospital   With the newer nerve block that is being utilized, patients are rarely requiring the use of this narcotic pain medication  If you find you do not tolerate that type of pain medicine well, call our office and we will try another one  In addition to the narcotic pain medication, it is safe to use an anti-inflammatory (unless the patient has a medical condition that would not allow safe use of this mediation)  This includes the Advil, Motrin, Ibuprofen and Alleve category of medications  Simply follow the over the counter dosing on the package and use as indicated as another adjunct  Importantly since these medications are all very similar, use only one of them  Tylenol is a separate medication that can be utilized as well and can be taken at the same time as the other medication or given in a "staggered" manner    Just make sure that you follow the dosing on the over the counter bottle instructions  Also make sure that the pain medication prescribed by Dr Gregorio Kuo team does not contain acetaminophen (this is found in Percocet and Vicodin)  Typically we do not prescribe those types of pain medications but if for some reason that has been prescribed DO NOT add more Tylenol (acetaminophen) as you could end up taking too much of that medication  As mentioned above, most patients find that lying down accentuates their discomfort  You might sleep better in a recliner, or propped up in bed  Dr Fidelina Taylor encourages patients to safely ambulate around the house as much as possible in the first few days after the procedure as this can help with blood circulation in the legs  While the incidence of blood clots is very rare following shoulder surgery, early ambulation is a great way to help decrease the already low rate  24 hours after the surgery you may remove the bandage and cover incisions with Band-Aids if needed  At that time you may shower, the wounds will have a surgical glue that will protect them from shower water but do not submerge your incisions directly (bathing or swimming) until at least 2 weeks post-operatively  It is safe to let the arm hang at the side and take a shower and put on a shirt without the sling on  Just make sure that you do not use the operative are to reach out and grab anything as that may damage the repair  When you are done showering and getting dressed please return the operative arm to the sling  Unless noted otherwise in your discharge paperwork, Dr Fidelina Taylor uses absorbable sutures so they do not need to be removed  Dr Randall Rose physician assistant (PA) will see you in the office a few days after the procedure to review the intra-operative findings and to initiate physical therapy if appropriate    A post-operative appointment should have been scheduled for you already, but if for some reason this did not happen, please call the office to make one  Physical therapy is important after nearly all shoulder surgeries and a detailed rehabilitation plan based on the specific intra-operative findings and procedures will be provided to your therapist at the first post-operative office visit  Most patients have post-operative therapy appointments scheduled pre-operatively, but if you do not, that will be handled at the first post-operative office visit  Unless expressly directed otherwise it is safe to remove the sling even the first day after the surgery and let the arm hang by the side  This allows patients to shower and even put a shirt on (bad arm in the sleeve first)  It is also safe to flex and extend their wrist, hand and fingers as much as possible when the block wears off  These simple motions can serve to pump fluid out of the forearm and decrease swelling in the arm

## 2022-08-26 ENCOUNTER — TELEPHONE (OUTPATIENT)
Dept: OBGYN CLINIC | Facility: OTHER | Age: 62
End: 2022-08-26

## 2022-09-27 ENCOUNTER — OFFICE VISIT (OUTPATIENT)
Dept: SURGICAL ONCOLOGY | Facility: CLINIC | Age: 62
End: 2022-09-27

## 2022-09-27 VITALS
HEART RATE: 62 BPM | OXYGEN SATURATION: 96 % | BODY MASS INDEX: 25.55 KG/M2 | TEMPERATURE: 97.3 F | DIASTOLIC BLOOD PRESSURE: 92 MMHG | RESPIRATION RATE: 17 BRPM | WEIGHT: 159 LBS | SYSTOLIC BLOOD PRESSURE: 142 MMHG | HEIGHT: 66 IN

## 2022-09-27 DIAGNOSIS — D49.7 FOLLICULAR NEOPLASM OF THYROID: Primary | ICD-10-CM

## 2022-09-27 PROCEDURE — PREOP: Performed by: SURGERY

## 2022-09-27 RX ORDER — HYDROCODONE BITARTRATE AND ACETAMINOPHEN 5; 325 MG/1; MG/1
1 TABLET ORAL EVERY 6 HOURS PRN
Qty: 10 TABLET | Refills: 0 | Status: SHIPPED | OUTPATIENT
Start: 2022-09-27

## 2022-09-27 RX ORDER — CEFAZOLIN SODIUM 1 G/50ML
1000 SOLUTION INTRAVENOUS ONCE
Status: CANCELLED | OUTPATIENT
Start: 2022-09-27 | End: 2022-09-27

## 2022-09-27 NOTE — PROGRESS NOTES
Surgical Oncology Follow Up       [address] [facility] CANCER [facility] [place]  [address] [facility] [address]  [phone]    Bette Flow  1960  [id number]  [address] [facility] [address] [address]  [phone]    Diagnoses and all orders for this visit:    Follicular neoplasm of thyroid  -     Case request operating room: RIGHT THYROID LOBECTOMY; Standing  -     Comprehensive metabolic panel; Future  -     CBC and differential; Future  -     EKG 12 lead; Future  -     HYDROcodone-acetaminophen (Norco) 5-325 mg per tablet; Take 1 tablet by mouth every 6 (six) hours as needed for pain Max Daily Amount: 4 tablets  -     Case request operating room: RIGHT THYROID LOBECTOMY    Other orders  -     Incentive spirometry; Standing  -     Insert and maintain IV line; Standing  -     Void On-Call to O R ; Standing  -     Place sequential compression device; Standing  -     ceFAZolin (ANCEF) IVPB (premix in dextrose) 1,000 mg 50 mL        Chief Complaint   Patient presents with    Follow-up       No follow-ups on file  Oncology History    No history exists  Staging: Right thyroid nodule  Treatment history:  Right thyroid nodule biopsy, July 2021:  [place] 4, suspicious for follicular neoplasm, Hurthle cell type  Afirma negative  Family history of thyroid cancer in his son  Current treatment:  Observation  Disease status: Stable    History of Present Illness:  Patient returns  He has elected on having surgical intervention to his right thyroid nodule  He is feeling well  He continues to have dysphagia  He thinks this is getting worse  Review of Systems  Complete ROS Surg Onc:   Complete ROS Surg Onc:   Constitutional: The patient denies new or recent history of general fatigue, no recent weight loss, no change in appetite     Eyes: No complaints of visual problems, no scleral icterus  ENT: no complaints of ear pain, no hoarseness, no difficulty swallowing,  no tinnitus and no new masses in head, oral cavity, or neck  Cardiovascular: No complaints of chest pain, no palpitations, no ankle edema  Respiratory: No complaints of shortness of breath, no cough  Gastrointestinal: No complaints of jaundice, no bloody stools, no pale stools  Genitourinary: No complaints of dysuria, no hematuria, no nocturia, no frequent urination, no urethral discharge  Musculoskeletal: No complaints of weakness, paralysis, joint stiffness or arthralgias  Integumentary: No complaints of rash, no new lesions  Neurological: No complaints of convulsions, no seizures, no dizziness  Hematologic/Lymphatic: No complaints of easy bruising  Endocrine:  No hot or cold intolerance  No polydipsia, polyphagia, or polyuria  Allergy/immunology:  No environmental allergies  No food allergies  Not immunocompromised  Skin:  No pallor or rash  No wound          Patient Active Problem List   Diagnosis    Osteoarthritis of finger of right hand    Mixed hyperlipidemia    Diverticulitis of large intestine with perforation without bleeding    Renal cell cancer, left ([address] )    Cluster headache    Malignant neoplasm of left kidney, except renal pelvis (HCC)    Tinea versicolor    CKD (chronic kidney disease) stage 3, GFR 30-59 ml/min ([facility])    Hyperglycemia    Persistent proteinuria    Hypermagnesemia    Stage 3a chronic kidney disease ([address] )    Solitary kidney, acquired    Pulse irregularity    Lymphadenopathy of head and neck    Mass of right side of neck    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    Thyroid nodule    Follicular neoplasm of thyroid    Hip pain, bilateral    Asthmatic bronchitis    Subacromial impingement of left shoulder    Tear of left supraspinatus tendon     Past Medical History:   Diagnosis Date    Aneurysm ([facility] [address] ) 2017    behind right eye-    Arthritis     Cancer ([facility] ) 10/13/2019    kidney    Chronic kidney disease 1/7/2020    stage 3    CKD (chronic kidney disease)     Diverticulitis of colon 10/13/2019    Diverticulitis of large intestine with perforation without bleeding 10/13/2019    Hyperlipidemia     Hypertension     Inflammatory bowel disease 10/13/2019    Renal cell cancer, left ([facility] ) 10/13/2019    Renal cell carcinoma ([facility] )      Past Surgical History:   Procedure Laterality Date    COLONOSCOPY  2015    HERNIA REPAIR      HIP SURGERY Right     age 4    JOINT REPLACEMENT Right 2011    TKR    NEPHRECTOMY  [id number]    left    NC LAP, RADICAL NEPHRECTOMY Left 12/30/2019    Procedure: NEPHRECTOMY LAPAROSCOPIC HAND ASSISTED;  Surgeon: Cory Vaughn MD;  [facility];  Service: Urology    NC LAP,SURG,COLECTOMY, PARTIAL, [place] N/A 12/30/2019    Procedure: LAPAROSCOPIC HAND-ASSISTED SIGMOID COLECTOMY; LAPAROSCOPIC MOBILIZATION OF SPLENIC FLEXURE;  Surgeon: Jenna Dwyer MD;  [facility];  Service: Colorectal    REPLACEMENT TOTAL KNEE Right 2011    REPLACEMENT TOTAL KNEE      SHOULDER SURGERY Left     SHOULDER SURGERY Right     US GUIDED THYROID BIOPSY  7/9/2021    WRIST FUSION Left 2016    WRIST SURGERY       Family History   Problem Relation Age of Onset    Heart disease Father     Other Father         cardiac disorder    No Known Problems Mother     Heart attack Maternal Grandfather     Heart attack Paternal Grandfather     Thyroid cancer Son     Colon cancer Neg Hx      Social History     Socioeconomic History    Marital status: /Civil Union     Spouse name: Not on file    Number of children: 4    Years of education: high school or GED    Highest education level: Not on file   Occupational History    Occupation:      Comment: [employer]   Tobacco Use    Smoking status: Never Smoker    Smokeless tobacco: Never Used   Vaping Use    Vaping Use: Never used   Substance and Sexual Activity    Alcohol use: Yes     Alcohol/week: 2 0 standard drinks     Types: 2 Cans of beer per week    Drug use: No    Sexual activity: Not Currently     Partners: Female     Birth control/protection: None   Other Topics Concern    Not on file   Social History Narrative    Not on file     Social Determinants of Health     Financial Resource Strain: Not on file   Food Insecurity: Not on file   Transportation Needs: Not on file   Physical Activity: Not on file   Stress: Not on file   Social Connections: Not on file   Intimate Partner Violence: Not on file   Housing Stability: Not on file       Current Outpatient Medications:     aspirin 81 MG tablet, Take 1 tablet by mouth daily, Disp: , Rfl:     atorvastatin (LIPITOR) 20 mg tablet, Take 1 tablet (20 mg total) by mouth daily, Disp: 90 tablet, Rfl: 1    HYDROcodone-acetaminophen (Norco) 5-325 mg per tablet, Take 1 tablet by mouth every 6 (six) hours as needed for pain Max Daily Amount: 4 tablets, Disp: 10 tablet, Rfl: 0    losartan (COZAAR) 25 mg tablet, Take 1 tablet (25 mg total) by mouth daily, Disp: 90 tablet, Rfl: 1    multivitamin (THERAGRAN) TABS, Take 1 tablet by mouth every other day, Disp: , Rfl:     Omega-3 Fatty Acids (FISH OIL) 1200 MG CAPS, Take by mouth , Disp: , Rfl:   No Known Allergies  Vitals:    09/27/22 1538   BP: 142/92   Pulse: 62   Resp: 17   Temp: (!) 97 3 °F (36 3 °C)   SpO2: 96%       Physical Exam  Constitutional: General appearance: The Patient is well-developed and well-nourished who appears the stated age in no acute distress  Patient is pleasant and talkative  HEENT:  Normocephalic  Sclerae are anicteric  Mucous membranes are moist  Neck is supple without adenopathy  No JVD    palpable right thyroid nodule  This is not fixed to any underlying structures  Chest: The lungs are clear to auscultation  Cardiac: Heart is regular rate       Abdomen: Abdomen is soft, non-tender, non-distended and without masses  Extremities: There is no clubbing or cyanosis  There is no edema  Symmetric  Neuro: Grossly nonfocal  Gait is normal      Lymphatic: No evidence of cervical adenopathy bilaterally  Skin: Warm, anicteric  Psych:  Patient is pleasant and talkative  Breasts:        Pathology:  [unfilled]    Labs:      Imaging  No results found  I reviewed the above laboratory and imaging data  Discussion/Summary: 13-year-old male with a family history of thyroid cancer, a personal history of kidney cancer and right thyroid nodule that was benign by Afirma  This is less than 4 cm and is stable  There is also a subcentimeter nodule in the isthmus that does not appear suspicious  We discussed continued observation with repeat thyroid ultrasound in 1 year  He is also thinking about having this surgically removed since it is becoming more symptomatic  I suspect that his symptoms will improve with surgical resection  I explained the risks of right thyroid lobectomy to include bleeding, infection, recurrence, need for further surgery, wound complications, permanent hypocalcemia and recurrent laryngeal nerve injury  Informed consent was obtained  We will schedule this at our earliest mutual convenience  He is agreeable to this plan  All of his questions were answered

## 2022-09-27 NOTE — H&P (VIEW-ONLY)
Surgical Oncology Follow Up       [address] [facility]ER [facility] [place] [address]  [facility] [id number] [phone]    Yuko Snell  1960  [id number]  [address] [facility] [place]  [address]  [facility] [address] [phone]    Diagnoses and all orders for this visit:    Follicular neoplasm of thyroid  -     Case request operating room: RIGHT THYROID LOBECTOMY; Standing  -     Comprehensive metabolic panel; Future  -     CBC and differential; Future  -     EKG 12 lead; Future  -     HYDROcodone-acetaminophen (Norco) 5-325 mg per tablet; Take 1 tablet by mouth every 6 (six) hours as needed for pain Max Daily Amount: 4 tablets  -     Case request operating room: RIGHT THYROID LOBECTOMY    Other orders  -     Incentive spirometry; Standing  -     Insert and maintain IV line; Standing  -     Void On-Call to O R ; Standing  -     Place sequential compression device; Standing  -     ceFAZolin (ANCEF) IVPB (premix in dextrose) 1,000 mg 50 mL        Chief Complaint   Patient presents with   • Follow-up       No follow-ups on file  Oncology History    No history exists  Staging: Right thyroid nodule  Treatment history:  Right thyroid nodule biopsy, July 2021:  [place] 4, suspicious for follicular neoplasm, Hurthle cell type  Afirma negative  Family history of thyroid cancer in his son  Current treatment:  Observation  Disease status: Stable    History of Present Illness:  Patient returns  He has elected on having surgical intervention to his right thyroid nodule  He is feeling well  He continues to have dysphagia  He thinks this is getting worse  Review of Systems  Complete ROS Surg Onc:   Complete ROS Surg Onc:   Constitutional: The patient denies new or recent history of general fatigue, no recent weight loss, no change in appetite     Eyes: No complaints of visual problems, no scleral icterus  ENT: no complaints of ear pain, no hoarseness, no difficulty swallowing,  no tinnitus and no new masses in head, oral cavity, or neck  Cardiovascular: No complaints of chest pain, no palpitations, no ankle edema  Respiratory: No complaints of shortness of breath, no cough  Gastrointestinal: No complaints of jaundice, no bloody stools, no pale stools  Genitourinary: No complaints of dysuria, no hematuria, no nocturia, no frequent urination, no urethral discharge  Musculoskeletal: No complaints of weakness, paralysis, joint stiffness or arthralgias  Integumentary: No complaints of rash, no new lesions  Neurological: No complaints of convulsions, no seizures, no dizziness  Hematologic/Lymphatic: No complaints of easy bruising  Endocrine:  No hot or cold intolerance  No polydipsia, polyphagia, or polyuria  Allergy/immunology:  No environmental allergies  No food allergies  Not immunocompromised  Skin:  No pallor or rash  No wound          Patient Active Problem List   Diagnosis   • Osteoarthritis of finger of right hand   • Mixed hyperlipidemia   • Diverticulitis of large intestine with perforation without bleeding   • Renal cell cancer, left (HCC)   • Cluster headache   • Malignant neoplasm of left kidney, except renal pelvis (HCC)   • Tinea versicolor   • CKD (chronic kidney disease) stage 3, GFR 30-59 ml/min (HCC)   • Hyperglycemia   • Persistent proteinuria   • Hypermagnesemia   • Stage 3a chronic kidney disease (HCC)   • Solitary kidney, acquired   • Pulse irregularity   • Lymphadenopathy of head and neck   • Mass of right side of neck   • 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   • Thyroid nodule   • Follicular neoplasm of thyroid   • Hip pain, bilateral   • Asthmatic bronchitis   • Subacromial impingement of left shoulder   • Tear of left supraspinatus tendon     Past Medical History:   Diagnosis Date   • [facility]neur[facility] ) 2017    behind right eye-   • Arthritis    • Cancer ([facility] ) 10/13/2019    kidney   • Chronic kidney disease 1/7/2020    stage 3   • CKD (chronic kidney disease)    • Diverticulitis of colon 10/13/2019   • Diverticulitis of large intestine with perforation without bleeding 10/13/2019   • Hyperlipidemia    • Hypertension    • Inflammatory bowel disease 10/13/2019   • Renal cell cancer, left ([facility] ) 10/13/2019   • Renal cell carcinoma ([facility] )      Past Surgical History:   Procedure Laterality Date   • COLONOSCOPY  2015   • HERNIA REPAIR     • HIP SURGERY Right     age 4   • JOINT REPLACEMENT Right 2011    TKR   • NEPHRECTOMY  [id number]    left   • [place] LAP, RADICAL NEPHRECTOMY Left 12/30/2019    Procedure: NEPHRECTOMY LAPAROSCOPIC HAND ASSISTED;  Surgeon: Tim Raygoza MD;  [facility];  Service: Urology   • TN LAP,SURG,COLECTOMY, PARTIAL, W/ANAST N/A 12/30/2019    Procedure: LAPAROSCOPIC HAND-ASSISTED SIGMOID COLECTOMY; LAPAROSCOPIC MOBILIZATION OF SPLENIC FLEXURE;  Surgeon: Pelon Cruz MD;  [facility];  Service: Colorectal   • REPLACEMENT TOTAL KNEE Right 2011   • REPLACEMENT TOTAL KNEE     • SHOULDER SURGERY Left    • SHOULDER SURGERY Right    • US GUIDED THYROID BIOPSY  7/9/2021   • WRIST FUSION Left 2016   • WRIST SURGERY       Family History   Problem Relation Age of Onset   • Heart disease Father    • Other Father         cardiac disorder   • No Known Problems Mother    • Heart attack Maternal Grandfather    • Heart attack Paternal Grandfather    • Thyroid cancer Son    • Colon cancer Neg Hx      Social History     Socioeconomic History   • Marital status: /Civil Union     Spouse name: Not on file   • Number of children: 4   • Years of education: high school or GED   • Highest education level: Not on file   Occupational History   • Occupation:      Comment: [employer]   Tobacco Use   • Smoking status: Never Smoker   • Smokeless tobacco: Never Used   Vaping Use   • Vaping Use: Never used   Substance and Sexual Activity   • Alcohol use: Yes     Alcohol/week: 2 0 standard drinks     Types: 2 Cans of beer per week   • Drug use: No   • Sexual activity: Not Currently     Partners: Female     Birth control/protection: None   Other Topics Concern   • Not on file   Social History Narrative   • Not on file     Social Determinants of Health     Financial Resource Strain: Not on file   Food Insecurity: Not on file   Transportation Needs: Not on file   Physical Activity: Not on file   Stress: Not on file   Social Connections: Not on file   Intimate Partner Violence: Not on file   Housing Stability: Not on file       Current Outpatient Medications:   •  aspirin 81 MG tablet, Take 1 tablet by mouth daily, Disp: , Rfl:   •  atorvastatin (LIPITOR) 20 mg tablet, Take 1 tablet (20 mg total) by mouth daily, Disp: 90 tablet, Rfl: 1  •  HYDROcodone-acetaminophen (Norco) 5-325 mg per tablet, Take 1 tablet by mouth every 6 (six) hours as needed for pain Max Daily Amount: 4 tablets, Disp: 10 tablet, Rfl: 0  •  losartan (COZAAR) 25 mg tablet, Take 1 tablet (25 mg total) by mouth daily, Disp: 90 tablet, Rfl: 1  •  multivitamin (THERAGRAN) TABS, Take 1 tablet by mouth every other day, Disp: , Rfl:   •  Omega-3 Fatty Acids (FISH OIL) 1200 MG CAPS, Take by mouth , Disp: , Rfl:   No Known Allergies  Vitals:    09/27/22 1538   BP: 142/92   Pulse: 62   Resp: 17   Temp: (!) 97 3 °F (36 3 °C)   SpO2: 96%       Physical Exam  Constitutional: General appearance: The Patient is well-developed and well-nourished who appears the stated age in no acute distress  Patient is pleasant and talkative  HEENT:  Normocephalic  Sclerae are anicteric  Mucous membranes are moist  Neck is supple without adenopathy  No JVD    palpable right thyroid nodule  This is not fixed to any underlying structures  Chest: The lungs are clear to auscultation  Cardiac: Heart is regular rate       Abdomen: Abdomen is soft, non-tender, non-distended and without masses  Extremities: There is no clubbing or cyanosis  There is no edema  Symmetric  Neuro: Grossly nonfocal  Gait is normal      Lymphatic: No evidence of cervical adenopathy bilaterally  Skin: Warm, anicteric  Psych:  Patient is pleasant and talkative  Breasts:        Pathology:  [unfilled]    Labs:      Imaging  No results found  I reviewed the above laboratory and imaging data  Discussion/Summary: 49-year-old male with a family history of thyroid cancer, a personal history of kidney cancer and right thyroid nodule that was benign by Afirma  This is less than 4 cm and is stable  There is also a subcentimeter nodule in the isthmus that does not appear suspicious  We discussed continued observation with repeat thyroid ultrasound in 1 year  He is also thinking about having this surgically removed since it is becoming more symptomatic  I suspect that his symptoms will improve with surgical resection  I explained the risks of right thyroid lobectomy to include bleeding, infection, recurrence, need for further surgery, wound complications, permanent hypocalcemia and recurrent laryngeal nerve injury  Informed consent was obtained  We will schedule this at our earliest mutual convenience  He is agreeable to this plan  All of his questions were answered

## 2022-10-03 ENCOUNTER — APPOINTMENT (OUTPATIENT)
Dept: LAB | Age: 62
End: 2022-10-03
Payer: COMMERCIAL

## 2022-10-03 ENCOUNTER — LAB (OUTPATIENT)
Dept: LAB | Age: 62
End: 2022-10-03
Payer: COMMERCIAL

## 2022-10-03 DIAGNOSIS — D49.7 FOLLICULAR NEOPLASM OF THYROID: ICD-10-CM

## 2022-10-03 DIAGNOSIS — N18.31 STAGE 3A CHRONIC KIDNEY DISEASE (HCC): ICD-10-CM

## 2022-10-03 LAB
ALBUMIN SERPL BCP-MCNC: 3.7 G/DL (ref 3.5–5)
ALP SERPL-CCNC: 68 U/L (ref 46–116)
ALT SERPL W P-5'-P-CCNC: 32 U/L (ref 12–78)
ANION GAP SERPL CALCULATED.3IONS-SCNC: 6 MMOL/L (ref 4–13)
AST SERPL W P-5'-P-CCNC: 22 U/L (ref 5–45)
ATRIAL RATE: 52 BPM
BASOPHILS # BLD AUTO: 0.03 THOUSANDS/ΜL (ref 0–0.1)
BASOPHILS NFR BLD AUTO: 1 % (ref 0–1)
BILIRUB SERPL-MCNC: 0.72 MG/DL (ref 0.2–1)
BUN SERPL-MCNC: 21 MG/DL (ref 5–25)
CALCIUM SERPL-MCNC: 9.1 MG/DL (ref 8.3–10.1)
CHLORIDE SERPL-SCNC: 109 MMOL/L (ref 96–108)
CO2 SERPL-SCNC: 25 MMOL/L (ref 21–32)
CREAT SERPL-MCNC: 1.32 MG/DL (ref 0.6–1.3)
EOSINOPHIL # BLD AUTO: 0.2 THOUSAND/ΜL (ref 0–0.61)
EOSINOPHIL NFR BLD AUTO: 4 % (ref 0–6)
ERYTHROCYTE [DISTWIDTH] IN BLOOD BY AUTOMATED COUNT: 12.4 % (ref 11.6–15.1)
GFR SERPL CREATININE-BSD FRML MDRD: 56 ML/MIN/1.73SQ M
GLUCOSE P FAST SERPL-MCNC: 95 MG/DL (ref 65–99)
HCT VFR BLD AUTO: 44.5 % (ref 36.5–49.3)
HGB BLD-MCNC: 14.3 G/DL (ref 12–17)
IMM GRANULOCYTES # BLD AUTO: 0.02 THOUSAND/UL (ref 0–0.2)
IMM GRANULOCYTES NFR BLD AUTO: 0 % (ref 0–2)
LYMPHOCYTES # BLD AUTO: 1.19 THOUSANDS/ΜL (ref 0.6–4.47)
LYMPHOCYTES NFR BLD AUTO: 24 % (ref 14–44)
MCH RBC QN AUTO: 30 PG (ref 26.8–34.3)
MCHC RBC AUTO-ENTMCNC: 32.1 G/DL (ref 31.4–37.4)
MCV RBC AUTO: 94 FL (ref 82–98)
MONOCYTES # BLD AUTO: 0.49 THOUSAND/ΜL (ref 0.17–1.22)
MONOCYTES NFR BLD AUTO: 10 % (ref 4–12)
NEUTROPHILS # BLD AUTO: 2.95 THOUSANDS/ΜL (ref 1.85–7.62)
NEUTS SEG NFR BLD AUTO: 61 % (ref 43–75)
NRBC BLD AUTO-RTO: 0 /100 WBCS
P AXIS: 68 DEGREES
PLATELET # BLD AUTO: 218 THOUSANDS/UL (ref 149–390)
PMV BLD AUTO: 10.3 FL (ref 8.9–12.7)
POTASSIUM SERPL-SCNC: 4.4 MMOL/L (ref 3.5–5.3)
PR INTERVAL: 134 MS
PROT SERPL-MCNC: 7.4 G/DL (ref 6.4–8.4)
QRS AXIS: -14 DEGREES
QRSD INTERVAL: 98 MS
QT INTERVAL: 424 MS
QTC INTERVAL: 394 MS
RBC # BLD AUTO: 4.76 MILLION/UL (ref 3.88–5.62)
SODIUM SERPL-SCNC: 140 MMOL/L (ref 135–147)
T WAVE AXIS: 35 DEGREES
VENTRICULAR RATE: 52 BPM
WBC # BLD AUTO: 4.88 THOUSAND/UL (ref 4.31–10.16)

## 2022-10-03 PROCEDURE — 93005 ELECTROCARDIOGRAM TRACING: CPT

## 2022-10-03 PROCEDURE — 36415 COLL VENOUS BLD VENIPUNCTURE: CPT

## 2022-10-03 PROCEDURE — 93010 ELECTROCARDIOGRAM REPORT: CPT | Performed by: INTERNAL MEDICINE

## 2022-10-03 PROCEDURE — 80053 COMPREHEN METABOLIC PANEL: CPT

## 2022-10-03 PROCEDURE — 85025 COMPLETE CBC W/AUTO DIFF WBC: CPT

## 2022-10-03 NOTE — RESULT ENCOUNTER NOTE
Please inform patient that renal function is stable at creatinine 1 3  Electrolytes are stable  Okay for surgery from Nephrology side, please remind him to hold losartan on the day of surgery as well as the previous day

## 2022-10-04 ENCOUNTER — TELEPHONE (OUTPATIENT)
Dept: NEPHROLOGY | Facility: CLINIC | Age: 62
End: 2022-10-04

## 2022-10-04 NOTE — TELEPHONE ENCOUNTER
----- Message from Jethro Jordan MD sent at 10/3/2022  2:52 PM EDT -----  Please inform patient that renal function is stable at creatinine 1 3  Electrolytes are stable  Okay for surgery from Nephrology side, please remind him to hold losartan on the day of surgery as well as the previous day

## 2022-10-12 ENCOUNTER — HOSPITAL ENCOUNTER (OUTPATIENT)
Facility: HOSPITAL | Age: 62
Setting detail: OUTPATIENT SURGERY
Discharge: HOME | End: 2022-10-12
Attending: SURGERY | Admitting: SURGERY
Payer: COMMERCIAL

## 2022-10-12 ENCOUNTER — ANESTHESIA EVENT (OUTPATIENT)
Dept: PERIOP | Facility: HOSPITAL | Age: 62
End: 2022-10-12
Payer: COMMERCIAL

## 2022-10-12 ENCOUNTER — ANESTHESIA (OUTPATIENT)
Dept: PERIOP | Facility: HOSPITAL | Age: 62
End: 2022-10-12
Payer: COMMERCIAL

## 2022-10-12 VITALS
SYSTOLIC BLOOD PRESSURE: 127 MMHG | OXYGEN SATURATION: 96 % | DIASTOLIC BLOOD PRESSURE: 79 MMHG | TEMPERATURE: 97.2 F | HEART RATE: 63 BPM | RESPIRATION RATE: 18 BRPM

## 2022-10-12 DIAGNOSIS — D49.7 FOLLICULAR NEOPLASM OF THYROID: ICD-10-CM

## 2022-10-12 PROBLEM — I10 HTN (HYPERTENSION): Status: ACTIVE | Noted: 2022-10-12

## 2022-10-12 PROCEDURE — 60220 PARTIAL REMOVAL OF THYROID: CPT | Performed by: SURGERY

## 2022-10-12 PROCEDURE — 88307 TISSUE EXAM BY PATHOLOGIST: CPT | Performed by: STUDENT IN AN ORGANIZED HEALTH CARE EDUCATION/TRAINING PROGRAM

## 2022-10-12 RX ORDER — MIDAZOLAM HYDROCHLORIDE 2 MG/2ML
INJECTION, SOLUTION INTRAMUSCULAR; INTRAVENOUS AS NEEDED
Status: DISCONTINUED | OUTPATIENT
Start: 2022-10-12 | End: 2022-10-12

## 2022-10-12 RX ORDER — CEFAZOLIN SODIUM 1 G/50ML
1000 SOLUTION INTRAVENOUS ONCE
Status: COMPLETED | OUTPATIENT
Start: 2022-10-12 | End: 2022-10-12

## 2022-10-12 RX ORDER — MORPHINE SULFATE 10 MG/ML
2 INJECTION, SOLUTION INTRAMUSCULAR; INTRAVENOUS
Status: DISCONTINUED | OUTPATIENT
Start: 2022-10-12 | End: 2022-10-12 | Stop reason: HOSPADM

## 2022-10-12 RX ORDER — DIPHENHYDRAMINE HYDROCHLORIDE 50 MG/ML
12.5 INJECTION INTRAMUSCULAR; INTRAVENOUS ONCE AS NEEDED
Status: DISCONTINUED | OUTPATIENT
Start: 2022-10-12 | End: 2022-10-12 | Stop reason: HOSPADM

## 2022-10-12 RX ORDER — DEXAMETHASONE SODIUM PHOSPHATE 10 MG/ML
INJECTION, SOLUTION INTRAMUSCULAR; INTRAVENOUS AS NEEDED
Status: DISCONTINUED | OUTPATIENT
Start: 2022-10-12 | End: 2022-10-12

## 2022-10-12 RX ORDER — LIDOCAINE HYDROCHLORIDE 20 MG/ML
INJECTION, SOLUTION EPIDURAL; INFILTRATION; INTRACAUDAL; PERINEURAL AS NEEDED
Status: DISCONTINUED | OUTPATIENT
Start: 2022-10-12 | End: 2022-10-12

## 2022-10-12 RX ORDER — HYDROMORPHONE HCL IN WATER/PF 6 MG/30 ML
0.2 PATIENT CONTROLLED ANALGESIA SYRINGE INTRAVENOUS
Status: DISCONTINUED | OUTPATIENT
Start: 2022-10-12 | End: 2022-10-12 | Stop reason: HOSPADM

## 2022-10-12 RX ORDER — FENTANYL CITRATE 50 UG/ML
INJECTION, SOLUTION INTRAMUSCULAR; INTRAVENOUS AS NEEDED
Status: DISCONTINUED | OUTPATIENT
Start: 2022-10-12 | End: 2022-10-12

## 2022-10-12 RX ORDER — ROCURONIUM BROMIDE 10 MG/ML
INJECTION, SOLUTION INTRAVENOUS AS NEEDED
Status: DISCONTINUED | OUTPATIENT
Start: 2022-10-12 | End: 2022-10-12

## 2022-10-12 RX ORDER — SODIUM CHLORIDE, SODIUM LACTATE, POTASSIUM CHLORIDE, CALCIUM CHLORIDE 600; 310; 30; 20 MG/100ML; MG/100ML; MG/100ML; MG/100ML
100 INJECTION, SOLUTION INTRAVENOUS CONTINUOUS
Status: CANCELLED | OUTPATIENT
Start: 2022-10-12

## 2022-10-12 RX ORDER — SODIUM CHLORIDE, SODIUM LACTATE, POTASSIUM CHLORIDE, CALCIUM CHLORIDE 600; 310; 30; 20 MG/100ML; MG/100ML; MG/100ML; MG/100ML
INJECTION, SOLUTION INTRAVENOUS CONTINUOUS PRN
Status: DISCONTINUED | OUTPATIENT
Start: 2022-10-12 | End: 2022-10-12

## 2022-10-12 RX ORDER — BUPIVACAINE HYDROCHLORIDE 2.5 MG/ML
INJECTION, SOLUTION EPIDURAL; INFILTRATION; INTRACAUDAL AS NEEDED
Status: DISCONTINUED | OUTPATIENT
Start: 2022-10-12 | End: 2022-10-12 | Stop reason: HOSPADM

## 2022-10-12 RX ORDER — PROPOFOL 10 MG/ML
INJECTION, EMULSION INTRAVENOUS AS NEEDED
Status: DISCONTINUED | OUTPATIENT
Start: 2022-10-12 | End: 2022-10-12

## 2022-10-12 RX ORDER — PHENYLEPHRINE HYDROCHLORIDE 10 MG/ML
INJECTION INTRAVENOUS AS NEEDED
Status: DISCONTINUED | OUTPATIENT
Start: 2022-10-12 | End: 2022-10-12

## 2022-10-12 RX ORDER — MAGNESIUM HYDROXIDE 1200 MG/15ML
LIQUID ORAL AS NEEDED
Status: DISCONTINUED | OUTPATIENT
Start: 2022-10-12 | End: 2022-10-12 | Stop reason: HOSPADM

## 2022-10-12 RX ORDER — FENTANYL CITRATE/PF 50 MCG/ML
25 SYRINGE (ML) INJECTION
Status: DISCONTINUED | OUTPATIENT
Start: 2022-10-12 | End: 2022-10-12 | Stop reason: HOSPADM

## 2022-10-12 RX ORDER — ONDANSETRON 2 MG/ML
INJECTION INTRAMUSCULAR; INTRAVENOUS AS NEEDED
Status: DISCONTINUED | OUTPATIENT
Start: 2022-10-12 | End: 2022-10-12

## 2022-10-12 RX ORDER — EPHEDRINE SULFATE 50 MG/ML
INJECTION INTRAVENOUS AS NEEDED
Status: DISCONTINUED | OUTPATIENT
Start: 2022-10-12 | End: 2022-10-12

## 2022-10-12 RX ORDER — ONDANSETRON 2 MG/ML
4 INJECTION INTRAMUSCULAR; INTRAVENOUS EVERY 4 HOURS PRN
Status: DISCONTINUED | OUTPATIENT
Start: 2022-10-12 | End: 2022-10-12 | Stop reason: HOSPADM

## 2022-10-12 RX ORDER — ONDANSETRON 2 MG/ML
4 INJECTION INTRAMUSCULAR; INTRAVENOUS ONCE AS NEEDED
Status: DISCONTINUED | OUTPATIENT
Start: 2022-10-12 | End: 2022-10-12 | Stop reason: HOSPADM

## 2022-10-12 RX ORDER — ACETAMINOPHEN 325 MG/1
650 TABLET ORAL EVERY 4 HOURS PRN
Status: DISCONTINUED | OUTPATIENT
Start: 2022-10-12 | End: 2022-10-12 | Stop reason: HOSPADM

## 2022-10-12 RX ORDER — OXYCODONE HYDROCHLORIDE 5 MG/1
5 TABLET ORAL EVERY 4 HOURS PRN
Status: DISCONTINUED | OUTPATIENT
Start: 2022-10-12 | End: 2022-10-12 | Stop reason: HOSPADM

## 2022-10-12 RX ADMIN — PROPOFOL 200 MG: 10 INJECTION, EMULSION INTRAVENOUS at 10:51

## 2022-10-12 RX ADMIN — SODIUM CHLORIDE, SODIUM LACTATE, POTASSIUM CHLORIDE, AND CALCIUM CHLORIDE: .6; .31; .03; .02 INJECTION, SOLUTION INTRAVENOUS at 10:41

## 2022-10-12 RX ADMIN — ROCURONIUM BROMIDE 50 MG: 10 SOLUTION INTRAVENOUS at 10:51

## 2022-10-12 RX ADMIN — DEXAMETHASONE SODIUM PHOSPHATE 10 MG: 10 INJECTION, SOLUTION INTRAMUSCULAR; INTRAVENOUS at 11:01

## 2022-10-12 RX ADMIN — SUGAMMADEX 200 MG: 100 INJECTION, SOLUTION INTRAVENOUS at 11:58

## 2022-10-12 RX ADMIN — FENTANYL CITRATE 100 MCG: 50 INJECTION INTRAMUSCULAR; INTRAVENOUS at 10:51

## 2022-10-12 RX ADMIN — MIDAZOLAM 2 MG: 1 INJECTION INTRAMUSCULAR; INTRAVENOUS at 10:44

## 2022-10-12 RX ADMIN — ONDANSETRON 4 MG: 2 INJECTION INTRAMUSCULAR; INTRAVENOUS at 11:01

## 2022-10-12 RX ADMIN — PHENYLEPHRINE HYDROCHLORIDE 100 MCG: 10 INJECTION INTRAVENOUS at 11:33

## 2022-10-12 RX ADMIN — CEFAZOLIN SODIUM 1000 MG: 1 SOLUTION INTRAVENOUS at 11:01

## 2022-10-12 RX ADMIN — EPHEDRINE SULFATE 10 MG: 50 INJECTION, SOLUTION INTRAVENOUS at 10:56

## 2022-10-12 RX ADMIN — FENTANYL CITRATE 50 MCG: 50 INJECTION INTRAMUSCULAR; INTRAVENOUS at 11:23

## 2022-10-12 RX ADMIN — LIDOCAINE HYDROCHLORIDE 100 MG: 20 INJECTION, SOLUTION EPIDURAL; INFILTRATION; INTRACAUDAL; PERINEURAL at 10:51

## 2022-10-12 RX ADMIN — PHENYLEPHRINE HYDROCHLORIDE 100 MCG: 10 INJECTION INTRAVENOUS at 11:26

## 2022-10-12 NOTE — OP NOTE
OPERATIVE REPORT  PATIENT NAME: Naveen Cisneros    :  1960  MRN: [id number]  Pt Location: [facility] OR ROOM 04    SURGERY DATE: 10/12/2022    Surgeon(s) and Role:     * Alannah Petty MD - Primary     * Rimma South MD - Assisting    Preop Diagnosis:  Follicular neoplasm of thyroid [D49 7]    Post-Op Diagnosis Codes:     * Follicular neoplasm of thyroid [D49 7]    Procedure(s) (LRB):  RIGHT THYROID LOBECTOMY (Right)    Specimen(s):  ID Type Source Tests Collected by Time Destination   1 : Right Thyroid Lobe and Isthmus, Stitch Marks Right Upper Pole Tissue Thyroid, Right TISSUE EXAM Alannah Petty MD 10/12/2022 11:39 AM        Estimated Blood Loss:   Minimal    Drains:  * No LDAs found *    Anesthesia Type:   General    Operative Indications: Follicular neoplasm of thyroid [D527]  68-year-old male with a right thyroid nodule that was symptomatic  It was recommended that he undergo resection  Risks and benefits were explained  Informed consent was obtained  Patient brought to the operating room  Operative Findings:  Recurrent laryngeal nerve was identified and preserved along its course  Complications:   None    Procedure and Technique:  After identifying the patient, general anesthesia was achieved  Patient was prepped and draped in the usual sterile fashion  A time-out was performed  A Kocher incision was made 2 cm above the sternal notch  Using sharp dissection this was taken down through the skin, subcutaneous tissue and through the platysma  Subplatysmal flaps were then raised  Gelpi retractors were placed  Strap muscles were divided in the midline  We started with the right thyroid  The strap muscles were dissected away from the thyroid  Middle thyroid vein was divided with Harmonic scalpel  Superior pole vessels were isolated and divided with the Harmonic scalpel  Thyroid was rotated medially  The superior and inferior parathyroids were identified and dissected away from the thyroid  Recurrent laryngeal nerve was identified and preserved along its course  The thyroid continued to be rotated medially until we came to the ligament of Rodriguez  This was isolated and clamped  The nerve was well away from our clamp  The tissue above the clamp was divided and the tissue below the clamp was suture ligated with 3-0 Vicryl suture  The isthmus was cauterized off the trachea  The thyroid was then divided at the junction of the left lobe and the isthmus  There was excellent hemostasis  Specimen was oriented and handed off the table  Wound was copiously irrigated  There was excellent hemostasis  There was 1 area of bleeding along the thyroid isthmus that was cauterized  The recurrent laryngeal nerve were stimulated and found to be functioning  Both parathyroids were identified and were viable  The wound was filled with saline  A Valsalva maneuver was performed and there was no evidence of bleeding or air bubbles  The irrigant was removed  Strap muscles were approximated with a running 2-0 Vicryl suture  Gelpi retractors were removed  There was excellent hemostasis  Platysma was approximated with a running 3-0 Vicryl suture  Local anesthesia was infiltrated into the wound  There was excellent hemostasis  Skin was approximated with a running 4-0 Monocryl suture in a subcuticular fashion  Steri-Strips and sterile dressings were applied  Patient was extubated having tolerated the procedure well  I was present and participated in all aspects of this procedure           I was present for the entire procedure    Patient Disposition:  PACU         SIGNATURE: Mayuri Chester MD  DATE: October 12, 2022  TIME: 11:56 AM

## 2022-10-12 NOTE — ANESTHESIA POSTPROCEDURE EVALUATION
Post-Op Assessment Note    CV Status:  Stable  Pain Score: 0    Pain management: adequate     Mental Status:  Arousable and sleepy   Hydration Status:  Euvolemic   PONV Controlled:  Controlled   Airway Patency:  Patent  Airway: intubated      Post Op Vitals Reviewed: Yes      Staff: Anesthesiologist         No complications documented      BP   128/80   Temp   97 5   Pulse  71   Resp   12   SpO2   100 fm

## 2022-10-12 NOTE — ANESTHESIA PREPROCEDURE EVALUATION
Procedure:  RIGHT THYROID LOBECTOMY (Right Neck)    Relevant Problems   CARDIO   (+) HTN (hypertension)   (+) Mixed hyperlipidemia      /RENAL   (+) CKD (chronic kidney disease) stage 3, GFR 30-59 ml/min (HCC)   (+) Malignant neoplasm of left kidney, except renal pelvis (HCC)   (+) Renal cell cancer, left (HCC)   (+) Solitary kidney, acquired   (+) Stage 3a chronic kidney disease (HCC)      MUSCULOSKELETAL   (+) Osteoarthritis of finger of right hand      NEURO/PSYCH   (+) Cluster headache      PULMONARY   (+) Asthmatic bronchitis    10/3/22 Sinus bradycardia  Incomplete right bundle branch block          Anesthesia Plan  ASA Score- 3     Anesthesia Type- general with ASA Monitors  Additional Monitors:   Airway Plan: ETT  Plan Factors-Exercise tolerance (METS): >4 METS  Chart reviewed  EKG reviewed  Imaging results reviewed  Existing labs reviewed  Patient summary reviewed  Patient is not a current smoker  Induction- intravenous  Postoperative Plan-   Planned trial extubation    Informed Consent- Anesthetic plan and risks discussed with patient  I personally reviewed this patient with the CRNA  Discussed and agreed on the Anesthesia Plan with the CRNA  Ismael Sinclair

## 2022-10-12 NOTE — DISCHARGE INSTRUCTIONS
POST-OPERATIVE WOUND CARE INSTRUCTIONS    Your wound is closed with:   Sterile strips-white pieces of tape that hold your incision together    Wound care: You may remove your dressing after 24 hours   You may shower using soap and water to clean your wound  Gently pat it dry  You may redress your wound for comfort as needed  Activity:   Did not perform any heavy lifting or strenuous physical activity for 7 days  Your activity restrictions will be reevaluated at your postoperative visit  Medications: You may resume all your preoperative medications and diet  Pain medication as directed on the prescription given in the office  Other:   May use ice on the wound for 24-48 hours as needed for comfort  May place warm compresses to the neck after 48 hours for comfort  Call the office at [phone] if you have any of the followin  Redness, swelling, heat, unusual drainage or heavy bleeding from the wound     2  Fever greater than 101°F    3  Pain not relieved by the prescribed pain medication

## 2022-10-13 ENCOUNTER — TELEPHONE (OUTPATIENT)
Dept: HEMATOLOGY ONCOLOGY | Facility: CLINIC | Age: 62
End: 2022-10-13

## 2022-10-13 ENCOUNTER — TELEPHONE (OUTPATIENT)
Dept: SURGICAL ONCOLOGY | Facility: CLINIC | Age: 62
End: 2022-10-13

## 2022-10-13 NOTE — TELEPHONE ENCOUNTER
Appointment Cancellation Or Reschedule     Person calling in Patient    If other than patient calling, are they listed on the communication consent form? Provider Dr Nathaniel Bejarano   Office Visit Date and Time  10/27/22   Office Visit Location [facility]   Did patient want to reschedule their office appointment? If so, when was it scheduled to? Yes 10/25/22 8:45am   Did you have STAR scheduled for this appointment? no   Do you need STAR set up for your new appointment? If yes, please send to "PATIENT RIDESHARE" pool for STAR rescheduling no   If you are cancelling appointment, can we notify STAR to cancel ride? If yes, please send to "PATIENT RIDESHARE" pool for STAR to cancel service no   Is this patient calling to reschedule an infusion appointment? no   When is their next infusion appointment? no   Is this patient a Chemo patient? no   Reason for Cancellation or Reschedule oneida was requested to change appt since Dr Nathaniel Bejarano will be in OR     If the patient is a treatment patient, please route this to the office nurse  If the patient is not on treatment, please route to the office MA  If the patient is a surgical oncology patient, please route to surg/onc clinical pool

## 2022-10-13 NOTE — TELEPHONE ENCOUNTER
I called and left a message for patient to please call back to reschedule his post op apt scheduled for 10/27 due to Dr Kerry Ulloa going into the OR  I would like for patient to come in on 10/25 instead

## 2022-10-24 PROBLEM — E89.0 S/P PARTIAL THYROIDECTOMY: Status: ACTIVE | Noted: 2022-10-24

## 2022-10-24 PROCEDURE — 88307 TISSUE EXAM BY PATHOLOGIST: CPT | Performed by: STUDENT IN AN ORGANIZED HEALTH CARE EDUCATION/TRAINING PROGRAM

## 2022-10-25 ENCOUNTER — TELEPHONE (OUTPATIENT)
Dept: NEPHROLOGY | Facility: CLINIC | Age: 62
End: 2022-10-25

## 2022-10-25 ENCOUNTER — OFFICE VISIT (OUTPATIENT)
Dept: SURGICAL ONCOLOGY | Facility: CLINIC | Age: 62
End: 2022-10-25

## 2022-10-25 ENCOUNTER — LAB (OUTPATIENT)
Dept: LAB | Facility: CLINIC | Age: 62
End: 2022-10-25
Payer: COMMERCIAL

## 2022-10-25 VITALS
DIASTOLIC BLOOD PRESSURE: 80 MMHG | HEIGHT: 66 IN | BODY MASS INDEX: 25.07 KG/M2 | WEIGHT: 156 LBS | SYSTOLIC BLOOD PRESSURE: 128 MMHG | TEMPERATURE: 97.3 F | OXYGEN SATURATION: 97 % | HEART RATE: 80 BPM

## 2022-10-25 DIAGNOSIS — E89.0 S/P PARTIAL THYROIDECTOMY: ICD-10-CM

## 2022-10-25 DIAGNOSIS — E04.1 THYROID NODULE: Primary | ICD-10-CM

## 2022-10-25 DIAGNOSIS — N18.31 STAGE 3A CHRONIC KIDNEY DISEASE (HCC): ICD-10-CM

## 2022-10-25 LAB
ANION GAP SERPL CALCULATED.3IONS-SCNC: 4 MMOL/L (ref 4–13)
BUN SERPL-MCNC: 17 MG/DL (ref 5–25)
CALCIUM SERPL-MCNC: 9 MG/DL (ref 8.3–10.1)
CHLORIDE SERPL-SCNC: 108 MMOL/L (ref 96–108)
CO2 SERPL-SCNC: 27 MMOL/L (ref 21–32)
CREAT SERPL-MCNC: 1.44 MG/DL (ref 0.6–1.3)
GFR SERPL CREATININE-BSD FRML MDRD: 50 ML/MIN/1.73SQ M
GLUCOSE SERPL-MCNC: 98 MG/DL (ref 65–140)
POTASSIUM SERPL-SCNC: 4.8 MMOL/L (ref 3.5–5.3)
SODIUM SERPL-SCNC: 139 MMOL/L (ref 135–147)

## 2022-10-25 PROCEDURE — 36415 COLL VENOUS BLD VENIPUNCTURE: CPT

## 2022-10-25 PROCEDURE — 80048 BASIC METABOLIC PNL TOTAL CA: CPT

## 2022-10-25 PROCEDURE — 99024 POSTOP FOLLOW-UP VISIT: CPT | Performed by: SURGERY

## 2022-10-25 NOTE — PROGRESS NOTES
Surgical Oncology Follow Up       [address] [facility] [place]  [address] [facility] [place]  [facility] [address] [phone]    Saurabh Elders  1960  [id number]  [address] [facility] [address] [address] [phone]    Diagnoses and all orders for this visit:    Thyroid nodule    S/P partial thyroidectomy        Chief Complaint   Patient presents with   • Follow-up       No follow-ups on file  Oncology History    No history exists  Staging:  Benign right thyroid nodule  Treatment history:  Right thyroid lobectomy, October 2022  Current treatment:  ObservationNED  Disease status: AMADOR    History of Present Illness:  Patient returns after his right thyroid lobectomy  He is doing well  No dysphagia or hoarseness  His swallowing issues completely resolved approximately 1-1/2 weeks after surgery  He has no voice complaints at this time  Review of Systems  Complete ROS Surg Onc:   Complete ROS Surg Onc:   Constitutional: The patient denies new or recent history of general fatigue, no recent weight loss, no change in appetite  Eyes: No complaints of visual problems, no scleral icterus  ENT: no complaints of ear pain, no hoarseness, no difficulty swallowing,  no tinnitus and no new masses in head, oral cavity, or neck  Cardiovascular: No complaints of chest pain, no palpitations, no ankle edema  Respiratory: No complaints of shortness of breath, no cough  Gastrointestinal: No complaints of jaundice, no bloody stools, no pale stools  Genitourinary: No complaints of dysuria, no hematuria, no nocturia, no frequent urination, no urethral discharge  Musculoskeletal: No complaints of weakness, paralysis, joint stiffness or arthralgias  Integumentary: No complaints of rash, no new lesions     Neurological: No complaints of convulsions, no seizures, no dizziness  Hematologic/Lymphatic: No complaints of easy bruising  Endocrine:  No hot or cold intolerance  No polydipsia, polyphagia, or polyuria  Allergy/immunology:  No environmental allergies  No food allergies  Not immunocompromised  Skin:  No pallor or rash  No wound          Patient Active Problem List   Diagnosis   • Osteoarthritis of finger of right hand   • Mixed hyperlipidemia   • Diverticulitis of large intestine with perforation without bleeding   • Renal cell cancer, left (HCC)   • Cluster headache   • Malignant neoplasm of left kidney, except renal pelvis (HCC)   • Tinea versicolor   • CKD (chronic kidney disease) stage 3, GFR 30-59 ml/min (HCC)   • Hyperglycemia   • Persistent proteinuria   • Hypermagnesemia   • Stage 3a chronic kidney disease (HCC)   • Solitary kidney, acquired   • Pulse irregularity   • Lymphadenopathy of head and neck   • Mass of right side of neck   • 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   • Thyroid nodule   • Follicular neoplasm of thyroid   • Hip pain, bilateral   • Asthmatic bronchitis   • Subacromial impingement of left shoulder   • Tear of left supraspinatus tendon   • HTN (hypertension)   • S/P partial thyroidectomy     Past Medical History:   Diagnosis Date   • Aneurysm ([facility] ) 2017    behind right eye-   • Arthritis    • Cancer ([address] ) 10/13/2019    kidney   • Chronic kidney disease 1/7/2020    stage 3   • CKD (chronic kidney disease)    • Diverticulitis of colon 10/13/2019   • Diverticulitis of large intestine with perforation without bleeding 10/13/2019   • Hyperlipidemia    • Hypertension    • Inflammatory bowel disease 10/13/2019   • Renal cell cancer, left ([address] ) 10/13/2019   • Renal cell carcinoma ([address] )      Past Surgical History:   Procedure Laterality Date   • COLONOSCOPY  2015   • HERNIA REPAIR     • HIP SURGERY Right     age 4   • JOINT REPLACEMENT Right 2011    TKR   • NEPHRECTOMY  [id number]    left   • [place] LAP, RADICAL NEPHRECTOMY Left 12/30/2019    Procedure: NEPHRECTOMY LAPAROSCOPIC HAND ASSISTED;  Surgeon: Ron Tran MD;  [facility];  Service: Urology   • DC LAP,SURG,COLECTOMY, PARTIAL, W/ANAST N/A 12/30/2019    Procedure: LAPAROSCOPIC HAND-ASSISTED SIGMOID COLECTOMY; LAPAROSCOPIC MOBILIZATION OF SPLENIC FLEXURE;  Surgeon: Celia Taylor MD;  [facility];  Service: Colorectal   • REPLACEMENT TOTAL KNEE Right 2011   • REPLACEMENT TOTAL KNEE     • SHOULDER SURGERY Left    • SHOULDER SURGERY Right    • THYROID LOBECTOMY Right 10/12/2022    Procedure: RIGHT THYROID LOBECTOMY;  Surgeon: Rashaun Arriola MD;  [facility];  Service: Surgical Oncology   • US GUIDED THYROID BIOPSY  7/9/2021   • WRIST FUSION Left 2016   • WRIST SURGERY       Family History   Problem Relation Age of Onset   • Heart disease Father    • Other Father         cardiac disorder   • No Known Problems Mother    • Heart attack Maternal Grandfather    • Heart attack Paternal Grandfather    • Thyroid cancer Son    • Colon cancer Neg Hx      Social History     Socioeconomic History   • Marital status: /Civil Union     Spouse name: Not on file   • Number of children: 4   • Years of education: high school or GED   • Highest education level: Not on file   Occupational History   • Occupation:      Comment: [employer]   Tobacco Use   • Smoking status: Never Smoker   • Smokeless tobacco: Never Used   Vaping Use   • Vaping Use: Never used   Substance and Sexual Activity   • Alcohol use:  Yes     Alcohol/week: 2 0 standard drinks     Types: 2 Cans of beer per week   • Drug use: No   • Sexual activity: Not Currently     Partners: Female     Birth control/protection: None   Other Topics Concern   • Not on file   Social History Narrative   • Not on file     Social Determinants of Health     Financial Resource Strain: Not on file   Food Insecurity: Not on file   Transportation Needs: Not on file   Physical Activity: Not on file   Stress: Not on file   Social Connections: Not on file   Intimate Partner Violence: Not on file   Housing Stability: Not on file       Current Outpatient Medications:   •  aspirin 81 MG tablet, Take 1 tablet by mouth daily, Disp: , Rfl:   •  atorvastatin (LIPITOR) 20 mg tablet, Take 1 tablet (20 mg total) by mouth daily, Disp: 90 tablet, Rfl: 1  •  HYDROcodone-acetaminophen (Norco) 5-325 mg per tablet, Take 1 tablet by mouth every 6 (six) hours as needed for pain Max Daily Amount: 4 tablets, Disp: 10 tablet, Rfl: 0  •  losartan (COZAAR) 25 mg tablet, Take 1 tablet (25 mg total) by mouth daily, Disp: 90 tablet, Rfl: 1  •  multivitamin (THERAGRAN) TABS, Take 1 tablet by mouth every other day, Disp: , Rfl:   •  Omega-3 Fatty Acids (FISH OIL) 1200 MG CAPS, Take by mouth , Disp: , Rfl:   No Known Allergies  There were no vitals filed for this visit  Physical Exam  Constitutional: General appearance: The Patient is well-developed and well-nourished who appears the stated age in no acute distress  Patient is pleasant and talkative  HEENT:  Normocephalic  Sclerae are anicteric  Mucous membranes are moist  Neck is  Extremities: There is no clubbing or cyanosis  There is no edema  Symmetric  Neuro: Grossly nonfocal  Gait is normal      Skin: Warm, anicteric  Psych:  Patient is pleasant and talkative  Breasts:        Pathology:  Final Diagnosis   A  Right thyroid lobe and isthmus; lobectomy:       - Benign thyroid with adenomatoid nodule       - Background thyroid with previous biopsy site changes       - Negative for malignancy    Electronically signed by Brenda Silver MD on 10/24/2022 at 12:54 PM         Labs:      Imaging  No results found  I reviewed the above laboratory and imaging data  Discussion/Summary:  25-year-old male with a family history of thyroid cancer and a personal history of kidney cancer  The right thyroid nodule was benign on final pathology    His dysphagia has completely resolved  Since there were no other nodules on the other side, I would recommend routine surveillance as needed  I did discuss that there is a 25-30% chance of developing hypothyroidism in his lifetime  His family physician should monitor his thyroid function studies  I will see him again in the future should the need arise  He is agreeable to this  All his questions were answered

## 2022-10-25 NOTE — TELEPHONE ENCOUNTER
----- Message from Junior Aiken MD sent at 10/25/2022 12:31 PM EDT -----  Noted patient had right thyroid lobectomy on 09/38/9622 for follicular neoplasm of thyroid please inform patient that renal function is stable at creatinine 1 44 mg/dL blood work from 10/25, continue same treatment

## 2022-10-25 NOTE — RESULT ENCOUNTER NOTE
Noted patient had right thyroid lobectomy on 98/57/3209 for follicular neoplasm of thyroid please inform patient that renal function is stable at creatinine 1 44 mg/dL blood work from 10/25, continue same treatment

## 2022-11-15 DIAGNOSIS — Z01.818 PREOPERATIVE CLEARANCE: ICD-10-CM

## 2022-11-15 DIAGNOSIS — N18.31 STAGE 3A CHRONIC KIDNEY DISEASE (HCC): Primary | ICD-10-CM

## 2022-12-02 ENCOUNTER — ANESTHESIA EVENT (OUTPATIENT)
Dept: PERIOP | Facility: AMBULARY SURGERY CENTER | Age: 62
End: 2022-12-02

## 2022-12-03 ENCOUNTER — LAB (OUTPATIENT)
Dept: LAB | Age: 62
End: 2022-12-03

## 2022-12-03 DIAGNOSIS — N18.31 STAGE 3A CHRONIC KIDNEY DISEASE (HCC): ICD-10-CM

## 2022-12-03 LAB
ANION GAP SERPL CALCULATED.3IONS-SCNC: 6 MMOL/L (ref 4–13)
BUN SERPL-MCNC: 18 MG/DL (ref 5–25)
CALCIUM SERPL-MCNC: 9.2 MG/DL (ref 8.3–10.1)
CHLORIDE SERPL-SCNC: 111 MMOL/L (ref 96–108)
CO2 SERPL-SCNC: 24 MMOL/L (ref 21–32)
CREAT SERPL-MCNC: 1.26 MG/DL (ref 0.6–1.3)
GFR SERPL CREATININE-BSD FRML MDRD: 59 ML/MIN/1.73SQ M
GLUCOSE P FAST SERPL-MCNC: 106 MG/DL (ref 65–99)
POTASSIUM SERPL-SCNC: 4.3 MMOL/L (ref 3.5–5.3)
SODIUM SERPL-SCNC: 141 MMOL/L (ref 135–147)

## 2022-12-05 ENCOUNTER — TELEPHONE (OUTPATIENT)
Dept: OBGYN CLINIC | Facility: CLINIC | Age: 62
End: 2022-12-05

## 2022-12-05 DIAGNOSIS — I12.9 BENIGN HYPERTENSION WITH CHRONIC KIDNEY DISEASE, STAGE III (HCC): ICD-10-CM

## 2022-12-05 DIAGNOSIS — N18.30 BENIGN HYPERTENSION WITH CHRONIC KIDNEY DISEASE, STAGE III (HCC): ICD-10-CM

## 2022-12-05 DIAGNOSIS — E78.2 MIXED HYPERLIPIDEMIA: ICD-10-CM

## 2022-12-05 RX ORDER — ATORVASTATIN CALCIUM 20 MG/1
20 TABLET, FILM COATED ORAL DAILY
Qty: 90 TABLET | Refills: 1 | Status: SHIPPED | OUTPATIENT
Start: 2022-12-05

## 2022-12-05 RX ORDER — LOSARTAN POTASSIUM 25 MG/1
25 TABLET ORAL DAILY
Qty: 90 TABLET | Refills: 1 | Status: SHIPPED | OUTPATIENT
Start: 2022-12-05

## 2022-12-05 NOTE — TELEPHONE ENCOUNTER
Caller: Self    Doctor: Ralph Whitlock    Reason for call: Patient is scheduled for surgery on 12/9  Does he need any pre admission testing? Does he need to stop any medications? Please advise       Call back#: [phone]

## 2022-12-06 ENCOUNTER — TELEPHONE (OUTPATIENT)
Dept: NEPHROLOGY | Facility: CLINIC | Age: 62
End: 2022-12-06

## 2022-12-06 NOTE — RESULT ENCOUNTER NOTE
Please inform patient that renal function is stable at creatinine 1 26, electrolytes are stable, okay for surgery from nephrology side  Please remind to hold losartan on the day of surgery as well as previous day

## 2022-12-06 NOTE — TELEPHONE ENCOUNTER
----- Message from Maggie Kearney MD sent at 12/6/2022 12:43 PM EST -----  Please inform patient that renal function is stable at creatinine 1 26, electrolytes are stable, okay for surgery from nephrology side  Please remind to hold losartan on the day of surgery as well as previous day

## 2022-12-06 NOTE — TELEPHONE ENCOUNTER
I spoke to Deric, he is aware he is cleared on a renal standpoint for his surgery  Will hold losartan the day prior and day of surgery

## 2022-12-07 NOTE — PRE-PROCEDURE INSTRUCTIONS
Pre-Surgery Instructions:   Medication Instructions   • aspirin 81 MG tablet Pt has not received hold instructions  Ot will hold starting 12/8/22 already took dose 12/7/22   • atorvastatin (LIPITOR) 20 mg tablet Take this medication day of surgery if normally taken in the morning  Pt elects to hold this med DOS   • losartan (COZAAR) 25 mg tablet Pt instructed by Nephrologist to hold 12/8/22 and 12/9/22   • multivitamin (Juvencio Julia) TABS Hold from now till after procedure unless prescribed by a Physician  • Omega-3 Fatty Acids (FISH OIL) 1200 MG CAPS Hold from now till after procedure unless prescribed by a Physician  My Surgical Experience    The following information was developed to assist you to prepare for your operation  What do I need to do before coming to the hospital?  • Arrange for a responsible person to drive you to and from the hospital   • Arrange care for your children at home  Children are not allowed in the recovery areas of the hospital  • Plan to wear clothing that is easy to put on and take off  If you are having shoulder surgery, wear a shirt that buttons or zippers in the front  Bathing  o Shower the evening before and the morning of your surgery with an antibacterial soap  Please refer to the Pre Op Showering Instructions for Surgery Patients Sheet   o Remove nail polish and all body piercing jewelry  o Do not shave any body part for at least 24 hours before surgery-this includes face, arms, legs and upper body  Food  o Nothing to eat or drink after midnight the night before your surgery   This includes candy and chewing gum  o Exception: If your surgery is after 12:00pm (noon), you may have clear liquids such as 7-Up®, ginger ale, apple or cranberry juice, Jell-O®, water, or clear broth until 8:00 am  o Do not drink milk or juice with pulp on the morning before surgery  o Do not drink alcohol 24 hours before surgery  Medicine  o Follow instructions you received from your surgeon about which medicines you may take on the day of surgery  o If instructed to take medicine on the morning of surgery, take pills with just a small sip of water  Call your prescribing doctor for specific infroamtion on what to do if you take insulin    What should I bring to the hospital?    Bring:  • Crutches or a walker, if you have them, for foot or knee surgery  • A list of the daily medicines, vitamins, minerals, herbals and nutritional supplements you take  Include the dosages of medicines and the time you take them each day  • Glasses, dentures or hearing aids  • Minimal clothing; you will be wearing hospital sleepwear  • Photo ID; required to verify your identity  • If you have a Living Will or Power of , bring a copy of the documents  • If you have an ostomy, bring an extra pouch and any supplies you use    Do not bring  • Medicines or inhalers  • Money, valuables or jewelry    What other information should I know about the day of surgery? • Notify your surgeons if you develop a cold, sore throat, cough, fever, rash or any other illness  • Report to the Ambulatory Surgical/Same Day Surgery Unit  • You will be instructed to stop at Registration only if you have not been pre-registered  • Inform your  fi they do not stay that they will be asked by the staff to leave a phone number where they can be reached  • Be available to be reached before surgery  In the event the operating room schedule changes, you may be asked to come in earlier or later than expected    *It is important to tell your doctor and others involved in your health care if you are taking or have been taking any non-prescription drugs, vitamins, minerals, herbals or other nutritional supplements   Any of these may interact with some food or medicines and cause a reaction

## 2022-12-09 ENCOUNTER — HOSPITAL ENCOUNTER (OUTPATIENT)
Facility: AMBULARY SURGERY CENTER | Age: 62
Setting detail: OUTPATIENT SURGERY
Discharge: HOME | End: 2022-12-09
Attending: ORTHOPAEDIC SURGERY | Admitting: ORTHOPAEDIC SURGERY

## 2022-12-09 ENCOUNTER — ANESTHESIA (OUTPATIENT)
Dept: PERIOP | Facility: AMBULARY SURGERY CENTER | Age: 62
End: 2022-12-09

## 2022-12-09 VITALS
SYSTOLIC BLOOD PRESSURE: 130 MMHG | HEIGHT: 66 IN | DIASTOLIC BLOOD PRESSURE: 74 MMHG | TEMPERATURE: 97.2 F | HEART RATE: 65 BPM | BODY MASS INDEX: 25.07 KG/M2 | RESPIRATION RATE: 16 BRPM | WEIGHT: 156 LBS | OXYGEN SATURATION: 94 %

## 2022-12-09 DIAGNOSIS — M75.102 TEAR OF LEFT SUPRASPINATUS TENDON: Primary | ICD-10-CM

## 2022-12-09 DEVICE — IMPLANTABLE DEVICE: Type: IMPLANTABLE DEVICE | Site: SHOULDER | Status: FUNCTIONAL

## 2022-12-09 DEVICE — ANCHOR SUT 4.75 X 19.1MM W/CLD EYELET SWIVELOCK STRL: Type: IMPLANTABLE DEVICE | Site: SHOULDER | Status: FUNCTIONAL

## 2022-12-09 RX ORDER — ONDANSETRON 2 MG/ML
4 INJECTION INTRAMUSCULAR; INTRAVENOUS EVERY 6 HOURS PRN
Status: DISCONTINUED | OUTPATIENT
Start: 2022-12-09 | End: 2022-12-09 | Stop reason: HOSPADM

## 2022-12-09 RX ORDER — CHLORHEXIDINE GLUCONATE 0.12 MG/ML
15 RINSE ORAL ONCE
Status: DISCONTINUED | OUTPATIENT
Start: 2022-12-09 | End: 2022-12-09 | Stop reason: HOSPADM

## 2022-12-09 RX ORDER — ONDANSETRON 2 MG/ML
INJECTION INTRAMUSCULAR; INTRAVENOUS AS NEEDED
Status: DISCONTINUED | OUTPATIENT
Start: 2022-12-09 | End: 2022-12-09

## 2022-12-09 RX ORDER — METOCLOPRAMIDE HYDROCHLORIDE 5 MG/ML
10 INJECTION INTRAMUSCULAR; INTRAVENOUS ONCE AS NEEDED
Status: DISCONTINUED | OUTPATIENT
Start: 2022-12-09 | End: 2022-12-09 | Stop reason: HOSPADM

## 2022-12-09 RX ORDER — OXYCODONE HYDROCHLORIDE 5 MG/1
TABLET ORAL
Qty: 13 TABLET | Refills: 0 | Status: SHIPPED | OUTPATIENT
Start: 2022-12-09

## 2022-12-09 RX ORDER — FENTANYL CITRATE/PF 50 MCG/ML
50 SYRINGE (ML) INJECTION
Status: DISCONTINUED | OUTPATIENT
Start: 2022-12-09 | End: 2022-12-09 | Stop reason: HOSPADM

## 2022-12-09 RX ORDER — CEFAZOLIN SODIUM 2 G/50ML
2000 SOLUTION INTRAVENOUS ONCE
Status: COMPLETED | OUTPATIENT
Start: 2022-12-09 | End: 2022-12-09

## 2022-12-09 RX ORDER — SODIUM CHLORIDE, SODIUM LACTATE, POTASSIUM CHLORIDE, CALCIUM CHLORIDE 600; 310; 30; 20 MG/100ML; MG/100ML; MG/100ML; MG/100ML
INJECTION, SOLUTION INTRAVENOUS CONTINUOUS PRN
Status: DISCONTINUED | OUTPATIENT
Start: 2022-12-09 | End: 2022-12-09

## 2022-12-09 RX ORDER — EPHEDRINE SULFATE 50 MG/ML
INJECTION INTRAVENOUS AS NEEDED
Status: DISCONTINUED | OUTPATIENT
Start: 2022-12-09 | End: 2022-12-09

## 2022-12-09 RX ORDER — MIDAZOLAM HYDROCHLORIDE 2 MG/2ML
INJECTION, SOLUTION INTRAMUSCULAR; INTRAVENOUS AS NEEDED
Status: DISCONTINUED | OUTPATIENT
Start: 2022-12-09 | End: 2022-12-09

## 2022-12-09 RX ORDER — FENTANYL CITRATE 50 UG/ML
INJECTION, SOLUTION INTRAMUSCULAR; INTRAVENOUS AS NEEDED
Status: DISCONTINUED | OUTPATIENT
Start: 2022-12-09 | End: 2022-12-09

## 2022-12-09 RX ORDER — ACETAMINOPHEN 325 MG/1
650 TABLET ORAL EVERY 6 HOURS PRN
Status: DISCONTINUED | OUTPATIENT
Start: 2022-12-09 | End: 2022-12-09 | Stop reason: HOSPADM

## 2022-12-09 RX ORDER — DEXAMETHASONE SODIUM PHOSPHATE 10 MG/ML
INJECTION, SOLUTION INTRAMUSCULAR; INTRAVENOUS AS NEEDED
Status: DISCONTINUED | OUTPATIENT
Start: 2022-12-09 | End: 2022-12-09

## 2022-12-09 RX ORDER — PROPOFOL 10 MG/ML
INJECTION, EMULSION INTRAVENOUS AS NEEDED
Status: DISCONTINUED | OUTPATIENT
Start: 2022-12-09 | End: 2022-12-09

## 2022-12-09 RX ORDER — ONDANSETRON 2 MG/ML
4 INJECTION INTRAMUSCULAR; INTRAVENOUS ONCE AS NEEDED
Status: DISCONTINUED | OUTPATIENT
Start: 2022-12-09 | End: 2022-12-09 | Stop reason: HOSPADM

## 2022-12-09 RX ORDER — OXYCODONE HYDROCHLORIDE 5 MG/1
5 TABLET ORAL EVERY 6 HOURS PRN
Status: DISCONTINUED | OUTPATIENT
Start: 2022-12-09 | End: 2022-12-09 | Stop reason: HOSPADM

## 2022-12-09 RX ORDER — BUPIVACAINE HYDROCHLORIDE 5 MG/ML
INJECTION, SOLUTION EPIDURAL; INTRACAUDAL AS NEEDED
Status: DISCONTINUED | OUTPATIENT
Start: 2022-12-09 | End: 2022-12-09

## 2022-12-09 RX ADMIN — SODIUM CHLORIDE, SODIUM LACTATE, POTASSIUM CHLORIDE, AND CALCIUM CHLORIDE: .6; .31; .03; .02 INJECTION, SOLUTION INTRAVENOUS at 12:50

## 2022-12-09 RX ADMIN — DEXAMETHASONE SODIUM PHOSPHATE 10 MG: 10 INJECTION, SOLUTION INTRAMUSCULAR; INTRAVENOUS at 12:55

## 2022-12-09 RX ADMIN — BUPIVACAINE 20 ML: 13.3 INJECTION, SUSPENSION, LIPOSOMAL INFILTRATION at 11:40

## 2022-12-09 RX ADMIN — MIDAZOLAM HYDROCHLORIDE 2 MG: 1 INJECTION, SOLUTION INTRAMUSCULAR; INTRAVENOUS at 11:38

## 2022-12-09 RX ADMIN — FENTANYL CITRATE 50 MCG: 50 INJECTION, SOLUTION INTRAMUSCULAR; INTRAVENOUS at 12:53

## 2022-12-09 RX ADMIN — BUPIVACAINE HYDROCHLORIDE 7.5 ML: 5 INJECTION, SOLUTION EPIDURAL; INTRACAUDAL at 11:40

## 2022-12-09 RX ADMIN — EPHEDRINE SULFATE 5 MG: 50 INJECTION, SOLUTION INTRAVENOUS at 13:18

## 2022-12-09 RX ADMIN — LIDOCAINE HYDROCHLORIDE 60 MG: 20 INJECTION, SOLUTION INTRAVENOUS at 12:53

## 2022-12-09 RX ADMIN — ONDANSETRON 4 MG: 2 INJECTION INTRAMUSCULAR; INTRAVENOUS at 12:55

## 2022-12-09 RX ADMIN — SODIUM CHLORIDE, SODIUM LACTATE, POTASSIUM CHLORIDE, AND CALCIUM CHLORIDE: .6; .31; .03; .02 INJECTION, SOLUTION INTRAVENOUS at 12:51

## 2022-12-09 RX ADMIN — CEFAZOLIN SODIUM 2000 MG: 2 SOLUTION INTRAVENOUS at 12:52

## 2022-12-09 RX ADMIN — PROPOFOL 150 MG: 10 INJECTION, EMULSION INTRAVENOUS at 12:53

## 2022-12-09 RX ADMIN — EPHEDRINE SULFATE 5 MG: 50 INJECTION, SOLUTION INTRAVENOUS at 13:13

## 2022-12-09 NOTE — DISCHARGE INSTRUCTIONS
You are being scheduled for a shoulder arthroscopy to treat your symptoms  Below are some instructions and information on what to expect before and after your surgery  Pre-Surgical Preparation for Arthroscopic Shoulder Surgery: You will be contacted the evening prior to your surgery to confirm the scheduled time of the procedure and when to arrive at the hospital    Do not eat or drink anything after midnight the night before your surgery  Since you are having out-patient surgery, make sure that you have someone who can drive you home later in the day  Also, prepare that person for a long day, as the process of safely preparing for and recovering from the procedure is more time consuming than the actual procedure! As you will be in a sling after surgery, please wear or bring a loose fitting button-down shirt so that you can easily place this over the sling when you leave the surgical suite  This avoids having to place the operative arm in a sleeve  Most patients find that this is the easiest outfit to wear for the first week or so after surgery so you may want to plan accordingly  Most patients find that lying down in bed after shoulder surgery accentuates their discomfort  This is likely related to the effect of gravity on the swelling in the shoulder  As a result, most patients sleep better in a recliner or in bed with pillows propped up behind their back for the first few days or weeks after surgery  It is a good idea to plan for this ahead of time so there will be less hassle getting things set up the night after surgery  What to Expect After Arthroscopic Shoulder Surgery: It is normal to have swelling and discomfort in the shoulder for several days or a week after surgery  It is also normal to have a small amount of drainage from the surgical wounds (especially the first few days after surgery), as we put fluid into the shoulder to visualize the structures during surgery  It is NOT normal to have foul smelling, purulent drainage and if this is noted, please contact the office immediately or proceed to the emergency room for evaluation as this may indicate an infection  Applying ice bags to the shoulder may help with pain that is not controlled by the regional block  Ice should be applied 20-30 minutes at a time, every hour or two  Make sure to put a thin towel or T-shirt next to your skin to avoid direct contact of the ice with the skin  Icing is most helpful in the first 48 hours, although many people find that continuing past this time frame lessens their postoperative pain  Please note that your post-operative dressing is not conductive to ice, so if you need to, it is okay to remove that dressing even the night after surgery and place band-aids over the wounds in order for the ice to take effect  Pain Control    Most patients will receive a nerve block, the local anesthetic may keep your whole arm numb for up to 4 days  You will be given a prescription for narcotic pain medication when you are discharged from the hospital   With the newer nerve block that is being utilized, patients are rarely requiring the use of this narcotic pain medication  If you find you do not tolerate that type of pain medicine well, call our office and we will try another one  In addition to the narcotic pain medication, it is safe to use an anti-inflammatory (unless the patient has a medical condition that would not allow safe use of this mediation)  This includes the Advil, Motrin, Ibuprofen and Alleve category of medications  Simply follow the over the counter dosing on the package and use as indicated as another adjunct  Importantly since these medications are all very similar, use only one of them  Tylenol is a separate medication that can be utilized as well and can be taken at the same time as the other medication or given in a "staggered" manner    Just make sure that you follow the dosing on the over the counter bottle instructions  Also make sure that the pain medication prescribed by Dr Massey Ip team does not contain acetaminophen (this is found in Percocet and Vicodin)  Typically we do not prescribe those types of pain medications but if for some reason that has been prescribed DO NOT add more Tylenol (acetaminophen) as you could end up taking too much of that medication  As mentioned above, most patients find that lying down accentuates their discomfort  You might sleep better in a recliner, or propped up in bed  Dr Kala Jack encourages patients to safely ambulate around the house as much as possible in the first few days after the procedure as this can help with blood circulation in the legs  While the incidence of blood clots is very rare following shoulder surgery, early ambulation is a great way to help decrease the already low rate  24 hours after the surgery you may remove the bandage and cover incisions with Band-Aids if needed  At that time you may shower, the wounds will have a surgical glue that will protect them from shower water but do not submerge your incisions directly (bathing or swimming) until at least 2 weeks post-operatively  It is safe to let the arm hang at the side and take a shower and put on a shirt without the sling on  Just make sure that you do not use the operative are to reach out and grab anything as that may damage the repair  When you are done showering and getting dressed please return the operative arm to the sling  Unless noted otherwise in your discharge paperwork, Dr Kala Jack uses absorbable sutures so they do not need to be removed  Dr Rikki Mccrary physician assistant (PA) will see you in the office a few days after the procedure to review the intra-operative findings and to initiate physical therapy if appropriate    A post-operative appointment should have been scheduled for you already, but if for some reason this did not happen, please call the office to make one  Physical therapy is important after nearly all shoulder surgeries and a detailed rehabilitation plan based on the specific intra-operative findings and procedures will be provided to your therapist at the first post-operative office visit  Most patients have post-operative therapy appointments scheduled pre-operatively, but if you do not, that will be handled at the first post-operative office visit  Unless expressly directed otherwise it is safe to remove the sling even the first day after the surgery and let the arm hang by the side  This allows patients to shower and even put a shirt on (bad arm in the sleeve first)  It is also safe to flex and extend their wrist, hand and fingers as much as possible when the block wears off  These simple motions can serve to pump fluid out of the forearm and decrease swelling in the arm

## 2022-12-09 NOTE — ANESTHESIA POSTPROCEDURE EVALUATION
Post-Op Assessment Note    CV Status:  Stable  Pain Score: 0    Pain management: adequate     Mental Status:  Awake and sleepy   Hydration Status:  Euvolemic   PONV Controlled:  Controlled   Airway Patency:  Patent      Post Op Vitals Reviewed: Yes      Staff: Anesthesiologist, CRNA         No notable events documented      BP   140/65   Temp      Pulse  70   Resp   12   SpO2   97

## 2022-12-09 NOTE — ANESTHESIA PREPROCEDURE EVALUATION
Procedure:  SHOULDER ARTHROSCOPIC ROTATOR CUFF REPAIR (Left: Shoulder)    Relevant Problems   CARDIO   (+) HTN (hypertension)   (+) Mixed hyperlipidemia      /RENAL   (+) CKD (chronic kidney disease) stage 3, GFR 30-59 ml/min (HCC)   (+) Malignant neoplasm of left kidney, except renal pelvis (HCC)   (+) Renal cell cancer, left (HCC)   (+) Solitary kidney, acquired   (+) Stage 3a chronic kidney disease (HCC)      MUSCULOSKELETAL   (+) Osteoarthritis of finger of right hand      NEURO/PSYCH   (+) Cluster headache      PULMONARY   (+) Asthmatic bronchitis        Physical Exam    Airway    Mallampati score: II  TM Distance: >3 FB  Neck ROM: full     Dental       Cardiovascular      Pulmonary      Other Findings        Anesthesia Plan  ASA Score- 3     Anesthesia Type- general with ASA Monitors  Additional Monitors:   Airway Plan: LMA  Comment: IS Block for post op pain per surgeon request          Plan Factors-            Obstructive sleep apnea risk education given perioperatively  Induction- intravenous  Postoperative Plan-     Informed Consent- Anesthetic plan and risks discussed with patient  I personally reviewed this patient with the CRNA  Discussed and agreed on the Anesthesia Plan with the CRNA  Gerhardt Sep

## 2022-12-09 NOTE — H&P
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     Discussion and Plan:     -Findings are consistent with a recurrent tear of the left rotator cuff (patient is status post primary repair 15 years ago)  I did review with patient that the radiologist does not necessarily state this clearly given the metal anchor and its artifact but to me the for MRI findings are very consistent with a recurrent tear of the supraspinatus  - Discussed with patient today that since he has seen minimal improvements with conservative treatments, the best option is a arthroscopic rotator cuff repair  Patient wishes to move forward with surgical intervention   -A thorough discussion was performed with the patient regarding the risks and benefit of operative and nonoperative treatment of their rotator cuff tear   Risks discussed include but were not limited to infection, neurovascular injury, recurrent tear, nonhealing of the repair, need for further surgery, need for biceps tenodesis or tenotomy, stiffness, need for prolonged rehabilitation, as well as the risk of anesthesia   After this discussion all questions were answered and informed consent was obtained in the office for arthroscopic rotator cuff repair of the right shoulder  - Patient was fitted with post-op ARC sling and post-op referral to physical was placed today      Subjective:   Patient ID: Naveen Cisneros is a 59 y o  male        HPI      Patient presents today to discuss results of left shoulder MRI obtained 8/5/2022  Patient has not seen any improvements since last visit   Pain has been present for the last couple months  Patient states his pain is located posterior and radiates anterior  Pain is described as burning  Patient has tried physical therapy and CSI in the past with minimal relief  Patient states the pain is increased with excess shoulder motions and over head movements  Patient notes a previous rotator cuff repair about 15 years ago and has had off an on pain very since       The following portions of the patient's history were reviewed and updated as appropriate: allergies, current medications, past family history, past medical history, past social history, past surgical history and problem list      Review of Systems   Constitutional: Negative for chills and fever  HENT: Negative for ear pain and sore throat  Eyes: Negative for pain and visual disturbance  Respiratory: Negative for cough and shortness of breath  Cardiovascular: Negative for chest pain and palpitations  Gastrointestinal: Negative for abdominal pain and vomiting  Genitourinary: Negative for dysuria and hematuria  Musculoskeletal: Positive for arthralgias (left shoulder)  Negative for back pain  Skin: Negative for color change and rash  Neurological: Negative for seizures and syncope  All other systems reviewed and are negative         Objective:  /83   Pulse 59   Temp 98 2 °F (36 8 °C)   Resp 18   Ht 5' 6" (1 676 m)   Wt 70 8 kg (156 lb)   SpO2 99%   BMI 25 18 kg/m²        Left Shoulder Exam      Tenderness   The patient is experiencing no tenderness       Range of Motion   The patient has normal left shoulder ROM     Muscle Strength   Abduction: 5/5   Supraspinatus: 5/5   Subscapularis: 5/5      Tests   Apprehension: positive  Stallworth test: negative  Cross arm: positive  Impingement: positive  Drop arm: negative  Sulcus: absent                 Physical Exam  Vitals and nursing note reviewed  Constitutional:       Appearance: He is well-developed  HENT:      Head: Normocephalic and atraumatic     Eyes: Pupils: Pupils are equal, round, and reactive to light  Cardiovascular:      Rate and Rhythm: Normal rate and regular rhythm  Pulses: Normal pulses  Heart sounds: Normal heart sounds  Pulmonary:      Effort: Pulmonary effort is normal  No respiratory distress  Breath sounds: Normal breath sounds  Abdominal:      General: There is no distension  Palpations: Abdomen is soft  Musculoskeletal:      Cervical back: Normal range of motion and neck supple  Skin:     General: Skin is warm and dry  Neurological:      Mental Status: He is alert and oriented to person, place, and time  Psychiatric:         Behavior: Behavior normal          Thought Content: Thought content normal          Judgment: Judgment normal                I have personally reviewed pertinent films in PACS and my interpretation is as follows   MRI of left shoulder shows presence of a metallic anchor from his prior repair with findings consistent with recurrent tear of the supraspinatus, no significant retraction but there is significant thinning of both the supraspinatus and infraspinatus tendon

## 2022-12-09 NOTE — ANESTHESIA PROCEDURE NOTES
Peripheral Block    Patient location during procedure: holding area  Start time: 12/9/2022 11:40 AM  Reason for block: at surgeon's request and post-op pain management  Staffing  Performed: Anesthesiologist   Anesthesiologist: Eluterio Runner, MD  Preanesthetic Checklist  Completed: patient identified, IV checked, site marked, risks and benefits discussed, surgical consent, monitors and equipment checked, pre-op evaluation and timeout performed  Peripheral Block  Patient position: supine  Prep: ChloraPrep  Patient monitoring: continuous pulse ox and frequent blood pressure checks  Block type: interscalene  Laterality: left  Injection technique: single-shot  Procedures: ultrasound guided, Ultrasound guidance required for the procedure to increase accuracy and safety of medication placement and decrease risk of complications    Ultrasound permanent image saved  Needle  Needle type: Stimuplex   Needle gauge: 22 G  Needle length: 5 cm  Needle localization: ultrasound guidance  Test dose: negative  Assessment  Injection assessment: incremental injection, local visualized surrounding nerve on ultrasound, no paresthesia on injection and negative aspiration for heme  Paresthesia pain: none  Heart rate change: no  Slow fractionated injection: yes  Post-procedure:  site cleaned  patient tolerated the procedure well with no immediate complications

## 2022-12-09 NOTE — OP NOTE
OPERATIVE REPORT  PATIENT NAME: Sidra Celeste    :  1960  MRN: [id number]  Pt Location: [facility] OR [facility] 06    SURGERY DATE: 2022     SURGEON: Venkata Manley MD     ASSISTANT: Jordana Yancey PA-C     NOTE: Jordana Yancey PA-C was present throughout the entire procedure and performed essential assistance with patient prepping, draping, positioning, suture management, wound closure, sterile dressing application and sling application, all under my direct supervision  NOTE: No qualified resident physician was available for assistance    PREOPERATIVE DIAGNOSIS:  Left Shoulder Recurrent Supraspinatus Tear    POSTOPERATIVE DIAGNOSIS: Same plus Long Head Biceps Tendon Partial Tear    PROCEDURES: Surgical Arthroscopy Left Shoulder with Revision Rotator Cuff Repair, Long Head Biceps Tenodesis and Extensive Debridement    ANESTHESIA STAFF: Zeeshan Cuba MD     ANESTHESIA TYPE: General LMA with ultrasound guided interscalene block (Exparel)  The interscalene block was provided by the anesthesia staff per my request for postoperative pain control and to decrease the use of postoperative narcotic medication for pain control  COMPLICATIONS: None    FINDINGS: Recurrent Supraspinatus Tear and Long Head Biceps Tendon Partial Tear    SPECIMEN(S):  None    ESTIMATED BLOOD LOSS: Minimal    INDICATION:  Briefly, the patient is a 57 y o   male with left shoulder pain  Patient had undergone rotator cuff repair approximately 15 years ago  MRI scan suggested a recurrent tear of the supraspinatus at the site of previous repair  The MRI was difficult to interpret secondary to previous metallic anchor placed in prior repair   The patient elected for arthroscopic treatment  Informed consent was obtained after a thorough discussion of the risks and benefits of the procedure, as well as alternatives to the procedure       OPERATIVE TECHNIQUE:  On the day of surgery, I identified the patient’s left shoulder and marked it with my initials  The patient was taken to the operating room where anesthesia was induced and 2 grams of IV Cefazolin were given  The patient was examined in the supine position and was found to have full range of motion of the left shoulder with no instability   The patient was then positioned in the [address] [place] [address] chair position  All bony prominences were padded  The shoulder was prepped and draped in normal sterile fashion  After a time-out for safety, a standard posterolateral arthroscopic portal was made  Glenohumeral evaluation revealed early degenerative changes humeral head and glenoid with no loose bodies  There was a recurrent full thickness supraspinatus tear at the site of previous repair as evidenced by the presence of Ethibond sutures in this location  The subscapularis and infraspinatus were intact and the circumferential labrum did have degenerative change  The long head of biceps had high-grade partial tearing associated with the recurrent supraspinatus tear  Given device was reduced to the anterior cannula and extensive debridement of the glenohumeral joint including the humeral head articular cartilage changes, the glenoid articular cartilage changes, the anterior and posterior glenoid labrum as well as the superior labrum of the long head biceps tendon complex was performed to stable border  After the intra-articular work was completed, the scope was then placed in the subacromial space through the same portal where a thorough bursectomy was performed  The full thickness supraspinatus tear was found to measure 1 2 cm from anterior to posterior and the tendon quality was quite poor and thinned given the previous repair  Medially we were able to achieve good fixation into much better tendon and this was able to be reduced to the tuberosity    The tuberosity was prepared in routine fashion and a double row suture bridge configuration repair with one medial 2 6 mm double loaded Arthrex All-Suture anchor and one lateral 4 75 mm Arthrex BioComposite SwiveLock anchor using four stiches through the tendon in horizontal mattress fashion was performed achieving anatomic reduction of the rotator cuff tendon to the tuberosity  The long head of biceps tendon was indicated for tenodesis and it was incorporated into the rotator cuff repair by using the anterior limb of the most anterior anchor through and around the long head of biceps in a loop whipstitch fashion; the long head of biceps was then released  When the medial row of the rotator cuff repair was tied down, this incorporated the long head biceps tenodesis into our repair  There was not significant recurrent CA ligament fraying or recurrent subacromial spur so a revision acromioplasty was not indicated  The area was then irrigated  Scope was withdrawn  Wounds were closed with 4-0 Monocryl and Histoacryl  Sterile dressings and a sling with an abduction pillow was placed  The patient was awoken without complication and returned to the recovery room in good condition  We will see the patient back in the office next week to initiate therapy following standard rotator cuff repair rehabilitation protocol  At the end of procedure, the counts were correct       PATIENT DISPOSITION:  Stable to PACU      SIGNATURE: Jacky Choi MD  DATE: December 9, 2022  TIME: 1:41 PM

## 2022-12-12 ENCOUNTER — OFFICE VISIT (OUTPATIENT)
Dept: OBGYN CLINIC | Facility: OTHER | Age: 62
End: 2022-12-12

## 2022-12-12 VITALS
DIASTOLIC BLOOD PRESSURE: 83 MMHG | WEIGHT: 154 LBS | HEART RATE: 61 BPM | BODY MASS INDEX: 24.75 KG/M2 | SYSTOLIC BLOOD PRESSURE: 138 MMHG | HEIGHT: 66 IN

## 2022-12-12 DIAGNOSIS — Z47.89 AFTERCARE FOLLOWING SURGERY OF THE MUSCULOSKELETAL SYSTEM: Primary | ICD-10-CM

## 2022-12-12 NOTE — PROGRESS NOTES
Assessment:       1  Aftercare following surgery of the musculoskeletal system          Plan:          Patient is doing well postoperatively  Operative note, images, and standard rotator cuff repair rehab protocol were discussed  All questions were addressed to the patient's satisfaction  Patient has an appointment with PT  Follow-up will be in 2 months to assess patient's progress  Subjective:     Patient ID: Octaviano Lan is a 57 y o  male  Chief Complaint:    HPI    Patient presents for follow-up status post left shoulder arthroscopy with rotator cuff repair on 12/9/2022  Pain is controlled  Patient mild residual paresthesia following anesthetic block  Social History     Occupational History   • Occupation:      Comment: [employer]   Tobacco Use   • Smoking status: Never   • Smokeless tobacco: Never   Vaping Use   • Vaping Use: Never used   Substance and Sexual Activity   • Alcohol use: Yes     Alcohol/week: 2 0 standard drinks     Types: 2 Cans of beer per week     Comment: 1-2 beers/week   • Drug use: No   • Sexual activity: Not Currently     Partners: Female     Birth control/protection: None      Review of Systems   Constitutional: Negative  Respiratory: Negative  Cardiovascular: Negative  Gastrointestinal: Negative  Genitourinary: Negative  Musculoskeletal: Negative for myalgias  Skin: Positive for wound  Neurological: Positive for weakness and numbness  Hematological: Negative  Psychiatric/Behavioral: Negative  Objective:         Neurological Testing     Additional Neurological Details  Mild decreased sensation in left thumb and index finger  Physical Exam  HENT:      Head: Atraumatic  Cardiovascular:      Pulses: Normal pulses  Pulmonary:      Effort: Pulmonary effort is normal    Musculoskeletal:      Comments: Full motion left hand and wrist   Left shoulder range of motion not tested     Skin:     General: Skin is warm and dry       Capillary Refill: Capillary refill takes less than 2 seconds  Comments: Surgical incisions dry and clean  Neurological:      Mental Status: He is alert and oriented to person, place, and time  Sensory: Sensory deficit present        Comments: Mild decreased sensation in left thumb and index finger   Psychiatric:         Mood and Affect: Mood normal          Behavior: Behavior normal

## 2022-12-13 ENCOUNTER — OFFICE VISIT (OUTPATIENT)
Dept: PHYSICAL THERAPY | Age: 62
End: 2022-12-13

## 2022-12-13 DIAGNOSIS — M25.512 LEFT SHOULDER PAIN, UNSPECIFIED CHRONICITY: Primary | ICD-10-CM

## 2022-12-13 NOTE — PROGRESS NOTES
PT Evaluation     Today's date: 2022  Patient name: Sofiya Collins  : 1960  MRN: [id number]  Referring provider: Gabriel Laguerre  Dx:   Encounter Diagnosis     ICD-10-CM    1  Left shoulder pain, unspecified chronicity  M25 512 PT plan of care cert/re-cert          Start Time: 1050  Stop Time: 1130  Total time in clinic (min): 40 minutes    Assessment  Assessment details: Pt presents complaining of L foot pain which has been ongoing since 2022 that persisted with nerve pain interrupting his sleep  He has received surgery previously on the L shoulder about 15 years ago  No known MATTHEW of re-injury  Pt locates their pain over the L supraspinatus tendon on anterior L shoulder     Pt reports pain is worse bad positioning when resting it in the sling and general motions    Pt is not currently working  Impairments: abnormal coordination, abnormal muscle firing, abnormal muscle tone, abnormal or restricted ROM, abnormal movement and pain with function     Prognosis: good    Goals  STG  -Pt will demonstrate HEP compliancy in 1 week   -pt will increase L shoulder ROM in 2 week by +5 degrees in flexion, abduction, and ER    LTG  -Pt will improve L shoulder ROM within functional limites to aid with ADL's In 12 weeks  -Pt will improve L shoulder strength to 4+/5 to return to golfing in 12 weeks      Plan  Plan details: Goals will be achieved through repetitions of Nueromuscular re-education, therapeutic exercise, therapeutic activities, manuals and HEP's  Progression will be added during each visit to improve pt impairments like L shoulder strength, p! Tolerance with activities as well as L shoulder ROM    Planned therapy interventions: therapeutic activities, therapeutic exercise, neuromuscular re-education, home exercise program, coordination, stretching and strengthening  Duration in weeks: 12  Plan of Care beginning date: 2022  Plan of Care expiration date: 3/7/2023  Treatment plan discussed with: patient        Subjective Evaluation    History of Present Illness  Date of onset: 2022  Date of surgery: 2022  Mechanism of injury: surgery  Mechanism of injury: No known MATTHEW however has a previous hx of L shoulder repair           Not a recurrent problem   Quality of life: good    Pain  Current pain ratin  At best pain ratin  At worst pain ratin  Location: over the L supraspinatus  Quality: needle-like  Relieving factors: ice, medications and rest  Aggravating factors: overhead activity  Progression: no change      Diagnostic Tests  MRI studies: normal  Treatments  Previous treatment: injection treatment  Patient Goals  Patient goals for therapy: increased motion, independence with ADLs/IADLs, increased strength, return to sport/leisure activities and decreased pain  Patient goal: Getting back to golfing        Objective     General Comments:      Shoulder Comments   Ttp directly over the L supraspintus with tenderness from surgery over the anterior deltoid      L shoulder PROM  Flexion = 60  Abduction = 25  ER =15    Skin integrity, no obvious signs of infections                  Patient was evaluated by KYLER Kulkarni under the direct supervision of Tiffanie Anderson PT       Precautions: HTN, CKD, Hx of Ca,   shoulder ROM flexion 90 deg, abduction 60 deg, ER 30 deg      Manuals             L shoulder ROM                                                    Neuro Re-Ed             scap squeezes             Shoulder shrugs                                                                              Ther Ex             Pendulums 3way nv            Self UT stretch             Elbow ROM             Wrist ROM                                                                 Ther Activity                                       Gait Training                                       Modalities

## 2022-12-14 ENCOUNTER — OFFICE VISIT (OUTPATIENT)
Dept: PHYSICAL THERAPY | Age: 62
End: 2022-12-14

## 2022-12-14 DIAGNOSIS — M25.512 LEFT SHOULDER PAIN, UNSPECIFIED CHRONICITY: Primary | ICD-10-CM

## 2022-12-14 NOTE — PROGRESS NOTES
Daily Note     Today's date: 2022  Patient name: Cielo Ortiz  : 1960  MRN: [id number]  Referring provider: Selina Kothari  Dx:   Encounter Diagnosis     ICD-10-CM    1  Left shoulder pain, unspecified chronicity  M25 512                      Subjective: grades pain as a 2 but feels some numbness in his thumb       Objective: See treatment diary below      Assessment: Tolerated treatment well  Patient exhibited good technique with therapeutic exercises and would benefit from continued PT to increase L UE rom per protocal      Plan: Continue per plan of care        Patient was treated by KYLER Garcia under the direct supervision of Ana Perez PT     Precautions: HTN, CKD, Hx of Ca,   shoulder ROM flexion 90 deg, abduction 60 deg, ER 30 deg      Manuals             L shoulder ROM CQ                                                   Neuro Re-Ed             scap squeezes 30x5"            Shoulder shrugs 3x10x3"                                                                             Ther Ex             Pendulums 3way nv            Self UT stretch 10x10"            Elbow ROM 3x10            Wrist ROM 3x10                                                                Ther Activity                                       Gait Training                                       Modalities

## 2022-12-16 ENCOUNTER — LAB (OUTPATIENT)
Dept: LAB | Age: 62
End: 2022-12-16

## 2022-12-16 ENCOUNTER — TELEPHONE (OUTPATIENT)
Dept: NEPHROLOGY | Facility: CLINIC | Age: 62
End: 2022-12-16

## 2022-12-16 ENCOUNTER — OFFICE VISIT (OUTPATIENT)
Dept: PHYSICAL THERAPY | Age: 62
End: 2022-12-16

## 2022-12-16 DIAGNOSIS — N18.31 STAGE 3A CHRONIC KIDNEY DISEASE (HCC): ICD-10-CM

## 2022-12-16 DIAGNOSIS — Z01.818 PREOPERATIVE CLEARANCE: ICD-10-CM

## 2022-12-16 DIAGNOSIS — M25.512 LEFT SHOULDER PAIN, UNSPECIFIED CHRONICITY: Primary | ICD-10-CM

## 2022-12-16 LAB
ANION GAP SERPL CALCULATED.3IONS-SCNC: 4 MMOL/L (ref 4–13)
BUN SERPL-MCNC: 19 MG/DL (ref 5–25)
CALCIUM SERPL-MCNC: 9 MG/DL (ref 8.3–10.1)
CHLORIDE SERPL-SCNC: 106 MMOL/L (ref 96–108)
CO2 SERPL-SCNC: 27 MMOL/L (ref 21–32)
CREAT SERPL-MCNC: 1.28 MG/DL (ref 0.6–1.3)
GFR SERPL CREATININE-BSD FRML MDRD: 58 ML/MIN/1.73SQ M
GLUCOSE P FAST SERPL-MCNC: 92 MG/DL (ref 65–99)
POTASSIUM SERPL-SCNC: 4 MMOL/L (ref 3.5–5.3)
SODIUM SERPL-SCNC: 137 MMOL/L (ref 135–147)

## 2022-12-16 NOTE — PROGRESS NOTES
Daily Note     Today's date: 2022  Patient name: Nick Cheek  : 1960  MRN: [id number]  Referring provider: Carlee Olson  Dx:   Encounter Diagnosis     ICD-10-CM    1  Left shoulder pain, unspecified chronicity  M25 512                      Subjective: Patient reports things are going well at home, states he's "blown away that the pain isn't there"  Able to sleep on/off in recliner  Continuing with HEP  Objective: See treatment diary below      Assessment: Tolerated treatment well  Good tolerance to PROM of R shoulder within protocol guidelines, denied increase in symptoms  Took sling off and introduced pendulums during today's session, tolerated well  Continued to target elbow/wrist mobility to avoid discomfort from sling  Patient exhibited good technique with therapeutic exercises and would benefit from continued PT  Plan: Continue per plan of care  Progress treament per protocol        Precautions: HTN, CKD, Hx of Ca,   shoulder ROM flexion 90 deg, abduction 60 deg, ER 30 deg      Manuals            L shoulder ROM CQ CS                                                  Neuro Re-Ed             scap squeezes 30x5" 30x5"           Shoulder shrugs 3x10x3" 3x10x3"           Digiflex   x30                                                               Ther Ex             Pendulums 3way nv 30 ea           Self UT stretch 10x10"            Elbow ROM 3x10 3x10 self PROM           Wrist ROM 3x10                                                                Ther Activity                                       Gait Training                                       Modalities

## 2022-12-16 NOTE — TELEPHONE ENCOUNTER
I lm for Refugio Hua and advised that renal function is stable no changes are to be made at this time  No changes are to be made at this time

## 2022-12-16 NOTE — RESULT ENCOUNTER NOTE
Please inform patient that renal function is stable at creatinine 1 28 mg/dL, electrolytes are stable, I am aware that he underwent shoulder rotator cuff surgery 12/9  Good to see that renal function has remained stable

## 2022-12-16 NOTE — TELEPHONE ENCOUNTER
----- Message from Harriett Hong MD sent at 12/16/2022  3:41 PM EST -----  Please inform patient that renal function is stable at creatinine 1 28 mg/dL, electrolytes are stable, I am aware that he underwent shoulder rotator cuff surgery 12/9  Good to see that renal function has remained stable

## 2022-12-19 ENCOUNTER — OFFICE VISIT (OUTPATIENT)
Dept: PHYSICAL THERAPY | Age: 62
End: 2022-12-19

## 2022-12-19 DIAGNOSIS — M25.512 LEFT SHOULDER PAIN, UNSPECIFIED CHRONICITY: Primary | ICD-10-CM

## 2022-12-19 NOTE — PROGRESS NOTES
Daily Note     Today's date: 2022  Patient name: Antoni Prather  : 1960  MRN: [id number]  Referring provider: Rodolfo Marroquin  Dx:   Encounter Diagnosis     ICD-10-CM    1  Left shoulder pain, unspecified chronicity  M25 512                      Subjective: Pt reports at this time he is doing well with no pain to start the session  He does not having any difficulty with his interventions  Objective: See treatment diary below      Assessment: pt did well with all basic interventions to start the session  He was given pendulums as an intervention for home to progress and ensure he is staying on track to initiate AAROM when tolerated  Plan: Continue per plan of care        Precautions: HTN, CKD, Hx of Ca,   shoulder ROM flexion 90 deg, abduction 60 deg, ER 30 deg      Manuals           L shoulder ROM CQ CS [address]                                                 Neuro Re-Ed             scap squeezes 30x5" 30x5" 30"x1          Shoulder shrugs 3x10x3" 3x10x3" 30"x1          Digiflex   x30 30x blue                                                              Ther Ex             Pendulums 3way nv 30 ea 30x           Self UT stretch 10x10"            Elbow ROM 3x10 3x10 self PROM 10x          Wrist ROM 3x10  2# 30x ext/flex                                                              Ther Activity                                       Gait Training                                       Modalities

## 2022-12-21 ENCOUNTER — OFFICE VISIT (OUTPATIENT)
Dept: PHYSICAL THERAPY | Age: 62
End: 2022-12-21

## 2022-12-21 DIAGNOSIS — M25.512 LEFT SHOULDER PAIN, UNSPECIFIED CHRONICITY: Primary | ICD-10-CM

## 2022-12-21 NOTE — PROGRESS NOTES
Daily Note     Today's date: 2022  Patient name: Gino Fernandez  : 1960  MRN: [id number]  Referring provider: Maria T Blake  Dx:   Encounter Diagnosis     ICD-10-CM    1  Left shoulder pain, unspecified chronicity  M25 512                      Subjective: Reports no new complaints today  Objective: See treatment diary below       Assessment: Tolerated treatment well  Patient would benefit from continued PT, demonstrates continued improvements in range of motion through therapy  Plan: Continue per plan of care        Precautions: HTN, CKD, Hx of Ca,   shoulder ROM flexion 90 deg, abduction 60 deg, ER 30 deg      Manuals          L shoulder ROM CQ CS [address] CW                                                 Neuro Re-Ed             scap squeezes 30x5" 30x5" 30"x1 30x5"          Shoulder shrugs 3x10x3" 3x10x3" 30"x1 30x3"          Digiflex   x30 30x blue 2' blue                                                              Ther Ex             Pendulums 3way nv 30 ea 30x  2' ea          Self UT stretch 10x10"            Elbow ROM 3x10 3x10 self PROM 10x 20x          Wrist ROM 3x10  2# 30x ext/flex 20x                                                              Ther Activity                                       Gait Training                                       Modalities

## 2022-12-23 ENCOUNTER — OFFICE VISIT (OUTPATIENT)
Dept: PHYSICAL THERAPY | Age: 62
End: 2022-12-23

## 2022-12-23 DIAGNOSIS — M25.512 LEFT SHOULDER PAIN, UNSPECIFIED CHRONICITY: Primary | ICD-10-CM

## 2022-12-23 NOTE — PROGRESS NOTES
Daily Note     Today's date: 2022  Patient name: Tyson Hensley  : 1960  MRN: [id number]  Referring provider: Kaushik Burroughs  Dx:   Encounter Diagnosis     ICD-10-CM    1  Left shoulder pain, unspecified chronicity  M25 512                      Subjective: Reports feeling pretty good today with minimal complaints throughout today's tx  Objective: See treatment diary below      Assessment: Tolerated treatment well  Patient would benefit from continued PT, continues to progress within protocol  Plan: Continue per plan of care        Precautions: HTN, CKD, Hx of Ca,   shoulder ROM flexion 90 deg, abduction 60 deg, ER 30 deg      Manuals          L shoulder ROM CQ CS [address] CW  CW                                               Neuro Re-Ed             scap squeezes 30x5" 30x5" 30"x1 30x5"  30x5"         Shoulder shrugs 3x10x3" 3x10x3" 30"x1 30x3"          Digiflex   x30 30x blue 2' blue  2' blue                                                             Ther Ex             Pendulums 3way nv 30 ea 30x  2' ea  2' ea         Self UT stretch 10x10"    5x20"         Elbow ROM 3x10 3x10 self PROM 10x 20x  40x         Wrist ROM 3x10  2# 30x ext/flex 20x  40x                                                            Ther Activity                                       Gait Training                                       Modalities

## 2022-12-27 ENCOUNTER — OFFICE VISIT (OUTPATIENT)
Dept: PHYSICAL THERAPY | Age: 62
End: 2022-12-27

## 2022-12-27 DIAGNOSIS — M25.512 LEFT SHOULDER PAIN, UNSPECIFIED CHRONICITY: Primary | ICD-10-CM

## 2022-12-27 NOTE — PROGRESS NOTES
Daily Note     Today's date: 2022  Patient name: Inetta Riedel  : 1960  MRN: [id number]  Referring provider: Grant Grande  Dx:   Encounter Diagnosis     ICD-10-CM    1  Left shoulder pain, unspecified chronicity  M25 512                      Subjective: Reports no new complaints coming in to therapy today  Objective: See treatment diary below      Assessment: Tolerated treatment well  Patient would benefit from continued PT, did well with exercises with no complaints throughout, continues to progress within protocol  Plan: Continue per plan of care        Precautions: HTN, CKD, Hx of Ca,   shoulder ROM flexion 90 deg, abduction 60 deg, ER 30 deg      Manuals         L shoulder ROM CQ CS [address] CW  CW CW                                               Neuro Re-Ed             scap squeezes 30x5" 30x5" 30"x1 30x5"  30x5"  30x5"        Shoulder shrugs 3x10x3" 3x10x3" 30"x1 30x3"   30x3"        Digiflex   x30 30x blue 2' blue  2' blue  2' blue                                                            Ther Ex             Pendulums 3way nv 30 ea 30x  2' ea  2' ea  2' ea        Self UT stretch 10x10"    5x20"  5x20"        Elbow ROM 3x10 3x10 self PROM 10x 20x  40x  np       Wrist ROM 3x10  2# 30x ext/flex 20x  40x np                                                            Ther Activity                                       Gait Training                                       Modalities

## 2022-12-28 ENCOUNTER — OFFICE VISIT (OUTPATIENT)
Dept: PHYSICAL THERAPY | Age: 62
End: 2022-12-28

## 2022-12-28 DIAGNOSIS — M25.512 LEFT SHOULDER PAIN, UNSPECIFIED CHRONICITY: Primary | ICD-10-CM

## 2022-12-28 NOTE — PROGRESS NOTES
Daily Note     Today's date: 2022  Patient name: Jose Miguel Mehta  : 1960  MRN: [id number]  Referring provider: Krystyna Wagner  Dx:   Encounter Diagnosis     ICD-10-CM    1  Left shoulder pain, unspecified chronicity  M25 512                      Subjective: Reports no new complaints       Objective: See treatment diary below      Assessment: Tolerated treatment well  Patient would benefit from continued PT, demonstrates adequate range of motion  Plan: Continue per plan of care        Precautions: HTN, CKD, Hx of Ca,   shoulder ROM flexion 90 deg, abduction 60 deg, ER 30 deg      Manuals        L shoulder ROM CW  CW CW  CW                                     Neuro Re-Ed          scap squeezes 30x5"  30x5"  30x5"  30x5"       Shoulder shrugs 30x3"   30x3"  30x3"      Digiflex  2' blue  2' blue  2' blue  2' blue                                              Ther Ex          Pendulums 3way 2' ea  2' ea  2' ea  2' ea       Self UT stretch  5x20"  5x20"        Elbow ROM 20x  40x  np       Wrist ROM 20x  40x np                                                Ther Activity                              Gait Training                              Modalities

## 2022-12-29 ENCOUNTER — TELEPHONE (OUTPATIENT)
Dept: INTERNAL MEDICINE CLINIC | Facility: CLINIC | Age: 62
End: 2022-12-29

## 2022-12-30 ENCOUNTER — OFFICE VISIT (OUTPATIENT)
Dept: PHYSICAL THERAPY | Age: 62
End: 2022-12-30

## 2022-12-30 DIAGNOSIS — M25.512 LEFT SHOULDER PAIN, UNSPECIFIED CHRONICITY: Primary | ICD-10-CM

## 2022-12-30 NOTE — PROGRESS NOTES
Daily Note     Today's date: 2022  Patient name: Ceasar Raya  : 1960  MRN: [id number]  Referring provider: Ashley Hayden  Dx:   Encounter Diagnosis     ICD-10-CM    1  Left shoulder pain, unspecified chronicity  M25 512                      Subjective: Reports continued improvement but still has some difficulty sleeping at night  Objective: See treatment diary below      Assessment: Tolerated treatment well  Patient would benefit from continued PT, did well with tx today with minimal complaints throughout today's tx  Plan: Continue per plan of care        Precautions: HTN, CKD, Hx of Ca,   shoulder ROM flexion 90 deg, abduction 60 deg, ER 30 deg      Manuals       L shoulder ROM CW  CW CW  CW  CW                                    Neuro Re-Ed          scap squeezes 30x5"  30x5"  30x5"  30x5"  30x5"      Shoulder shrugs 30x3"   30x3"  30x3" 30x3"      Digiflex  2' blue  2' blue  2' blue  2' blue                                              Ther Ex          Pendulums 3way 2' ea  2' ea  2' ea  2' ea  2' ea      Self UT stretch  5x20"  5x20"  5x20" 5x20"     Elbow ROM 20x  40x  np       Wrist ROM 20x  40x np                                                Ther Activity                              Gait Training                              Modalities

## 2023-01-03 ENCOUNTER — OFFICE VISIT (OUTPATIENT)
Dept: PHYSICAL THERAPY | Age: 63
End: 2023-01-03

## 2023-01-03 DIAGNOSIS — M25.512 LEFT SHOULDER PAIN, UNSPECIFIED CHRONICITY: Primary | ICD-10-CM

## 2023-01-03 NOTE — PROGRESS NOTES
Daily Note     Today's date: 1/3/2023  Patient name: Octaviano Lan  : 1960  MRN: [id number]  Referring provider: Ramesh Cornell  Dx:   Encounter Diagnosis     ICD-10-CM    1  Left shoulder pain, unspecified chronicity  M25 512                      Subjective: Reports feeling pretty good today with minimal complaints of pain  Objective: See treatment diary below      Assessment: Tolerated treatment well  Patient would benefit from continued PT, did well with tx with no complaints throughout tx today  Patient can progress to pulleys and railing slides in future visits  Plan: Continue per plan of care        Precautions: HTN, CKD, Hx of Ca,   shoulder ROM flexion 90 deg, abduction 60 deg, ER 30 deg      Manuals 12/21 12/23  12/27  12/28  12/30  1/3     L shoulder ROM CW  CW CW  CW  CW  CW                                  Neuro Re-Ed          scap squeezes 30x5"  30x5"  30x5"  30x5"  30x5"  30x5"     Shoulder shrugs 30x3"   30x3"  30x3" 30x3"  30x5"     Digiflex  2' blue  2' blue  2' blue  2' blue                                              Ther Ex          Pendulums 3way 2' ea  2' ea  2' ea  2' ea  2' ea  2' ea     Self UT stretch  5x20"  5x20"  5x20" 5x20" 5x20"     Elbow ROM 20x  40x  np       Wrist ROM 20x  40x np        Supine cane flexion      10x10"     Supine cane ER       10x10"     Pulleys       nv    Railing slides     nv     Ther Activity                              Gait Training                              Modalities

## 2023-01-05 ENCOUNTER — OFFICE VISIT (OUTPATIENT)
Dept: PHYSICAL THERAPY | Age: 63
End: 2023-01-05

## 2023-01-05 DIAGNOSIS — M25.512 LEFT SHOULDER PAIN, UNSPECIFIED CHRONICITY: Primary | ICD-10-CM

## 2023-01-05 NOTE — PROGRESS NOTES
Daily Note     Today's date: 2023  Patient name: Kev Elaine  : 1960  MRN: [id number]  Referring provider: Leopold Barker  Dx:   Encounter Diagnosis     ICD-10-CM    1  Left shoulder pain, unspecified chronicity  M25 512                      Subjective: Patient offers no new complaints regarding L shoulder to begin session  Objective: See treatment diary below    Assessment: Tolerated treatment well  Able to complete activity without pain provocation and with good form  Patient would benefit from continued PT  Activity per protocol  Plan: Continue per plan of care        Precautions: HTN, CKD, Hx of Ca,   shoulder ROM flexion 90 deg, abduction 60 deg, ER 30 deg    Manuals 12/21 12/23  12/27  12/28  12/30  1/3  1/5   L shoulder ROM CW  CW CW  CW  CW  CW                                  Neuro Re-Ed          scap squeezes 30x5"  30x5"  30x5"  30x5"  30x5"  30x5"  30x5"   Shoulder shrugs 30x3"   30x3"  30x3" 30x3"  30x5"     Digiflex  2' blue  2' blue  2' blue  2' blue                                              Ther Ex          Pendulums 3way 2' ea  2' ea  2' ea  2' ea  2' ea  2' ea  2' ea   Self UT stretch  5x20"  5x20"  5x20" 5x20" 5x20"     Elbow ROM 20x  40x  np       Wrist ROM 20x  40x np        Supine cane flexion      10x10"  10x10"   Supine cane ER       10x10"  10x10"   Pulleys       nv Flex 2'   Railing slides     nv  10x10"   Ther Activity                              Gait Training                              Modalities

## 2023-01-06 ENCOUNTER — OFFICE VISIT (OUTPATIENT)
Dept: PHYSICAL THERAPY | Age: 63
End: 2023-01-06

## 2023-01-06 DIAGNOSIS — M25.512 LEFT SHOULDER PAIN, UNSPECIFIED CHRONICITY: Primary | ICD-10-CM

## 2023-01-06 NOTE — PROGRESS NOTES
PT Re-Evaluation     Today's date: 2023  Patient name: Argelia Amezcua  : 1960  MRN: [id number]  Referring provider: Vito Echols  Dx:   Encounter Diagnosis     ICD-10-CM    1  Left shoulder pain, unspecified chronicity  M25 512                      Assessment  Assessment details: Patient has made progress since first post-op visit and demonstrates appropriate PROM and activity tolerance for 4 weeks post-op  Patient continues with sling use and PROM/AAROM of LUE for two additional weeks, AROM to begin at 6 weeks post-op  Patient would benefit from continued skilled intervention to address remaining functional deficits and progress towards strengthening and safe return to PLOF per protocol     Impairments: abnormal coordination, abnormal muscle firing, abnormal muscle tone, abnormal or restricted ROM, abnormal movement and pain with function     Prognosis: good    Goals  STG  -Pt will demonstrate HEP compliancy in 1 week - met  -pt will increase L shoulder ROM in 2 week by +5 degrees in flexion, abduction, and ER- met   STG  - Patient will tolerate discharge of sling without pain increase within 2 weeks  - Patient will tolerate progression of program per protocol to achieve AROM within 4 weeks    LTG  -Pt will improve L shoulder ROM within functional limites to aid with ADL's In 12 weeks  -Pt will improve L shoulder strength to 4+/5 to return to golfing in 12 weeks      Plan  Planned therapy interventions: therapeutic activities, therapeutic exercise, neuromuscular re-education, home exercise program, coordination, stretching and strengthening  Duration in weeks: 12  Plan of Care beginning date: 2022  Plan of Care expiration date: 3/7/2023  Treatment plan discussed with: patient        Subjective Evaluation    History of Present Illness  Date of onset: 2022  Date of surgery: 2022  Mechanism of injury: surgery  Mechanism of injury: Patient offers no new complaints regarding L shoulder, notes minimal pain  Has been compliant with HEP and sling use during this phase of treatment  Patient is 4 weeks post-op today  Not a recurrent problem   Quality of life: good    Pain  No pain reported  Relieving factors: ice, medications and rest  Aggravating factors: overhead activity  Progression: no change      Diagnostic Tests  MRI studies: normal  Treatments  Previous treatment: injection treatment  Patient Goals  Patient goals for therapy: increased motion, independence with ADLs/IADLs, increased strength, return to sport/leisure activities and decreased pain  Patient goal: Getting back to golfing        Objective     General Comments:      Shoulder Comments   1/6: L shoulder PROM  Flexion: 110 deg  Abd 80 deg  ER at 0 abd 30 deg  IR to body  AROM/strength not tested today secondary to post-op protocol    Ttp directly over the L supraspintus with tenderness from surgery over the anterior deltoid  L shoulder PROM  Flexion = 60  Abduction = 25  ER =15  Skin integrity, no obvious signs of infections               1/6: instructed patient on additional HEP per protocol to use during time away over the next 2 weeks, reviewed ability to move to AROM 1/20       Precautions: HTN, CKD, Hx of Ca,   shoulder ROM flexion 90 deg, abduction 60 deg, ER 30 deg    Manuals 1/6  12/27  12/28  12/30  1/3  1/5   L shoulder ROM LH  CW  CW  CW  CW LH    RE, LH                             Neuro Re-Ed 1/6 1/5   scap squeezes   30x5"  30x5"  30x5"  30x5"  30x5"   Shoulder shrugs   30x3"  30x3" 30x3"  30x5"     Digiflex    2' blue  2' blue                                              Ther Ex 1/6 1/5   Pendulums 3way   2' ea  2' ea  2' ea  2' ea  2' ea   Self UT stretch   5x20"  5x20" 5x20" 5x20"     Elbow ROM   np       Wrist ROM AAROM abd 10x10"  np        Supine cane flexion Standing, 10x10"     10x10"  10x10"   Supine cane ER  seated, 10x10"     10x10"  10x10"   Pulleys  5' flex     nv Flex 2'   Railing slides 10x10"    nv  10x10"   Ther Activity                              Gait Training                              Modalities

## 2023-01-20 ENCOUNTER — OFFICE VISIT (OUTPATIENT)
Dept: PHYSICAL THERAPY | Age: 63
End: 2023-01-20

## 2023-01-20 DIAGNOSIS — M25.512 LEFT SHOULDER PAIN, UNSPECIFIED CHRONICITY: Primary | ICD-10-CM

## 2023-01-20 NOTE — PROGRESS NOTES
Daily Note     Today's date: 2023  Patient name: Jennifer Bernard  : 1960  MRN: [id number]  Referring provider: Demian Broderick  Dx:   Encounter Diagnosis     ICD-10-CM    1  Left shoulder pain, unspecified chronicity  M25 512                      Subjective: Reports feeling pretty good coming in today after his trip for a couple weeks       Objective: See treatment diary below      Assessment: Tolerated treatment well  Patient would benefit from continued PT, did well with exercises with no complaints throughout today's tx  Plan: Continue per plan of care        Precautions: HTN, CKD, Hx of Ca,   shoulder ROM flexion 90 deg, abduction 60 deg, ER 30 deg    Manuals     L shoulder ROM LH CW    RE, LH              Neuro Re-Ed     scap squeezes     Shoulder shrugs     Digiflex                          Ther Ex     Pulleys   5'    Finger ladder 2way   nv   Railing slides   10x10" ea    Supine cane flexion Standing, 10x10" Supine 15x10"    Supine cane ER  seated, 10x10" Supine 15x10"    Ther Activity               Gait Training               Modalities

## 2023-01-24 ENCOUNTER — OFFICE VISIT (OUTPATIENT)
Dept: PHYSICAL THERAPY | Age: 63
End: 2023-01-24

## 2023-01-24 DIAGNOSIS — M25.512 LEFT SHOULDER PAIN, UNSPECIFIED CHRONICITY: Primary | ICD-10-CM

## 2023-01-24 NOTE — PROGRESS NOTES
Daily Note     Today's date: 2023  Patient name: Yuko Breen  : 1960  MRN: [id number]  Referring provider: Chaya Galvan  Dx:   Encounter Diagnosis     ICD-10-CM    1  Left shoulder pain, unspecified chronicity  M25 512                      Subjective: No new complaints coming in today  Objective: See treatment diary below      Assessment: Tolerated treatment well  Patient would benefit from continued PT, patient progressing well through protocol and demonstrates continued improved range of motion  Plan: Continue per plan of care        Precautions: HTN, CKD, Hx of Ca,   shoulder ROM flexion 90 deg, abduction 60 deg, ER 30 deg    Manuals     L shoulder ROM LH CW CW     RE, LH                 Neuro Re-Ed      scap squeezes      Shoulder shrugs      Digiflex                               Ther Ex      Pulleys   5'  5'    Finger ladder 2way   nv 10x10" ea   Railing slides   10x10" ea  10x10" ea    Supine cane flexion Standing, 10x10" Supine 15x10"  Supine 15x10"   Supine cane ER  seated, 10x10" Supine 15x10"  Supine 15x10"    Ther Activity                  Gait Training                  Modalities

## 2023-01-26 ENCOUNTER — OFFICE VISIT (OUTPATIENT)
Dept: PHYSICAL THERAPY | Age: 63
End: 2023-01-26

## 2023-01-26 DIAGNOSIS — M25.512 LEFT SHOULDER PAIN, UNSPECIFIED CHRONICITY: Primary | ICD-10-CM

## 2023-01-26 NOTE — PROGRESS NOTES
Daily Note     Today's date: 2023  Patient name: Gino Fernandez  : 1960  MRN: [id number]  Referring provider: Chestine Lights  Dx:   Encounter Diagnosis     ICD-10-CM    1  Left shoulder pain, unspecified chronicity  M25 512                      Subjective: Reports feeling better and better, feels like his R arm limits his movement more than his L      Objective: See treatment diary below      Assessment: Tolerated treatment well  Patient would benefit from continued PT, did well with tx with no complaints throughout today;s tx, can progress to isometrics nv  Plan: Continue per plan of care        Precautions: HTN, CKD, Hx of Ca,   shoulder ROM flexion 90 deg, abduction 60 deg, ER 30 deg    Manuals     L shoulder ROM CW CW  CW                      Neuro Re-Ed      scap squeezes      Shoulder shrugs      Digiflex       L shoulder isometrics    nv                     Ther Ex      Pulleys  5'  5'     Finger ladder 2way  nv 10x10" ea 10x10" ea    Railing slides  10x10" ea  10x10" ea  np   Wall slides    [id number]"    Supine cane flexion Supine 15x10"  Supine 15x10" Supine 15x10"    Supine cane ER  Supine 15x10"  Supine 15x10"  Supine 15x10"    Ther Activity                  Gait Training                  Modalities

## 2023-01-31 ENCOUNTER — TELEPHONE (OUTPATIENT)
Dept: NEPHROLOGY | Facility: CLINIC | Age: 63
End: 2023-01-31

## 2023-01-31 ENCOUNTER — APPOINTMENT (OUTPATIENT)
Dept: LAB | Age: 63
End: 2023-01-31

## 2023-01-31 ENCOUNTER — OFFICE VISIT (OUTPATIENT)
Dept: PHYSICAL THERAPY | Age: 63
End: 2023-01-31

## 2023-01-31 DIAGNOSIS — M25.512 LEFT SHOULDER PAIN, UNSPECIFIED CHRONICITY: Primary | ICD-10-CM

## 2023-01-31 DIAGNOSIS — E78.2 MIXED HYPERLIPIDEMIA: ICD-10-CM

## 2023-01-31 DIAGNOSIS — N18.30 BENIGN HYPERTENSION WITH CHRONIC KIDNEY DISEASE, STAGE III (HCC): ICD-10-CM

## 2023-01-31 DIAGNOSIS — Z90.5 SOLITARY KIDNEY, ACQUIRED: ICD-10-CM

## 2023-01-31 DIAGNOSIS — I12.9 BENIGN HYPERTENSION WITH CHRONIC KIDNEY DISEASE, STAGE III (HCC): ICD-10-CM

## 2023-01-31 DIAGNOSIS — N18.31 STAGE 3A CHRONIC KIDNEY DISEASE (HCC): ICD-10-CM

## 2023-01-31 DIAGNOSIS — R80.1 PERSISTENT PROTEINURIA: ICD-10-CM

## 2023-01-31 LAB
ANION GAP SERPL CALCULATED.3IONS-SCNC: 3 MMOL/L (ref 4–13)
BACTERIA UR QL AUTO: NORMAL /HPF
BILIRUB UR QL STRIP: NEGATIVE
BUN SERPL-MCNC: 18 MG/DL (ref 5–25)
CALCIUM SERPL-MCNC: 9.2 MG/DL (ref 8.3–10.1)
CHLORIDE SERPL-SCNC: 110 MMOL/L (ref 96–108)
CHOLEST SERPL-MCNC: 142 MG/DL
CLARITY UR: CLEAR
CO2 SERPL-SCNC: 28 MMOL/L (ref 21–32)
COLOR UR: NORMAL
CREAT SERPL-MCNC: 1.33 MG/DL (ref 0.6–1.3)
CREAT UR-MCNC: 137 MG/DL
ERYTHROCYTE [DISTWIDTH] IN BLOOD BY AUTOMATED COUNT: 12.4 % (ref 11.6–15.1)
GFR SERPL CREATININE-BSD FRML MDRD: 56 ML/MIN/1.73SQ M
GLUCOSE P FAST SERPL-MCNC: 95 MG/DL (ref 65–99)
GLUCOSE UR STRIP-MCNC: NEGATIVE MG/DL
HCT VFR BLD AUTO: 44.8 % (ref 36.5–49.3)
HDLC SERPL-MCNC: 50 MG/DL
HGB BLD-MCNC: 14.5 G/DL (ref 12–17)
HGB UR QL STRIP.AUTO: NEGATIVE
KETONES UR STRIP-MCNC: NEGATIVE MG/DL
LDLC SERPL CALC-MCNC: 73 MG/DL (ref 0–100)
LEUKOCYTE ESTERASE UR QL STRIP: NEGATIVE
MAGNESIUM SERPL-MCNC: 2.4 MG/DL (ref 1.6–2.6)
MCH RBC QN AUTO: 30.1 PG (ref 26.8–34.3)
MCHC RBC AUTO-ENTMCNC: 32.4 G/DL (ref 31.4–37.4)
MCV RBC AUTO: 93 FL (ref 82–98)
NITRITE UR QL STRIP: NEGATIVE
NON-SQ EPI CELLS URNS QL MICRO: NORMAL /HPF
PH UR STRIP.AUTO: 7 [PH]
PHOSPHATE SERPL-MCNC: 3.4 MG/DL (ref 2.3–4.1)
PLATELET # BLD AUTO: 220 THOUSANDS/UL (ref 149–390)
PMV BLD AUTO: 9.6 FL (ref 8.9–12.7)
POTASSIUM SERPL-SCNC: 5 MMOL/L (ref 3.5–5.3)
PROT UR STRIP-MCNC: NEGATIVE MG/DL
PROT UR-MCNC: 12 MG/DL
PROT/CREAT UR: 0.09 MG/G{CREAT} (ref 0–0.1)
PTH-INTACT SERPL-MCNC: 37.7 PG/ML (ref 18.4–80.1)
RBC # BLD AUTO: 4.81 MILLION/UL (ref 3.88–5.62)
RBC #/AREA URNS AUTO: NORMAL /HPF
SODIUM SERPL-SCNC: 141 MMOL/L (ref 135–147)
SP GR UR STRIP.AUTO: 1.02 (ref 1–1.03)
TRIGL SERPL-MCNC: 93 MG/DL
UROBILINOGEN UR STRIP-ACNC: <2 MG/DL
WBC # BLD AUTO: 4.63 THOUSAND/UL (ref 4.31–10.16)
WBC #/AREA URNS AUTO: NORMAL /HPF

## 2023-01-31 RX ORDER — SODIUM CHLORIDE 9 MG/ML
150 INJECTION, SOLUTION INTRAVENOUS CONTINUOUS
OUTPATIENT
Start: 2023-07-28

## 2023-01-31 RX ORDER — SODIUM CHLORIDE 9 MG/ML
100 INJECTION, SOLUTION INTRAVENOUS CONTINUOUS
OUTPATIENT
Start: 2023-07-28

## 2023-01-31 NOTE — PROGRESS NOTES
Order for pre and postcontrast IV fluid for CT with IV contrast planned and July 28 at [facility]

## 2023-01-31 NOTE — PROGRESS NOTES
Daily Note     Today's date: 2023  Patient name: Gino Fernandez  : 1960  MRN: [id number]  Referring provider: Chestine Lights  Dx:   Encounter Diagnosis     ICD-10-CM    1  Left shoulder pain, unspecified chronicity  M25 512                      Subjective: Reports no new complaints coming in today  Objective: See treatment diary below      Assessment: Tolerated treatment well  Patient would benefit from continued PT, was able to add shoulder isometrics today with minimal complaints of pain throughout tx  Plan: Continue per plan of care        Precautions: HTN, CKD, Hx of Ca,   shoulder ROM flexion 90 deg, abduction 60 deg, ER 30 deg    Manuals     L shoulder ROM CW CW  CW  CW                         Neuro Re-Ed       L shoulder isometrics    nv 10x5" flex/abd                        Ther Ex       Pulleys  5'  5'   5'   Finger ladder 2way  nv 10x10" ea 10x10" ea  10x10" ea    Wall slides    [id number]"  [id number]"   Supine cane flexion Supine 15x10"  Supine 15x10" Supine 15x10"  Supine 15x10"   Supine cane ER  Supine 15x10"  Supine 15x10"  Supine 15x10"  Supine 15x10"    Ther Activity                     Gait Training                     Modalities

## 2023-01-31 NOTE — TELEPHONE ENCOUNTER
----- Message from Sai Levine MD sent at 1/31/2023  2:24 PM EST -----  Patient is going for CT with IV contrast on July 28 at [facility], please arrange for pre and postcontrast IV fluids, orders are in [facility]   Thank you

## 2023-02-02 ENCOUNTER — OFFICE VISIT (OUTPATIENT)
Dept: PHYSICAL THERAPY | Age: 63
End: 2023-02-02

## 2023-02-02 DIAGNOSIS — M25.512 LEFT SHOULDER PAIN, UNSPECIFIED CHRONICITY: Primary | ICD-10-CM

## 2023-02-02 NOTE — PROGRESS NOTES
Daily Note     Today's date: 2023  Patient name: Ceasar Raya  : 1960  MRN: [id number]  Referring provider: Ashley Hayden  Dx:   Encounter Diagnosis     ICD-10-CM    1  Left shoulder pain, unspecified chronicity  M25 512                      Subjective: Reported feeling some nerve pain overnight which made it hard to sleep, but feels better now      Objective: See treatment diary below      Assessment: Tolerated treatment well  Patient would benefit from continued PT, did well with tx today with no complaints of pain throughout  Patient can continue to progress in future weeks  Plan: Continue per plan of care        Precautions: HTN, CKD, Hx of Ca,   shoulder ROM flexion 90 deg, abduction 60 deg, ER 30 deg    Manuals 2    L shoulder ROM CW  CW                       Neuro Re-Ed      L shoulder isometrics  nv 10x5" flex/abd 10x5" flex/abd/ER/IR                     Ther Ex      Pulleys   5' 5'    Finger ladder 2way  10x10" ea  10x10" ea  10x10" ea    Wall slides  [id number]"  [id number]" [id number]"    Supine cane flexion Supine 15x10"  Supine 15x10" Supine 15x10"    Supine cane ER  Supine 15x10"  Supine 15x10"  Supine 15x10"    Ther Activity                  Gait Training                  Modalities

## 2023-02-07 ENCOUNTER — APPOINTMENT (OUTPATIENT)
Dept: PHYSICAL THERAPY | Age: 63
End: 2023-02-07

## 2023-02-07 ENCOUNTER — RA CDI HCC (OUTPATIENT)
Dept: OTHER | Facility: HOSPITAL | Age: 63
End: 2023-02-07

## 2023-02-07 NOTE — PROGRESS NOTES
[facility], [place] [facility]  coding opportunities          Chart Reviewed number of suggestions sent to Provider: 1   [id number] [phone]    This is a reminder to [facility]  (risk [facility]) codes as found on active problem list for 2023 as patient scores reset to zero JOSE      Patients Insurance        Commercial Insurance: [address] [place] [address]

## 2023-02-08 ENCOUNTER — OFFICE VISIT (OUTPATIENT)
Dept: PHYSICAL THERAPY | Age: 63
End: 2023-02-08

## 2023-02-08 DIAGNOSIS — M25.512 LEFT SHOULDER PAIN, UNSPECIFIED CHRONICITY: Primary | ICD-10-CM

## 2023-02-08 NOTE — PROGRESS NOTES
Daily Note     Today's date: 2023  Patient name: Josy Davila  : 1960  MRN: [id number]  Referring provider: Jacky Dawkins  Dx:   Encounter Diagnosis     ICD-10-CM    1  Left shoulder pain, unspecified chronicity  M25 512                      Subjective: Reports feeling continued improvements in range of motion  Objective: See treatment diary below      Assessment: Tolerated treatment well  Patient would benefit from continued PT, did well with tx with no complaints of pain throughout tx today, patient continues to improve in accordance with protocol  Plan: Continue per plan of care        Precautions: HTN, CKD, Hx of Ca,   shoulder ROM flexion 90 deg, abduction 60 deg, ER 30 deg    Manuals     L shoulder ROM CW  CW  CW  CW                        Neuro Re-Ed       L shoulder isometrics  nv 10x5" flex/abd 10x5" flex/abd/ER/IR 15x5" all                         Ther Ex       Pulleys   5' 5'  5'    Finger ladder 2way  10x10" ea  10x10" ea  10x10" ea  10x10" ea   Wall slides  [id number]"  [id number]" [id number]"  [id number]"   Supine cane flexion Supine 15x10"  Supine 15x10" Supine 15x10"  np   Supine cane ER  Supine 15x10"  Supine 15x10"  Supine 15x10"  20x10"    Ther Activity                     Gait Training                     Modalities

## 2023-02-09 ENCOUNTER — OFFICE VISIT (OUTPATIENT)
Dept: NEPHROLOGY | Facility: CLINIC | Age: 63
End: 2023-02-09

## 2023-02-09 VITALS
HEIGHT: 66 IN | WEIGHT: 155 LBS | BODY MASS INDEX: 24.91 KG/M2 | DIASTOLIC BLOOD PRESSURE: 62 MMHG | SYSTOLIC BLOOD PRESSURE: 118 MMHG

## 2023-02-09 DIAGNOSIS — Z90.5 SOLITARY KIDNEY, ACQUIRED: ICD-10-CM

## 2023-02-09 DIAGNOSIS — I12.9 BENIGN HYPERTENSION WITH CHRONIC KIDNEY DISEASE, STAGE III (HCC): ICD-10-CM

## 2023-02-09 DIAGNOSIS — N18.31 STAGE 3A CHRONIC KIDNEY DISEASE (HCC): Primary | ICD-10-CM

## 2023-02-09 DIAGNOSIS — N18.30 BENIGN HYPERTENSION WITH CHRONIC KIDNEY DISEASE, STAGE III (HCC): ICD-10-CM

## 2023-02-09 NOTE — PATIENT INSTRUCTIONS
-Renal Function is stable   -You have Chronic Kidney Disease Stage 3  -Avoid NSAIDs like Ibuprofen/Motrin, Naproxen/Aleve, Celebrex, meloxicam/Mobic, Diclofenac and other NSAIDs   -Okay to take Acetaminophen/Tylenol if you do not have any liver problems  -Avoid IV contrast used for CT scan and cardiac catheterization     -If plan for any study with IV contrast, please let me know so we could hydrate with fluids before and after IV contrast  -Dosage  of certain medications may need to be adjusted depending on Kidney function  Blood pressure is stable    -Recommend 2 g sodium diet  -Recommend daily exercise and weight loss  -Discussed home monitoring of BP and maintaining a log of blood pressure   -Recommend goal BP less than 130/80  Please inform me if SBP below 110 or above 140's persistently      Blood work in mid July before CT with IV contrast   ordered for pre and postcontrast IV fluids before CT with IV contrast on July 28  Follow-up in 6 months with repeat blood work and urine test before the office visit

## 2023-02-09 NOTE — LETTER
February 9, 2023     Flora Gutierrez MD  [address] [place] [address]    Patient: Inetta Riedel   YOB: 1960   Date of Visit: 2/9/2023       Dear Dr Dimitry Zarate: Thank you for referring Danay Otto to me for evaluation  Below are my notes for this consultation  If you have questions, please do not hesitate to call me  I look forward to following your patient along with you  Sincerely,        Mecca Kauffman MD        CC: PREETI DavisC  Mecca Kauffman MD  2/9/2023  8:18 AM  Incomplete  NEPHROLOGY OUTPATIENT PROGRESS NOTE   Inetta Riedel 57 y o  male MRN: [id number]  DATE: 2/9/2023  Reason for visit:   Chief Complaint   Patient presents with   • Follow-up   • Chronic Kidney Disease       ASSESSMENT and PLAN:  Chronic kidney disease stage 3a  -baseline creatinine 1 2-1 4     -renal function currently stable creatinine 1 33 mg/dL with stable electrolytes  Stressed on oral hydration  Continue to avoid nephrotoxins like NSAIDs and IV contrast   -Chronic kidney disease likely due to decreased nephron mass from left nephrectomy done for renal cell carcinoma left kidney  -preop creatinine was 0 8-0 9 but has been elevated and stable at  1 2-1 4  likely due to decreased nephron mass from left nephrectomy   -discussed about avoiding nephrotoxins like NSAIDs   -Patient will need renal prep with IV fluid before any CT with IV contrast and this was discussed with patient in detail    -PTH in normal limit, no proteinuria  -Ordered for iv fluids before CT with iv contrast on July 28th    Recommend precontrast 150 mL/h normal saline 1 hour before CT scan with IV contrast followed by IV normal saline 100 mL/h for 4 hours post IV contrast  Message was sent to urology and I also informed  staff to schedule appt for infusion    -Follow-up in 6 months with repeat labs      Primary Hypertension with CKD:   -Current medication: losartan 25 mg     -Current blood pressure: Stable and is at goal  -Plan:    ? Continue losartan at current dose  -Recommend 2 g sodium diet     -Recommend daily exercise and weight loss  -Discussed home monitoring of BP and maintaining a log of blood pressure   -Recommend goal BP less than 130/80       Solitary right kidney  -status post left nephrectomy for renal cell carcinoma-done on 12/30/19 by Dr Jarrett Maurice pathology results suggestive of renal cell carcinoma-conventional clear cell type    -continue follow-up with Urology, last visit was in July 28, 2022, noted plan for annual surveillance with CT with and without contrast and noted plan for pre and post contrast IV hydration      Persistent proteinuria-resolved  -Fayrene Curl was likely due to [facility] DEER well as from compensatory hypertrophy in solitary kidney   - Recommended goal blood pressure less than 130/80  -Continue ARB     CKD/MBD: Phosphorus is at normal range and PTH in normal range, continue to monitor     Hyperlipidemia:   -Lipid panel improving   -last lipid panel from January 2023 showed LDL at goal at 73, triglyceride 93  -continue statin as well as fish oil per PCP  -recommend goal LDL less than 100, continue monitoring and management per PCP  -continue daily exercise     Patient Instructions   -Renal Function is stable   -You have Chronic Kidney Disease Stage 3  -Avoid NSAIDs like Ibuprofen/Motrin, Naproxen/Aleve, Celebrex, meloxicam/Mobic, Diclofenac and other NSAIDs   -Okay to take Acetaminophen/Tylenol if you do not have any liver problems  -Avoid IV contrast used for CT scan and cardiac catheterization     -If plan for any study with IV contrast, please let me know so we could hydrate with fluids before and after IV contrast  -Dosage  of certain medications may need to be adjusted depending on Kidney function  Blood pressure is stable    -Recommend 2 g sodium diet      -Recommend daily exercise and weight loss  -Discussed home monitoring of BP and maintaining a log of blood pressure   -Recommend goal BP less than 130/80  Please inform me if SBP below 110 or above 140's persistently  Blood work in mid July before CT with IV contrast   ordered for pre and postcontrast IV fluids before CT with IV contrast on July 28  Follow-up in 6 months with repeat blood work and urine test before the office visit       Diagnoses and all orders for this visit:    Stage 3a chronic kidney disease ([address] )  -     Basic metabolic panel; Future  -     Basic metabolic panel; Future  -     Protein / creatinine ratio, urine; Future  -     PTH, intact; Future  -     Urinalysis with microscopic; Future  -     Phosphorus; Future  -     CBC; Future    Benign hypertension with chronic kidney disease, stage III (HCC)  -     Basic metabolic panel; Future    Solitary kidney, acquired  -     Basic metabolic panel; Future            SUBJECTIVE / HPI:  Alyssa Bynum is a 57 y o   male who underwent elective laparoscopy hand assisted sigmoid resection and left nephrectomy for complicated diverticular disease and left renal cell carcinoma on 12/30/2019   Nephrology was consulted for increased creatinine postop   Preoperative creatinine was 0 9   Postop creatinine stabilized at 1 4 and elevated creatinine was thought to be due to nephron loss  Since then renal function has been stable at creatinine 1 2-1 3  Since last office visit he underwent left shoulder arthroscopic revision of rotator cuff repair with biceps tenodesis      Reviewed labs from January 21, 2023, renal function stable at creatinine 1 33 mg/dL with stable electrolytes, normal phosphorus and magnesium  Hemoglobin stable at 14 5, PTH in normal range at 37 7  UPC ratio with no proteinuria, was 0 09    No new complaints, no chest pain or shortness of breath, no nausea vomiting          REVIEW OF SYSTEMS:    Review of Systems   Constitutional: Negative for chills and fever  HENT: Negative for ear pain and sore throat      Eyes: Negative for pain and visual disturbance  Respiratory: Negative for cough and shortness of breath  Cardiovascular: Negative for chest pain and palpitations  Gastrointestinal: Negative for abdominal pain and vomiting  Genitourinary: Negative for dysuria and hematuria  Musculoskeletal: Negative for arthralgias and back pain  Skin: Negative for color change and rash  Neurological: Negative for seizures and syncope  All other systems reviewed and are negative  More than 10 point review of systems were obtained and discussed in length with the patient  Complete review of systems were negative / unremarkable except mentioned above         PAST MEDICAL HISTORY:  Past Medical History:   Diagnosis Date   • 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-   • Arthritis    • Cancer ([facility] ) 10/13/2019    kidney   • Chronic kidney disease 1/7/2020    stage 3   • CKD (chronic kidney disease)    • Diverticulitis of colon 10/13/2019   • Diverticulitis of large intestine with perforation without bleeding 10/13/2019   • Hyperlipidemia    • Hypertension    • Inflammatory bowel disease 10/13/2019   • Renal cell cancer, left ([facility] [place] [facility] ) 10/13/2019   • Renal cell carcinoma ([facility] [place] [facility] )        PAST SURGICAL HISTORY:  Past Surgical History:   Procedure Laterality Date   • COLONOSCOPY  2015   • HERNIA REPAIR     • HIP SURGERY Right     age 4   • JOINT REPLACEMENT Right 2011    TKR   • NEPHRECTOMY  [id number]    left   • [place] LAPAROSCOPY COLECTOMY PARTIAL W/ANASTOMOSIS N/A 12/30/2019    Procedure: LAPAROSCOPIC HAND-ASSISTED SIGMOID COLECTOMY; LAPAROSCOPIC MOBILIZATION OF SPLENIC FLEXURE;  Surgeon: Penelope Roldan MD;  [facility]: [facility] Main OR;  Service: Colorectal   • KS LAPAROSCOPY RADICAL NEPHRECTOMY Left 12/30/2019    Procedure: NEPHRECTOMY LAPAROSCOPIC HAND ASSISTED;  Surgeon: Kash Newell MD;  Location: [facility] OR;  Service: Urology   • KS SURGICAL ARTHROSCOPY SHOULDER W/ROTATOR CUFF RPR Left 12/9/2022    Procedure: SHOULDER ARTHROSCOPIC REVISION ROTATOR CUFF REPAIR AND BICEPS TENODESIS;  Surgeon: Viktor Ma MD;  Location: [facility] MA[facility] OR;  Service: Orthopedics   • REPLACEMENT TOTAL KNEE Right 2011   • REPLACEMENT TOTAL KNEE     • SHOULDER SURGERY Left    • SHOULDER SURGERY Right    • THYROID LOBECTOMY Right 10/12/2022    Procedure: RIGHT THYROID LOBECTOMY;  Surgeon: Carla Beltran MD;  Location: [facility] Main OR;  Service: Surgical Oncology   • US GUIDED THYROID BIOPSY  7/9/2021   • WRIST FUSION Left 2016   • WRIST SURGERY         SOCIAL HISTORY:  Social History     Substance and Sexual Activity   Alcohol Use Yes   • Alcohol/week: 2 0 standard drinks   • Types: 2 Cans of beer per week    Comment: 1-2 beers/week     Social History     Substance and Sexual Activity   Drug Use No     Social History     Tobacco Use   Smoking Status Never   Smokeless Tobacco Never       FAMILY HISTORY:  Family History   Problem Relation Age of Onset   • Heart disease Father    • Other Father         cardiac disorder   • No Known Problems Mother    • Heart attack Maternal Grandfather    • Heart attack Paternal Grandfather    • Thyroid cancer Son    • Colon cancer Neg Hx        MEDICATIONS:    Current Outpatient Medications:   •  aspirin 81 MG tablet, Take 1 tablet by mouth daily, Disp: , Rfl:   •  atorvastatin (LIPITOR) 20 mg tablet, Take 1 tablet (20 mg total) by mouth daily, Disp: 90 tablet, Rfl: 1  •  losartan (COZAAR) 25 mg tablet, Take 1 tablet (25 mg total) by mouth daily, Disp: 90 tablet, Rfl: 1  •  multivitamin (THERAGRAN) TABS, Take 1 tablet by mouth every other day, Disp: , Rfl:   •  Omega-3 Fatty Acids (FISH OIL) 1200 MG CAPS, Take by mouth in the morning, Disp: , Rfl:   •  oxyCODONE (ROXICODONE) 5 immediate release tablet, 1 tablets every 6 hours as needed for severe shoulder pain only  , Disp: 13 tablet, Rfl: 0      PHYSICAL EXAM:  Vitals:    02/09/23 0749   BP: 118/62   BP Location: Left arm   Patient Position: Sitting   Cuff Size: Adult   Weight: 70 3 kg (155 lb)   Height: 5' 6" (1 676 m)     Body mass index is 25 02 kg/m²  Physical Exam  Constitutional:       General: He is not in acute distress  Appearance: He is well-developed  He is not diaphoretic  HENT:      Head: Normocephalic and atraumatic  Mouth/Throat:      Mouth: Mucous membranes are moist    Eyes:      General: No scleral icterus  Conjunctiva/sclera: Conjunctivae normal       Pupils: Pupils are equal, round, and reactive to light  Neck:      Thyroid: No thyromegaly  Cardiovascular:      Rate and Rhythm: Normal rate and regular rhythm  Heart sounds: Normal heart sounds  No murmur heard  No friction rub  Pulmonary:      Effort: Pulmonary effort is normal  No respiratory distress  Breath sounds: Normal breath sounds  No wheezing or rales  Abdominal:      General: Bowel sounds are normal  There is no distension  Palpations: Abdomen is soft  Tenderness: There is no abdominal tenderness  Musculoskeletal:         General: No deformity  Cervical back: Neck supple  Right lower leg: No edema  Left lower leg: No edema  Lymphadenopathy:      Cervical: No cervical adenopathy  Skin:     General: Skin is warm and dry  Coloration: Skin is not pale  Nails: There is no clubbing  Neurological:      Mental Status: He is alert and oriented to person, place, and time  He is not disoriented  Psychiatric:         Mood and Affect: Mood normal  Mood is not anxious  Affect is not inappropriate  Behavior: Behavior normal          Thought Content:  Thought content normal          Lab Results:   Results for orders placed or performed in visit on 01/31/23   Lipid Panel with Direct LDL reflex   Result Value Ref Range    Cholesterol 142 See Comment mg/dL    Triglycerides 93 See Comment mg/dL    HDL, Direct 50 >=40 mg/dL    LDL Calculated 73 0 - 100 mg/dL   CBC   Result Value Ref Range    WBC 4 63 4 31 - 10 16 Thousand/uL    RBC 4 81 3 88 - 5 62 Million/uL    Hemoglobin 14 5 12 0 - 17 0 g/dL    Hematocrit 44 8 36 5 - 49 3 %    MCV 93 82 - 98 fL    MCH 30 1 26 8 - 34 3 pg    MCHC 32 4 31 4 - 37 4 g/dL    RDW 12 4 11 6 - 15 1 %    Platelets 629 063 - 832 Thousands/uL    MPV 9 6 8 9 - 12 7 fL   Basic metabolic panel   Result Value Ref Range    Sodium 141 135 - 147 mmol/L    Potassium 5 0 3 5 - 5 3 mmol/L    Chloride 110 (H) 96 - 108 mmol/L    CO2 28 21 - 32 mmol/L    ANION GAP 3 (L) 4 - 13 mmol/L    BUN 18 5 - 25 mg/dL    Creatinine 1 33 (H) 0 60 - 1 30 mg/dL    Glucose, Fasting 95 65 - 99 mg/dL    Calcium 9 2 8 3 - 10 1 mg/dL    eGFR 56 ml/min/1 73sq m   Protein / creatinine ratio, urine   Result Value Ref Range    Creatinine, Ur 137 0 mg/dL    Protein Urine Random 12 mg/dL    Prot/Creat Ratio, Ur 0 09 0 00 - 0 10   Urinalysis with microscopic   Result Value Ref Range    Color, UA Light Yellow     Clarity, UA Clear     Specific Gravity, UA 1 017 1 003 - 1 030    pH, UA 7 0 4 5, 5 0, 5 5, 6 0, 6 5, 7 0, 7 5, 8 0    Leukocytes, UA Negative Negative    Nitrite, UA Negative Negative    Protein, UA Negative Negative mg/dl    Glucose, UA Negative Negative mg/dl    Ketones, UA Negative Negative mg/dl    Urobilinogen, UA <2 0 <2 0 mg/dl mg/dl    Bilirubin, UA Negative Negative    Occult Blood, UA Negative Negative    RBC, UA 1-2 None Seen, 1-2 /hpf    WBC, UA None Seen None Seen, 1-2 /hpf    Epithelial Cells None Seen None Seen, Occasional /hpf    Bacteria, UA None Seen None Seen, Occasional /hpf   PTH, intact   Result Value Ref Range    PTH 37 7 18 4 - 80 1 pg/mL   Phosphorus   Result Value Ref Range    Phosphorus 3 4 2 3 - 4 1 mg/dL   Magnesium   Result Value Ref Range    Magnesium 2 4 1 6 - 2 6 mg/dL         Lanis Ormond, MD  2/9/2023  8:14 AM  Sign when Signing Visit  NEPHROLOGY OUTPATIENT PROGRESS NOTE   Samantha Bennett 57 y o  male MRN: [id number]  DATE: 2/9/2023  Reason for visit:   Chief Complaint   Patient presents with   • Follow-up   • Chronic Kidney Disease       ASSESSMENT and PLAN:  Chronic kidney disease stage 3a  -baseline creatinine 1 2-1 4     -renal function currently stable creatinine 1 33 mg/dL with stable electrolytes  Stressed on oral hydration  Continue to avoid nephrotoxins like NSAIDs and IV contrast   -Chronic kidney disease likely due to decreased nephron mass from left nephrectomy done for renal cell carcinoma left kidney  -preop creatinine was 0 8-0 9 but has been elevated and stable at  1 2-1 4  likely due to decreased nephron mass from left nephrectomy   -discussed about avoiding nephrotoxins like NSAIDs   -Patient will need renal prep with IV fluid before any CT with IV contrast and this was discussed with patient in detail    -PTH in normal limit, no proteinuria  -Ordered for iv fluids before CT with iv contrast on July 28th  Recommend precontrast 150 mL/h normal saline 1 hour before CT scan with IV contrast followed by IV normal saline 100 mL/h for 4 hours post IV contrast  -Follow-up in 6 months with repeat labs      Primary Hypertension with CKD:   -Current medication: losartan 25 mg     -Current blood pressure: Stable and is at goal  -Plan:    ?  Continue losartan at current dose  -Recommend 2 g sodium diet     -Recommend daily exercise and weight loss  -Discussed home monitoring of BP and maintaining a log of blood pressure   -Recommend goal BP less than 130/80       Solitary right kidney  -status post left nephrectomy for renal cell carcinoma-done on 12/30/19 by Dr Mila Knight pathology results suggestive of renal cell carcinoma-conventional clear cell type    -continue follow-up with Urology, last visit was in July 28, 2022, noted plan for annual surveillance with CT with and without contrast and noted plan for pre and post contrast IV hydration      Persistent proteinuria-resolved  -Peace Hodge was likely due to [facility] DEER well as from compensatory hypertrophy in solitary kidney   - Recommended goal blood pressure less than 130/80  -Continue ARB     CKD/MBD: Phosphorus is at normal range and PTH in normal range, continue to monitor     Hyperlipidemia:   -Lipid panel improving   -last lipid panel from January 2023 showed LDL at goal at 73, triglyceride 93  -continue statin as well as fish oil per PCP  -recommend goal LDL less than 100, continue monitoring and management per PCP  -continue daily exercise     Patient Instructions   -Renal Function is stable   -You have Chronic Kidney Disease Stage 3  -Avoid NSAIDs like Ibuprofen/Motrin, Naproxen/Aleve, Celebrex, meloxicam/Mobic, Diclofenac and other NSAIDs   -Okay to take Acetaminophen/Tylenol if you do not have any liver problems  -Avoid IV contrast used for CT scan and cardiac catheterization     -If plan for any study with IV contrast, please let me know so we could hydrate with fluids before and after IV contrast  -Dosage  of certain medications may need to be adjusted depending on Kidney function  Blood pressure is stable    -Recommend 2 g sodium diet  -Recommend daily exercise and weight loss  -Discussed home monitoring of BP and maintaining a log of blood pressure   -Recommend goal BP less than 130/80  Please inform me if SBP below 110 or above 140's persistently  Blood work in mid July before CT with IV contrast   ordered for pre and postcontrast IV fluids before CT with IV contrast on July 28  Follow-up in 6 months with repeat blood work and urine test before the office visit       Diagnoses and all orders for this visit:    Stage 3a chronic kidney disease ([facility] [address] )  -     Basic metabolic panel; Future  -     Basic metabolic panel; Future  -     Protein / creatinine ratio, urine; Future  -     PTH, intact; Future  -     Urinalysis with microscopic; Future  -     Phosphorus; Future  -     CBC; Future    Benign hypertension with chronic kidney disease, stage III (HCC)  -     Basic metabolic panel; Future    Solitary kidney, acquired  -     Basic metabolic panel;  Future SUBJECTIVE / HPI:  Diogenes Rose is a 57 y o   male who underwent elective laparoscopy hand assisted sigmoid resection and left nephrectomy for complicated diverticular disease and left renal cell carcinoma on 12/30/2019   Nephrology was consulted for increased creatinine postop   Preoperative creatinine was 0 9   Postop creatinine stabilized at 1 4 and elevated creatinine was thought to be due to nephron loss  Since then renal function has been stable at creatinine 1 2-1 3  Since last office visit he underwent left shoulder arthroscopic revision of rotator cuff repair with biceps tenodesis      Reviewed labs from January 21, 2023, renal function stable at creatinine 1 33 mg/dL with stable electrolytes, normal phosphorus and magnesium  Hemoglobin stable at 14 5, PTH in normal range at 37 7  UPC ratio with no proteinuria, was 0 09    No new complaints, no chest pain or shortness of breath, no nausea vomiting          REVIEW OF SYSTEMS:    Review of Systems   Constitutional: Negative for chills and fever  HENT: Negative for ear pain and sore throat  Eyes: Negative for pain and visual disturbance  Respiratory: Negative for cough and shortness of breath  Cardiovascular: Negative for chest pain and palpitations  Gastrointestinal: Negative for abdominal pain and vomiting  Genitourinary: Negative for dysuria and hematuria  Musculoskeletal: Negative for arthralgias and back pain  Skin: Negative for color change and rash  Neurological: Negative for seizures and syncope  All other systems reviewed and are negative  More than 10 point review of systems were obtained and discussed in length with the patient  Complete review of systems were negative / unremarkable except mentioned above         PAST MEDICAL HISTORY:  Past Medical History:   Diagnosis Date   • 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-   • Arthritis    • [facility]    kidney   • Chronic kidney disease 1/7/2020 stage 3   • CKD (chronic kidney disease)    • Diverticulitis of colon 10/13/2019   • Diverticulitis of large intestine with perforation without bleeding 10/13/2019   • Hyperlipidemia    • Hypertension    • Inflammatory bowel disease 10/13/2019   • Renal cell cancer, left ([facility] [address] ) 10/13/2019   • Renal cell carcinoma ([facility] [address] )        PAST SURGICAL HISTORY:  Past Surgical History:   Procedure Laterality Date   • COLONOSCOPY  2015   • HERNIA REPAIR     • HIP SURGERY Right     age 4   • JOINT REPLACEMENT Right 2011    TKR   • NEPHRECTOMY  [id number]    left   • [place] LAPAROSCOPY COLECTOMY PARTIAL W/ANASTOMOSIS N/A 12/30/2019    Procedure: LAPAROSCOPIC HAND-ASSISTED SIGMOID COLECTOMY; LAPAROSCOPIC MOBILIZATION OF SPLENIC FLEXURE;  Surgeon: Earnest Troy MD;  [facility]: [facility];  Service: Colorectal   • [place] LAPAROSCOPY RADICAL NEPHRECTOMY Left 12/30/2019    Procedure: NEPHRECTOMY LAPAROSCOPIC HAND ASSISTED;  Surgeon: Bennett Bishop MD;  Location: [facility] OR;  Service: Urology   • [place] SURGICAL ARTHROSCOPY SHOULDER W/ROTATOR CUFF RPR Left 12/9/2022    Procedure: SHOULDER ARTHROSCOPIC REVISION ROTATOR CUFF REPAIR AND BICEPS TENODESIS;  Surgeon: Crystal Chavarria MD;  [facility];  Service: Orthopedics   • REPLACEMENT TOTAL KNEE Right 2011   • REPLACEMENT TOTAL KNEE     • SHOULDER SURGERY Left    • SHOULDER SURGERY Right    • THYROID LOBECTOMY Right 10/12/2022    Procedure: RIGHT THYROID LOBECTOMY;  Surgeon: Josh Larson MD;  Location: [facility];  Service: Surgical Oncology   • US GUIDED THYROID BIOPSY  7/9/2021   • WRIST FUSION Left 2016   • WRIST SURGERY         SOCIAL HISTORY:  Social History     Substance and Sexual Activity   Alcohol Use Yes   • Alcohol/week: 2 0 standard drinks   • Types: 2 Cans of beer per week    Comment: 1-2 beers/week     Social History     Substance and Sexual Activity   Drug Use No     Social History     Tobacco Use   Smoking Status Never   Smokeless Tobacco Never       FAMILY HISTORY:  Family History   Problem Relation Age of Onset   • Heart disease Father    • Other Father         cardiac disorder   • No Known Problems Mother    • Heart attack Maternal Grandfather    • Heart attack Paternal Grandfather    • Thyroid cancer Son    • Colon cancer Neg Hx        MEDICATIONS:    Current Outpatient Medications:   •  aspirin 81 MG tablet, Take 1 tablet by mouth daily, Disp: , Rfl:   •  atorvastatin (LIPITOR) 20 mg tablet, Take 1 tablet (20 mg total) by mouth daily, Disp: 90 tablet, Rfl: 1  •  losartan (COZAAR) 25 mg tablet, Take 1 tablet (25 mg total) by mouth daily, Disp: 90 tablet, Rfl: 1  •  multivitamin (THERAGRAN) TABS, Take 1 tablet by mouth every other day, Disp: , Rfl:   •  Omega-3 Fatty Acids (FISH OIL) 1200 MG CAPS, Take by mouth in the morning, Disp: , Rfl:   •  oxyCODONE (ROXICODONE) 5 immediate release tablet, 1 tablets every 6 hours as needed for severe shoulder pain only  , Disp: 13 tablet, Rfl: 0      PHYSICAL EXAM:  Vitals:    02/09/23 0749   BP: 118/62   BP Location: Left arm   Patient Position: Sitting   Cuff Size: Adult   Weight: 70 3 kg (155 lb)   Height: 5' 6" (1 676 m)     Body mass index is 25 02 kg/m²  Physical Exam  Constitutional:       General: He is not in acute distress  Appearance: He is well-developed  He is not diaphoretic  HENT:      Head: Normocephalic and atraumatic  Mouth/Throat:      Mouth: Mucous membranes are moist    Eyes:      General: No scleral icterus  Conjunctiva/sclera: Conjunctivae normal       Pupils: Pupils are equal, round, and reactive to light  Neck:      Thyroid: No thyromegaly  Cardiovascular:      Rate and Rhythm: Normal rate and regular rhythm  Heart sounds: Normal heart sounds  No murmur heard  No friction rub  Pulmonary:      Effort: Pulmonary effort is normal  No respiratory distress  Breath sounds: Normal breath sounds  No wheezing or rales     Abdominal:      General: Bowel sounds are normal  There is no distension  Palpations: Abdomen is soft  Tenderness: There is no abdominal tenderness  Musculoskeletal:         General: No deformity  Cervical back: Neck supple  Right lower leg: No edema  Left lower leg: No edema  Lymphadenopathy:      Cervical: No cervical adenopathy  Skin:     General: Skin is warm and dry  Coloration: Skin is not pale  Nails: There is no clubbing  Neurological:      Mental Status: He is alert and oriented to person, place, and time  He is not disoriented  Psychiatric:         Mood and Affect: Mood normal  Mood is not anxious  Affect is not inappropriate  Behavior: Behavior normal          Thought Content:  Thought content normal          Lab Results:   Results for orders placed or performed in visit on 01/31/23   Lipid Panel with Direct LDL reflex   Result Value Ref Range    Cholesterol 142 See Comment mg/dL    Triglycerides 93 See Comment mg/dL    HDL, Direct 50 >=40 mg/dL    LDL Calculated 73 0 - 100 mg/dL   CBC   Result Value Ref Range    WBC 4 63 4 31 - 10 16 Thousand/uL    RBC 4 81 3 88 - 5 62 Million/uL    Hemoglobin 14 5 12 0 - 17 0 g/dL    Hematocrit 44 8 36 5 - 49 3 %    MCV 93 82 - 98 fL    MCH 30 1 26 8 - 34 3 pg    MCHC 32 4 31 4 - 37 4 g/dL    RDW 12 4 11 6 - 15 1 %    Platelets 316 603 - 362 Thousands/uL    MPV 9 6 8 9 - 12 7 fL   Basic metabolic panel   Result Value Ref Range    Sodium 141 135 - 147 mmol/L    Potassium 5 0 3 5 - 5 3 mmol/L    Chloride 110 (H) 96 - 108 mmol/L    CO2 28 21 - 32 mmol/L    ANION GAP 3 (L) 4 - 13 mmol/L    BUN 18 5 - 25 mg/dL    Creatinine 1 33 (H) 0 60 - 1 30 mg/dL    Glucose, Fasting 95 65 - 99 mg/dL    Calcium 9 2 8 3 - 10 1 mg/dL    eGFR 56 ml/min/1 73sq m   Protein / creatinine ratio, urine   Result Value Ref Range    Creatinine, Ur 137 0 mg/dL    Protein Urine Random 12 mg/dL    Prot/Creat Ratio, Ur 0 09 0 00 - 0 10   Urinalysis with microscopic   Result Value Ref Range    Color, UA Light Yellow     Clarity, UA Clear     Specific Gravity, UA 1 017 1 003 - 1 030    pH, UA 7 0 4 5, 5 0, 5 5, 6 0, 6 5, 7 0, 7 5, 8 0    Leukocytes, UA Negative Negative    Nitrite, UA Negative Negative    Protein, UA Negative Negative mg/dl    Glucose, UA Negative Negative mg/dl    Ketones, UA Negative Negative mg/dl    Urobilinogen, UA <2 0 <2 0 mg/dl mg/dl    Bilirubin, UA Negative Negative    Occult Blood, UA Negative Negative    RBC, UA 1-2 None Seen, 1-2 /hpf    WBC, UA None Seen None Seen, 1-2 /hpf    Epithelial Cells None Seen None Seen, Occasional /hpf    Bacteria, UA None Seen None Seen, Occasional /hpf   PTH, intact   Result Value Ref Range    PTH 37 7 18 4 - 80 1 pg/mL   Phosphorus   Result Value Ref Range    Phosphorus 3 4 2 3 - 4 1 mg/dL   Magnesium   Result Value Ref Range    Magnesium 2 4 1 6 - 2 6 mg/dL

## 2023-02-09 NOTE — PROGRESS NOTES
NEPHROLOGY OUTPATIENT PROGRESS NOTE   Chantell Maldonado 57 y o  male MRN: [id number]  DATE: 2/9/2023  Reason for visit:   Chief Complaint   Patient presents with   • Follow-up   • Chronic Kidney Disease       ASSESSMENT and PLAN:  Chronic kidney disease stage 3a  -baseline creatinine 1 2-1 4     -renal function currently stable creatinine 1 33 mg/dL with stable electrolytes  Stressed on oral hydration  Continue to avoid nephrotoxins like NSAIDs and IV contrast   -Chronic kidney disease likely due to decreased nephron mass from left nephrectomy done for renal cell carcinoma left kidney  -preop creatinine was 0 8-0 9 but has been elevated and stable at  1 2-1 4  likely due to decreased nephron mass from left nephrectomy   -discussed about avoiding nephrotoxins like NSAIDs   -Patient will need renal prep with IV fluid before any CT with IV contrast and this was discussed with patient in detail    -PTH in normal limit, no proteinuria  -Ordered for iv fluids before CT with iv contrast on July 28th  Recommend precontrast 150 mL/h normal saline 1 hour before CT scan with IV contrast followed by IV normal saline 100 mL/h for 4 hours post IV contrast  Message was sent to urology and I also informed  staff to schedule appt for infusion    -Follow-up in 6 months with repeat labs      Primary Hypertension with CKD:   -Current medication: losartan 25 mg     -Current blood pressure: Stable and is at goal  -Plan:    ?  Continue losartan at current dose  -Recommend 2 g sodium diet     -Recommend daily exercise and weight loss  -Discussed home monitoring of BP and maintaining a log of blood pressure   -Recommend goal BP less than 130/80       Solitary right kidney  -status post left nephrectomy for renal cell carcinoma-done on 12/30/19 by Dr Aidee Thakur pathology results suggestive of renal cell carcinoma-conventional clear cell type    -continue follow-up with Urology, last visit was in July 28, 2022, noted plan for annual surveillance with CT with and without contrast and noted plan for pre and post contrast IV hydration      Persistent proteinuria-resolved  -Peace Hodge was likely due to [facility] well as from compensatory hypertrophy in solitary kidney   - Recommended goal blood pressure less than 130/80  -Continue ARB     CKD/MBD: Phosphorus is at normal range and PTH in normal range, continue to monitor     Hyperlipidemia:   -Lipid panel improving   -last lipid panel from January 2023 showed LDL at goal at 73, triglyceride 93  -continue statin as well as fish oil per PCP  -recommend goal LDL less than 100, continue monitoring and management per PCP  -continue daily exercise     Patient Instructions   -Renal Function is stable   -You have Chronic Kidney Disease Stage 3  -Avoid NSAIDs like Ibuprofen/Motrin, Naproxen/Aleve, Celebrex, meloxicam/Mobic, Diclofenac and other NSAIDs   -Okay to take Acetaminophen/Tylenol if you do not have any liver problems  -Avoid IV contrast used for CT scan and cardiac catheterization     -If plan for any study with IV contrast, please let me know so we could hydrate with fluids before and after IV contrast  -Dosage  of certain medications may need to be adjusted depending on Kidney function  Blood pressure is stable    -Recommend 2 g sodium diet  -Recommend daily exercise and weight loss  -Discussed home monitoring of BP and maintaining a log of blood pressure   -Recommend goal BP less than 130/80  Please inform me if SBP below 110 or above 140's persistently  Blood work in mid July before CT with IV contrast   ordered for pre and postcontrast IV fluids before CT with IV contrast on July 28  Follow-up in 6 months with repeat blood work and urine test before the office visit       Diagnoses and all orders for this visit:    Stage 3a chronic kidney disease ([facility] [address] )  -     Basic metabolic panel; Future  -     Basic metabolic panel; Future  -     Protein / creatinine ratio, urine;  Future  - PTH, intact; Future  -     Urinalysis with microscopic; Future  -     Phosphorus; Future  -     CBC; Future    Benign hypertension with chronic kidney disease, stage III (HCC)  -     Basic metabolic panel; Future    Solitary kidney, acquired  -     Basic metabolic panel; Future            SUBJECTIVE / HPI:  Lebron Calloway is a 57 y o   male who underwent elective laparoscopy hand assisted sigmoid resection and left nephrectomy for complicated diverticular disease and left renal cell carcinoma on 12/30/2019   Nephrology was consulted for increased creatinine postop   Preoperative creatinine was 0 9   Postop creatinine stabilized at 1 4 and elevated creatinine was thought to be due to nephron loss  Since then renal function has been stable at creatinine 1 2-1 3  Since last office visit he underwent left shoulder arthroscopic revision of rotator cuff repair with biceps tenodesis      Reviewed labs from January 21, 2023, renal function stable at creatinine 1 33 mg/dL with stable electrolytes, normal phosphorus and magnesium  Hemoglobin stable at 14 5, PTH in normal range at 37 7  UPC ratio with no proteinuria, was 0 09    No new complaints, no chest pain or shortness of breath, no nausea vomiting          REVIEW OF SYSTEMS:    Review of Systems   Constitutional: Negative for chills and fever  HENT: Negative for ear pain and sore throat  Eyes: Negative for pain and visual disturbance  Respiratory: Negative for cough and shortness of breath  Cardiovascular: Negative for chest pain and palpitations  Gastrointestinal: Negative for abdominal pain and vomiting  Genitourinary: Negative for dysuria and hematuria  Musculoskeletal: Negative for arthralgias and back pain  Skin: Negative for color change and rash  Neurological: Negative for seizures and syncope  All other systems reviewed and are negative        More than 10 point review of systems were obtained and discussed in length with the patient  Complete review of systems were negative / unremarkable except mentioned above         PAST MEDICAL HISTORY:  Past Medical History:   Diagnosis Date   • 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-   • Arthritis    • Cancer ([facility] [address] ) 10/13/2019    kidney   • Chronic kidney disease 1/7/2020    stage 3   • CKD (chronic kidney disease)    • Diverticulitis of colon 10/13/2019   • Diverticulitis of large intestine with perforation without bleeding 10/13/2019   • Hyperlipidemia    • Hypertension    • Inflammatory bowel disease 10/13/2019   • Renal cell cancer, left ([facility] [address] ) 10/13/2019   • Renal cell carcinoma ([facility] [address] )        PAST SURGICAL HISTORY:  Past Surgical History:   Procedure Laterality Date   • COLONOSCOPY  2015   • HERNIA REPAIR     • HIP SURGERY Right     age 4   • JOINT REPLACEMENT Right 2011    TKR   • NEPHRECTOMY  [id number]    left   • FL LAPAROSCOPY COLECTOMY PARTIAL W/ANASTOMOSIS N/A 12/30/2019    Procedure: LAPAROSCOPIC HAND-ASSISTED SIGMOID COLECTOMY; LAPAROSCOPIC MOBILIZATION OF SPLENIC FLEXURE;  Surgeon: Irina Duran MD;  [facility];  Service: Colorectal   • FL LAPAROSCOPY RADICAL NEPHRECTOMY Left 12/30/2019    Procedure: NEPHRECTOMY LAPAROSCOPIC HAND ASSISTED;  Surgeon: Kajal Jack MD;  [facility];  Service: Urology   • FL SURGICAL ARTHROSCOPY SHOULDER W/ROTATOR CUFF RPR Left 12/9/2022    Procedure: SHOULDER ARTHROSCOPIC REVISION ROTATOR CUFF REPAIR AND BICEPS TENODESIS;  Surgeon: Gatito Yang MD;  Location: [facility];  Service: Orthopedics   • REPLACEMENT TOTAL KNEE Right 2011   • REPLACEMENT TOTAL KNEE     • SHOULDER SURGERY Left    • SHOULDER SURGERY Right    • THYROID LOBECTOMY Right 10/12/2022    Procedure: RIGHT THYROID LOBECTOMY;  Surgeon: Marisa Moses MD;  Location[facility];  Service: Surgical Oncology   • US GUIDED THYROID BIOPSY  7/9/2021   • WRIST FUSION Left 2016   • WRIST SURGERY         SOCIAL HISTORY:  Social History     Substance and Sexual Activity   Alcohol Use Yes   • Alcohol/week: 2 0 standard drinks   • Types: 2 Cans of beer per week    Comment: 1-2 beers/week     Social History     Substance and Sexual Activity   Drug Use No     Social History     Tobacco Use   Smoking Status Never   Smokeless Tobacco Never       FAMILY HISTORY:  Family History   Problem Relation Age of Onset   • Heart disease Father    • Other Father         cardiac disorder   • No Known Problems Mother    • Heart attack Maternal Grandfather    • Heart attack Paternal Grandfather    • Thyroid cancer Son    • Colon cancer Neg Hx        MEDICATIONS:    Current Outpatient Medications:   •  aspirin 81 MG tablet, Take 1 tablet by mouth daily, Disp: , Rfl:   •  atorvastatin (LIPITOR) 20 mg tablet, Take 1 tablet (20 mg total) by mouth daily, Disp: 90 tablet, Rfl: 1  •  losartan (COZAAR) 25 mg tablet, Take 1 tablet (25 mg total) by mouth daily, Disp: 90 tablet, Rfl: 1  •  multivitamin (THERAGRAN) TABS, Take 1 tablet by mouth every other day, Disp: , Rfl:   •  Omega-3 Fatty Acids (FISH OIL) 1200 MG CAPS, Take by mouth in the morning, Disp: , Rfl:   •  oxyCODONE (ROXICODONE) 5 immediate release tablet, 1 tablets every 6 hours as needed for severe shoulder pain only  , Disp: 13 tablet, Rfl: 0      PHYSICAL EXAM:  Vitals:    02/09/23 0749   BP: 118/62   BP Location: Left arm   Patient Position: Sitting   Cuff Size: Adult   Weight: 70 3 kg (155 lb)   Height: 5' 6" (1 676 m)     Body mass index is 25 02 kg/m²  Physical Exam  Constitutional:       General: He is not in acute distress  Appearance: He is well-developed  He is not diaphoretic  HENT:      Head: Normocephalic and atraumatic  Mouth/Throat:      Mouth: Mucous membranes are moist    Eyes:      General: No scleral icterus  Conjunctiva/sclera: Conjunctivae normal       Pupils: Pupils are equal, round, and reactive to light  Neck:      Thyroid: No thyromegaly     Cardiovascular:      Rate and Rhythm: Normal rate and regular rhythm  Heart sounds: Normal heart sounds  No murmur heard  No friction rub  Pulmonary:      Effort: Pulmonary effort is normal  No respiratory distress  Breath sounds: Normal breath sounds  No wheezing or rales  Abdominal:      General: Bowel sounds are normal  There is no distension  Palpations: Abdomen is soft  Tenderness: There is no abdominal tenderness  Musculoskeletal:         General: No deformity  Cervical back: Neck supple  Right lower leg: No edema  Left lower leg: No edema  Lymphadenopathy:      Cervical: No cervical adenopathy  Skin:     General: Skin is warm and dry  Coloration: Skin is not pale  Nails: There is no clubbing  Neurological:      Mental Status: He is alert and oriented to person, place, and time  He is not disoriented  Psychiatric:         Mood and Affect: Mood normal  Mood is not anxious  Affect is not inappropriate  Behavior: Behavior normal          Thought Content:  Thought content normal          Lab Results:   Results for orders placed or performed in visit on 01/31/23   Lipid Panel with Direct LDL reflex   Result Value Ref Range    Cholesterol 142 See Comment mg/dL    Triglycerides 93 See Comment mg/dL    HDL, Direct 50 >=40 mg/dL    LDL Calculated 73 0 - 100 mg/dL   CBC   Result Value Ref Range    WBC 4 63 4 31 - 10 16 Thousand/uL    RBC 4 81 3 88 - 5 62 Million/uL    Hemoglobin 14 5 12 0 - 17 0 g/dL    Hematocrit 44 8 36 5 - 49 3 %    MCV 93 82 - 98 fL    MCH 30 1 26 8 - 34 3 pg    MCHC 32 4 31 4 - 37 4 g/dL    RDW 12 4 11 6 - 15 1 %    Platelets 563 275 - 701 Thousands/uL    MPV 9 6 8 9 - 12 7 fL   Basic metabolic panel   Result Value Ref Range    Sodium 141 135 - 147 mmol/L    Potassium 5 0 3 5 - 5 3 mmol/L    Chloride 110 (H) 96 - 108 mmol/L    CO2 28 21 - 32 mmol/L    ANION GAP 3 (L) 4 - 13 mmol/L    BUN 18 5 - 25 mg/dL    Creatinine 1 33 (H) 0 60 - 1 30 mg/dL    Glucose, Fasting 95 65 - 99 mg/dL Calcium 9 2 8 3 - 10 1 mg/dL    eGFR 56 ml/min/1 73sq m   Protein / creatinine ratio, urine   Result Value Ref Range    Creatinine, Ur 137 0 mg/dL    Protein Urine Random 12 mg/dL    Prot/Creat Ratio, Ur 0 09 0 00 - 0 10   Urinalysis with microscopic   Result Value Ref Range    Color, UA Light Yellow     Clarity, UA Clear     Specific Gravity, UA 1 017 1 003 - 1 030    pH, UA 7 0 4 5, 5 0, 5 5, 6 0, 6 5, 7 0, 7 5, 8 0    Leukocytes, UA Negative Negative    Nitrite, UA Negative Negative    Protein, UA Negative Negative mg/dl    Glucose, UA Negative Negative mg/dl    Ketones, UA Negative Negative mg/dl    Urobilinogen, UA <2 0 <2 0 mg/dl mg/dl    Bilirubin, UA Negative Negative    Occult Blood, UA Negative Negative    RBC, UA 1-2 None Seen, 1-2 /hpf    WBC, UA None Seen None Seen, 1-2 /hpf    Epithelial Cells None Seen None Seen, Occasional /hpf    Bacteria, UA None Seen None Seen, Occasional /hpf   PTH, intact   Result Value Ref Range    PTH 37 7 18 4 - 80 1 pg/mL   Phosphorus   Result Value Ref Range    Phosphorus 3 4 2 3 - 4 1 mg/dL   Magnesium   Result Value Ref Range    Magnesium 2 4 1 6 - 2 6 mg/dL

## 2023-02-10 ENCOUNTER — OFFICE VISIT (OUTPATIENT)
Dept: PHYSICAL THERAPY | Age: 63
End: 2023-02-10

## 2023-02-10 DIAGNOSIS — M25.512 LEFT SHOULDER PAIN, UNSPECIFIED CHRONICITY: Primary | ICD-10-CM

## 2023-02-10 NOTE — PROGRESS NOTES
Daily Note     Today's date: 2/10/2023  Patient name: Lebron Calloway  : 1960  MRN: [id number]  Referring provider: Shivani Lopez  Dx:   Encounter Diagnosis     ICD-10-CM    1  Left shoulder pain, unspecified chronicity  M25 512                      Subjective: Reports no new complaints coming in today  Objective: See treatment diary below      Assessment: Tolerated treatment well  Patient would benefit from continued PT, did well with tx today with no new complaints throughout today's tx  Plan: Continue per plan of care        Precautions: HTN, CKD, Hx of Ca,   shoulder ROM flexion 90 deg, abduction 60 deg, ER 30 deg    Manuals 1/26  1/31  2/2  2/8  2/10    L shoulder ROM CW  CW  CW  CW CW                            Neuro Re-Ed        L shoulder isometrics  nv 10x5" flex/abd 10x5" flex/abd/ER/IR 15x5" all  15x5" all                            Ther Ex        Pulleys   5' 5'  5'  5'    Finger ladder 2way  10x10" ea  10x10" ea  10x10" ea  10x10" ea 10x10" ea    Wall slides  [id number]"  [id number]" [id number]"  [id number]" [id number]"    Supine cane flexion Supine 15x10"  Supine 15x10" Supine 15x10"  np    Supine cane ER  Supine 15x10"  Supine 15x10"  Supine 15x10"  20x10"  20x10"    Ther Activity                        Gait Training                        Modalities

## 2023-02-13 ENCOUNTER — OFFICE VISIT (OUTPATIENT)
Dept: OBGYN CLINIC | Facility: OTHER | Age: 63
End: 2023-02-13

## 2023-02-13 ENCOUNTER — OFFICE VISIT (OUTPATIENT)
Dept: PHYSICAL THERAPY | Age: 63
End: 2023-02-13

## 2023-02-13 VITALS
DIASTOLIC BLOOD PRESSURE: 80 MMHG | HEART RATE: 64 BPM | BODY MASS INDEX: 25.23 KG/M2 | HEIGHT: 66 IN | SYSTOLIC BLOOD PRESSURE: 125 MMHG | WEIGHT: 157 LBS

## 2023-02-13 DIAGNOSIS — M25.512 LEFT SHOULDER PAIN, UNSPECIFIED CHRONICITY: Primary | ICD-10-CM

## 2023-02-13 DIAGNOSIS — Z47.89 AFTERCARE FOLLOWING SURGERY OF THE MUSCULOSKELETAL SYSTEM: Primary | ICD-10-CM

## 2023-02-13 NOTE — PROGRESS NOTES
Daily Note     Today's date: 2023  Patient name: Hero Estimable  : 1960  MRN: [id number]  Referring provider: Timothy Hanson  Dx:   Encounter Diagnosis     ICD-10-CM    1  Left shoulder pain, unspecified chronicity  M25 512                      Subjective: Reports feeling continued improvement and is ready for his trip to [place]  Objective: See treatment diary below      Assessment: Tolerated treatment well  Patient would benefit from continued PT, did well throughout tx, and was cleared to begin chipping and putting  Plan: Continue per plan of care        Precautions: HTN, CKD, Hx of Ca,   shoulder ROM flexion 90 deg, abduction 60 deg, ER 30 deg    Manuals 2/2  2/8  2/10  2/13    L shoulder ROM CW  CW CW  CW                         Neuro Re-Ed       L shoulder isometrics  10x5" flex/abd/ER/IR 15x5" all  15x5" all  10x5" all                         Ther Ex       Pulleys  5'  5'  5'  5'    Finger ladder 2way  10x10" ea  10x10" ea 10x10" ea  10x10" ea    Wall slides  [id number]"  [id number]" [id number]"  [id number]"   Supine cane flexion Supine 15x10"  np     Supine cane ER  Supine 15x10"  20x10"  20x10"  [id number]"   Ther Activity                     Gait Training                     Modalities

## 2023-02-13 NOTE — PROGRESS NOTES
Assessment:       1  Aftercare following surgery of the musculoskeletal system          Plan:          Patient is doing well postoperatively  All questions were addressed to the patient's satisfaction  Patient will continue PT  We reviewed the timeline and I expressed he is doing well is done his progress  The patient may start gentle chip and putt golf  Follow-up will be in 2 months to assess patient's progress  Subjective:     Patient ID: Russell Ross is a 57 y o  male  Chief Complaint:    HPI    The patient presents to the office for 9-1/2-week follow-up status post left shoulder arthroscopy with rotator cuff repair and biceps tenodesis, 12/9//2022  Patient is making progress but admits to being frustrated by timeline  Social History     Occupational History   • Occupation:      Comment: [employer]   Tobacco Use   • Smoking status: Never   • Smokeless tobacco: Never   Vaping Use   • Vaping Use: Never used   Substance and Sexual Activity   • Alcohol use: Yes     Alcohol/week: 2 0 standard drinks     Types: 2 Cans of beer per week     Comment: 1-2 beers/week   • Drug use: No   • Sexual activity: Not Currently     Partners: Female     Birth control/protection: None      Review of Systems   Constitutional: Negative  Respiratory: Negative  Cardiovascular: Negative  Gastrointestinal: Negative  Musculoskeletal: Positive for myalgias  Negative for arthralgias  Skin: Negative for wound  Neurological: Positive for weakness  Negative for numbness  Hematological: Negative  Psychiatric/Behavioral: Negative  Objective:     Ortho ExamPhysical Exam  HENT:      Head: Atraumatic  Cardiovascular:      Pulses: Normal pulses  Pulmonary:      Effort: Pulmonary effort is normal    Musculoskeletal:      Comments: Active range of motion left shoulder: Forward flexion 135 degrees, external rotation 90 degrees, internal rotation SI region     Skin: General: Skin is warm and dry  Capillary Refill: Capillary refill takes less than 2 seconds  Neurological:      Mental Status: He is alert and oriented to person, place, and time  Sensory: No sensory deficit     Psychiatric:         Mood and Affect: Mood normal          Behavior: Behavior normal

## 2023-02-14 ENCOUNTER — OFFICE VISIT (OUTPATIENT)
Dept: INTERNAL MEDICINE CLINIC | Age: 63
End: 2023-02-14

## 2023-02-14 VITALS
HEART RATE: 70 BPM | DIASTOLIC BLOOD PRESSURE: 70 MMHG | OXYGEN SATURATION: 95 % | HEIGHT: 66 IN | SYSTOLIC BLOOD PRESSURE: 122 MMHG | TEMPERATURE: 98.1 F | BODY MASS INDEX: 24.75 KG/M2 | WEIGHT: 154 LBS

## 2023-02-14 DIAGNOSIS — E89.0 S/P PARTIAL THYROIDECTOMY: ICD-10-CM

## 2023-02-14 DIAGNOSIS — E78.2 MIXED HYPERLIPIDEMIA: ICD-10-CM

## 2023-02-14 DIAGNOSIS — I10 PRIMARY HYPERTENSION: Primary | ICD-10-CM

## 2023-02-14 DIAGNOSIS — N18.31 STAGE 3A CHRONIC KIDNEY DISEASE (HCC): ICD-10-CM

## 2023-02-14 DIAGNOSIS — C64.2 RENAL CELL CANCER, LEFT (HCC): ICD-10-CM

## 2023-02-14 DIAGNOSIS — C64.2 MALIGNANT NEOPLASM OF LEFT KIDNEY, EXCEPT RENAL PELVIS (HCC): ICD-10-CM

## 2023-02-14 PROBLEM — K57.20 DIVERTICULITIS OF LARGE INTESTINE WITH PERFORATION WITHOUT BLEEDING: Status: RESOLVED | Noted: 2019-10-13 | Resolved: 2023-02-14

## 2023-02-14 PROBLEM — J45.909 ASTHMATIC BRONCHITIS: Status: RESOLVED | Noted: 2022-01-27 | Resolved: 2023-02-14

## 2023-02-14 NOTE — ASSESSMENT & PLAN NOTE
Lab Results   Component Value Date    EGFR 56 01/31/2023    EGFR 58 12/16/2022    EGFR 59 12/03/2022    CREATININE 1 33 (H) 01/31/2023    CREATININE 1 28 12/16/2022    CREATININE 1 26 12/03/2022   Renal function stable    Being managed by nephrologist

## 2023-02-14 NOTE — ASSESSMENT & PLAN NOTE
Lipid profile is in acceptable range    Continue with present dose of statin along with low-fat diet and exercise

## 2023-02-14 NOTE — PROGRESS NOTES
Assessment/Plan:    HTN (hypertension)  Blood pressure is stable with present regimen  Continue with low-salt diet and exercise    Renal cell cancer, left [facility])  Patient is s/p left nephrectomy  In remission  Also being followed by urologist    Malignant neoplasm of left kidney, except renal pelvis ([facility] )  Is post nephrectomy  Being followed by urologist    CKD (chronic kidney disease) stage 3, GFR 30-59 ml/min (HCC)  Lab Results   Component Value Date    EGFR 56 01/31/2023    EGFR 58 12/16/2022    EGFR 59 12/03/2022    CREATININE 1 33 (H) 01/31/2023    CREATININE 1 28 12/16/2022    CREATININE 1 26 12/03/2022   Renal function stable  Being managed by nephrologist    Mixed hyperlipidemia  Lipid profile is in acceptable range  Continue with present dose of statin along with low-fat diet and exercise    S/P partial thyroidectomy  No complaints  Will check thyroid function test        Diagnoses and all orders for this visit:    Primary hypertension    Renal cell cancer, left ([facility] )    Malignant neoplasm of left kidney, except renal pelvis (HCC)    Stage 3a chronic kidney disease ([facility] )    Mixed hyperlipidemia    S/P partial thyroidectomy  -     TSH, 3rd generation with Free T4 reflex; Future            Depression Screening and Follow-up Plan: Patient was screened for depression during today's encounter  They screened negative with a PHQ-2 score of 0  Subjective:          Patient ID: Negro Nixon is a 57 y o  male  Patient is here for regular follow-up  Also had blood work done and would like to discuss results  Otherwise no new complaints  Recently he underwent left shoulder rotator cuff repair and also partial thyroidectomy        The following portions of the patient's history were reviewed and updated as appropriate: allergies, current medications, past family history, past medical history, past social history, past surgical history and problem list     Review of Systems   Constitutional: Negative for fatigue and fever  HENT: Negative for congestion, ear discharge, ear pain, postnasal drip, sinus pressure, sore throat, tinnitus and trouble swallowing  Eyes: Negative for discharge, itching and visual disturbance  Respiratory: Negative for cough and shortness of breath  Cardiovascular: Negative for chest pain and palpitations  Gastrointestinal: Negative for abdominal pain, diarrhea, nausea and vomiting  Endocrine: Negative for cold intolerance and polyuria  Genitourinary: Negative for difficulty urinating, dysuria and urgency  Musculoskeletal: Negative for arthralgias and neck pain  Skin: Negative for rash  Allergic/Immunologic: Negative for environmental allergies  Neurological: Negative for dizziness, weakness and headaches  Psychiatric/Behavioral: Negative for agitation and behavioral problems  The patient is not nervous/anxious            Past Medical History:   Diagnosis Date   • 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-   • Arthritis    • Cancer ([place] [address] ) 10/13/2019    kidney   • Chronic kidney disease 1/7/2020    stage 3   • CKD (chronic kidney disease)    • Diverticulitis of colon 10/13/2019   • Diverticulitis of large intestine with perforation without bleeding 10/13/2019   • Hyperlipidemia    • Hypertension    • Inflammatory bowel disease 10/13/2019   • Ren[facility] cell cancer, left ([facility] ) 10/13/2019   • Renal cell carcinoma (HCC)          Current Outpatient Medications:   •  aspirin 81 MG tablet, Take 1 tablet by mouth daily, Disp: , Rfl:   •  atorvastatin (LIPITOR) 20 mg tablet, Take 1 tablet (20 mg total) by mouth daily, Disp: 90 tablet, Rfl: 1  •  losartan (COZAAR) 25 mg tablet, Take 1 tablet (25 mg total) by mouth daily, Disp: 90 tablet, Rfl: 1  •  multivitamin (THERAGRAN) TABS, Take 1 tablet by mouth every other day, Disp: , Rfl:   •  Omega-3 Fatty Acids (FISH OIL) 1200 MG CAPS, Take by mouth in the morning, Disp: , Rfl:     No Known Allergies    Social History   Past Surgical History:   Procedure Laterality Date   • COLONOSCOPY  2015   • HERNIA REPAIR     • HIP SURGERY Right     age 4   • JOINT REPLACEMENT Right 2011    TKR   • NEPHRECTOMY  [id number]    left   • ID LAPAROSCOPY COLECTOMY PARTIAL W/ANASTOMOSIS N/A 12/30/2019    Procedure: LAPAROSCOPIC HAND-ASSISTED SIGMOID COLECTOMY; LAPAROSCOPIC MOBILIZATION OF SPLENIC FLEXURE;  Surgeon: Godfrey Balbuena MD;  Location: [facility] Main OR;  Service: Colorectal   • ID LAPAROSCOPY RADICAL NEPHRECTOMY Left 12/30/2019    Procedure: NEPHRECTOMY LAPAROSCOPIC HAND ASSISTED;  Surgeon: Gaurav White MD;  Location: [facility] OR;  Service: Urology   • ID SURGICAL ARTHROSCOPY SHOULDER W/ROTATOR CUFF RPR Left 12/9/2022    Procedure: SHOULDER ARTHROSCOPIC REVISION ROTATOR CUFF REPAIR AND BICEPS TENODESIS;  Surgeon: Mai Bettencourt MD;  Location: [facility] MAIN OR;  Service: Orthopedics   • REPLACEMENT TOTAL KNEE Right 2011   • REPLACEMENT TOTAL KNEE     • SHOULDER SURGERY Left    • SHOULDER SURGERY Right    • THYROID LOBECTOMY Right 10/12/2022    Procedure: RIGHT THYROID LOBECTOMY;  Surgeon: Pastora Verdin MD;  Location: [facility] Main OR;  Service: Surgical Oncology   • US GUIDED THYROID BIOPSY  7/9/2021   • WRIST FUSION Left 2016   • WRIST SURGERY       Family History   Problem Relation Age of Onset   • Heart disease Father    • Other Father         cardiac disorder   • No Known Problems Mother    • Heart attack Maternal Grandfather    • Heart attack Paternal Grandfather    • Thyroid cancer Son    • Colon cancer Neg Hx        Objective:  /70 (BP Location: Left arm, Patient Position: Sitting, Cuff Size: Adult)   Pulse 70   Temp 98 1 °F (36 7 °C) (Temporal)   Ht 5' 6" (1 676 m)   Wt 69 9 kg (154 lb)   SpO2 95%   BMI 24 86 kg/m²   Body mass index is 24 86 kg/m²  Physical Exam  Constitutional:       Appearance: He is well-developed  HENT:      Head: Normocephalic        Right Ear: Tympanic membrane, ear canal and external ear normal  Left Ear: Tympanic membrane, ear canal and external ear normal       Nose: No rhinorrhea  Mouth/Throat:      Pharynx: No posterior oropharyngeal erythema  Eyes:      General: No scleral icterus  Pupils: Pupils are equal, round, and reactive to light  Neck:      Thyroid: No thyromegaly  Trachea: No tracheal deviation  Comments: Scar from thyroid surgery healed well  No nodularity noted  Cardiovascular:      Rate and Rhythm: Normal rate and regular rhythm  Heart sounds: Normal heart sounds  Pulmonary:      Effort: Pulmonary effort is normal  No respiratory distress  Breath sounds: Normal breath sounds  Chest:      Chest wall: No tenderness  Abdominal:      General: Bowel sounds are normal       Palpations: Abdomen is soft  There is no mass  Tenderness: There is no abdominal tenderness  Musculoskeletal:         General: Normal range of motion  Cervical back: Normal range of motion and neck supple  Right lower leg: No edema  Left lower leg: No edema  Lymphadenopathy:      Cervical: No cervical adenopathy  Skin:     General: Skin is warm  Neurological:      Mental Status: He is alert and oriented to person, place, and time  Cranial Nerves: No cranial nerve deficit  Psychiatric:         Mood and Affect: Mood normal          Behavior: Behavior normal          Thought Content:  Thought content normal

## 2023-02-15 ENCOUNTER — OFFICE VISIT (OUTPATIENT)
Dept: PHYSICAL THERAPY | Age: 63
End: 2023-02-15

## 2023-02-15 DIAGNOSIS — M25.512 LEFT SHOULDER PAIN, UNSPECIFIED CHRONICITY: Primary | ICD-10-CM

## 2023-02-15 NOTE — PROGRESS NOTES
Daily Note     Today's date: 2/15/2023  Patient name: Samantha Bennett  : 1960  MRN: [id number]  Referring provider: Ellie Culp  Dx:   Encounter Diagnosis     ICD-10-CM    1  Left shoulder pain, unspecified chronicity  M25 512                      Subjective: Reports feeling pretty good today, just some shoulder soreness  Objective: See treatment diary below      Assessment: Tolerated treatment well  Patient would benefit from continued PT, did well with tx with no complaints throughout tx, patient provided with HEP to complete while on vacation  Plan: Continue per plan of care        Precautions: HTN, CKD, Hx of Ca,   shoulder ROM flexion 90 deg, abduction 60 deg, ER 30 deg    Manuals 2/2  2/8  2/10  2/13  2/15    L shoulder ROM CW  CW CW  CW  CW                            Neuro Re-Ed        L shoulder isometrics  10x5" flex/abd/ER/IR 15x5" all  15x5" all  10x5" all  10x5" all w ext                           Ther Ex        Pulleys  5'  5'  5'  5'  5'    Finger ladder 2way  10x10" ea  10x10" ea 10x10" ea  10x10" ea  10x10" ea    Wall slides  [id number]"  8b95w21" 1e93f13"  4a91q56" 10x10"   Supine cane flexion Supine 15x10"  np   np   Supine cane ER  Supine 15x10"  20x10"  20x10"  5x24f09"    Standing cane flexion/abd     15x10"    Ther Activity                        Gait Training                        Modalities

## 2023-02-17 ENCOUNTER — TELEPHONE (OUTPATIENT)
Dept: HEMATOLOGY ONCOLOGY | Facility: CLINIC | Age: 63
End: 2023-02-17

## 2023-02-17 NOTE — TELEPHONE ENCOUNTER
Spoke to Black Prasad, informed her to speak with nephrology who ordered the hydration with any questions

## 2023-02-17 NOTE — TELEPHONE ENCOUNTER
Call Transfer   Reason for patient call? Fozia from [facility] calling in requesting to speak with the infusion center  If someone other than patient is calling, are they listed on the communication consent? N/A   Patient's primary oncologist? Dr Kentrell Mckeon    Person/Department that the call was transferred to? Time that call was transferred? [place], [facility]    Informed patient that the message will be forwarded to the team and someone will get back to them as soon as possible  Did you relay this information to the patient?   Yes

## 2023-03-02 ENCOUNTER — APPOINTMENT (OUTPATIENT)
Dept: PHYSICAL THERAPY | Age: 63
End: 2023-03-02

## 2023-03-03 ENCOUNTER — OFFICE VISIT (OUTPATIENT)
Dept: PHYSICAL THERAPY | Age: 63
End: 2023-03-03

## 2023-03-03 DIAGNOSIS — M25.512 LEFT SHOULDER PAIN, UNSPECIFIED CHRONICITY: Primary | ICD-10-CM

## 2023-03-03 NOTE — PROGRESS NOTES
Daily Note     Today's date: 3/3/2023  Patient name: Praveena Bowen  : 1960  MRN: [id number]  Referring provider: Merari Mandel  Dx:   Encounter Diagnosis     ICD-10-CM    1  Left shoulder pain, unspecified chronicity  M25 512                      Subjective: Reports feeling continued improvement today, no complaints of pain while he was away       Objective: See treatment diary below      Assessment: Tolerated treatment well  Patient would benefit from continued PT, did well with tx with no complaints of pain, can continue to progress as he is in phase 3 strengthening  Plan: Continue per plan of care        Precautions: HTN, CKD, Hx of Ca,   shoulder ROM flexion 90 deg, abduction 60 deg, ER 30 deg    Manuals 2/10  2/13  2/15  3/3   L shoulder ROM CW  CW  CW                          Neuro Re-Ed       L shoulder isometrics  15x5" all  10x5" all  10x5" all w ext np   Shoulder 3way     15x ea 1#   Rows     3x10 RTB   LPD     3x10 RTB   ER    nv   IR     3x10 RTB   S/L flex, abd     2x15    S/L ER     2x15    Ther Ex       Pulleys  5'  5'  5'  5'    UBE       Finger ladder 2way  10x10" ea  10x10" ea  10x10" ea  np    Wall slides  [id number]"  8e17g91" 10x10"    Wall slide w TB       Supine cane ER  20x10"  2v60w73"     Standing cane flexion/abd   15x10"     Ther Activity                     Gait Training                     Modalities

## 2023-03-06 ENCOUNTER — OFFICE VISIT (OUTPATIENT)
Dept: PHYSICAL THERAPY | Age: 63
End: 2023-03-06

## 2023-03-06 DIAGNOSIS — M25.512 LEFT SHOULDER PAIN, UNSPECIFIED CHRONICITY: Primary | ICD-10-CM

## 2023-03-06 NOTE — PROGRESS NOTES
Daily Note     Today's date: 3/6/2023  Patient name: Shiela Woods  : 1960  MRN: [id number]  Referring provider: Lorain Dance  Dx:   Encounter Diagnosis     ICD-10-CM    1  Left shoulder pain, unspecified chronicity  M25 512                      Subjective: pt reports L shoulder feeling better but wishes he could do more ex/activities (beyond protocol)      Objective: See treatment diary below  Pt ed for post op protocol and goals of PT    Assessment: ex progressions as per protocol, pt has good understanding of HEP/protocol and to cont with strengthening ex 3x/wk(not daily)    Plan: Continue per plan of care  Progress treament per protocol        Precautions: HTN, CKD, Hx of Ca,   shoulder ROM flexion 90 deg, abduction 60 deg, ER 30 deg    Manuals 2/10  2/13  2/15  33 3/6   L shoulder ROM CW  CW  CW   VK                           Neuro Re-Ed     3/6   L shoulder isometrics  15x5" all  10x5" all  10x5" all w ext np    Shoulder 3way     15x ea 1# 1# 2x15 ea   Rows     3x10 RTB btb 3x15   LPD     3x10 RTB gtb 3x15   ER    nv Single btb 3x10   IR     3x10 RTB rtb 3x15   S/L flex, abd     2x15  2x15   S/L ER     2x15  2x15   Ther Ex     3/6   Pulleys  5'  5'  5'  5'  5'   UBE     2'/2'   Finger ladder 2way  10x10" ea  10x10" ea  10x10" ea  np     Wall slides  1a85n97"  8j73q45" 10x10"     Wall slide w TB        Supine cane ER  20x10"  3y90b52"      Standing cane flexion/abd   15x10"      Ther Activity                        Gait Training                        Modalities

## 2023-03-09 ENCOUNTER — OFFICE VISIT (OUTPATIENT)
Dept: PHYSICAL THERAPY | Age: 63
End: 2023-03-09

## 2023-03-09 DIAGNOSIS — M25.512 LEFT SHOULDER PAIN, UNSPECIFIED CHRONICITY: Primary | ICD-10-CM

## 2023-03-09 NOTE — PROGRESS NOTES
Daily Note     Today's date: 3/9/2023  Patient name: Daryl Roy  : 1960  MRN: [id number]  Referring provider: Marta Ventura  Dx:   Encounter Diagnosis     ICD-10-CM    1  Left shoulder pain, unspecified chronicity  M25 512                      Subjective: Reports no pain coming in today, just would like to continue progressing his strengthening  Objective: See treatment diary below      Assessment: Tolerated treatment well  Patient would benefit from continued PT     L ROM  Shoulder flexion   Shoulder abduction  Shoulder ER scratch=C7  Shoulder IR scratch=T7    L MMT  Shoulder flexion=4/5  Shoulder abduction=4/5  Shoulder ER=4/5  Shoulder IR=4/5     Plan: Continue per plan of care   , patient will continue 2x/week x 3 weeks then be weaned down to 1x/week     Precautions: HTN, CKD, Hx of Ca,   shoulder ROM flexion 90 deg, abduction 60 deg, ER 30 deg    Manuals 215  3/3 36 3/9    L shoulder ROM CW   VK CW                         Neuro Re-Ed   3/6    L shoulder isometrics  10x5" all w ext np     Shoulder 3way   15x ea 1# 1# 2x15 ea 2x15 ea 2#    Rows   3x10 RTB btb 3x15 2x20 15#   LPD   3x10 RTB gtb 3x15 2x20 10#   ER  nv Single btb 3x10 3x10 3#    IR   3x10 RTB rtb 3x15    S/L flex, abd   2x15  2x15 2x15 2#   S/L ER   2x15  2x15 2x15 2#   Ther Ex   3/6    Pulleys  5'  5'  5' np    UBE   2'/2' 4/4'    Finger ladder 2way  10x10" ea  np      Wall slides  10x10"      Wall slide w TB       Supine cane ER        Standing cane flexion/abd 15x10"       Ther Activity                     Gait Training                     Modalities

## 2023-03-13 ENCOUNTER — OFFICE VISIT (OUTPATIENT)
Dept: PHYSICAL THERAPY | Age: 63
End: 2023-03-13

## 2023-03-13 DIAGNOSIS — M25.512 LEFT SHOULDER PAIN, UNSPECIFIED CHRONICITY: Primary | ICD-10-CM

## 2023-03-13 NOTE — PROGRESS NOTES
Daily Note     Today's date: 3/13/2023  Patient name: Erin Tomlinson  : 1960  MRN: [id number]  Referring provider: Fuad Ewing  Dx:   Encounter Diagnosis     ICD-10-CM    1  Left shoulder pain, unspecified chronicity  M25 512                      Subjective: Reports feeling only soreness but better overall  Objective: See treatment diary below      Assessment: Tolerated treatment well  Patient would benefit from continued PT, did well with progressions to improve UE WBing  Plan: Continue per plan of care        Precautions: HTN, CKD, Hx of Ca,   shoulder ROM flexion 90 deg, abduction 60 deg, ER 30 deg    Manuals 2/15  3/3 3/6 3/9  3/13    L shoulder ROM CW   VK CW  CW                            Neuro Re-Ed   3/6     L shoulder isometrics  10x5" all w ext np      Shoulder 3way   15x ea 1# 1# 2x15 ea 2x15 ea 2#  30x ea 2#    Rows   3x10 RTB btb 3x15 2x20 15# 2x20 18#   LPD   3x10 RTB gtb 3x15 2x20 10# 2x20 12#   ER  nv Single btb 3x10 3x10 3#  3x10 4#    IR   3x10 RTB rtb 3x15     S/L flex, abd   2x15  2x15 2x15 2# 2x20 2#   S/L ER   2x15  2x15 2x15 2# 2x20 2#    Ball circles      2x1' grn   Table taps      3x10    Ther Ex   3/6     Pulleys  5'  5'  5' np     UBE   2'/2' 4/4'  4/4'    Finger ladder 2way  10x10" ea  np       Wall slides  10x10"       Wall slide w TB        Supine cane ER         Standing cane flexion/abd 15x10"        Ther Activity                        Gait Training                        Modalities

## 2023-03-16 ENCOUNTER — OFFICE VISIT (OUTPATIENT)
Dept: PHYSICAL THERAPY | Age: 63
End: 2023-03-16

## 2023-03-16 DIAGNOSIS — M25.512 LEFT SHOULDER PAIN, UNSPECIFIED CHRONICITY: Primary | ICD-10-CM

## 2023-03-16 NOTE — PROGRESS NOTES
Daily Note     Today's date: 3/16/2023  Patient name: Erin Tomlinson  : 1960  MRN: [id number]  Referring provider: Ochoa Carter  Dx:   Encounter Diagnosis     ICD-10-CM    1  Left shoulder pain, unspecified chronicity  M25 512                      Subjective: Reports continued improvement, is excited to get to back to golfing       Objective: See treatment diary below      Assessment: Tolerated treatment well  Patient would benefit from continued PT, did well with progressions today with minimal complaints of pain throughout  Plan: Continue per plan of care        Precautions: HTN, CKD, Hx of Ca,   shoulder ROM flexion 90 deg, abduction 60 deg, ER 30 deg    Manuals 3/6 3/9  3/13  3/16    L shoulder ROM VK CW  CW  CW                         Neuro Re-Ed 3/6      L shoulder isometrics        Shoulder 3way  1# 2x15 ea 2x15 ea 2#  30x ea 2#  20x ea 3#   Rows  btb 3x15 2x20 15# 2x20 18#    LPD  gtb 3x15 2x20 10# 2x20 12#    ER Single btb 3x10 3x10 3#  3x10 4#     IR  rtb 3x15      S/L flex, abd  2x15 2x15 2# 2x20 2# 2x15 3#   S/L ER  2x15 2x15 2# 2x20 2#  2x15 3#   Ball circles    2x1' grn    Table taps    3x10  3x10 bosu    Table push ups     2x10   Ther Ex 3/6      Pulleys  5' np      UBE 2'/2' 4/4'  4/4'  4/4'    Finger ladder 2way        scap clocks TB    5x GTB   Wall slide w TB    2x10 GTB   Ther Activity                     Gait Training                     Modalities

## 2023-03-20 ENCOUNTER — OFFICE VISIT (OUTPATIENT)
Dept: PHYSICAL THERAPY | Age: 63
End: 2023-03-20

## 2023-03-20 DIAGNOSIS — M25.512 LEFT SHOULDER PAIN, UNSPECIFIED CHRONICITY: Primary | ICD-10-CM

## 2023-03-20 NOTE — PROGRESS NOTES
Daily Note     Today's date: 3/20/2023  Patient name: Venetta Mohs  : 1960  MRN: [id number]  Referring provider: Shanice Alexander  Dx:   Encounter Diagnosis     ICD-10-CM    1  Left shoulder pain, unspecified chronicity  M25 512                      Subjective: pt reports L wrist has h o  fusion and has had some pain after performing wall ex      Objective: See treatment diary below      Assessment: modified wall ex with keeping forearm in neutral and pt seemed to rafael better, will monitor sx and adjust accordingly, focused on scapular stabilization and proper ex technique, pt rafael well    Plan: Continue per plan of care  Progress treament per protocol        Precautions: HTN, CKD, Hx of Ca,   shoulder ROM flexion 90 deg, abduction 60 deg, ER 30 deg    Manuals 3/6 3/9  3/13  3/16  3/20   L shoulder ROM VK CW  CW  CW  VK                           Neuro Re-Ed 3/6    3/20   L shoulder isometrics         Shoulder 3way  1# 2x15 ea 2x15 ea 2#  30x ea 2#  20x ea 3# 3# 2x15   Rows  btb 3x15 2x20 15# 2x20 18#     LPD  gtb 3x15 2x20 10# 2x20 12#     ER Single btb 3x10 3x10 3#  3x10 4#      IR  rtb 3x15       S/L flex, abd  2x15 2x15 2# 2x20 2# 2x15 3# 3# 2x15   S/L ER  2x15 2x15 2# 2x20 2#  2x15 3# 3# 2x15   Ball circles    2x1' grn     Table taps    3x10  3x10 bosu  BOSU 3x15   Table push ups     2x10 3x10   Bodyblade     Er/ir 5x15"   Ther Ex 3/6    3/20   Pulleys  5' np       UBE 2'/2' 4/4'  4/4'  4/4'  4'/4'   Finger ladder 2way         scap clocks TB    5x GTB 5x   Wall slide w TB    2x10 GTB gtb 2x10 wall walk (forearm in neutral)   Ther Activity                        Gait Training                        Modalities

## 2023-03-23 ENCOUNTER — OFFICE VISIT (OUTPATIENT)
Dept: PHYSICAL THERAPY | Age: 63
End: 2023-03-23

## 2023-03-23 DIAGNOSIS — M25.512 LEFT SHOULDER PAIN, UNSPECIFIED CHRONICITY: Primary | ICD-10-CM

## 2023-03-23 NOTE — PROGRESS NOTES
Daily Note     Today's date: 3/23/2023  Patient name: Kong Mon  : 1960  MRN: [id number]  Referring provider: Miley Ceballos  Dx:   Encounter Diagnosis     ICD-10-CM    1  Left shoulder pain, unspecified chronicity  M25 512                      Subjective: Reports feeling a little wrist soreness today but shoulder feels good  Objective: See treatment diary below      Assessment: Tolerated treatment well  Patient would benefit from continued PT, progressed well today  Plan: Continue per plan of care        Precautions: HTN, CKD, Hx of Ca,   shoulder ROM flexion 90 deg, abduction 60 deg, ER 30 deg    Manuals 3/13  3/16  3/20 3/23    L shoulder ROM CW  CW  VK CW                         Neuro Re-Ed   3/20    L shoulder isometrics        Shoulder 3way  30x ea 2#  20x ea 3# 3# 2x15 15x 4#   Rows  2x20 18#      LPD  2x20 12#      ER 3x10 4#    3x15 4#   IR        S/L flex, abd  2x20 2# 2x15 3# 3# 2x15 2x20 3#   S/L ER  2x20 2#  2x15 3# 3# 2x15 2x20 3#    Ball circles  2x1' grn      Table taps  3x10  3x10 bosu  BOSU 3x15 bosu 3x15 low   Table push ups   2x10 3x10 3x10    Bodyblade   Er/ir 5x15" 2x30" ea    PNF diagonals     20x ea RTB   Ther Ex   3/20    Pulleys        UBE 4/4'  4/4'  4'/4' 4/4'    Finger ladder 2way        scap clocks TB  5x GTB 5x    Wall slide w TB  2x10 GTB gtb 2x10 wall walk (forearm in neutral)    Ther Activity                     Gait Training                     Modalities

## 2023-03-27 ENCOUNTER — OFFICE VISIT (OUTPATIENT)
Dept: PHYSICAL THERAPY | Age: 63
End: 2023-03-27

## 2023-03-27 DIAGNOSIS — M25.512 LEFT SHOULDER PAIN, UNSPECIFIED CHRONICITY: Primary | ICD-10-CM

## 2023-03-27 NOTE — PROGRESS NOTES
"Daily Note     Today's date: 3/27/2023  Patient name: Sherri Shetty  : 1960  MRN: [id number]  Referring provider: Vinh Zavala  Dx:   Encounter Diagnosis     ICD-10-CM    1  Left shoulder pain, unspecified chronicity  M25 512                      Subjective: Reports feeling a little sore today, minimal complaints coming in today  Objective: See treatment diary below      Assessment: Tolerated treatment well  Patient would benefit from continued PT, did well with tx with minimal complaints coming in to therapy today  Plan: Continue per plan of care        Precautions: HTN, CKD, Hx of Ca,   shoulder ROM flexion 90 deg, abduction 60 deg, ER 30 deg    Manuals 3/16  3/20 3/23  3/27    L shoulder ROM CW  VK CW  CW                         Neuro Re-Ed  3/20     L shoulder isometrics        Shoulder 3way  20x ea 3# 3# 2x15 15x 4# 25x ea 4#   Rows        LPD        ER   3x15 4# 3x15 4#   IR        S/L flex, abd  2x15 3# 3# 2x15 2x20 3# 2x15 4#   S/L ER  2x15 3# 3# 2x15 2x20 3#  2x15 4#    Ball circles        Table taps  3x10 bosu  BOSU 3x15 bosu 3x15 low bosu 3x10    Table push ups  2x10 3x10 3x10  3x10   Bodyblade  Er/ir 5x15\" 2x30\" ea  2x45\" ea    PNF diagonals    20x ea RTB 25x ea RTB   Prone I,Y,T    2x10 2#   Ther Ex  3/20     Pulleys        UBE 4/4'  4'/4' 4/4'  4/4'    Finger ladder 2way        scap clocks TB 5x GTB 5x     Wall slide w TB 2x10 GTB gtb 2x10 wall walk (forearm in neutral)     Ther Activity                     Gait Training                     Modalities                          "

## 2023-03-29 DIAGNOSIS — N18.30 BENIGN HYPERTENSION WITH CHRONIC KIDNEY DISEASE, STAGE III (HCC): ICD-10-CM

## 2023-03-29 DIAGNOSIS — I12.9 BENIGN HYPERTENSION WITH CHRONIC KIDNEY DISEASE, STAGE III (HCC): ICD-10-CM

## 2023-03-29 DIAGNOSIS — E78.2 MIXED HYPERLIPIDEMIA: ICD-10-CM

## 2023-03-29 RX ORDER — ATORVASTATIN CALCIUM 20 MG/1
20 TABLET, FILM COATED ORAL DAILY
Qty: 90 TABLET | Refills: 1 | Status: SHIPPED | OUTPATIENT
Start: 2023-03-29

## 2023-03-29 RX ORDER — LOSARTAN POTASSIUM 25 MG/1
25 TABLET ORAL DAILY
Qty: 90 TABLET | Refills: 1 | Status: SHIPPED | OUTPATIENT
Start: 2023-03-29

## 2023-03-30 ENCOUNTER — OFFICE VISIT (OUTPATIENT)
Dept: PHYSICAL THERAPY | Age: 63
End: 2023-03-30

## 2023-03-30 DIAGNOSIS — M25.512 LEFT SHOULDER PAIN, UNSPECIFIED CHRONICITY: Primary | ICD-10-CM

## 2023-03-30 NOTE — PROGRESS NOTES
"Daily Note     Today's date: 3/30/2023  Patient name: Shea Johnson  : 1960  MRN: [id number]  Referring provider: Umu Coppola  Dx:   Encounter Diagnosis     ICD-10-CM    1  Left shoulder pain, unspecified chronicity  M25 512                      Subjective: Reports feeling continued improvement coming in today  Objective: See treatment diary below      Assessment: Tolerated treatment well  Patient would benefit from continued PT, did well with tx today and will be away from therapy for a couple weeks       Plan: Continue per plan of care   , will continue @ 1x/week when he returns      Precautions: HTN, CKD, Hx of Ca,   shoulder ROM flexion 90 deg, abduction 60 deg, ER 30 deg    Manuals 3/20 3/23  3/27  3/30    L shoulder ROM VK CW  CW  CW                         Neuro Re-Ed 3/20      L shoulder isometrics        Shoulder 3way  3# 2x15 15x 4# 25x ea 4#    Rows        LPD        ER  3x15 4# 3x15 4#    IR        S/L flex, abd  3# 2x15 2x20 3# 2x15 4# 2x20 4#   S/L ER  3# 2x15 2x20 3#  2x15 4#  2x20 4#    Ball circles        Table taps  BOSU 3x15 bosu 3x15 low bosu 3x10  bosu 3x15   Table push ups  3x10 3x10  3x10 3x15    Bodyblade Er/ir 5x15\" 2x30\" ea  2x45\" ea  2x1' ea    PNF diagonals   20x ea RTB 25x ea RTB 30x ea RTB   Prone I,Y,T   2x10 2#    90/90 ER TB    2x15 RTB    Ther Ex 3/20      Pulleys        UBE 4'/4' 4/4'  4/4'  4/4'    Finger ladder 2way        scap clocks TB 5x      Wall slide w TB gtb 2x10 wall walk (forearm in neutral)      Ther Activity                     Gait Training                     Modalities                          "

## 2023-04-20 ENCOUNTER — APPOINTMENT (OUTPATIENT)
Dept: LAB | Age: 63
End: 2023-04-20

## 2023-04-20 DIAGNOSIS — E89.0 S/P PARTIAL THYROIDECTOMY: ICD-10-CM

## 2023-04-20 LAB — TSH SERPL DL<=0.05 MIU/L-ACNC: 1.9 UIU/ML (ref 0.45–4.5)

## 2023-04-24 ENCOUNTER — OFFICE VISIT (OUTPATIENT)
Dept: OBGYN CLINIC | Facility: OTHER | Age: 63
End: 2023-04-24

## 2023-04-24 VITALS
BODY MASS INDEX: 24.85 KG/M2 | SYSTOLIC BLOOD PRESSURE: 127 MMHG | DIASTOLIC BLOOD PRESSURE: 79 MMHG | WEIGHT: 154.6 LBS | HEIGHT: 66 IN | HEART RATE: 66 BPM

## 2023-04-24 DIAGNOSIS — M75.102 TEAR OF LEFT SUPRASPINATUS TENDON: Primary | ICD-10-CM

## 2023-04-24 NOTE — PROGRESS NOTES
"  Assessment  Diagnoses and all orders for this visit:    Tear of left supraspinatus tendon, s/p repair    Discussion and Plan:    · Patient is 4 5 months s/p revision rotator cuff repair of his left shoulder done on 12/9/22, doing well  · He may gradually return to normal activities as tolerated with modifications to avoid pain  · Follow up on an as needed basis    Subjective:   Patient ID: Watson Green is a 57 y o  male    58 y/o male who presents today 4 5 months s/p revision rotator cuff repair of his left shoulder performed on 12/9/22  Patient reports that he continues to do well post operatively  He denies pain about the shoulder  He states that he is able to golf and perform activities without pain  He is currently in formal physical therapy and is performing home exercise program with benefit  No numbness or tingling  No fevers or chills        The following portions of the patient's history were reviewed and updated as appropriate: allergies, current medications, past family history, past medical history, past social history, past surgical history and problem list     Objective:  /79   Pulse 66   Ht 5' 6\" (1 676 m)   Wt 70 1 kg (154 lb 9 6 oz)   BMI 24 95 kg/m²       Left Shoulder Exam     Range of Motion   The patient has normal left shoulder ROM  Muscle Strength   Abduction: 5/5   External rotation: 5/5     Other   Erythema: absent  Scars: present (portals are well healed)  Sensation: normal  Pulse: present             Physical Exam  Constitutional:       General: He is not in acute distress  Appearance: He is well-developed  Eyes:      Conjunctiva/sclera: Conjunctivae normal       Pupils: Pupils are equal, round, and reactive to light  Cardiovascular:      Rate and Rhythm: Normal rate and regular rhythm  Pulmonary:      Effort: Pulmonary effort is normal       Breath sounds: Normal breath sounds     Abdominal:      General: Bowel sounds are normal       Palpations: Abdomen is " soft    Musculoskeletal:      Cervical back: Normal range of motion and neck supple  Skin:     General: Skin is warm and dry  Findings: No erythema or rash  Neurological:      Mental Status: He is alert and oriented to person, place, and time  Deep Tendon Reflexes: Reflexes are normal and symmetric     Psychiatric:         Behavior: Behavior normal        Scribe Attestation    I,:  Pankaj Bryant am acting as a scribe while in the presence of the attending physician :       I,:  Sandip Knowles MD personally performed the services described in this documentation    as scribed in my presence :

## 2023-06-13 DIAGNOSIS — N18.30 BENIGN HYPERTENSION WITH CHRONIC KIDNEY DISEASE, STAGE III (HCC): ICD-10-CM

## 2023-06-13 DIAGNOSIS — E78.2 MIXED HYPERLIPIDEMIA: ICD-10-CM

## 2023-06-13 DIAGNOSIS — I12.9 BENIGN HYPERTENSION WITH CHRONIC KIDNEY DISEASE, STAGE III (HCC): ICD-10-CM

## 2023-06-13 RX ORDER — LOSARTAN POTASSIUM 25 MG/1
25 TABLET ORAL DAILY
Qty: 90 TABLET | Refills: 1 | Status: SHIPPED | OUTPATIENT
Start: 2023-06-13

## 2023-06-13 RX ORDER — ATORVASTATIN CALCIUM 20 MG/1
20 TABLET, FILM COATED ORAL DAILY
Qty: 90 TABLET | Refills: 1 | Status: SHIPPED | OUTPATIENT
Start: 2023-06-13

## 2023-07-12 ENCOUNTER — APPOINTMENT (OUTPATIENT)
Dept: RADIOLOGY | Age: 63
End: 2023-07-12
Payer: COMMERCIAL

## 2023-07-12 ENCOUNTER — APPOINTMENT (OUTPATIENT)
Dept: LAB | Age: 63
End: 2023-07-12
Payer: COMMERCIAL

## 2023-07-12 DIAGNOSIS — N18.31 STAGE 3A CHRONIC KIDNEY DISEASE (HCC): ICD-10-CM

## 2023-07-12 DIAGNOSIS — C64.2 RENAL CELL CANCER, LEFT (HCC): ICD-10-CM

## 2023-07-12 DIAGNOSIS — Z12.5 SCREENING FOR PROSTATE CANCER: ICD-10-CM

## 2023-07-12 DIAGNOSIS — Z90.5 SOLITARY KIDNEY, ACQUIRED: ICD-10-CM

## 2023-07-12 DIAGNOSIS — I12.9 BENIGN HYPERTENSION WITH CHRONIC KIDNEY DISEASE, STAGE III (HCC): ICD-10-CM

## 2023-07-12 DIAGNOSIS — N18.30 BENIGN HYPERTENSION WITH CHRONIC KIDNEY DISEASE, STAGE III (HCC): ICD-10-CM

## 2023-07-12 LAB
ANION GAP SERPL CALCULATED.3IONS-SCNC: 4 MMOL/L
BUN SERPL-MCNC: 27 MG/DL (ref 5–25)
CALCIUM SERPL-MCNC: 9.6 MG/DL (ref 8.3–10.1)
CHLORIDE SERPL-SCNC: 109 MMOL/L (ref 96–108)
CO2 SERPL-SCNC: 29 MMOL/L (ref 21–32)
CREAT SERPL-MCNC: 1.5 MG/DL (ref 0.6–1.3)
GFR SERPL CREATININE-BSD FRML MDRD: 48 ML/MIN/1.73SQ M
GLUCOSE P FAST SERPL-MCNC: 105 MG/DL (ref 65–99)
POTASSIUM SERPL-SCNC: 4.7 MMOL/L (ref 3.5–5.3)
PSA SERPL-MCNC: 0.49 NG/ML (ref 0–4)
SODIUM SERPL-SCNC: 142 MMOL/L (ref 135–147)

## 2023-07-12 PROCEDURE — 71046 X-RAY EXAM CHEST 2 VIEWS: CPT

## 2023-07-12 PROCEDURE — G0103 PSA SCREENING: HCPCS

## 2023-07-12 PROCEDURE — 80048 BASIC METABOLIC PNL TOTAL CA: CPT

## 2023-07-12 PROCEDURE — 36415 COLL VENOUS BLD VENIPUNCTURE: CPT

## 2023-07-28 ENCOUNTER — HOSPITAL ENCOUNTER (OUTPATIENT)
Dept: INFUSION CENTER | Facility: HOSPITAL | Age: 63
End: 2023-07-28
Payer: COMMERCIAL

## 2023-07-28 ENCOUNTER — HOSPITAL ENCOUNTER (OUTPATIENT)
Dept: RADIOLOGY | Facility: HOSPITAL | Age: 63
Discharge: HOME | End: 2023-07-28
Payer: COMMERCIAL

## 2023-07-28 VITALS
SYSTOLIC BLOOD PRESSURE: 145 MMHG | DIASTOLIC BLOOD PRESSURE: 74 MMHG | HEART RATE: 60 BPM | RESPIRATION RATE: 18 BRPM | TEMPERATURE: 97.6 F

## 2023-07-28 DIAGNOSIS — C64.2 RENAL CELL CANCER, LEFT (HCC): ICD-10-CM

## 2023-07-28 DIAGNOSIS — N18.31 STAGE 3A CHRONIC KIDNEY DISEASE (HCC): Primary | ICD-10-CM

## 2023-07-28 PROCEDURE — 74170 CT ABD WO CNTRST FLWD CNTRST: CPT

## 2023-07-28 PROCEDURE — G1004 CDSM NDSC: HCPCS

## 2023-07-28 RX ORDER — SODIUM CHLORIDE 9 MG/ML
150 INJECTION, SOLUTION INTRAVENOUS CONTINUOUS
Status: DISPENSED | OUTPATIENT
Start: 2023-07-28 | End: 2023-07-28

## 2023-07-28 RX ORDER — SODIUM CHLORIDE 9 MG/ML
100 INJECTION, SOLUTION INTRAVENOUS CONTINUOUS
Status: DISCONTINUED | OUTPATIENT
Start: 2023-07-28 | End: 2023-07-28 | Stop reason: HOSPADM

## 2023-07-28 RX ORDER — SODIUM CHLORIDE 9 MG/ML
150 INJECTION, SOLUTION INTRAVENOUS CONTINUOUS
Status: CANCELLED | OUTPATIENT
Start: 2023-07-28

## 2023-07-28 RX ORDER — SODIUM CHLORIDE 9 MG/ML
100 INJECTION, SOLUTION INTRAVENOUS CONTINUOUS
Status: CANCELLED | OUTPATIENT
Start: 2023-07-28

## 2023-07-28 RX ADMIN — SODIUM CHLORIDE 100 ML/HR: 0.9 INJECTION, SOLUTION INTRAVENOUS at 11:14

## 2023-07-28 RX ADMIN — SODIUM CHLORIDE 150 ML/HR: 0.9 INJECTION, SOLUTION INTRAVENOUS at 09:35

## 2023-07-28 RX ADMIN — IOHEXOL 100 ML: 350 INJECTION, SOLUTION INTRAVENOUS at 11:49

## 2023-08-03 ENCOUNTER — APPOINTMENT (OUTPATIENT)
Dept: LAB | Age: 63
End: 2023-08-03
Payer: MEDICARE

## 2023-08-03 LAB
ANION GAP SERPL CALCULATED.3IONS-SCNC: 6 MMOL/L
BACTERIA UR QL AUTO: NORMAL /HPF
BILIRUB UR QL STRIP: NEGATIVE
BUN SERPL-MCNC: 26 MG/DL (ref 5–25)
CALCIUM SERPL-MCNC: 8.5 MG/DL (ref 8.3–10.1)
CHLORIDE SERPL-SCNC: 107 MMOL/L (ref 96–108)
CLARITY UR: CLEAR
CO2 SERPL-SCNC: 28 MMOL/L (ref 21–32)
COLOR UR: COLORLESS
CREAT SERPL-MCNC: 1.54 MG/DL (ref 0.6–1.3)
CREAT UR-MCNC: 23.4 MG/DL
ERYTHROCYTE [DISTWIDTH] IN BLOOD BY AUTOMATED COUNT: 12.8 % (ref 11.6–15.1)
GFR SERPL CREATININE-BSD FRML MDRD: 46 ML/MIN/1.73SQ M
GLUCOSE SERPL-MCNC: 82 MG/DL (ref 65–140)
GLUCOSE UR STRIP-MCNC: NEGATIVE MG/DL
HCT VFR BLD AUTO: 43 % (ref 36.5–49.3)
HGB BLD-MCNC: 14.4 G/DL (ref 12–17)
HGB UR QL STRIP.AUTO: NEGATIVE
KETONES UR STRIP-MCNC: NEGATIVE MG/DL
LEUKOCYTE ESTERASE UR QL STRIP: NEGATIVE
MCH RBC QN AUTO: 30.8 PG (ref 26.8–34.3)
MCHC RBC AUTO-ENTMCNC: 33.5 G/DL (ref 31.4–37.4)
MCV RBC AUTO: 92 FL (ref 82–98)
NITRITE UR QL STRIP: NEGATIVE
NON-SQ EPI CELLS URNS QL MICRO: NORMAL /HPF
PH UR STRIP.AUTO: 6 [PH]
PHOSPHATE SERPL-MCNC: 3.9 MG/DL (ref 2.3–4.1)
PLATELET # BLD AUTO: 227 THOUSANDS/UL (ref 149–390)
PMV BLD AUTO: 10.3 FL (ref 8.9–12.7)
POTASSIUM SERPL-SCNC: 4.3 MMOL/L (ref 3.5–5.3)
PROT UR STRIP-MCNC: NEGATIVE MG/DL
PROT UR-MCNC: <6 MG/DL
PTH-INTACT SERPL-MCNC: 80.2 PG/ML (ref 12–88)
RBC # BLD AUTO: 4.67 MILLION/UL (ref 3.88–5.62)
RBC #/AREA URNS AUTO: NORMAL /HPF
SODIUM SERPL-SCNC: 141 MMOL/L (ref 135–147)
SP GR UR STRIP.AUTO: 1.01 (ref 1–1.03)
UROBILINOGEN UR STRIP-ACNC: <2 MG/DL
WBC # BLD AUTO: 7.8 THOUSAND/UL (ref 4.31–10.16)
WBC #/AREA URNS AUTO: NORMAL /HPF

## 2023-08-03 PROCEDURE — 84100 ASSAY OF PHOSPHORUS: CPT

## 2023-08-03 PROCEDURE — 82570 ASSAY OF URINE CREATININE: CPT

## 2023-08-03 PROCEDURE — 83970 ASSAY OF PARATHORMONE: CPT

## 2023-08-03 PROCEDURE — 84156 ASSAY OF PROTEIN URINE: CPT

## 2023-08-03 PROCEDURE — 80048 BASIC METABOLIC PNL TOTAL CA: CPT

## 2023-08-03 PROCEDURE — 85027 COMPLETE CBC AUTOMATED: CPT

## 2023-08-03 PROCEDURE — 81001 URINALYSIS AUTO W/SCOPE: CPT

## 2023-08-07 ENCOUNTER — TELEPHONE (OUTPATIENT)
Dept: NEPHROLOGY | Facility: HOSPITAL | Age: 63
End: 2023-08-07

## 2023-08-07 NOTE — TELEPHONE ENCOUNTER
----- Message from Jimbo Traore MD sent at 8/6/2023 12:47 PM EDT -----  Please inform patient that renal function is stable at creatinine 1.5, most likely this is his new baseline.   Continue same treatment

## 2023-08-27 NOTE — PROGRESS NOTES
1. Renal cell cancer, left ([address])  MRI abdomen w wo contrast    XR chest pa & lateral    Basic metabolic panel      2. Screening for prostate cancer  PSA, Total Screen          ASSESSMENT/PLAN:     1. Stage I clear cell renal cell carcinoma  - pT1a G2, N0 M0 R0, clear cell  - s/p lap left radical nephrectomy January 2020    2. CKD     I was happy to review with the patient his CT and chest x-ray results which were negative for evidence of recurrent or residual disease. We will continue with annual surveillance with a MRI next year as well as chest x-ray. Will also obtain BMP and PSA prior to follow-up. All questions answered. [address]    History of Present Illness:     Grisleda Miller is a 70 y.o. with a history left renal cell carcinoma status post left radical nephrectomy (01/2020) presenting for follow-up.     CT (728/23) negative for evidence of residual or recurrent disease. IV hydration performed. CXR (7/12/23) no disease. Creatinine 1.54 and eGFR 46 (8/3/23). Following with nephrology. UA with micro (8/3/23) negative for hematuria nor infection  PSA 0.49 (7/12/23)      Patient denies any dysuria, gross hematuria, suprapubic pressure, flank pain. Denies any voiding concerns. Denies any pain at previous surgical incisions. He is asymptomatic from a urologic standpoint.       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-   • Arthritis    • Cancer ([address]) 10/13/2019    kidney   • Chronic kidney disease 1/7/2020    stage 3   • CKD (chronic kidney disease)    • Diverticulitis of colon 10/13/2019   • Diverticulitis of large intestine with perforation without bleeding 10/13/2019   • Hyperlipidemia    • Hypertension    • Inflammatory bowel disease 10/13/2019   • Renal cell cancer, left ([address]) 10/13/2019   • Renal cell carcinoma ([address])    :    Past Surgical History:   Procedure Laterality Date   • COLONOSCOPY  2015   • HERNIA REPAIR     • HIP SURGERY Right     age 4   • JOINT REPLACEMENT Right 2011    TKR   • NEPHRECTOMY  [id number]    left   • OK LAPAROSCOPY COLECTOMY PARTIAL W/ANASTOMOSIS N/A 12/30/2019    Procedure: LAPAROSCOPIC HAND-ASSISTED SIGMOID COLECTOMY; LAPAROSCOPIC MOBILIZATION OF SPLENIC FLEXURE;  Surgeon: Adela Evans MD;  [facility];  Service: Colorectal   • OK LAPAROSCOPY RADICAL NEPHRECTOMY Left 12/30/2019    Procedure: NEPHRECTOMY LAPAROSCOPIC HAND ASSISTED;  Surgeon: Vivian Anthony MD;  [facility];  Service: Urology   • OK SURGICAL ARTHROSCOPY SHOULDER W/ROTATOR CUFF RPR Left 12/9/2022    Procedure: SHOULDER ARTHROSCOPIC REVISION ROTATOR CUFF REPAIR AND BICEPS TENODESIS;  Surgeon: Rita Mendez MD;  [facility];  Service: Orthopedics   • REPLACEMENT TOTAL KNEE Right 2011   • REPLACEMENT TOTAL KNEE     • SHOULDER SURGERY Left    • SHOULDER SURGERY Right    • THYROID LOBECTOMY Right 10/12/2022    Procedure: RIGHT THYROID LOBECTOMY;  Surgeon: Anuradha Balbuena MD;  [facility];  Service: Surgical Oncology   • US GUIDED THYROID BIOPSY  7/9/2021   • WRIST FUSION Left 2016   • WRIST SURGERY     :    Medications, Allergies:     Current Outpatient Medications:   •  aspirin 81 MG tablet, Take 1 tablet by mouth daily, Disp: , Rfl:   •  atorvastatin (LIPITOR) 20 mg tablet, Take 1 tablet (20 mg total) by mouth daily, Disp: 90 tablet, Rfl: 1  •  losartan (COZAAR) 25 mg tablet, Take 1 tablet (25 mg total) by mouth daily, Disp: 90 tablet, Rfl: 1  •  multivitamin (THERAGRAN) TABS, Take 1 tablet by mouth every other day, Disp: , Rfl:   •  Omega-3 Fatty Acids (FISH OIL) 1200 MG CAPS, Take by mouth in the morning, Disp: , Rfl:     Allergies:  No Known Allergies:    Social and Family History:   Social History:   Social History     Tobacco Use   • Smoking status: Never   • Smokeless tobacco: Never   Vaping Use   • Vaping Use: Never used   Substance Use Topics   • Alcohol use: Yes     Alcohol/week: 2.0 standard drinks of alcohol     Types: 2 Cans of beer per week     Comment: 1-2 beers/week   • Drug use: No   .    Social History     Tobacco Use   Smoking Status Never   Smokeless Tobacco Never       Family History:  Family History   Problem Relation Age of Onset   • Heart disease Father    • Other Father         cardiac disorder   • No Known Problems Mother    • Heart attack Maternal Grandfather    • Heart attack Paternal Grandfather    • Thyroid cancer Son    • Colon cancer Neg Hx    :     Review of Systems:     General: Fever, chills, or night sweats: negative  Cardiac: Negative for chest pain. Pulmonary: Negative for shortness of breath. Gastrointestinal: Abdominal pain positive. Nausea, vomiting, or diarrhea negative,  Genitourinary: See HPI above. Patient does not have hematuria. All other systems queried were negative. Physical Exam:   General: Patient is pleasant and in NAD. Awake and alert  /80 (BP Location: Left arm, Patient Position: Sitting, Cuff Size: Standard)   Pulse 58   Ht 5' 6" (1.676 m)   Wt 69.9 kg (154 lb)   SpO2 99%   BMI 24.86 kg/m²   Cardiac: Peripheral edema: negative  Pulmonary: Non-labored breathing  Abdomen: Soft, non-tender, non-distended. Genitourinary: Negative CVA tenderness, negative suprapubic tenderness.   All incisions are well healed with no palpable hernia      Labs:     Lab Results   Component Value Date    HGB 14.4 08/03/2023    HCT 43.0 08/03/2023    WBC 7.80 08/03/2023     08/03/2023   ]    Lab Results   Component Value Date    K 4.3 08/03/2023     08/03/2023    CO2 28 08/03/2023    BUN 26 (H) 08/03/2023    CREATININE 1.54 (H) 08/03/2023    CALCIUM 8.5 08/03/2023   ]

## 2023-08-28 ENCOUNTER — RA CDI HCC (OUTPATIENT)
Dept: OTHER | Facility: HOSPITAL | Age: 63
End: 2023-08-28

## 2023-08-28 ENCOUNTER — OFFICE VISIT (OUTPATIENT)
Dept: UROLOGY | Facility: CLINIC | Age: 63
End: 2023-08-28
Payer: MEDICARE

## 2023-08-28 VITALS
BODY MASS INDEX: 24.75 KG/M2 | SYSTOLIC BLOOD PRESSURE: 120 MMHG | HEIGHT: 66 IN | DIASTOLIC BLOOD PRESSURE: 80 MMHG | OXYGEN SATURATION: 99 % | WEIGHT: 154 LBS | HEART RATE: 58 BPM

## 2023-08-28 DIAGNOSIS — C64.2 RENAL CELL CANCER, LEFT (HCC): Primary | ICD-10-CM

## 2023-08-28 DIAGNOSIS — Z12.5 SCREENING FOR PROSTATE CANCER: ICD-10-CM

## 2023-08-28 PROCEDURE — 99213 OFFICE O/P EST LOW 20 MIN: CPT | Performed by: PHYSICIAN ASSISTANT

## 2023-09-05 ENCOUNTER — OFFICE VISIT (OUTPATIENT)
Dept: INTERNAL MEDICINE CLINIC | Age: 63
End: 2023-09-05
Payer: MEDICARE

## 2023-09-05 VITALS
OXYGEN SATURATION: 98 % | HEART RATE: 82 BPM | BODY MASS INDEX: 24.59 KG/M2 | HEIGHT: 66 IN | TEMPERATURE: 98.2 F | DIASTOLIC BLOOD PRESSURE: 70 MMHG | WEIGHT: 153 LBS | SYSTOLIC BLOOD PRESSURE: 130 MMHG

## 2023-09-05 DIAGNOSIS — B36.0 TINEA VERSICOLOR: ICD-10-CM

## 2023-09-05 DIAGNOSIS — E89.0 S/P PARTIAL THYROIDECTOMY: ICD-10-CM

## 2023-09-05 DIAGNOSIS — N18.31 STAGE 3A CHRONIC KIDNEY DISEASE (HCC): ICD-10-CM

## 2023-09-05 DIAGNOSIS — I10 PRIMARY HYPERTENSION: ICD-10-CM

## 2023-09-05 DIAGNOSIS — C64.2 RENAL CELL CANCER, LEFT (HCC): Primary | ICD-10-CM

## 2023-09-05 DIAGNOSIS — E78.2 MIXED HYPERLIPIDEMIA: ICD-10-CM

## 2023-09-05 DIAGNOSIS — C64.2 MALIGNANT NEOPLASM OF LEFT KIDNEY, EXCEPT RENAL PELVIS (HCC): ICD-10-CM

## 2023-09-05 PROCEDURE — 99214 OFFICE O/P EST MOD 30 MIN: CPT | Performed by: INTERNAL MEDICINE

## 2023-09-05 RX ORDER — FLUCONAZOLE 100 MG/1
100 TABLET ORAL DAILY
Qty: 7 TABLET | Refills: 0 | Status: SHIPPED | OUTPATIENT
Start: 2023-09-05 | End: 2023-09-05 | Stop reason: SDUPTHER

## 2023-09-05 RX ORDER — FLUCONAZOLE 100 MG/1
100 TABLET ORAL DAILY
Qty: 7 TABLET | Refills: 0 | Status: SHIPPED | OUTPATIENT
Start: 2023-09-05 | End: 2023-09-12

## 2023-09-05 NOTE — PROGRESS NOTES
Assessment/Plan:    HTN (hypertension)  Blood pressure stable on present regimen. Continue with low-salt diet. We will continue to monitor    CKD (chronic kidney disease) stage 3, GFR 30-59 ml/min [facility])  Lab Results   Component Value Date    EGFR 46 08/03/2023    EGFR 48 07/12/2023    EGFR 56 01/31/2023    CREATININE 1.54 (H) 08/03/2023    CREATININE 1.50 (H) 07/12/2023    CREATININE 1.33 (H) 01/31/2023   Renal function is stable. Also managed by nephrologist.  Analy [place] for adequate hydration    Malignant neoplasm of left kidney, except renal pelvis ([address])  S/p nephrectomy. Doing well. Also followed by urologist    Mixed hyperlipidemia  Continue with atorvastatin 20 mg daily along with low-fat diet. We will check lipid profile before next visit    S/P partial thyroidectomy  Patient is status post partial thyroidectomy. Recent TSH is in normal range. We will continue to monitor    Tinea versicolor  We will treat with Diflucan 100 mg daily for 7 days. Diagnoses and all orders for this visit:    Renal cell cancer, left ([address])    Stage 3a chronic kidney disease ([address])  -     Comprehensive metabolic panel; Future    Malignant neoplasm of left kidney, except renal pelvis (HCC)    Tinea versicolor  -     Discontinue: fluconazole (DIFLUCAN) 100 mg tablet; Take 1 tablet (100 mg total) by mouth daily for 7 days  -     fluconazole (DIFLUCAN) 100 mg tablet; Take 1 tablet (100 mg total) by mouth daily for 7 days    Primary hypertension    S/P partial thyroidectomy  -     TSH, 3rd generation with Free T4 reflex; Future    Mixed hyperlipidemia  -     Lipid Panel with Direct LDL reflex; Future  -     Comprehensive metabolic panel; Future            Depression Screening and Follow-up Plan: Patient was screened for depression during today's encounter. They screened negative with a PHQ-2 score of 0. Subjective:          Patient ID: Bambi Ford is a 70 y.o. male. Patient is here for follow-up.   Also have blood work done would like to discuss results. Complaining of skin hypopigmentation on chest and arms. No itching. The following portions of the patient's history were reviewed and updated as appropriate: allergies, current medications, past family history, past medical history, past social history, past surgical history and problem list.    Review of Systems   Constitutional: Negative for fatigue and fever. HENT: Negative for congestion, ear discharge, ear pain, postnasal drip, sinus pressure, sore throat, tinnitus and trouble swallowing. Eyes: Negative for discharge, itching and visual disturbance. Respiratory: Negative for cough and shortness of breath. Cardiovascular: Negative for chest pain and palpitations. Gastrointestinal: Negative for abdominal pain, diarrhea, nausea and vomiting. Endocrine: Negative for cold intolerance and polyuria. Genitourinary: Negative for difficulty urinating, dysuria and urgency. Musculoskeletal: Negative for arthralgias and neck pain. Skin: Positive for rash. Allergic/Immunologic: Negative for environmental allergies. Neurological: Negative for dizziness, weakness and headaches. Psychiatric/Behavioral: Negative for agitation and behavioral problems. The patient is not nervous/anxious.           Past Medical History:   Diagnosis Date   • Aneurysm ([address]) 2017    behind right eye-   • Arthritis    • Cancer ([address]) 10/13/2019    kidney   • Chronic kidney disease 1/7/2020    stage 3   • CKD (chronic kidney disease)    • Diverticulitis of colon 10/13/2019   • Diverticulitis of large intestine with perforation without bleeding 10/13/2019   • Hyperlipidemia    • Hypertension    • Inflammatory bowel disease 10/13/2019   • Renal cell cancer, left ([address]) 10/13/2019   • Renal cell carcinoma (HCC)          Current Outpatient Medications:   •  aspirin 81 MG tablet, Take 1 tablet by mouth daily, Disp: , Rfl:   •  atorvastatin (LIPITOR) 20 mg tablet, Take 1 tablet (20 mg total) by mouth daily, Disp: 90 tablet, Rfl: 1  •  fluconazole (DIFLUCAN) 100 mg tablet, Take 1 tablet (100 mg total) by mouth daily for 7 days, Disp: 7 tablet, Rfl: 0  •  losartan (COZAAR) 25 mg tablet, Take 1 tablet (25 mg total) by mouth daily, Disp: 90 tablet, Rfl: 1  •  multivitamin (THERAGRAN) TABS, Take 1 tablet by mouth every other day, Disp: , Rfl:   •  Omega-3 Fatty Acids (FISH OIL) 1200 MG CAPS, Take by mouth in the morning, Disp: , Rfl:     No Known Allergies    Social History   Past Surgical History:   Procedure Laterality Date   • COLONOSCOPY  2015   • HERNIA REPAIR     • HIP SURGERY Right     age 4   • JOINT REPLACEMENT Right 2011    TKR   • NEPHRECTOMY  [id number]    left   • OK LAPAROSCOPY COLECTOMY PARTIAL W/ANASTOMOSIS N/A 12/30/2019    Procedure: LAPAROSCOPIC HAND-ASSISTED SIGMOID COLECTOMY; LAPAROSCOPIC MOBILIZATION OF SPLENIC FLEXURE;  Surgeon: Pancho Shrestha MD;  [facility];  Service: Colorectal   • OK LAPAROSCOPY RADICAL NEPHRECTOMY Left 12/30/2019    Procedure: NEPHRECTOMY LAPAROSCOPIC HAND ASSISTED;  Surgeon: Az Dang MD;  [facility];  Service: Urology   • OK SURGICAL ARTHROSCOPY SHOULDER W/ROTATOR CUFF RPR Left 12/9/2022    Procedure: SHOULDER ARTHROSCOPIC REVISION ROTATOR CUFF REPAIR AND BICEPS TENODESIS;  Surgeon: Damien Parson MD;  [facility];  Service: Orthopedics   • REPLACEMENT TOTAL KNEE Right 2011   • REPLACEMENT TOTAL KNEE     • SHOULDER SURGERY Left    • SHOULDER SURGERY Right    • THYROID LOBECTOMY Right 10/12/2022    Procedure: RIGHT THYROID LOBECTOMY;  Surgeon: Carlita Estrada MD;  [facility];  Service: Surgical Oncology   • US GUIDED THYROID BIOPSY  7/9/2021   • WRIST FUSION Left 2016   • WRIST SURGERY       Family History   Problem Relation Age of Onset   • Heart disease Father    • Other Father         cardiac disorder   • No Known Problems Mother    • Heart attack Maternal Grandfather    • Heart attack Paternal Grandfather    • Thyroid cancer Son    • Colon cancer Neg Hx        Objective:  /70 (BP Location: Left arm, Patient Position: Sitting, Cuff Size: Standard)   Pulse 82   Temp 98.2 °F (36.8 °C) (Temporal)   Ht 5' 6" (1.676 m)   Wt 69.4 kg (153 lb)   SpO2 98%   BMI 24.69 kg/m²   Body mass index is 24.69 kg/m². Physical Exam  Constitutional:       Appearance: He is well-developed. He is not ill-appearing or diaphoretic. HENT:      Head: Normocephalic. Right Ear: External ear normal.      Left Ear: External ear normal.      Nose: No rhinorrhea. Mouth/Throat:      Pharynx: No posterior oropharyngeal erythema. Eyes:      General: No scleral icterus. Pupils: Pupils are equal, round, and reactive to light. Neck:      Thyroid: No thyromegaly. Trachea: No tracheal deviation. Cardiovascular:      Rate and Rhythm: Normal rate and regular rhythm. Heart sounds: Normal heart sounds. No murmur heard. Pulmonary:      Effort: Pulmonary effort is normal. No respiratory distress. Breath sounds: Normal breath sounds. Chest:      Chest wall: No tenderness. Abdominal:      General: Bowel sounds are normal.      Palpations: Abdomen is soft. There is no mass. Tenderness: There is no abdominal tenderness. Musculoskeletal:         General: Normal range of motion. Cervical back: Normal range of motion and neck supple. Right lower leg: No edema. Left lower leg: No edema. Lymphadenopathy:      Cervical: No cervical adenopathy. Skin:     General: Skin is warm. Findings: Rash present. Comments: Multiple areas of hypopigmentation compatible with tinea versicolor is noted over anterior and posterior chest upper arms around the neck. Neurological:      Mental Status: He is alert and oriented to person, place, and time. Cranial Nerves: No cranial nerve deficit.    Psychiatric:         Mood and Affect: Mood normal.         Behavior: Behavior normal.

## 2023-09-05 NOTE — ASSESSMENT & PLAN NOTE
Lab Results   Component Value Date    EGFR 46 08/03/2023    EGFR 48 07/12/2023    EGFR 56 01/31/2023    CREATININE 1.54 (H) 08/03/2023    CREATININE 1.50 (H) 07/12/2023    CREATININE 1.33 (H) 01/31/2023   Renal function is stable.   Also managed by nephrologist.  Gwen[place] Constant for adequate hydration

## 2023-09-05 NOTE — ASSESSMENT & PLAN NOTE
Blood pressure stable on present regimen. Continue with low-salt diet.   We will continue to monitor

## 2023-09-05 NOTE — ASSESSMENT & PLAN NOTE
Patient is status post partial thyroidectomy. Recent TSH is in normal range.   We will continue to monitor

## 2023-09-05 NOTE — ASSESSMENT & PLAN NOTE
Continue with atorvastatin 20 mg daily along with low-fat diet.   We will check lipid profile before next visit

## 2023-09-26 ENCOUNTER — OFFICE VISIT (OUTPATIENT)
Dept: NEPHROLOGY | Facility: CLINIC | Age: 63
End: 2023-09-26
Payer: MEDICARE

## 2023-09-26 VITALS
WEIGHT: 157 LBS | BODY MASS INDEX: 25.23 KG/M2 | HEART RATE: 50 BPM | DIASTOLIC BLOOD PRESSURE: 70 MMHG | SYSTOLIC BLOOD PRESSURE: 136 MMHG | HEIGHT: 66 IN | OXYGEN SATURATION: 98 %

## 2023-09-26 DIAGNOSIS — N18.30 BENIGN HYPERTENSION WITH CHRONIC KIDNEY DISEASE, STAGE III (HCC): ICD-10-CM

## 2023-09-26 DIAGNOSIS — Z90.5 SOLITARY KIDNEY, ACQUIRED: ICD-10-CM

## 2023-09-26 DIAGNOSIS — N18.31 STAGE 3A CHRONIC KIDNEY DISEASE (HCC): Primary | ICD-10-CM

## 2023-09-26 DIAGNOSIS — I12.9 BENIGN HYPERTENSION WITH CHRONIC KIDNEY DISEASE, STAGE III (HCC): ICD-10-CM

## 2023-09-26 PROCEDURE — 99214 OFFICE O/P EST MOD 30 MIN: CPT | Performed by: INTERNAL MEDICINE

## 2023-09-26 NOTE — PROGRESS NOTES
NEPHROLOGY OUTPATIENT PROGRESS NOTE   Laurence Jimenez 70 y.o. male MRN: [id number]  DATE: 9/26/2023  Reason for visit:   Chief Complaint   Patient presents with   • Follow-up   • Chronic Kidney Disease       ASSESSMENT and PLAN:  Chronic kidney disease stage 3a  -It appears that new baseline is around 1.2 to 1.5 mg/dL, renal function worsened to creatinine 1.5 mg/dL in July 2023 and has been stable at that range. Last blood work from August 3, 2023 showed creatinine 1.54 mg/dL. Stressed on oral hydration and avoiding nephrotoxins. Repeat BMP in 3 months and follow-up in 6 months with repeat labs  -Chronic kidney disease likely due to decreased nephron mass from left nephrectomy done for renal cell carcinoma left kidney. -preop creatinine was 0.8-0.9 but has been elevated and stable at  1.3-1.5 likely due to decreased nephron mass from left nephrectomy.  -Patient will need renal prep with IV fluid before any CT with IV contrast and this was discussed with patient in detail.   -Phosphorus at normal range at 3.9. PTH at normal range at 18.2. No proteinuria on the urine test UA was bland  -discussed about Tiffany Gormans but currently with no proteinuria, will hold off but may consider in future if renal function continue to worsen. Patient will do some research  -Follow-up in 6 months with repeat labs      Primary Hypertension with CKD:   -Current medication: losartan 25 mg     -Current blood pressure: Blood pressure stable and is at goal  -Plan:    ?  Continue current dose of losartan  -Recommend 2 g sodium diet.    -Recommend daily exercise and weight loss  -Discussed home monitoring of BP and maintaining a log of blood pressure.  -Recommend goal BP less than 130/80.      Solitary right kidney  -status post left nephrectomy for renal cell carcinoma-done on 12/30/19 by Dr. Lulu Dobbs pathology results suggestive of renal cell carcinoma-conventional clear cell type.   -continue follow-up with Urology, last office visit note from August 28, 2023 was reviewed, plan for MRI next year with repeat BMP and PSA prior  -Last CT abdomen pelvis with contrast from July 28, 2023 did not suggest any local recurrence or metastatic disease     Persistent proteinuria-resolved  -Bridget uBck was likely due to [facility] well as from compensatory hypertrophy in solitary kidney.  - Recommended goal blood pressure less than 130/80  -Continue ARB     CKD/MBD:  PTH at normal range, phosphorus at normal range, continue to monitor     Hyperlipidemia:   -Lipid panel improving.  -last lipid panel from January 2023 showed LDL at goal at 73, triglyceride 93  -continue statin as well as fish oil per PCP  -recommend goal LDL less than 100, continue monitoring and management per PCP  -Recommend daily exercise    Patient Instructions   Blood pressure is stable    -Recommend 2 g sodium diet. -Recommend daily exercise and weight loss  -Discussed home monitoring of BP and maintaining a log of blood pressure.  -Recommend goal BP less than 130/80. Please inform me if SBP below 110 or above 140's persistently. Renal function is stable. Continue oral hydration. Repeat blood work in 2 months    Follow up: 6  months with repeat Lab work within a week of the scheduled office visit. Will discuss the results of the previsit Labs during the office visit. Diagnoses and all orders for this visit:    Stage 3a chronic kidney disease ([address])  -     Basic metabolic panel; Future  -     Basic metabolic panel; Future  -     Protein / creatinine ratio, urine; Future  -     Albumin / creatinine urine ratio; Future  -     Phosphorus; Future  -     Magnesium; Future  -     Urinalysis with microscopic; Future  -     PTH, intact; Future    Benign hypertension with chronic kidney disease, stage III (HCC)  -     Basic metabolic panel; Future  -     Protein / creatinine ratio, urine; Future  -     Albumin / creatinine urine ratio;  Future    Solitary kidney, acquired  -     Basic metabolic panel; Future  -     Urinalysis with microscopic; Future            SUBJECTIVE / HPI:  Nelda Schuster is a 70 y.o.  male who underwent elective laparoscopy hand assisted sigmoid resection and left nephrectomy for complicated diverticular disease and left renal cell carcinoma on 12/30/2019.  Nephrology was consulted for increased creatinine postop.  Preoperative creatinine was 0.9.  Postop creatinine stabilized at 1.4 and elevated creatinine was thought to be due to nephron loss. Since then renal function had been stable at creatinine 1.2-1.3 but gradually worsened to creatinine 1.5 since July 2023     Since last office visit he underwent left shoulder arthroscopic revision of rotator cuff repair with biceps tenodesis. REVIEW OF SYSTEMS:    Review of Systems   Constitutional: Negative for chills and fever. HENT: Negative for ear pain and sore throat. Eyes: Negative for pain and visual disturbance. Respiratory: Negative for cough and shortness of breath. Cardiovascular: Negative for chest pain and palpitations. Gastrointestinal: Negative for abdominal pain and vomiting. Genitourinary: Negative for dysuria and hematuria. Musculoskeletal: Negative for arthralgias and back pain. Skin: Negative for color change and rash. Neurological: Negative for seizures and syncope. All other systems reviewed and are negative. More than 10 point review of systems were obtained and discussed in length with the patient. Complete review of systems were negative / unremarkable except mentioned above.        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-   • Arthritis    • Cancer ([address]) 10/13/2019    kidney   • Chronic kidney disease 1/7/2020    stage 3   • CKD (chronic kidney disease)    • Diverticulitis of colon 10/13/2019   • Diverticulitis of large intestine with perforation without bleeding 10/13/2019   • Hyperlipidemia    • Hypertension    • Inflammatory bowel disease 10/13/2019   • Renal cell cancer, left ([address]) 10/13/2019   • Renal cell carcinoma ([address])        PAST SURGICAL HISTORY:  Past Surgical History:   Procedure Laterality Date   • COLONOSCOPY  2015   • HERNIA REPAIR     • HIP SURGERY Right     age 4   • JOINT REPLACEMENT Right 2011    TKR   • NEPHRECTOMY  [id number]    left   • ME LAPAROSCOPY COLECTOMY PARTIAL W/ANASTOMOSIS N/A 12/30/2019    Procedure: LAPAROSCOPIC HAND-ASSISTED SIGMOID COLECTOMY; LAPAROSCOPIC MOBILIZATION OF SPLENIC FLEXURE;  Surgeon: Martha Barraza MD;  [facility];  Service: Colorectal   • ME LAPAROSCOPY RADICAL NEPHRECTOMY Left 12/30/2019    Procedure: NEPHRECTOMY LAPAROSCOPIC HAND ASSISTED;  Surgeon: Adrianna Pendleton MD;  [facility];  Service: Urology   • ME SURGICAL ARTHROSCOPY SHOULDER W/ROTATOR CUFF RPR Left 12/9/2022    Procedure: SHOULDER ARTHROSCOPIC REVISION ROTATOR CUFF REPAIR AND BICEPS TENODESIS;  Surgeon: Ros Hyman MD;  [facility];  Service: Orthopedics   • REPLACEMENT TOTAL KNEE Right 2011   • REPLACEMENT TOTAL KNEE     • SHOULDER SURGERY Left    • SHOULDER SURGERY Right    • THYROID LOBECTOMY Right 10/12/2022    Procedure: RIGHT THYROID LOBECTOMY;  Surgeon: Haven Weir MD;  [facility];  Service: Surgical Oncology   • US GUIDED THYROID BIOPSY  7/9/2021   • WRIST FUSION Left 2016   • WRIST SURGERY         SOCIAL HISTORY:  Social History     Substance and Sexual Activity   Alcohol Use Yes   • Alcohol/week: 2.0 standard drinks of alcohol   • Types: 2 Cans of beer per week    Comment: 1-2 beers/week     Social History     Substance and Sexual Activity   Drug Use No     Social History     Tobacco Use   Smoking Status Never   Smokeless Tobacco Never       FAMILY HISTORY:  Family History   Problem Relation Age of Onset   • Heart disease Father    • Other Father         cardiac disorder   • No Known Problems Mother    • Heart attack Maternal Grandfather    • Heart attack Paternal Grandfather    • Thyroid cancer Son    • Colon cancer Neg Hx        MEDICATIONS:    Current Outpatient Medications:   •  aspirin 81 MG tablet, Take 1 tablet by mouth daily, Disp: , Rfl:   •  atorvastatin (LIPITOR) 20 mg tablet, Take 1 tablet (20 mg total) by mouth daily, Disp: 90 tablet, Rfl: 1  •  losartan (COZAAR) 25 mg tablet, Take 1 tablet (25 mg total) by mouth daily, Disp: 90 tablet, Rfl: 1  •  multivitamin (THERAGRAN) TABS, Take 1 tablet by mouth every other day, Disp: , Rfl:   •  Omega-3 Fatty Acids (FISH OIL) 1200 MG CAPS, Take by mouth in the morning, Disp: , Rfl:       PHYSICAL EXAM:  Vitals:    09/26/23 0847 09/26/23 0856   BP: 130/86 136/70   BP Location: Left arm    Patient Position: Sitting    Cuff Size: Standard    Pulse: (!) 50    SpO2: 98%    Weight: 71.2 kg (157 lb)    Height: 5' 6" (1.676 m)      Body mass index is 25.34 kg/m². Physical Exam  Constitutional:       General: He is not in acute distress. Appearance: He is well-developed. He is not diaphoretic. HENT:      Head: Normocephalic and atraumatic. Mouth/Throat:      Mouth: Mucous membranes are moist.   Eyes:      General: No scleral icterus. Conjunctiva/sclera: Conjunctivae normal.      Pupils: Pupils are equal, round, and reactive to light. Neck:      Thyroid: No thyromegaly. Cardiovascular:      Rate and Rhythm: Normal rate and regular rhythm. Heart sounds: Normal heart sounds. No murmur heard. No friction rub. Pulmonary:      Effort: Pulmonary effort is normal. No respiratory distress. Breath sounds: Normal breath sounds. No wheezing or rales. Abdominal:      General: Bowel sounds are normal. There is no distension. Palpations: Abdomen is soft. Tenderness: There is no abdominal tenderness. Musculoskeletal:         General: No deformity. Cervical back: Neck supple. Right lower leg: No edema. Left lower leg: No edema. Lymphadenopathy:      Cervical: No cervical adenopathy. Skin:     Coloration: Skin is not pale. Nails: There is no clubbing. Neurological:      Mental Status: He is alert and oriented to person, place, and time. He is not disoriented. Psychiatric:         Mood and Affect: Mood normal. Mood is not anxious. Affect is not inappropriate. Behavior: Behavior normal.         Thought Content:  Thought content normal.          Lab Results:   Results for orders placed or performed in visit on 07/12/23   PSA, Total Screen   Result Value Ref Range    PSA 0.49 0.00 - 4.00 ng/mL   Basic metabolic panel   Result Value Ref Range    Sodium 142 135 - 147 mmol/L    Potassium 4.7 3.5 - 5.3 mmol/L    Chloride 109 (H) 96 - 108 mmol/L    CO2 29 21 - 32 mmol/L    ANION GAP 4 mmol/L    BUN 27 (H) 5 - 25 mg/dL    Creatinine 1.50 (H) 0.60 - 1.30 mg/dL    Glucose, Fasting 105 (H) 65 - 99 mg/dL    Calcium 9.6 8.3 - 10.1 mg/dL    eGFR 48 ml/min/1.73sq m   CBC   Result Value Ref Range    WBC 7.80 4.31 - 10.16 Thousand/uL    RBC 4.67 3.88 - 5.62 Million/uL    Hemoglobin 14.4 12.0 - 17.0 g/dL    Hematocrit 43.0 36.5 - 49.3 %    MCV 92 82 - 98 fL    MCH 30.8 26.8 - 34.3 pg    MCHC 33.5 31.4 - 37.4 g/dL    RDW 12.8 11.6 - 15.1 %    Platelets 124 452 - 665 Thousands/uL    MPV 10.3 8.9 - 12.7 fL   Basic metabolic panel   Result Value Ref Range    Sodium 141 135 - 147 mmol/L    Potassium 4.3 3.5 - 5.3 mmol/L    Chloride 107 96 - 108 mmol/L    CO2 28 21 - 32 mmol/L    ANION GAP 6 mmol/L    BUN 26 (H) 5 - 25 mg/dL    Creatinine 1.54 (H) 0.60 - 1.30 mg/dL    Glucose 82 65 - 140 mg/dL    Calcium 8.5 8.3 - 10.1 mg/dL    eGFR 46 ml/min/1.73sq m   Protein / creatinine ratio, urine   Result Value Ref Range    Creatinine, Ur 23.4 mg/dL    Protein Urine Random <6 mg/dL    Prot/Creat Ratio, Ur     PTH, intact   Result Value Ref Range    PTH 80.2 12.0 - 88.0 pg/mL   Urinalysis with microscopic   Result Value Ref Range    Color, UA Colorless Clarity, UA Clear     Specific Gravity, UA 1.006 1.003 - 1.030    pH, UA 6.0 4.5, 5.0, 5.5, 6.0, 6.5, 7.0, 7.5, 8.0    Leukocytes, UA Negative Negative    Nitrite, UA Negative Negative    Protein, UA Negative Negative mg/dl    Glucose, UA Negative Negative mg/dl    Ketones, UA Negative Negative mg/dl    Urobilinogen, UA <2.0 <2.0 mg/dl mg/dl    Bilirubin, UA Negative Negative    Occult Blood, UA Negative Negative    RBC, UA None Seen None Seen, 1-2 /hpf    WBC, UA None Seen None Seen, 1-2 /hpf    Epithelial Cells None Seen None Seen, Occasional /hpf    Bacteria, UA None Seen None Seen, Occasional /hpf   Phosphorus   Result Value Ref Range    Phosphorus 3.9 2.3 - 4.1 mg/dL

## 2023-09-26 NOTE — PATIENT INSTRUCTIONS
Blood pressure is stable    -Recommend 2 g sodium diet. -Recommend daily exercise and weight loss  -Discussed home monitoring of BP and maintaining a log of blood pressure.  -Recommend goal BP less than 130/80. Please inform me if SBP below 110 or above 140's persistently. Renal function is stable. Continue oral hydration. Repeat blood work in 2 months    Follow up: 6  months with repeat Lab work within a week of the scheduled office visit. Will discuss the results of the previsit Labs during the office visit.

## 2023-10-18 ENCOUNTER — OFFICE VISIT (OUTPATIENT)
Dept: URGENT CARE | Age: 63
End: 2023-10-18
Payer: MEDICARE

## 2023-10-18 VITALS
BODY MASS INDEX: 25.23 KG/M2 | WEIGHT: 157 LBS | TEMPERATURE: 98 F | RESPIRATION RATE: 18 BRPM | HEART RATE: 80 BPM | DIASTOLIC BLOOD PRESSURE: 74 MMHG | OXYGEN SATURATION: 98 % | SYSTOLIC BLOOD PRESSURE: 122 MMHG | HEIGHT: 66 IN

## 2023-10-18 DIAGNOSIS — J01.90 ACUTE NON-RECURRENT SINUSITIS, UNSPECIFIED LOCATION: Primary | ICD-10-CM

## 2023-10-18 PROCEDURE — G0463 HOSPITAL OUTPT CLINIC VISIT: HCPCS | Performed by: STUDENT IN AN ORGANIZED HEALTH CARE EDUCATION/TRAINING PROGRAM

## 2023-10-18 PROCEDURE — 99213 OFFICE O/P EST LOW 20 MIN: CPT | Performed by: STUDENT IN AN ORGANIZED HEALTH CARE EDUCATION/TRAINING PROGRAM

## 2023-10-18 RX ORDER — DOXYCYCLINE 100 MG/1
100 CAPSULE ORAL 2 TIMES DAILY
Qty: 14 CAPSULE | Refills: 0 | Status: SHIPPED | OUTPATIENT
Start: 2023-10-20 | End: 2023-10-27

## 2023-10-18 NOTE — PROGRESS NOTES
[facility]        NAME: Piter Robert is a 70 y.o. male  : 1960    MRN: [id number]  DATE: 2023  TIME: 2:49 PM    Assessment and Plan   Acute non-recurrent sinusitis, unspecified location [J01.90]  1. Acute non-recurrent sinusitis, unspecified location  doxycycline monohydrate (MONODOX) 100 mg capsule      Discussed that his symptoms are consistent with viral sinusitis. He is concerned because he is. Provided prescription for doxycycline that can be filled in 2 days if he is having worsening or no improvement of his symptoms. Patient Instructions       Follow up with PCP in 3-5 days. Proceed to  ER if symptoms worsen. Chief Complaint     Chief Complaint   Patient presents with    Cold Like Symptoms     Pt states he has cold like symptoms for about 2 weeks. Pt is concerned he may have a sinus infection. Pt states he also has a cough but denies any fever. History of Present Illness       Patient presents with 6 days of upper respiratory symptoms which started with sore throat, progressed to nasal and sinus congestion, postnasal drip, cough. His symptoms are worse at night with a lot of postnasal drip and coughing. When he blows his nose and coughs, mucus is thin, sometimes clear other times more yellow. Has bilateral nasal pressure, no pain. No fevers, chills. No chest pain, shortness of breath. Review of Systems   Review of Systems   All other systems reviewed and are negative.         Current Medications       Current Outpatient Medications:     [START ON 10/20/2023] doxycycline monohydrate (MONODOX) 100 mg capsule, Take 1 capsule (100 mg total) by mouth 2 (two) times a day for 7 days Do not start before 2023., Disp: 14 capsule, Rfl: 0    aspirin 81 MG tablet, Take 1 tablet by mouth daily, Disp: , Rfl:     atorvastatin (LIPITOR) 20 mg tablet, Take 1 tablet (20 mg total) by mouth daily, Disp: 90 tablet, Rfl: 1    losartan (COZAAR) 25 mg tablet, Take 1 tablet (25 mg total) by mouth daily, Disp: 90 tablet, Rfl: 1    multivitamin (THERAGRAN) TABS, Take 1 tablet by mouth every other day, Disp: , Rfl:     Omega-3 Fatty Acids ([address] [place] [address]) 1200 MG CAPS, Take by mouth in the morning, Disp: , Rfl:     Current Allergies     Allergies as of 10/18/2023    (No Known Allergies)            The following portions of the patient's history were reviewed and updated as appropriate: allergies, current medications, past family history, past medical history, past social history, past surgical history and problem list.     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[address]) 2017    behind right eye-    Arthritis     Cancer ([address]) 10/13/2019    kidney    Chronic kidney disease 1/7/2020    stage 3    CKD (chronic kidney disease)     Diverticulitis of colon 10/13/2019    Diverticulitis of large intestine with perforation without bleeding 10/13/2019    Hyperlipidemia     Hypertension     Inflammatory bowel disease 10/13/2019    Renal cell cancer, left ([address]) 10/13/2019    Renal cell carcinoma ([address])        Past Surgical History:   Procedure Laterality Date    COLONOSCOPY  2015    HERNIA REPAIR      HIP SURGERY Right     age 4    JOINT REPLACEMENT Right 2011    TKR    NEPHRECTOMY  [id number]    left    FL LAPAROSCOPY COLECTOMY PARTIAL W/ANASTOMOSIS N/A 12/30/2019    Procedure: LAPAROSCOPIC HAND-ASSISTED SIGMOID COLECTOMY; LAPAROSCOPIC MOBILIZATION OF SPLENIC FLEXURE;  Surgeon: Martha Barraza MD;  [facility];  Service: Colorectal    FL LAPAROSCOPY RADICAL NEPHRECTOMY Left 12/30/2019    Procedure: NEPHRECTOMY LAPAROSCOPIC HAND ASSISTED;  Surgeon: Adrianna Pendleton MD;  [facility];  Service: Urology    FL SURGICAL ARTHROSCOPY SHOULDER W/ROTATOR CUFF RPR Left 12/9/2022    Procedure: SHOULDER ARTHROSCOPIC REVISION ROTATOR CUFF REPAIR AND BICEPS TENODESIS;  Surgeon: Ros Hyman MD;  [facility];  Service: Orthopedics    REPLACEMENT TOTAL KNEE Right 2011    REPLACEMENT TOTAL KNEE      SHOULDER SURGERY Left     SHOULDER SURGERY Right     THYROID LOBECTOMY Right 10/12/2022    Procedure: RIGHT THYROID LOBECTOMY;  Surgeon: Nadir South MD;  [facility];  Service: Surgical Oncology    US GUIDED THYROID BIOPSY  7/9/2021    WRIST FUSION Left 2016    WRIST SURGERY         Family History   Problem Relation Age of Onset    Heart disease Father     Other Father         cardiac disorder    No Known Problems Mother     Heart attack Maternal Grandfather     Heart attack Paternal Grandfather     Thyroid cancer Son     Colon cancer Neg Hx          Medications have been verified. Objective   /74   Pulse 80   Temp 98 °F (36.7 °C)   Resp 18   Ht 5' 6" (1.676 m)   Wt 71.2 kg (157 lb)   SpO2 98%   BMI 25.34 kg/m²   No LMP for male patient. Physical Exam     Physical Exam  Vitals and nursing note reviewed. Constitutional:       General: He is not in acute distress. Appearance: Normal appearance. He is not ill-appearing or toxic-appearing. HENT:      Head: Normocephalic and atraumatic. Comments: Palpation of maxillary and frontal sinuses with minimal pressure, no pain       Right Ear: Tympanic membrane, ear canal and external ear normal.      Left Ear: Tympanic membrane, ear canal and external ear normal.      Nose: Nose normal.      Mouth/Throat:      Mouth: Mucous membranes are moist.      Comments: cobblestoning  Eyes:      Extraocular Movements: Extraocular movements intact. Cardiovascular:      Rate and Rhythm: Normal rate and regular rhythm. Heart sounds: Normal heart sounds. Pulmonary:      Effort: Pulmonary effort is normal. No respiratory distress. Breath sounds: Normal breath sounds. No stridor. No wheezing, rhonchi or rales. Musculoskeletal:         General: No swelling, tenderness or deformity. Right lower leg: No edema. Left lower leg: No edema.    Skin:     General: Skin is warm and dry. Capillary Refill: Capillary refill takes less than 2 seconds. Findings: No rash. Neurological:      Mental Status: He is alert.       Gait: Gait normal.   Psychiatric:         Behavior: Behavior normal.

## 2023-10-18 NOTE — PATIENT INSTRUCTIONS
If you are getting worse or not starting to improve at all in the next 2 days, you may start the antibiotic. To help with congestion, I recommend taking Mucinex (guaifenesin) 600 to 1200 mg every 12 hours. It is important to take it with plenty of water. To help clear out the mucus and reduce post-nasal drip - use saline nasal spray or saline nasal rinse (with distilled or boiled water) to help clear out mucus and reduce postnasal drip. To soothe sore throat, you may try warm tea with honey. You may take Tylenol or Motrin to help with fever/chills or muscle aches. Stay well hydrated and get plenty of rest.     If your symptoms are worsening or fail to improve in the coming days days, please return to see us or schedule with your PCP.     Nasal Saline Rinse:

## 2023-11-27 DIAGNOSIS — N18.30 BENIGN HYPERTENSION WITH CHRONIC KIDNEY DISEASE, STAGE III (HCC): ICD-10-CM

## 2023-11-27 DIAGNOSIS — E78.2 MIXED HYPERLIPIDEMIA: ICD-10-CM

## 2023-11-27 DIAGNOSIS — I12.9 BENIGN HYPERTENSION WITH CHRONIC KIDNEY DISEASE, STAGE III (HCC): ICD-10-CM

## 2023-11-27 RX ORDER — LOSARTAN POTASSIUM 25 MG/1
25 TABLET ORAL DAILY
Qty: 90 TABLET | Refills: 1 | Status: SHIPPED | OUTPATIENT
Start: 2023-11-27

## 2023-11-27 RX ORDER — ATORVASTATIN CALCIUM 20 MG/1
20 TABLET, FILM COATED ORAL DAILY
Qty: 90 TABLET | Refills: 1 | Status: SHIPPED | OUTPATIENT
Start: 2023-11-27

## 2023-11-28 ENCOUNTER — LAB (OUTPATIENT)
Dept: LAB | Facility: CLINIC | Age: 63
End: 2023-11-28
Payer: MEDICARE

## 2023-11-28 DIAGNOSIS — E89.0 S/P PARTIAL THYROIDECTOMY: ICD-10-CM

## 2023-11-28 DIAGNOSIS — N18.31 STAGE 3A CHRONIC KIDNEY DISEASE (HCC): ICD-10-CM

## 2023-11-28 DIAGNOSIS — E78.2 MIXED HYPERLIPIDEMIA: ICD-10-CM

## 2023-11-28 LAB
ANION GAP SERPL CALCULATED.3IONS-SCNC: 4 MMOL/L
BUN SERPL-MCNC: 18 MG/DL (ref 5–25)
CALCIUM SERPL-MCNC: 9.2 MG/DL (ref 8.4–10.2)
CHLORIDE SERPL-SCNC: 107 MMOL/L (ref 96–108)
CO2 SERPL-SCNC: 32 MMOL/L (ref 21–32)
CREAT SERPL-MCNC: 1.39 MG/DL (ref 0.6–1.3)
GFR SERPL CREATININE-BSD FRML MDRD: 52 ML/MIN/1.73SQ M
GLUCOSE P FAST SERPL-MCNC: 95 MG/DL (ref 65–99)
POTASSIUM SERPL-SCNC: 4.6 MMOL/L (ref 3.5–5.3)
SODIUM SERPL-SCNC: 143 MMOL/L (ref 135–147)

## 2023-11-28 PROCEDURE — 80048 BASIC METABOLIC PNL TOTAL CA: CPT

## 2023-11-28 PROCEDURE — 36415 COLL VENOUS BLD VENIPUNCTURE: CPT

## 2023-11-28 NOTE — RESULT ENCOUNTER NOTE
Please inform patient that renal function improved to creatinine 1.39 mg/dL.   Continue same treatment

## 2023-11-29 ENCOUNTER — TELEPHONE (OUTPATIENT)
Dept: NEPHROLOGY | Facility: CLINIC | Age: 63
End: 2023-11-29

## 2023-11-29 NOTE — TELEPHONE ENCOUNTER
----- Message from Tati Youngblood MD sent at 11/28/2023  5:02 PM EST -----  Please inform patient that renal function improved to creatinine 1.39 mg/dL.   Continue same treatment

## 2024-01-18 ENCOUNTER — TELEPHONE (OUTPATIENT)
Dept: NEPHROLOGY | Facility: CLINIC | Age: 64
End: 2024-01-18

## 2024-01-18 NOTE — TELEPHONE ENCOUNTER
Pt called to schedule March follow up with Dr. Walker but the dates that were available did not work for the patient. Pt will wait until the April schedule comes out.

## 2024-02-29 DIAGNOSIS — N18.30 BENIGN HYPERTENSION WITH CHRONIC KIDNEY DISEASE, STAGE III (HCC): ICD-10-CM

## 2024-02-29 DIAGNOSIS — I12.9 BENIGN HYPERTENSION WITH CHRONIC KIDNEY DISEASE, STAGE III (HCC): ICD-10-CM

## 2024-02-29 DIAGNOSIS — E78.2 MIXED HYPERLIPIDEMIA: ICD-10-CM

## 2024-02-29 RX ORDER — ATORVASTATIN CALCIUM 20 MG/1
20 TABLET, FILM COATED ORAL DAILY
Qty: 90 TABLET | Refills: 0 | Status: CANCELLED | OUTPATIENT
Start: 2024-02-29

## 2024-02-29 RX ORDER — LOSARTAN POTASSIUM 25 MG/1
25 TABLET ORAL DAILY
Qty: 90 TABLET | Refills: 0 | Status: CANCELLED | OUTPATIENT
Start: 2024-02-29

## 2024-03-01 ENCOUNTER — APPOINTMENT (OUTPATIENT)
Dept: LAB | Facility: CLINIC | Age: 64
End: 2024-03-01
Payer: MEDICARE

## 2024-03-01 DIAGNOSIS — Z90.5 SOLITARY KIDNEY, ACQUIRED: ICD-10-CM

## 2024-03-01 DIAGNOSIS — I12.9 BENIGN HYPERTENSION WITH CHRONIC KIDNEY DISEASE, STAGE III (HCC): ICD-10-CM

## 2024-03-01 DIAGNOSIS — N18.30 BENIGN HYPERTENSION WITH CHRONIC KIDNEY DISEASE, STAGE III (HCC): ICD-10-CM

## 2024-03-01 DIAGNOSIS — N18.31 STAGE 3A CHRONIC KIDNEY DISEASE (HCC): ICD-10-CM

## 2024-03-01 LAB
ALBUMIN SERPL BCP-MCNC: 4.4 G/DL (ref 3.5–5)
ALP SERPL-CCNC: 65 U/L (ref 34–104)
ALT SERPL W P-5'-P-CCNC: 16 U/L (ref 7–52)
ANION GAP SERPL CALCULATED.3IONS-SCNC: 8 MMOL/L
AST SERPL W P-5'-P-CCNC: 19 U/L (ref 13–39)
BACTERIA UR QL AUTO: NORMAL /HPF
BILIRUB SERPL-MCNC: 0.87 MG/DL (ref 0.2–1)
BILIRUB UR QL STRIP: NEGATIVE
BUN SERPL-MCNC: 19 MG/DL (ref 5–25)
CALCIUM SERPL-MCNC: 9 MG/DL (ref 8.4–10.2)
CHLORIDE SERPL-SCNC: 104 MMOL/L (ref 96–108)
CHOLEST SERPL-MCNC: 151 MG/DL
CLARITY UR: CLEAR
CO2 SERPL-SCNC: 29 MMOL/L (ref 21–32)
COLOR UR: NORMAL
CREAT SERPL-MCNC: 1.22 MG/DL (ref 0.6–1.3)
CREAT UR-MCNC: 49.3 MG/DL
CREAT UR-MCNC: 49.3 MG/DL
GFR SERPL CREATININE-BSD FRML MDRD: 61 ML/MIN/1.73SQ M
GLUCOSE P FAST SERPL-MCNC: 88 MG/DL (ref 65–99)
GLUCOSE UR STRIP-MCNC: NEGATIVE MG/DL
HDLC SERPL-MCNC: 42 MG/DL
HGB UR QL STRIP.AUTO: NEGATIVE
KETONES UR STRIP-MCNC: NEGATIVE MG/DL
LDLC SERPL CALC-MCNC: 89 MG/DL (ref 0–100)
LEUKOCYTE ESTERASE UR QL STRIP: NEGATIVE
MAGNESIUM SERPL-MCNC: 2.3 MG/DL (ref 1.9–2.7)
MICROALBUMIN UR-MCNC: <7 MG/L
MICROALBUMIN/CREAT 24H UR: <14 MG/G CREATININE (ref 0–30)
NITRITE UR QL STRIP: NEGATIVE
NON-SQ EPI CELLS URNS QL MICRO: NORMAL /HPF
PH UR STRIP.AUTO: 6.5 [PH]
PHOSPHATE SERPL-MCNC: 3.4 MG/DL (ref 2.3–4.1)
POTASSIUM SERPL-SCNC: 4.4 MMOL/L (ref 3.5–5.3)
PROT SERPL-MCNC: 7 G/DL (ref 6.4–8.4)
PROT UR STRIP-MCNC: NEGATIVE MG/DL
PROT UR-MCNC: <4 MG/DL
PTH-INTACT SERPL-MCNC: 40.8 PG/ML (ref 12–88)
RBC #/AREA URNS AUTO: NORMAL /HPF
SODIUM SERPL-SCNC: 141 MMOL/L (ref 135–147)
SP GR UR STRIP.AUTO: 1.01 (ref 1–1.03)
TRIGL SERPL-MCNC: 102 MG/DL
TSH SERPL DL<=0.05 MIU/L-ACNC: 2.5 UIU/ML (ref 0.45–4.5)
UROBILINOGEN UR STRIP-ACNC: <2 MG/DL
WBC #/AREA URNS AUTO: NORMAL /HPF

## 2024-03-01 PROCEDURE — 82570 ASSAY OF URINE CREATININE: CPT

## 2024-03-01 PROCEDURE — 83735 ASSAY OF MAGNESIUM: CPT

## 2024-03-01 PROCEDURE — 84100 ASSAY OF PHOSPHORUS: CPT

## 2024-03-01 PROCEDURE — 84156 ASSAY OF PROTEIN URINE: CPT

## 2024-03-01 PROCEDURE — 81001 URINALYSIS AUTO W/SCOPE: CPT

## 2024-03-01 PROCEDURE — 83970 ASSAY OF PARATHORMONE: CPT

## 2024-03-01 PROCEDURE — 82043 UR ALBUMIN QUANTITATIVE: CPT

## 2024-03-04 ENCOUNTER — TELEPHONE (OUTPATIENT)
Dept: NEPHROLOGY | Facility: CLINIC | Age: 64
End: 2024-03-04

## 2024-03-04 NOTE — TELEPHONE ENCOUNTER
Called patient and left a voicemail stating the following information:    Patients renal function is stable at creatinine 1.22 mg/dL, potassium is at normal range, continue same treatment.    I asked the patient please call back if they have any further questions.

## 2024-03-04 NOTE — TELEPHONE ENCOUNTER
----- Message from Tom Walker MD sent at 3/1/2024  7:14 PM EST -----  Please inform patient that renal function is stable at creatinine 1.22 mg/dL, potassium is at normal range, continue same treatment

## 2024-04-01 ENCOUNTER — APPOINTMENT (OUTPATIENT)
Dept: RADIOLOGY | Facility: OTHER | Age: 64
End: 2024-04-01
Payer: MEDICARE

## 2024-04-01 ENCOUNTER — OFFICE VISIT (OUTPATIENT)
Dept: OBGYN CLINIC | Facility: OTHER | Age: 64
End: 2024-04-01
Payer: MEDICARE

## 2024-04-01 ENCOUNTER — OFFICE VISIT (OUTPATIENT)
Dept: NEPHROLOGY | Facility: CLINIC | Age: 64
End: 2024-04-01
Payer: MEDICARE

## 2024-04-01 VITALS
SYSTOLIC BLOOD PRESSURE: 122 MMHG | HEIGHT: 66 IN | DIASTOLIC BLOOD PRESSURE: 60 MMHG | BODY MASS INDEX: 25.07 KG/M2 | WEIGHT: 156 LBS

## 2024-04-01 VITALS
BODY MASS INDEX: 25.07 KG/M2 | WEIGHT: 156 LBS | HEART RATE: 68 BPM | HEIGHT: 66 IN | SYSTOLIC BLOOD PRESSURE: 135 MMHG | DIASTOLIC BLOOD PRESSURE: 88 MMHG

## 2024-04-01 DIAGNOSIS — N18.30 BENIGN HYPERTENSION WITH CHRONIC KIDNEY DISEASE, STAGE III (HCC): ICD-10-CM

## 2024-04-01 DIAGNOSIS — E78.2 MIXED HYPERLIPIDEMIA: ICD-10-CM

## 2024-04-01 DIAGNOSIS — M75.51 SUBACROMIAL BURSITIS OF RIGHT SHOULDER JOINT: Primary | ICD-10-CM

## 2024-04-01 DIAGNOSIS — M25.511 ACUTE PAIN OF RIGHT SHOULDER: ICD-10-CM

## 2024-04-01 DIAGNOSIS — I12.9 BENIGN HYPERTENSION WITH CHRONIC KIDNEY DISEASE, STAGE III (HCC): ICD-10-CM

## 2024-04-01 DIAGNOSIS — Z90.5 SOLITARY KIDNEY, ACQUIRED: ICD-10-CM

## 2024-04-01 DIAGNOSIS — N18.31 STAGE 3A CHRONIC KIDNEY DISEASE (HCC): Primary | ICD-10-CM

## 2024-04-01 DIAGNOSIS — M75.81 TENDINITIS OF RIGHT ROTATOR CUFF: ICD-10-CM

## 2024-04-01 PROCEDURE — 73030 X-RAY EXAM OF SHOULDER: CPT

## 2024-04-01 PROCEDURE — 99214 OFFICE O/P EST MOD 30 MIN: CPT | Performed by: INTERNAL MEDICINE

## 2024-04-01 PROCEDURE — 20610 DRAIN/INJ JOINT/BURSA W/O US: CPT | Performed by: ORTHOPAEDIC SURGERY

## 2024-04-01 PROCEDURE — 99214 OFFICE O/P EST MOD 30 MIN: CPT | Performed by: ORTHOPAEDIC SURGERY

## 2024-04-01 RX ORDER — BUPIVACAINE HYDROCHLORIDE 2.5 MG/ML
2 INJECTION, SOLUTION INFILTRATION; PERINEURAL
Status: COMPLETED | OUTPATIENT
Start: 2024-04-01 | End: 2024-04-01

## 2024-04-01 RX ORDER — BETAMETHASONE SODIUM PHOSPHATE AND BETAMETHASONE ACETATE 3; 3 MG/ML; MG/ML
6 INJECTION, SUSPENSION INTRA-ARTICULAR; INTRALESIONAL; INTRAMUSCULAR; SOFT TISSUE
Status: COMPLETED | OUTPATIENT
Start: 2024-04-01 | End: 2024-04-01

## 2024-04-01 RX ADMIN — BUPIVACAINE HYDROCHLORIDE 2 ML: 2.5 INJECTION, SOLUTION INFILTRATION; PERINEURAL at 13:45

## 2024-04-01 RX ADMIN — BETAMETHASONE SODIUM PHOSPHATE AND BETAMETHASONE ACETATE 6 MG: 3; 3 INJECTION, SUSPENSION INTRA-ARTICULAR; INTRALESIONAL; INTRAMUSCULAR; SOFT TISSUE at 13:45

## 2024-04-01 NOTE — PROGRESS NOTES
Assessment  Diagnoses and all orders for this visit:    Subacromial bursitis of right shoulder joint    Tendinitis of right rotator cuff        Discussion and Plan:  The patient has an examination consistent with subacromial impingement syndrome of the right shoulder.  I have discussed with the patient the pathophysiology of this diagnosis and reviewed how the examination correlates with this diagnosis.  Treatment options were discussed at length and after discussing these treatment options, the patient elected for and received a subacromial injection of corticosteroid (as described in the procedure note) with a prescription for referral to physical therapy.  If the symptoms fail to improve with this treatment the patient would be indicated for further imaging in the form of an MRI scan of the shoulder.     Subjective:   Patient ID: Adrian Mares is a 63 y.o. male      HPI  The patient presents with a chief complaint of right shoulder pain.   The pain began several month(s) ago and is not associated with an acute injury. The patient describes the pain as aching, dull, and sharp in intensity,  intermittent in timing, and localizes the pain to the  right posterior and lateral shoulder.  The pain is worse with movement, overuse, and golf  and relieved by rest.  The pain is not associated with numbness and tingling.  The pain is not associated with constitutional symptoms. The patient is not awoken at night by the pain.      Patient reports he had a prior RIGHT RTC repair about 20 yrs ago.     The patient had a s/p revision rotator cuff repair of his left shoulder done on 12/9/22.       The following portions of the patient's history were reviewed and updated as appropriate: allergies, current medications, past family history, past medical history, past social history, past surgical history and problem list.        Objective:  /88 (BP Location: Right arm, Patient Position: Sitting, Cuff Size: Standard)    "Pulse 68   Ht 5' 6\" (1.676 m)   Wt 70.8 kg (156 lb)   BMI 25.18 kg/m²       Right Shoulder Exam     Tenderness   The patient is experiencing no tenderness.    Range of Motion   The patient has normal right shoulder ROM.    Muscle Strength   Abduction: 4/5   Internal rotation: 5/5   External rotation: 4/5     Tests   Stallworth test: positive  Impingement: positive    Other   Erythema: absent  Sensation: normal  Pulse: present            Physical Exam  Vitals reviewed.   Constitutional:       Appearance: He is well-developed.   HENT:      Head: Normocephalic.   Eyes:      Pupils: Pupils are equal, round, and reactive to light.   Pulmonary:      Effort: Pulmonary effort is normal.   Abdominal:      General: Abdomen is flat. There is no distension.   Skin:     General: Skin is warm and dry.       Large joint arthrocentesis: R subacromial bursa  Universal Protocol:  Consent: Verbal consent obtained.  Consent given by: parent  Patient understanding: patient states understanding of the procedure being performed  Site marked: the operative site was marked  Patient identity confirmed: verbally with patient  Supporting Documentation  Indications: pain   Procedure Details  Location: shoulder - R subacromial bursa  Preparation: Patient was prepped and draped in the usual sterile fashion  Needle size: 22 G  Ultrasound guidance: no  Approach: lateral  Medications administered: 2 mL bupivacaine 0.25 %; 6 mg betamethasone acetate-betamethasone sodium phosphate 6 (3-3) mg/mL    Patient tolerance: patient tolerated the procedure well with no immediate complications  Dressing:  Sterile dressing applied            I have personally reviewed pertinent films in PACS and my interpretation is as follows.    Right shoulder MRI demonstrates no fracture or dislocation, mild degenerative changes.    Scribe Attestation      I,:  Crystal Beasley am acting as a scribe while in the presence of the attending physician.:       I,:  Larry Loomis " MD Leonid personally performed the services described in this documentation    as scribed in my presence.:

## 2024-04-01 NOTE — PROGRESS NOTES
NEPHROLOGY OUTPATIENT PROGRESS NOTE   Adrian Mares 63 y.o. male MRN: [id number]  DATE: 4/1/2024  Reason for visit:   Chief Complaint   Patient presents with    Follow-up    Chronic Kidney Disease       ASSESSMENT and PLAN:  Chronic kidney disease stage 3a  - new baseline is around 1.2 to 1.5 mg/dLs  -Renal function is stable, last blood work showed creatinine 1.22 mg/dL which is within baseline.  Continue to monitor renal function continue to avoid nephrotoxins like NSAIDs and IV contrast.  Stressed on oral hydration.  -Chronic kidney disease likely due to decreased nephron mass from left nephrectomy done for renal cell carcinoma left kidney.  -preop creatinine was 0.8-0.9 but has been elevated and stable at  1.3-1.5 likely due to decreased nephron mass from left nephrectomy.  -Patient will need renal prep with IV fluid before any CT with IV contrast and this was discussed with patient in detail.   -PTH is normal range, no proteinuria, electrolytes are stable, continue to monitor  -Previously discussed about FArxiga but currently with no proteinuria, will hold off but may consider in future if renal function worsens or if there is any significant proteinuria  -Okay to do MRI with gadolinium.   -Follow-up in 6 months with repeat labs      Primary Hypertension with CKD:   -Current medication: losartan 25 mg     -Current blood pressure: BP stable and is at goal  -Plan:    Continue current dose of losartan  -Recommend 2 g sodium diet.    -Recommend daily exercise and weight loss  -Discussed home monitoring of BP and maintaining a log of blood pressure.  -Recommend goal BP less than 130/80.      Solitary right kidney  -status post left nephrectomy for renal cell carcinoma-done on 12/30/19 by Dr. Solomon Sagastume pathology results suggestive of renal cell carcinoma-conventional clear cell type.   -continue follow-up with Urology, was last seen in  August 28, 2023 was reviewed, plan for MRI next year with repeat BMP and PSA  prior. Has appt in July and MRI in July   -Last CT abdomen pelvis with contrast from July 28, 2023 did not suggest any local recurrence or metastatic disease     Proteinuria-resolved  -Last UPC ratio indicates no proteinuria  -proteinuria was likely due to HTN as well as from compensatory hypertrophy in solitary kidney.  - Recommended goal blood pressure less than 130/80  -Continue ARB     CKD/MBD: PTH at normal range, phosphorus at normal range, continue to monitor    Hyperlipidemia:   -Lipid panel at goal  -Last lipid panel from March 1, 2024 showed LDL at goal at 89 mg/dl.  LDL is at goal.  Triglyceride at normal range at 102  - continue same treatment, stressed on daily exercise  -continue statin as well as fish oil per PCP  -recommend goal LDL less than 100, continue monitoring and management per PCP          Patient Instructions   Blood pressure is stable    -Recommend 2 g sodium diet.    -Recommend daily exercise and weight loss  -Discussed home monitoring of BP and maintaining a log of blood pressure.  -Recommend goal BP less than 130/80. Please inform me if SBP below 110 or above 140's persistently.     -Renal Function is stable   -You have Chronic Kidney Disease Stage 3   -Avoid NSAIDs like Ibuprofen/Motrin, Naproxen/Aleve, Celebrex, meloxicam/Mobic, Diclofenac and other NSAIDs.  -Okay to take Acetaminophen/Tylenol if you do not have any liver problems  -Avoid IV contrast used for CT scan and cardiac catheterization.    -If plan for any study with IV contrast, please let me know so we could hydrate with fluids before and after IV contrast  -Dosage  of certain medications may need to be adjusted depending on Kidney function.     Follow up: 6  months with repeat Lab work within a week of the scheduled office visit. Will discuss the results of the previsit Labs during the office visit.        Diagnoses and all orders for this visit:    Stage 3a chronic kidney disease (HCC)  -     Basic metabolic panel;  Future  -     Protein / creatinine ratio, urine; Future  -     Urinalysis with microscopic; Future  -     PTH, intact; Future  -     Phosphorus; Future  -     CBC; Future    Benign hypertension with chronic kidney disease, stage III (HCC)  -     Basic metabolic panel; Future    Solitary kidney, acquired  -     Basic metabolic panel; Future    Mixed hyperlipidemia            SUBJECTIVE / HPI:  Adrian Mares is a 63 y.o.  male who underwent elective laparoscopy hand assisted sigmoid resection and left nephrectomy for complicated diverticular disease and left renal cell carcinoma on 12/30/2019.  Nephrology was consulted for increased creatinine postop.  Preoperative creatinine was 0.9.  Postop creatinine stabilized at 1.4 and elevated creatinine was thought to be due to nephron loss.  Since then renal function had been stable at creatinine 1.2-1.3 but gradually worsened to creatinine 1.5 since July 2023, since November 28, 2023 renal function stable creatinine 1.3    Last blood work from March 1, 2024 showed creatinine 1.22 mg/dL, EGFR 61, normal phosphorus magnesium and potassium.  LDL at goal at 89.  PTH at normal range at 40.8.  UA bland, no proteinuria UPC ratio     Patient denies any new complaints.  Reviewed PCP note from October 2023, patient had acute sinusitis at that time and was treated with doxycycline    REVIEW OF SYSTEMS:    Review of Systems   Constitutional:  Negative for chills and fever.   HENT:  Negative for ear pain and sore throat.    Eyes:  Negative for pain and visual disturbance.   Respiratory:  Negative for cough and shortness of breath.    Cardiovascular:  Negative for chest pain and palpitations.   Gastrointestinal:  Negative for abdominal pain and vomiting.   Genitourinary:  Negative for dysuria and hematuria.   Musculoskeletal:  Negative for arthralgias and back pain.   Skin:  Negative for color change and rash.   Neurological:  Negative for seizures and syncope.   All other systems  reviewed and are negative.      More than 10 point review of systems were obtained and discussed in length with the patient. Complete review of systems were negative / unremarkable except mentioned above.       PAST MEDICAL HISTORY:  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HCC) 2017    behind right eye-    Arthritis     Cancer (HCC) 10/13/2019    kidney    Chronic kidney disease 1/7/2020    stage 3    CKD (chronic kidney disease)     Diverticulitis of colon 10/13/2019    Diverticulitis of large intestine with perforation without bleeding 10/13/2019    Hyperlipidemia     Hypertension     Inflammatory bowel disease 10/13/2019    Renal cell cancer, left (HCC) 10/13/2019    Renal cell carcinoma (HCC)        PAST SURGICAL HISTORY:  Past Surgical History:   Procedure Laterality Date    COLONOSCOPY  2015    HERNIA REPAIR      HIP SURGERY Right     age 9    JOINT REPLACEMENT Right 2011    TKR    NEPHRECTOMY  [id number]    left    VT LAPAROSCOPY COLECTOMY PARTIAL W/ANASTOMOSIS N/A 12/30/2019    Procedure: LAPAROSCOPIC HAND-ASSISTED SIGMOID COLECTOMY; LAPAROSCOPIC MOBILIZATION OF SPLENIC FLEXURE;  Surgeon: ELKIN Ramos MD;  [facility];  Service: Colorectal    VT LAPAROSCOPY RADICAL NEPHRECTOMY Left 12/30/2019    Procedure: NEPHRECTOMY LAPAROSCOPIC HAND ASSISTED;  Surgeon: Harshad Gardner MD;  [facility];  Service: Urology    VT SURGICAL ARTHROSCOPY SHOULDER W/ROTATOR CUFF RPR Left 12/9/2022    Procedure: SHOULDER ARTHROSCOPIC REVISION ROTATOR CUFF REPAIR AND BICEPS TENODESIS;  Surgeon: Larry Jaquez MD;  Location: [facility];  Service: Orthopedics    REPLACEMENT TOTAL KNEE Right 2011    REPLACEMENT TOTAL KNEE      SHOULDER SURGERY Left     SHOULDER SURGERY Right     THYROID LOBECTOMY Right 10/12/2022    Procedure: RIGHT THYROID LOBECTOMY;  Surgeon: Hieu Melchor MD;  Location[facility];  Service: Surgical Oncology    US GUIDED THYROID BIOPSY  7/9/2021    WRIST FUSION  "Left 2016    WRIST SURGERY         SOCIAL HISTORY:  Social History     Substance and Sexual Activity   Alcohol Use Yes    Alcohol/week: 2.0 standard drinks of alcohol    Types: 2 Cans of beer per week    Comment: 1-2 beers/week     Social History     Substance and Sexual Activity   Drug Use No     Social History     Tobacco Use   Smoking Status Never   Smokeless Tobacco Never       FAMILY HISTORY:  Family History   Problem Relation Age of Onset    Heart disease Father     Other Father         cardiac disorder    No Known Problems Mother     Heart attack Maternal Grandfather     Heart attack Paternal Grandfather     Thyroid cancer Son     Colon cancer Neg Hx        MEDICATIONS:    Current Outpatient Medications:     aspirin 81 MG tablet, Take 1 tablet by mouth daily, Disp: , Rfl:     atorvastatin (LIPITOR) 20 mg tablet, Take 1 tablet (20 mg total) by mouth daily, Disp: 90 tablet, Rfl: 1    losartan (COZAAR) 25 mg tablet, Take 1 tablet (25 mg total) by mouth daily, Disp: 90 tablet, Rfl: 1    multivitamin (THERAGRAN) TABS, Take 1 tablet by mouth every other day, Disp: , Rfl:     Omega-3 Fatty Acids (FISH OIL) 1200 MG CAPS, Take by mouth in the morning, Disp: , Rfl:       PHYSICAL EXAM:  Vitals:    04/01/24 1025   BP: 122/60   BP Location: Left arm   Patient Position: Sitting   Cuff Size: Large   Weight: 70.8 kg (156 lb)   Height: 5' 6\" (1.676 m)     Body mass index is 25.18 kg/m².    Physical Exam  Constitutional:       General: He is not in acute distress.     Appearance: He is well-developed. He is not diaphoretic.   HENT:      Head: Normocephalic and atraumatic.      Mouth/Throat:      Mouth: Mucous membranes are moist.   Eyes:      General: No scleral icterus.     Conjunctiva/sclera: Conjunctivae normal.      Pupils: Pupils are equal, round, and reactive to light.   Neck:      Thyroid: No thyromegaly.   Cardiovascular:      Rate and Rhythm: Normal rate and regular rhythm.      Heart sounds: Normal heart sounds. No " murmur heard.     No friction rub.   Pulmonary:      Effort: Pulmonary effort is normal. No respiratory distress.      Breath sounds: Normal breath sounds. No wheezing or rales.   Abdominal:      General: There is no distension.      Palpations: Abdomen is soft.      Tenderness: There is no abdominal tenderness. There is no right CVA tenderness or left CVA tenderness.   Musculoskeletal:         General: No deformity.      Cervical back: Neck supple.      Right lower leg: No edema.      Left lower leg: No edema.   Lymphadenopathy:      Cervical: No cervical adenopathy.   Skin:     General: Skin is warm and dry.      Coloration: Skin is not jaundiced or pale.      Nails: There is no clubbing.   Neurological:      Mental Status: He is alert and oriented to person, place, and time. He is not disoriented.   Psychiatric:         Mood and Affect: Mood normal. Mood is not anxious. Affect is not inappropriate.         Behavior: Behavior normal.         Thought Content: Thought content normal.         Lab Results:   Results for orders placed or performed in visit on 03/01/24   Protein / creatinine ratio, urine   Result Value Ref Range    Creatinine, Ur 49.3 Reference range not established. mg/dL    Protein Urine Random <4 Reference range not established. mg/dL    Prot/Creat Ratio, Ur     Albumin / creatinine urine ratio   Result Value Ref Range    Creatinine, Ur 49.3 Reference range not established. mg/dL    Albumin,U,Random <7.0 <20.0 mg/L    Albumin Creat Ratio <14 0 - 30 mg/g creatinine   Phosphorus   Result Value Ref Range    Phosphorus 3.4 2.3 - 4.1 mg/dL   Magnesium   Result Value Ref Range    Magnesium 2.3 1.9 - 2.7 mg/dL   Urinalysis with microscopic   Result Value Ref Range    Color, UA Light Yellow     Clarity, UA Clear     Specific Gravity, UA 1.009 1.003 - 1.030    pH, UA 6.5 4.5, 5.0, 5.5, 6.0, 6.5, 7.0, 7.5, 8.0    Leukocytes, UA Negative Negative    Nitrite, UA Negative Negative    Protein, UA Negative Negative  mg/dl    Glucose, UA Negative Negative mg/dl    Ketones, UA Negative Negative mg/dl    Urobilinogen, UA <2.0 <2.0 mg/dl mg/dl    Bilirubin, UA Negative Negative    Occult Blood, UA Negative Negative    RBC, UA None Seen None Seen, 1-2 /hpf    WBC, UA 1-2 None Seen, 1-2 /hpf    Epithelial Cells None Seen None Seen, Occasional /hpf    Bacteria, UA None Seen None Seen, Occasional /hpf   PTH, intact   Result Value Ref Range    PTH 40.8 12.0 - 88.0 pg/mL

## 2024-04-01 NOTE — PATIENT INSTRUCTIONS
Blood pressure is stable    -Recommend 2 g sodium diet.    -Recommend daily exercise and weight loss  -Discussed home monitoring of BP and maintaining a log of blood pressure.  -Recommend goal BP less than 130/80. Please inform me if SBP below 110 or above 140's persistently.     -Renal Function is stable   -You have Chronic Kidney Disease Stage 3   -Avoid NSAIDs like Ibuprofen/Motrin, Naproxen/Aleve, Celebrex, meloxicam/Mobic, Diclofenac and other NSAIDs.  -Okay to take Acetaminophen/Tylenol if you do not have any liver problems  -Avoid IV contrast used for CT scan and cardiac catheterization.    -If plan for any study with IV contrast, please let me know so we could hydrate with fluids before and after IV contrast  -Dosage  of certain medications may need to be adjusted depending on Kidney function.     Follow up: 6  months with repeat Lab work within a week of the scheduled office visit. Will discuss the results of the previsit Labs during the office visit.

## 2024-05-14 ENCOUNTER — EVALUATION (OUTPATIENT)
Dept: PHYSICAL THERAPY | Age: 64
End: 2024-05-14
Payer: MEDICARE

## 2024-05-14 DIAGNOSIS — M75.51 SUBACROMIAL BURSITIS OF RIGHT SHOULDER JOINT: Primary | ICD-10-CM

## 2024-05-14 DIAGNOSIS — M75.81 TENDINITIS OF RIGHT ROTATOR CUFF: ICD-10-CM

## 2024-05-14 DIAGNOSIS — M25.511 ACUTE PAIN OF RIGHT SHOULDER: ICD-10-CM

## 2024-05-14 PROCEDURE — 97161 PT EVAL LOW COMPLEX 20 MIN: CPT | Performed by: PHYSICAL THERAPIST

## 2024-05-14 PROCEDURE — 97110 THERAPEUTIC EXERCISES: CPT | Performed by: PHYSICAL THERAPIST

## 2024-05-14 NOTE — PROGRESS NOTES
PT Evaluation     Today's date: 2024  Patient name: Adrian Mares  : 1960  MRN: [id number]  Referring provider: Larry Jaquez*  Dx:   Encounter Diagnosis     ICD-10-CM    1. Subacromial bursitis of right shoulder joint  M75.51 Ambulatory Referral to Physical Therapy      2. Tendinitis of right rotator cuff  M75.81 Ambulatory Referral to Physical Therapy      3. Acute pain of right shoulder  M25.511 Ambulatory Referral to Physical Therapy          Start Time: 845  Stop Time: 930  Total time in clinic (min): 45 minutes    Assessment  Assessment details: Pt is a 58 y/o male who presents with right shoulder pain. No further referral is necessary at this time. Pt has a movement impairment diagnosis of impingement syndrome representing a pathoantomical diagnosis of RTC pathology. Patient has had RTC tear on left and reports similar symptoms but would like to continue with conservative treatment to avoid surgery. Pt is experiencing pain, decreased strength, and decreased ROM. Pt has a positive prognosis. Pt would benefit from PT to address these impairments leading to increased functional capacity and improved quality of life.    Impairments: abnormal or restricted ROM, impaired physical strength, lacks appropriate home exercise program, pain with function, poor posture  and poor body mechanics  Understanding of Dx/Px/POC: good   Prognosis: good     Goals  Short Term Goals: to be achieved by 4 weeks  1) Patient to be independent with basic HEP.  2) Decrease pain to 2/10 at its worst.  3) Increase shoulder ROM by 5-10 degrees in all planes  4) Increase shoulder strength by 1/2 MMT grade in all deficient planes.     Long Term Goals: to be achieved by discharge  1) FOTO equal to or greater than 71.  2) Patient to be independent with comprehensive HEP.  3) Patient will demonstrate maximal over head reaching  4) Increase UE strength to 5/5 MMT grade in all planes to improve a/iadls.  5) Patient to  report no sleep interruption secondary to pain.        Plan  Patient would benefit from: skilled physical therapy  Planned modality interventions: cryotherapy and thermotherapy: hydrocollator packs  Planned therapy interventions: neuromuscular re-education, patient education, stretching, strengthening, therapeutic activities, therapeutic exercise, therapeutic training, home exercise program and graded activity  Frequency: Twice a week for 8 weeks.  Treatment plan discussed with: patient           Subjective Evaluation    H     Patient Goals  Patient goals for therapy: decreased pain, independence with ADLs/IADLs, return to sport/leisure activities, increased strength and increased motion  Pain worse at night and constantly wakes him up     Pain  Current pain ratin  At best pain ratin  At worst pain ratin  Quality: radiating, throbbing, tight and squeezing  Aggravating factors: overhead activity, keyboarding and lifting     Hand dominance: right           Objective      Concurrent Complaints  Negative for night pain, disturbed sleep, dizziness, faints, headaches, nausea/motion sickness, tinnitus, trouble swallowing, difficulty breathing, shortness of breath, respiratory pain, visual change, cardiac problem, kidney problem, gallbladder problem, stomach problem, ulcer, appendix problem, spleen problem, pancreas problem, history of cancer, history of trauma and infection     Active Range of Motion   Cervical/Thoracic Spine         Cervical     Left rotation:  with pain Restriction level: moderate  Right rotation:  with pain Restriction level: moderate     Strength/Myotome Testing           Right Shoulder      Planes of Motion   Flexion: 4-   Abduction: 4-   External rotation at 0°: 4-     Lower Trap: 4-  Middle Trap: 4-     Scapular dyskinesia at inferior angle            Right Shoulder   Positive Hawkin's and painful arc.   Positive full can.   Neuro Exam:      Headaches   Patient reports headaches: No.           Precautions: HTN, CKD, Hx of Ca,       Manuals 5/14                                                                Neuro Re-Ed                                                                                                        Ther Ex             SL ER HEP            Wall slides flexion HEP            Wall punch iso flexion HEP                                                                             Ther Activity                                       Gait Training                                       Modalities

## 2024-05-17 ENCOUNTER — OFFICE VISIT (OUTPATIENT)
Dept: PHYSICAL THERAPY | Age: 64
End: 2024-05-17
Payer: MEDICARE

## 2024-05-17 DIAGNOSIS — M75.81 TENDINITIS OF RIGHT ROTATOR CUFF: ICD-10-CM

## 2024-05-17 DIAGNOSIS — M25.511 ACUTE PAIN OF RIGHT SHOULDER: ICD-10-CM

## 2024-05-17 DIAGNOSIS — M75.51 SUBACROMIAL BURSITIS OF RIGHT SHOULDER JOINT: Primary | ICD-10-CM

## 2024-05-17 PROCEDURE — 97140 MANUAL THERAPY 1/> REGIONS: CPT | Performed by: PHYSICAL THERAPIST

## 2024-05-17 PROCEDURE — 97110 THERAPEUTIC EXERCISES: CPT | Performed by: PHYSICAL THERAPIST

## 2024-05-17 PROCEDURE — 97112 NEUROMUSCULAR REEDUCATION: CPT | Performed by: PHYSICAL THERAPIST

## 2024-05-17 NOTE — PROGRESS NOTES
"Daily Note     Today's date: 2024  Patient name: Adrian Mares  : 1960  MRN: [id number]  Referring provider: Larry Jaquez*  Dx:   Encounter Diagnosis     ICD-10-CM    1. Subacromial bursitis of right shoulder joint  M75.51       2. Tendinitis of right rotator cuff  M75.81       3. Acute pain of right shoulder  M25.511           Start Time: 0800  Stop Time: 0850  Total time in clinic (min): 50 minutes    Subjective: Pt reports slight improvements in symptoms but could be from steroids.       Objective: See treatment diary below      Assessment: Tolerated treatment well. Pt's POC was progressed to include more AROM and daniella-scap strengthening to increase tolerance to overhead motions. Patient demonstrated fatigue post treatment and would benefit from continued PT      Plan: Continue per plan of care.      Precautions: HTN, CKD, Hx of Ca,       Manuals             PROM JF            Inf and AP joint mobs  JF                                      Neuro Re-Ed             Prone Y + T  3x10             Wall ball  3x30\" cw/ccw            Rows  3x10                                                                Ther Ex             SL ER 3x10 3#            Wall slides flexion HEP            Wall punch iso flexion HEP            UBE 3/3'            Pulleys 5'                                                   Ther Activity                                       Gait Training                                       Modalities                                            "

## 2024-05-20 ENCOUNTER — OFFICE VISIT (OUTPATIENT)
Dept: PHYSICAL THERAPY | Age: 64
End: 2024-05-20
Payer: MEDICARE

## 2024-05-20 DIAGNOSIS — M75.81 TENDINITIS OF RIGHT ROTATOR CUFF: ICD-10-CM

## 2024-05-20 DIAGNOSIS — M75.51 SUBACROMIAL BURSITIS OF RIGHT SHOULDER JOINT: Primary | ICD-10-CM

## 2024-05-20 DIAGNOSIS — M25.511 ACUTE PAIN OF RIGHT SHOULDER: ICD-10-CM

## 2024-05-20 PROCEDURE — 97140 MANUAL THERAPY 1/> REGIONS: CPT | Performed by: PHYSICAL THERAPIST

## 2024-05-20 PROCEDURE — 97110 THERAPEUTIC EXERCISES: CPT | Performed by: PHYSICAL THERAPIST

## 2024-05-20 PROCEDURE — 97112 NEUROMUSCULAR REEDUCATION: CPT | Performed by: PHYSICAL THERAPIST

## 2024-05-20 NOTE — PROGRESS NOTES
"Daily Note     Today's date: 2024  Patient name: Adrian Mares  : 1960  MRN: [id number]  Referring provider: Larry Jaquez*  Dx:   Encounter Diagnosis     ICD-10-CM    1. Subacromial bursitis of right shoulder joint  M75.51       2. Tendinitis of right rotator cuff  M75.81       3. Acute pain of right shoulder  M25.511           Start Time: 1215  Stop Time: 1301  Total time in clinic (min): 46 minutes    Subjective: Pt reports improvements with intensity of symptoms.       Objective: See treatment diary below      Assessment: Tolerated treatment well. Pt's POC progressed to include more closed chain strengthening.  Patient demonstrated fatigue post treatment and would benefit from continued PT      Plan: Continue per plan of care.      Precautions: HTN, CKD, Hx of Ca,       Manuals 5/20            PROM JF            Inf and AP joint mobs  JF                                      Neuro Re-Ed             Prone Y + T  3x10             Wall ball  3x30\" cw/ccw            Rows  3x10            Shoulder taps 3x20            SA slides 3x10 gtb            PNF D1 + D2 3x10                         Ther Ex 5/20            SL ER 3x10 3#            Wall slides flexion HEP            Wall punch iso flexion HEP            UBE 3/3'            Pulleys 5'                                                   Ther Activity                                       Gait Training                                       Modalities                                              "

## 2024-06-04 ENCOUNTER — OFFICE VISIT (OUTPATIENT)
Dept: PHYSICAL THERAPY | Age: 64
End: 2024-06-04
Payer: MEDICARE

## 2024-06-04 DIAGNOSIS — M25.511 ACUTE PAIN OF RIGHT SHOULDER: ICD-10-CM

## 2024-06-04 DIAGNOSIS — M75.81 TENDINITIS OF RIGHT ROTATOR CUFF: ICD-10-CM

## 2024-06-04 DIAGNOSIS — M75.51 SUBACROMIAL BURSITIS OF RIGHT SHOULDER JOINT: Primary | ICD-10-CM

## 2024-06-04 PROCEDURE — 97110 THERAPEUTIC EXERCISES: CPT | Performed by: PHYSICAL THERAPIST

## 2024-06-04 PROCEDURE — 97112 NEUROMUSCULAR REEDUCATION: CPT | Performed by: PHYSICAL THERAPIST

## 2024-06-04 PROCEDURE — 97140 MANUAL THERAPY 1/> REGIONS: CPT | Performed by: PHYSICAL THERAPIST

## 2024-06-04 NOTE — PROGRESS NOTES
"Daily Note     Today's date: 2024  Patient name: Adrian Mares  : 1960  MRN: [id number]  Referring provider: Larry Jaquez*  Dx:   Encounter Diagnosis     ICD-10-CM    1. Subacromial bursitis of right shoulder joint  M75.51       2. Tendinitis of right rotator cuff  M75.81       3. Acute pain of right shoulder  M25.511           Start Time: 0930  Stop Time: 1015  Total time in clinic (min): 45 minutes    Subjective: Pt reports increased symptoms since last visit.       Objective: See treatment diary below      Assessment: Tolerated treatment well. Patient demonstrates increase symptoms and has had little improvements since starting PT.  Patient will be seen another visit but may benefit from further follow up with referring provider. Patient demonstrated fatigue post treatment and would benefit from continued PT      Plan: Continue per plan of care.      Precautions: HTN, CKD, Hx of Ca,       Manuals             PROM JF            Inf and AP joint mobs  JF                                      Neuro Re-Ed             Prone Y + T  3x10             Wall ball  3x30\" cw/ccw            Rows  3x10            Shoulder taps 3x20            SA slides 3x10 gtb            PNF D1 + D2 3x10                         Ther Ex 5/20            SL ER 3x10 3#            Wall slides flexion HEP            Wall punch iso flexion HEP            UBE 3/3'            Pulleys 5'                                                   Ther Activity                                       Gait Training                                       Modalities                                                "

## 2024-06-06 ENCOUNTER — OFFICE VISIT (OUTPATIENT)
Dept: PHYSICAL THERAPY | Age: 64
End: 2024-06-06
Payer: MEDICARE

## 2024-06-06 DIAGNOSIS — M75.51 SUBACROMIAL BURSITIS OF RIGHT SHOULDER JOINT: Primary | ICD-10-CM

## 2024-06-06 DIAGNOSIS — I12.9 BENIGN HYPERTENSION WITH CHRONIC KIDNEY DISEASE, STAGE III (HCC): ICD-10-CM

## 2024-06-06 DIAGNOSIS — M75.81 TENDINITIS OF RIGHT ROTATOR CUFF: ICD-10-CM

## 2024-06-06 DIAGNOSIS — M25.511 ACUTE PAIN OF RIGHT SHOULDER: ICD-10-CM

## 2024-06-06 DIAGNOSIS — N18.30 BENIGN HYPERTENSION WITH CHRONIC KIDNEY DISEASE, STAGE III (HCC): ICD-10-CM

## 2024-06-06 DIAGNOSIS — E78.2 MIXED HYPERLIPIDEMIA: ICD-10-CM

## 2024-06-06 PROCEDURE — 97110 THERAPEUTIC EXERCISES: CPT | Performed by: PHYSICAL THERAPIST

## 2024-06-06 PROCEDURE — 97112 NEUROMUSCULAR REEDUCATION: CPT | Performed by: PHYSICAL THERAPIST

## 2024-06-06 PROCEDURE — 97140 MANUAL THERAPY 1/> REGIONS: CPT | Performed by: PHYSICAL THERAPIST

## 2024-06-06 RX ORDER — ATORVASTATIN CALCIUM 20 MG/1
20 TABLET, FILM COATED ORAL DAILY
Qty: 90 TABLET | Refills: 1 | Status: SHIPPED | OUTPATIENT
Start: 2024-06-06

## 2024-06-06 RX ORDER — LOSARTAN POTASSIUM 25 MG/1
25 TABLET ORAL DAILY
Qty: 90 TABLET | Refills: 1 | Status: SHIPPED | OUTPATIENT
Start: 2024-06-06

## 2024-06-06 NOTE — PROGRESS NOTES
"PT Discharge Summary     Today's date: 2024  Patient name: Adrian Mares  : 1960  MRN: [id number]  Referring provider: Larry Jaquez*  Dx:   Encounter Diagnosis     ICD-10-CM    1. Subacromial bursitis of right shoulder joint  M75.51 Ambulatory Referral to Physical Therapy      2. Tendinitis of right rotator cuff  M75.81 Ambulatory Referral to Physical Therapy      3. Acute pain of right shoulder  M25.511 Ambulatory Referral to Physical Therapy          Start Time: 845  Stop Time: 930  Total time in clinic (min): 45 minutes    Assessment  Assessment details: Pt is a 58 y/o male who presents with right shoulder pain. No further referral is necessary at this time. Pt has not seen much improvements with PT and will be referred back to ortho for further follow-up.     Impairments: abnormal or restricted ROM, impaired physical strength, lacks appropriate home exercise program, pain with function, poor posture  and poor body mechanics  Understanding of Dx/Px/POC: good   Prognosis: good     Goals  Short Term Goals: to be achieved by 4 weeks  1) Patient to be independent with basic HEP.  2) Decrease pain to 2/10 at its worst.  3) Increase shoulder ROM by 5-10 degrees in all planes  4) Increase shoulder strength by 1/2 MMT grade in all deficient planes.     Long Term Goals: to be achieved by discharge  1) FOTO equal to or greater than 71.  2) Patient to be independent with comprehensive HEP.  3) Patient will demonstrate maximal over head reaching  4) Increase UE strength to 5/5 MMT grade in all planes to improve a/iadls.  5) Patient to report no sleep interruption secondary to pain.        Plan  Patient would benefit from:  Put on hold until follow-up with Ortho           Precautions: HTN, CKD, Hx of Ca,       Manuals             PROM JF            Inf and AP joint mobs  JF                                      Neuro Re-Ed             Prone Y + T  3x10             Wall ball  3x30\" cw/ccw          "   Rows  3x10            Shoulder taps 3x20            SA slides 3x10 gtb            PNF D1 + D2 3x10                         Ther Ex 5/20            SL ER 3x10 3#            Wall slides flexion HEP            Wall punch iso flexion HEP            UBE 3/3'            Pulleys 5'                                                   Ther Activity                                       Gait Training                                       Modalities

## 2024-06-06 NOTE — TELEPHONE ENCOUNTER
Patient requesting refills of the following be sent to [facility]:    atorvastatin (LIPITOR) 20 mg tablet  losartan (COZAAR) 25 mg tablet    Thank you.

## 2024-06-07 ENCOUNTER — APPOINTMENT (OUTPATIENT)
Dept: PHYSICAL THERAPY | Age: 64
End: 2024-06-07
Payer: MEDICARE

## 2024-06-10 ENCOUNTER — APPOINTMENT (OUTPATIENT)
Dept: PHYSICAL THERAPY | Age: 64
End: 2024-06-10
Payer: MEDICARE

## 2024-06-21 ENCOUNTER — HOSPITAL ENCOUNTER (OUTPATIENT)
Dept: RADIOLOGY | Facility: HOSPITAL | Age: 64
Discharge: HOME | End: 2024-06-21
Payer: MEDICARE

## 2024-06-21 DIAGNOSIS — M75.51 SUBACROMIAL BURSITIS OF RIGHT SHOULDER JOINT: ICD-10-CM

## 2024-06-21 PROCEDURE — 73221 MRI JOINT UPR EXTREM W/O DYE: CPT

## 2024-06-25 ENCOUNTER — OFFICE VISIT (OUTPATIENT)
Dept: INTERNAL MEDICINE CLINIC | Age: 64
End: 2024-06-25
Payer: MEDICARE

## 2024-06-25 VITALS
DIASTOLIC BLOOD PRESSURE: 60 MMHG | TEMPERATURE: 99 F | HEART RATE: 61 BPM | BODY MASS INDEX: 24.6 KG/M2 | SYSTOLIC BLOOD PRESSURE: 128 MMHG | OXYGEN SATURATION: 96 % | WEIGHT: 152.4 LBS

## 2024-06-25 DIAGNOSIS — K40.90 RIGHT INGUINAL HERNIA: ICD-10-CM

## 2024-06-25 DIAGNOSIS — C64.2 RENAL CELL CANCER, LEFT (HCC): ICD-10-CM

## 2024-06-25 DIAGNOSIS — M25.551 HIP PAIN, BILATERAL: ICD-10-CM

## 2024-06-25 DIAGNOSIS — I10 PRIMARY HYPERTENSION: ICD-10-CM

## 2024-06-25 DIAGNOSIS — I12.9 BENIGN HYPERTENSION WITH CHRONIC KIDNEY DISEASE, STAGE III (HCC): ICD-10-CM

## 2024-06-25 DIAGNOSIS — E78.2 MIXED HYPERLIPIDEMIA: ICD-10-CM

## 2024-06-25 DIAGNOSIS — N18.30 BENIGN HYPERTENSION WITH CHRONIC KIDNEY DISEASE, STAGE III (HCC): ICD-10-CM

## 2024-06-25 DIAGNOSIS — M25.552 HIP PAIN, BILATERAL: ICD-10-CM

## 2024-06-25 DIAGNOSIS — Z00.00 WELCOME TO MEDICARE PREVENTIVE VISIT: Primary | ICD-10-CM

## 2024-06-25 PROCEDURE — G0402 INITIAL PREVENTIVE EXAM: HCPCS | Performed by: STUDENT IN AN ORGANIZED HEALTH CARE EDUCATION/TRAINING PROGRAM

## 2024-06-25 PROCEDURE — 99214 OFFICE O/P EST MOD 30 MIN: CPT | Performed by: STUDENT IN AN ORGANIZED HEALTH CARE EDUCATION/TRAINING PROGRAM

## 2024-06-25 PROCEDURE — G0403 EKG FOR INITIAL PREVENT EXAM: HCPCS | Performed by: STUDENT IN AN ORGANIZED HEALTH CARE EDUCATION/TRAINING PROGRAM

## 2024-06-25 NOTE — PATIENT INSTRUCTIONS
Medicare Preventive Visit Patient Instructions  Thank you for completing your Welcome to Medicare Visit or Medicare Annual Wellness Visit today. Your next wellness visit will be due in one year (6/26/2025).  The screening/preventive services that you may require over the next 5-10 years are detailed below. Some tests may not apply to you based off risk factors and/or age. Screening tests ordered at today's visit but not completed yet may show as past due. Also, please note that scanned in results may not display below.  Preventive Screenings:  Service Recommendations Previous Testing/Comments   Colorectal Cancer Screening  Colonoscopy    Fecal Occult Blood Test (FOBT)/Fecal Immunochemical Test (FIT)  Fecal DNA/Cologuard Test  Flexible Sigmoidoscopy Age: 45-75 years old   Colonoscopy: every 10 years (May be performed more frequently if at higher risk)  OR  FOBT/FIT: every 1 year  OR  Cologuard: every 3 years  OR  Sigmoidoscopy: every 5 years  Screening may be recommended earlier than age 45 if at higher risk for colorectal cancer. Also, an individualized decision between you and your healthcare provider will decide whether screening between the ages of 76-85 would be appropriate. Colonoscopy: 08/11/2015  FOBT/FIT: Not on file  Cologuard: Not on file  Sigmoidoscopy: Not on file    Screening Current     Prostate Cancer Screening Individualized decision between patient and health care provider in men between ages of 55-69   Medicare will cover every 12 months beginning on the day after your 50th birthday PSA: 0.49 ng/mL     Screening Current     Hepatitis C Screening Once for adults born between 1945 and 1965  More frequently in patients at high risk for Hepatitis C Hep C Antibody: 01/07/2020    Screening Current   Diabetes Screening 1-2 times per year if you're at risk for diabetes or have pre-diabetes Fasting glucose: 88 mg/dL (3/1/2024)  A1C: 5.8 % (12/2/2019)  Screening Current   Cholesterol Screening Once every 5  years if you don't have a lipid disorder. May order more often based on risk factors. Lipid panel: 03/01/2024  Screening Not Indicated  History Lipid Disorder      Other Preventive Screenings Covered by Medicare:  Abdominal Aortic Aneurysm (AAA) Screening: covered once if your at risk. You're considered to be at risk if you have a family history of AAA or a male between the age of 65-75 who smoking at least 100 cigarettes in your lifetime.  Lung Cancer Screening: covers low dose CT scan once per year if you meet all of the following conditions: (1) Age 55-77; (2) No signs or symptoms of lung cancer; (3) Current smoker or have quit smoking within the last 15 years; (4) You have a tobacco smoking history of at least 20 pack years (packs per day x number of years you smoked); (5) You get a written order from a healthcare provider.  Glaucoma Screening: covered annually if you're considered high risk: (1) You have diabetes OR (2) Family history of glaucoma OR (3)  aged 50 and older OR (4)  American aged 65 and older  Osteoporosis Screening: covered every 2 years if you meet one of the following conditions: (1) Have a vertebral abnormality; (2) On glucocorticoid therapy for more than 3 months; (3) Have primary hyperparathyroidism; (4) On osteoporosis medications and need to assess response to drug therapy.  HIV Screening: covered annually if you're between the age of 15-65. Also covered annually if you are younger than 15 and older than 65 with risk factors for HIV infection. For pregnant patients, it is covered up to 3 times per pregnancy.    Immunizations:  Immunization Recommendations   Influenza Vaccine Annual influenza vaccination during flu season is recommended for all persons aged >= 6 months who do not have contraindications   Pneumococcal Vaccine   * Pneumococcal conjugate vaccine = PCV13 (Prevnar 13), PCV15 (Vaxneuvance), PCV20 (Prevnar 20)  * Pneumococcal polysaccharide vaccine = PPSV23  (Pneumovax) Adults 19-63 yo with certain risk factors or if 65+ yo  If never received any pneumonia vaccine: recommend Prevnar 20 (PCV20)  Give PCV20 if previously received 1 dose of PCV13 or PPSV23   Hepatitis B Vaccine 3 dose series if at intermediate or high risk (ex: diabetes, end stage renal disease, liver disease)   Respiratory syncytial virus (RSV) Vaccine - COVERED BY MEDICARE PART D  * RSVPreF3 (Arexvy) CDC recommends that adults 60 years of age and older may receive a single dose of RSV vaccine using shared clinical decision-making (SCDM)   Tetanus (Td) Vaccine - COST NOT COVERED BY MEDICARE PART B Following completion of primary series, a booster dose should be given every 10 years to maintain immunity against tetanus. Td may also be given as tetanus wound prophylaxis.   Tdap Vaccine - COST NOT COVERED BY MEDICARE PART B Recommended at least once for all adults. For pregnant patients, recommended with each pregnancy.   Shingles Vaccine (Shingrix) - COST NOT COVERED BY MEDICARE PART B  2 shot series recommended in those 19 years and older who have or will have weakened immune systems or those 50 years and older     Health Maintenance Due:      Topic Date Due   • HIV Screening  Never done   • Colorectal Cancer Screening  08/11/2025   • Hepatitis C Screening  Completed     Immunizations Due:      Topic Date Due   • Pneumococcal Vaccine: 65+ Years (1 of 1 - PCV) Never done   • COVID-19 Vaccine (4 - 2023-24 season) 09/01/2023   • Influenza Vaccine (Season Ended) 09/01/2024     Advance Directives   What are advance directives?  Advance directives are legal documents that state your wishes and plans for medical care. These plans are made ahead of time in case you lose your ability to make decisions for yourself. Advance directives can apply to any medical decision, such as the treatments you want, and if you want to donate organs.   What are the types of advance directives?  There are many types of advance  directives, and each state has rules about how to use them. You may choose a combination of any of the following:  Living will:  This is a written record of the treatment you want. You can also choose which treatments you do not want, which to limit, and which to stop at a certain time. This includes surgery, medicine, IV fluid, and tube feedings.   Durable power of  for healthcare (DPAHC):  This is a written record that states who you want to make healthcare choices for you when you are unable to make them for yourself. This person, called a proxy, is usually a family member or a friend. You may choose more than 1 proxy.  Do not resuscitate (DNR) order:  A DNR order is used in case your heart stops beating or you stop breathing. It is a request not to have certain forms of treatment, such as CPR. A DNR order may be included in other types of advance directives.  Medical directive:  This covers the care that you want if you are in a coma, near death, or unable to make decisions for yourself. You can list the treatments you want for each condition. Treatment may include pain medicine, surgery, blood transfusions, dialysis, IV or tube feedings, and a ventilator (breathing machine).  Values history:  This document has questions about your views, beliefs, and how you feel and think about life. This information can help others choose the care that you would choose.  Why are advance directives important?  An advance directive helps you control your care. Although spoken wishes may be used, it is better to have your wishes written down. Spoken wishes can be misunderstood, or not followed. Treatments may be given even if you do not want them. An advance directive may make it easier for your family to make difficult choices about your care.       © Copyright [employer] 2018 Information is for End User's use only and may not be sold, redistributed or otherwise used for commercial purposes. All illustrations and  images included in CareNotes® are the copyrighted property of [email or web address] or [employer]

## 2024-06-25 NOTE — PROGRESS NOTES
Ambulatory Visit  Name: Adrian Mares      : 1960      MRN: [id number]  Encounter Provider: Arely Zepeda DO  Encounter Date: 2024   Encounter department: [facility]    Assessment & Plan   1. Welcome to Medicare preventive visit  Colonoscopy in  had removal of 2 hyperplastic polyps.    Repeat colonoscopy in   EK G reviewed and showed sinus arrhythmia with occasional PVC    -     POCT ECG  2. Right inguinal hernia  Has nontender swelling in right groin for the last 3 weeks.  Noticeable when standing.  Consistent with reducible inguinal hernia.    -     Ambulatory Referral to General Surgery; Future    3. Hip pain, bilateral  Has chronic bilateral hip pain.  Evaluated by [facility] and planning for right hip arthroplasty on .  Patient states pain has become more severe and prevents him from staying asleep.    Continue pain control with Tylenol  Advised melatonin 5 mg to help with sleep  No NSAID due to underlying CKD    4. Mixed hyperlipidemia  Lipid panel from 3/1/2024: , , HDL 42, LDL 89.    Continue atorvastatin 20 mg    5. Renal cell cancer, left (HCC)  S/p nephrectomy.    Follows with nephrology and urology  BMP from     6. Benign hypertension with chronic kidney disease, stage III (HCC)  Current regimenLosartan 25 mg.  /60.  Controlled.    Continue losartan  Avoid NSAIDs  Continue following with nephrology     Preventive health issues were discussed with patient, and age appropriate screening tests were ordered as noted in patient's After Visit Summary. Personalized health advice and appropriate referrals for health education or preventive services given if needed, as noted in patient's After Visit Summary.    History of Present Illness     63-year-old male history of hypertension, CKD 3A, hyperlipidemia, left renal cancer s/p nephrectomy, partial thyroidectomy, subacromial impingement with steroid injections, s/p  revision of left rotator cuff repair.  Endorses bilateral hip pain that is occurring more frequently, every day.  Previously only occurred with strenuous activity.  Pain is causing him to limp.  Pain also radiating down entire right leg and has bilateral low back pain, worse on right.  No relief with Tylenol.  Has trouble staying asleep between 2-4 am due to pain from shoulder and hips.  Scheuled for right hip arhtroplasty in October 17.  Noticed right sided groin swelling and lump that started 3 weeks ago.  Lump is nontender and is more noticeable with standing.  Denies trauma, numbness, saddle anesthesia, fever, chills, bowel or bladder incontinence, testicular or penile pain, penile discharge, rashes, hematuria, dysuria.           Patient Care Team:  Matthew Henao MD as PCP - General  JULIETTE Santillan as PCP - PCP-[facility]  MD Ilia Doan MD Iftikhar Ahmad, MD Darius Desai, MD (Surgical Oncology)  Tom Walker MD (Nephrology)    Review of Systems as above  Medical History Reviewed by provider this encounter:  Tobacco  Allergies  Meds  Problems  Med Hx  Surg Hx  Fam Hx       Annual Wellness Visit Questionnaire   Adrian is here for his Welcome to Medicare visit.     Health Risk Assessment:   Patient rates overall health as very good. Patient feels that their physical health rating is slightly better. Patient is very satisfied with their life. Eyesight was rated as same. Hearing was rated as same. Patient feels that their emotional and mental health rating is same. Patients states they are never, rarely angry. Patient states they are sometimes unusually tired/fatigued. Pain experienced in the last 7 days has been a lot. Patient's pain rating has been 7/10. Patient states that he has experienced no weight loss or gain in last 6 months.     Depression Screening:   PHQ-2 Score: 0      Fall Risk Screening:   In the past year, patient has  experienced: no history of falling in past year      Home Safety:  Patient does not have trouble with stairs inside or outside of their home. Patient has working smoke alarms and has working carbon monoxide detector. Home safety hazards include: none.     Nutrition:   Current diet is Regular and No Added Salt.     Medications:   Patient is currently taking over-the-counter supplements. OTC medications include: see medication list. Patient is able to manage medications.     Activities of Daily Living (ADLs)/Instrumental Activities of Daily Living (IADLs):   Walk and transfer into and out of bed and chair?: Yes  Dress and groom yourself?: Yes    Bathe or shower yourself?: Yes    Feed yourself? Yes  Do your laundry/housekeeping?: Yes  Manage your money, pay your bills and track your expenses?: Yes  Make your own meals?: Yes    Do your own shopping?: Yes    Previous Hospitalizations:   Any hospitalizations or ED visits within the last 12 months?: No      Advance Care Planning:   Living will: Yes    Durable POA for healthcare: No    Advanced directive: Yes      PREVENTIVE SCREENINGS      Cardiovascular Screening:    General: Screening Not Indicated and History Lipid Disorder      Diabetes Screening:     General: Screening Current      Colorectal Cancer Screening:     General: Screening Current      Prostate Cancer Screening:    General: Screening Current      Abdominal Aortic Aneurysm (AAA) Screening:    Risk factors include: age between 65-74 yo        Lung Cancer Screening:     General: Screening Not Indicated      Hepatitis C Screening:    General: Screening Current    Screening, Brief Intervention, and Referral to Treatment (SBIRT)    Screening  Typical number of drinks in a day: 0  Typical number of drinks in a week: 3  Interpretation: Low risk drinking behavior.    AUDIT-C Screenin) How often did you have a drink containing alcohol in the past year? 2 to 3 times a week  2) How many drinks did you have on a  typical day when you were drinking in the past year? 1 to 2  3) How often did you have 6 or more drinks on one occasion in the past year? never    AUDIT-C Score: 3  Interpretation: Score 0-3 (male): Negative screen for alcohol misuse    Single Item Drug Screening:  How often have you used an illegal drug (including marijuana) or a prescription medication for non-medical reasons in the past year? never    Single Item Drug Screen Score: 0  Interpretation: Negative screen for possible drug use disorder    Social Determinants of Health     Food Insecurity: No Food Insecurity (6/25/2024)    Hunger Vital Sign     Worried About Running Out of Food in the Last Year: Never true     Ran Out of Food in the Last Year: Never true   Transportation Needs: No Transportation Needs (6/25/2024)    PRAPARE - Transportation     Lack of Transportation (Medical): No     Lack of Transportation (Non-Medical): No   Housing Stability: Low Risk  (6/25/2024)    Housing Stability Vital Sign     Unable to Pay for Housing in the Last Year: No     Number of Times Moved in the Last Year: 0     Homeless in the Last Year: No   Utilities: Not At Risk (6/25/2024)    [facility] Utilities     Threatened with loss of utilities: No     Vision Screening    Right eye Left eye Both eyes   Without correction      With correction 20/40 20/40 20/25       Objective     /60 (BP Location: Left arm, Patient Position: Sitting, Cuff Size: Standard)   Pulse 61   Temp 99 °F (37.2 °C) (Temporal)   Wt 69.1 kg (152 lb 6.4 oz)   SpO2 96% Comment: room air  BMI 24.60 kg/m²     Physical Exam  Vitals reviewed.   Constitutional:       General: He is not in acute distress.  HENT:      Head: Normocephalic and atraumatic.      Nose: No rhinorrhea.   Eyes:      General: No scleral icterus.  Cardiovascular:      Rate and Rhythm: Normal rate and regular rhythm.      Pulses: Normal pulses.      Heart sounds: Normal heart sounds. No murmur heard.  Pulmonary:      Effort: Pulmonary  effort is normal. No respiratory distress.      Breath sounds: Normal breath sounds. No wheezing, rhonchi or rales.   Abdominal:      Hernia: A hernia is present. Hernia is present in the right inguinal area (Reducible).   Musculoskeletal:      Cervical back: No tenderness or bony tenderness.      Thoracic back: No tenderness or bony tenderness.      Lumbar back: No tenderness or bony tenderness. Negative right straight leg raise test and negative left straight leg raise test.      Right hip: Normal range of motion.      Left hip: Normal range of motion.      Right lower leg: No edema.      Left lower leg: No edema.      Comments: Pain with internal rotation of hip, pain over right greater trochanter, bilateral lower extremity strength 5/5   Skin:     General: Skin is warm and dry.      Coloration: Skin is not jaundiced.   Neurological:      Mental Status: He is alert.      Cranial Nerves: No dysarthria.      Comments: CN 2-12 grossly intact, moves all 4 extremities   Psychiatric:         Mood and Affect: Mood normal.         Behavior: Behavior normal.       Administrative Statements

## 2024-06-26 ENCOUNTER — TELEPHONE (OUTPATIENT)
Age: 64
End: 2024-06-26

## 2024-06-26 NOTE — TELEPHONE ENCOUNTER
Patient called to schedule consult with Dr Cole for right sided inguinal hernia, scheduled for 7/15.

## 2024-07-01 ENCOUNTER — OFFICE VISIT (OUTPATIENT)
Dept: OBGYN CLINIC | Facility: OTHER | Age: 64
End: 2024-07-01
Payer: MEDICARE

## 2024-07-01 VITALS
DIASTOLIC BLOOD PRESSURE: 85 MMHG | BODY MASS INDEX: 24.75 KG/M2 | SYSTOLIC BLOOD PRESSURE: 149 MMHG | WEIGHT: 154 LBS | HEIGHT: 66 IN | HEART RATE: 54 BPM

## 2024-07-01 DIAGNOSIS — M75.111 NONTRAUMATIC INCOMPLETE TEAR OF RIGHT ROTATOR CUFF: Primary | ICD-10-CM

## 2024-07-01 PROCEDURE — 99214 OFFICE O/P EST MOD 30 MIN: CPT | Performed by: ORTHOPAEDIC SURGERY

## 2024-07-01 RX ORDER — CHLORHEXIDINE GLUCONATE ORAL RINSE 1.2 MG/ML
15 SOLUTION DENTAL ONCE
OUTPATIENT
Start: 2024-07-01 | End: 2024-07-01

## 2024-07-01 NOTE — PATIENT INSTRUCTIONS
You are being scheduled for a shoulder arthroscopy to treat your symptoms.  Below are some instructions and information on what to expect before and after your surgery.        Pre-Surgical Preparation for Arthroscopic Shoulder Surgery:     You will be contacted the evening prior to your surgery to confirm the scheduled time of the procedure and when to arrive at the hospital.   Do not eat or drink anything after midnight the night before your surgery.  Since you are having out-patient surgery, make sure that you have someone who can drive you home later in the day.  Also, prepare that person for a long day, as the process of safely preparing for and recovering from the procedure is more time consuming than the actual procedure!      As you will be in a sling after surgery, please wear or bring a loose fitting button-down shirt so that you can easily place this over the sling when you leave the surgical suite.  This avoids having to place the operative arm in a sleeve.  Most patients find that this is the easiest outfit to wear for the first week or so after surgery so you may want to plan accordingly.    Most patients find that lying down in bed after shoulder surgery accentuates their discomfort.  This is likely related to the effect of gravity on the swelling in the shoulder.  As a result, most patients sleep better in a recliner or in bed with pillows propped up behind their back for the first few days or weeks after surgery.  It is a good idea to plan for this ahead of time so there will be less hassle getting things set up the night after surgery.       What to Expect After Arthroscopic Shoulder Surgery:    It is normal to have swelling and discomfort in the shoulder for several days or a week after surgery.  It is also normal to have a small amount of drainage from the surgical wounds (especially the first few days after surgery), as we put fluid into the shoulder to visualize the structures during surgery.   "It is NOT normal to have foul smelling, purulent drainage and if this is noted, please contact the office immediately or proceed to the emergency room for evaluation as this may indicate an infection.     Applying ice bags to the shoulder may help with pain that is not controlled by the regional block.  Ice should be applied 20-30 minutes at a time, every hour or two.  Make sure to put a thin towel or T-shirt next to your skin to avoid direct contact of the ice with the skin. Icing is most helpful in the first 48 hours, although many people find that continuing past this time frame lessens their postoperative pain.  Please note that your post-operative dressing is not conductive to ice, so if you need to, it is okay to remove that dressing even the night after surgery and place band-aids over the wounds in order for the ice to take effect.     Pain Control    Most patients will receive a nerve block, the local anesthetic may keep your whole arm numb for up to 4 days.  You will be given a prescription for narcotic pain medication when you are discharged from the hospital.  With the newer nerve block that is being utilized, patients are rarely requiring the use of this narcotic pain medication.  If you find you do not tolerate that type of pain medicine well, call our office and we will try another one.     In addition to the narcotic pain medication, it is safe to use an anti-inflammatory (unless the patient has a medical condition that would not allow safe use of this mediation).  This includes the Advil, Motrin, Ibuprofen and Alleve category of medications.  Simply follow the over the counter dosing on the package and use as indicated as another adjunct.  Importantly since these medications are all very similar, use only one of them.  Tylenol is a separate medication that can be utilized as well and can be taken at the same time as the other medication or given in a \"staggered\" manner.  Just make sure that you " follow the dosing on the over the counter bottle instructions.  Also make sure that the pain medication prescribed by Dr Jaquez's team does not contain acetaminophen (this is found in Percocet and Vicodin).   Typically we do not prescribe those types of pain medications but if for some reason that has been prescribed DO NOT add more Tylenol (acetaminophen) as you could end up taking too much of that medication.    As mentioned above, most patients find that lying down accentuates their discomfort. You might sleep better in a recliner, or propped up in bed.     Dr. Jaquez encourages patients to safely ambulate around the house as much as possible in the first few days after the procedure as this can help with blood circulation in the legs. While the incidence of blood clots is very rare following shoulder surgery, early ambulation is a great way to help decrease the already low rate.    24 hours after the surgery you may remove the bandage and cover incisions with Band-Aids if needed. At that time you may shower, the wounds will have a surgical glue that will protect them from shower water but do not submerge your incisions directly (bathing or swimming) until at least 2 weeks post-operatively.  It is safe to let the arm hang at the side and take a shower and put on a shirt without the sling on.  Just make sure that you do not use the operative are to reach out and grab anything as that may damage the repair.  When you are done showering and getting dressed please return the operative arm to the sling.    Unless noted otherwise in your discharge paperwork, Dr. Jaquez uses absorbable sutures so they do not need to be removed.    Dr. Jaquez’s physician assistant (PA) will see you in the office a few days after the procedure to review the intra-operative findings and to initiate physical therapy if appropriate.  A post-operative appointment should have been scheduled for you already, but if for some reason this did  not happen, please call the office to make one.     Physical therapy is important after nearly all shoulder surgeries and a detailed rehabilitation plan based on the specific intra-operative findings and procedures will be provided to your therapist at the first post-operative office visit.  Most patients have post-operative therapy appointments scheduled pre-operatively, but if you do not, that will be handled at the first post-operative office visit.  Unless expressly directed otherwise it is safe to remove the sling even the first day after the surgery and let the arm hang by the side.  This allows patients to shower and even put a shirt on (bad arm in the sleeve first).  It is also safe to flex and extend their wrist, hand and fingers as much as possible when the block wears off.  These simple motions can serve to pump fluid out of the forearm and decrease swelling in the arm.

## 2024-07-01 NOTE — PROGRESS NOTES
Assessment  Diagnoses and all orders for this visit:    Nontraumatic partial thickness tear of right supraspiantus tendon    Discussion and Plan:    Explained to the patient that his MRI shows a partial thickness tear of the supraspinatus tendon. He has tried and continues to have symptoms despite PT/HEP, activity modifications, rest, ice and a cortisone injection.   At this time, he is a candidate for an arthroscopic procedure to further address his rotator cuff tear. He understood and wished to proceed forward with this procedure.   A thorough discussion was performed with the patient regarding the risks and benefit of operative and nonoperative treatment of their rotator cuff tear.  Risks discussed include but were not limited to infection, neurovascular injury, recurrent tear, nonhealing of the repair, need for further surgery, need for biceps tenodesis or tenotomy, stiffness, need for prolonged rehabilitation, as well as the risk of anesthesia.  After this discussion all questions were answered and informed consent was obtained in the office for arthroscopic rotator cuff repair of the right shoulder.  The patient will be scheduled for this procedure accordingly.  Follow up post operatively    Subjective:   Patient ID: Adrian Mares is a 63 y.o. male presents today for a follow up visit for his right shoulder as well as to discuss MRI results.   The pain began several month(s) ago and is not associated with an acute injury. The patient describes the pain as aching, dull, and sharp in intensity,  intermittent in timing, and localizes the pain to the  right posterior and lateral shoulder.  The pain is worse with movement, overuse, and golf  and relieved by rest.  The pain is not associated with numbness and tingling.  The pain is not associated with constitutional symptoms. The patient is not awoken at night by the pain.       Patient reports he had a prior RIGHT RTC repair about 20 yrs ago.      The patient  "had a s/p revision rotator cuff repair of his left shoulder done on 12/9/22.        The following portions of the patient's history were reviewed and updated as appropriate: allergies, current medications, past family history, past medical history, past social history, past surgical history and problem list.    Objective:  /85 (BP Location: Right arm, Patient Position: Sitting, Cuff Size: Standard)   Pulse (!) 54   Ht 5' 6\" (1.676 m)   Wt 69.9 kg (154 lb)   BMI 24.86 kg/m²       Right Shoulder Exam     Range of Motion   The patient has normal right shoulder ROM.    Muscle Strength   Abduction: 4/5   External rotation: 4/5     Tests   Drop arm: positive    Other   Erythema: absent  Sensation: normal  Pulse: present            Physical Exam  Vitals and nursing note reviewed.   Constitutional:       General: He is not in acute distress.     Appearance: He is well-developed.   HENT:      Head: Normocephalic and atraumatic.   Eyes:      Conjunctiva/sclera: Conjunctivae normal.      Pupils: Pupils are equal, round, and reactive to light.   Cardiovascular:      Rate and Rhythm: Normal rate and regular rhythm.      Pulses: Normal pulses.      Heart sounds: Normal heart sounds.   Pulmonary:      Effort: Pulmonary effort is normal. No respiratory distress.      Breath sounds: Normal breath sounds.   Abdominal:      General: Bowel sounds are normal. There is no distension.      Palpations: Abdomen is soft.   Musculoskeletal:      Cervical back: Normal range of motion and neck supple.   Skin:     General: Skin is warm and dry.      Findings: No erythema or rash.   Neurological:      Mental Status: He is alert and oriented to person, place, and time.      Deep Tendon Reflexes: Reflexes are normal and symmetric.   Psychiatric:         Mood and Affect: Mood normal.         Behavior: Behavior normal.           I have personally reviewed pertinent films in PACS and my interpretation is as follows.    MRI Right Shoulder " 6/21/24:  Partial thickness, articular sided tear of the supraspinatus tendon. Minimal glenohumeral joint osteoarthritis    Scribe Attestation      I,:  Aakash Colon am acting as a scribe while in the presence of the attending physician.:       I,:  Larry Jaquez MD personally performed the services described in this documentation    as scribed in my presence.:

## 2024-07-15 ENCOUNTER — TELEPHONE (OUTPATIENT)
Age: 64
End: 2024-07-15

## 2024-07-15 ENCOUNTER — APPOINTMENT (OUTPATIENT)
Dept: RADIOLOGY | Age: 64
End: 2024-07-15
Payer: MEDICARE

## 2024-07-15 DIAGNOSIS — C64.2 RENAL CELL CANCER, LEFT (HCC): Primary | ICD-10-CM

## 2024-07-15 DIAGNOSIS — C64.2 RENAL CELL CANCER, LEFT (HCC): ICD-10-CM

## 2024-07-15 DIAGNOSIS — Z12.5 SCREENING FOR PROSTATE CANCER: ICD-10-CM

## 2024-07-15 PROCEDURE — 71046 X-RAY EXAM CHEST 2 VIEWS: CPT

## 2024-07-15 NOTE — TELEPHONE ENCOUNTER
Lab calling back in regards to PSA blood work. Relayed message from clinical and let her know it was send to provider for review.     Call back- [phone] Callie ([facility])     Pt call back- [phone]

## 2024-07-15 NOTE — TELEPHONE ENCOUNTER
Patient is requesting PSA blood work however due to insurance it is diagnosis driven and he will need to sign an ABN for prostate cancer screening diagnosis. Please review and advise on PSA blood work order, place in Epic

## 2024-07-15 NOTE — TELEPHONE ENCOUNTER
Per insurance purposes the patient can only have one PSA yearly due to no reasoning for diagnostic PSA. He can have his follow up appointment in July with Nadir and then we can call him with the PSA results in August.

## 2024-07-15 NOTE — TELEPHONE ENCOUNTER
Patient was calling back again in regards to the labs. Reviewed the notes with him. He will be getting the BMP for the upcoming appt.     Then he will get the PSA done in August.

## 2024-07-15 NOTE — TELEPHONE ENCOUNTER
Callie from [facility] calling to advise the pt presented to the lab this morning for his PSA and BMP but the expected dates are for 8/28/24 so they were unable to draw the labs. Pt is scheduled for a follow up on 7/30/24 and would like to have labs done prior this his appointment.     Please advise.

## 2024-07-16 ENCOUNTER — TELEPHONE (OUTPATIENT)
Age: 64
End: 2024-07-16

## 2024-07-16 NOTE — TELEPHONE ENCOUNTER
Pt called because he was rescheduled to 07/23/24 for a hernia consult with Dr. Cole . He asked if there are any cancellations today or 07/22, to give him a call. I have him on cancellation list

## 2024-07-18 ENCOUNTER — APPOINTMENT (OUTPATIENT)
Dept: LAB | Age: 64
End: 2024-07-18
Payer: MEDICARE

## 2024-07-18 DIAGNOSIS — C64.2 RENAL CELL CANCER, LEFT (HCC): ICD-10-CM

## 2024-07-18 LAB
ANION GAP SERPL CALCULATED.3IONS-SCNC: 11 MMOL/L (ref 4–13)
BUN SERPL-MCNC: 25 MG/DL (ref 5–25)
CALCIUM SERPL-MCNC: 9.3 MG/DL (ref 8.4–10.2)
CHLORIDE SERPL-SCNC: 103 MMOL/L (ref 96–108)
CO2 SERPL-SCNC: 27 MMOL/L (ref 21–32)
CREAT SERPL-MCNC: 1.22 MG/DL (ref 0.6–1.3)
GFR SERPL CREATININE-BSD FRML MDRD: 61 ML/MIN/1.73SQ M
GLUCOSE P FAST SERPL-MCNC: 81 MG/DL (ref 65–99)
POTASSIUM SERPL-SCNC: 4.2 MMOL/L (ref 3.5–5.3)
SODIUM SERPL-SCNC: 141 MMOL/L (ref 135–147)

## 2024-07-18 PROCEDURE — 80048 BASIC METABOLIC PNL TOTAL CA: CPT

## 2024-07-18 PROCEDURE — 36415 COLL VENOUS BLD VENIPUNCTURE: CPT

## 2024-07-19 ENCOUNTER — TELEPHONE (OUTPATIENT)
Dept: NEPHROLOGY | Facility: CLINIC | Age: 64
End: 2024-07-19

## 2024-07-19 NOTE — TELEPHONE ENCOUNTER
Patient called back and I scheduled him with Dr. Walker in [place] in November.     It asked that we just make Dr. Walker aware that he is planning on getting hernia surgery, hip replacement, and shoulder surgery before the year is over.     He just asked that we keep Dr. Walker in the loop

## 2024-07-22 ENCOUNTER — HOSPITAL ENCOUNTER (OUTPATIENT)
Dept: RADIOLOGY | Facility: HOSPITAL | Age: 64
Discharge: HOME | End: 2024-07-22
Payer: MEDICARE

## 2024-07-22 DIAGNOSIS — C64.2 RENAL CELL CANCER, LEFT (HCC): ICD-10-CM

## 2024-07-22 PROCEDURE — G1004 CDSM NDSC: HCPCS

## 2024-07-22 PROCEDURE — 74183 MRI ABD W/O CNTR FLWD CNTR: CPT

## 2024-07-22 PROCEDURE — A9585 GADOBUTROL INJECTION: HCPCS | Performed by: PHYSICIAN ASSISTANT

## 2024-07-22 RX ORDER — GADOBUTROL 604.72 MG/ML
7 INJECTION INTRAVENOUS
Status: COMPLETED | OUTPATIENT
Start: 2024-07-22 | End: 2024-07-22

## 2024-07-22 RX ADMIN — GADOBUTROL 7 ML: 604.72 INJECTION INTRAVENOUS at 07:15

## 2024-07-23 ENCOUNTER — PREP FOR PROCEDURE (OUTPATIENT)
Dept: SURGERY | Facility: CLINIC | Age: 64
End: 2024-07-23

## 2024-07-23 ENCOUNTER — CONSULT (OUTPATIENT)
Dept: SURGERY | Facility: CLINIC | Age: 64
End: 2024-07-23
Payer: MEDICARE

## 2024-07-23 ENCOUNTER — TELEPHONE (OUTPATIENT)
Dept: OBGYN CLINIC | Facility: HOSPITAL | Age: 64
End: 2024-07-23

## 2024-07-23 VITALS
HEIGHT: 66 IN | DIASTOLIC BLOOD PRESSURE: 77 MMHG | BODY MASS INDEX: 24.11 KG/M2 | WEIGHT: 150 LBS | HEART RATE: 59 BPM | SYSTOLIC BLOOD PRESSURE: 141 MMHG

## 2024-07-23 DIAGNOSIS — K40.90 RIGHT INGUINAL HERNIA: ICD-10-CM

## 2024-07-23 DIAGNOSIS — K40.90 INGUINAL HERNIA: Primary | ICD-10-CM

## 2024-07-23 PROCEDURE — 99203 OFFICE O/P NEW LOW 30 MIN: CPT | Performed by: SURGERY

## 2024-07-23 RX ORDER — TAMSULOSIN HYDROCHLORIDE 0.4 MG/1
0.4 CAPSULE ORAL
Qty: 10 CAPSULE | Refills: 0 | Status: SHIPPED | OUTPATIENT
Start: 2024-07-23

## 2024-07-23 NOTE — TELEPHONE ENCOUNTER
Caller: Ryley (Patient)    Doctor: Leonid    Reason for call: Patient calling wanted to speak to Dalila to cancel his surgery. I did try to reach via phone number on file, she was unavailable at the time of call. I advised patient I would leave her a message. States he has a second opinion coming up about his hip, once that is situated he will call back to r/s his shoulder surgery.     Any questions or concerns please call. I can cancel his PO appointment.    Call back#: [phone]

## 2024-07-23 NOTE — PROGRESS NOTES
Assessment/Plan: Patient presents with a right inguinal hernia.  Is been present over the last 2 months.  He has a burning constant discomfort.  Has been daily.  Does not limit his activities.    We discussed operative repair.  Risks and benefits discussed.  Pre and perioperative instructions provided.  Prescription for Flomax was provided.    He also needs a total hip replacement and right shoulder rotator cuff repair in the future.  This was discussed.    Diagnoses and all orders for this visit:    Right inguinal hernia  -     Ambulatory Referral to General Surgery        Subjective:      Patient ID: Adrian Mares is a 63 y.o. male.    Patient presents for right inguinal hernia consult.  States he has had a bulge and constant burning pain RLQ for 2 to 3 months.  Does not limits his activities.        The following portions of the patient's history were reviewed and updated as appropriate:     He  has a past medical history of Aneurysm (HCC) (2017), Arthritis, Cancer (HCC) (10/13/2019), Chronic kidney disease (1/7/2020), CKD (chronic kidney disease), Diverticulitis of colon (10/13/2019), Diverticulitis of large intestine with perforation without bleeding (10/13/2019), Hyperlipidemia, Hypertension, Inflammatory bowel disease (10/13/2019), Renal cell cancer, left (HCC) (10/13/2019), and Renal cell carcinoma (HCC).  He  has a past surgical history that includes Replacement total knee (Right, 2011); Wrist fusion (Left, 2016); Hernia repair; Hip surgery (Right); Replacement total knee; Shoulder surgery (Left); Shoulder surgery (Right); Wrist surgery; Joint replacement (Right, 2011); pr laparoscopy colectomy partial w/anastomosis (N/A, 12/30/2019); pr laparoscopy radical nephrectomy (Left, 12/30/2019); Colonoscopy (2015); US guided thyroid biopsy (07/09/2021); Nephrectomy (12/30/2019); Thyroid lobectomy (Right, 10/12/2022); pr surgical arthroscopy shoulder w/rotator cuff rpr (Left, 12/09/2022); Colon surgery  "([id number]); and Colonoscopy (2015).  His family history includes Heart attack in his maternal grandfather and paternal grandfather; Heart disease in his father; No Known Problems in his mother; Other in his father; Thyroid cancer in his son.  He  reports that he has never smoked. He has never used smokeless tobacco. He reports current alcohol use of about 2.0 standard drinks of alcohol per week. He reports that he does not use drugs.  Current Outpatient Medications   Medication Sig Dispense Refill    aspirin 81 MG tablet Take 1 tablet by mouth daily      atorvastatin (LIPITOR) 20 mg tablet Take 1 tablet (20 mg total) by mouth daily 90 tablet 1    losartan (COZAAR) 25 mg tablet Take 1 tablet (25 mg total) by mouth daily 90 tablet 1    multivitamin (THERAGRAN) TABS Take 1 tablet by mouth every other day      Omega-3 Fatty Acids (FISH OIL) 1200 MG CAPS Take by mouth in the morning       No current facility-administered medications for this visit.     He has No Known Allergies..    Review of Systems   Constitutional: Negative.  Negative for activity change.   HENT: Negative.     Eyes: Negative.    Respiratory: Negative.     Cardiovascular: Negative.    Gastrointestinal:  Positive for abdominal pain.   Endocrine: Negative.    Genitourinary: Negative.    Musculoskeletal: Negative.    Skin: Negative.    Allergic/Immunologic: Negative.    Neurological: Negative.    Psychiatric/Behavioral:  Negative for agitation, behavioral problems and confusion. The patient is not nervous/anxious.          Objective:      /77   Pulse 59   Ht 5' 6\" (1.676 m)   Wt 68 kg (150 lb)   BMI 24.21 kg/m²          Physical Exam  Constitutional:       Appearance: He is well-developed.   HENT:      Head: Atraumatic.   Eyes:      Extraocular Movements: Extraocular movements intact.   Neck:      Thyroid: No thyromegaly.      Trachea: No tracheal deviation.   Cardiovascular:      Rate and Rhythm: Regular rhythm.      Heart sounds: Normal heart " sounds.   Pulmonary:      Effort: Pulmonary effort is normal.      Breath sounds: Normal breath sounds.   Abdominal:      Hernia: A hernia (Right inguinal hernia is present.  This is reducible.) is present.   Musculoskeletal:      Right lower leg: No edema.      Left lower leg: No edema.   Skin:     General: Skin is warm and dry.   Neurological:      Mental Status: He is alert and oriented to person, place, and time.   Psychiatric:         Behavior: Behavior normal.

## 2024-07-30 ENCOUNTER — OFFICE VISIT (OUTPATIENT)
Dept: UROLOGY | Facility: AMBULATORY SURGERY CENTER | Age: 64
End: 2024-07-30
Payer: MEDICARE

## 2024-07-30 VITALS
HEIGHT: 66 IN | WEIGHT: 142 LBS | OXYGEN SATURATION: 98 % | BODY MASS INDEX: 22.82 KG/M2 | DIASTOLIC BLOOD PRESSURE: 82 MMHG | SYSTOLIC BLOOD PRESSURE: 142 MMHG | HEART RATE: 59 BPM

## 2024-07-30 DIAGNOSIS — Z12.5 SCREENING FOR PROSTATE CANCER: Primary | ICD-10-CM

## 2024-07-30 DIAGNOSIS — C64.2 RENAL CELL CANCER, LEFT (HCC): ICD-10-CM

## 2024-07-30 PROCEDURE — 99213 OFFICE O/P EST LOW 20 MIN: CPT

## 2024-07-30 NOTE — PROGRESS NOTES
Office Visit- Urology  Adrian Mares 1960 MRN: [id number]      Assessment/Discussion/Plan    63 y.o. male managed by     Clear-cell renal cell carcinoma  -S/p laparoscopic left radical nephrectomy in December 2019   -Final pathology with no pT1a disease with no identified sarcomatoid or rhabdoid features.  Uninvolved margins  -Patient has had postoperative imaging which not suggestive recurrence last had a MRI of the abdomen with and without contrast and chest x-ray in July 2024 with no signs of residual, recurrent or metastatic malignancy   -As patient will be 5 years from surgery December 2024.  Will obtain 1 more repeat imaging in 1 year with CT of the abdomen with without contrast and chest x-ray.  If stable findings at that point in time per NCCN guidelines with pT1a disease with no concerning features patient does not need further imaging     2.  Prostate cancer screening  -Last PSA was 0.49 in July 2023.  Due for updated PSA  -NILO today was deferred by patient      Chief Complaint:   Adrian is a 63 y.o. male presenting to the office for a follow up visit regarding renal cell carcinoma        Subjective    Patient is a 63-year-old male with a history of left renal cell carcinoma status post left radical nephrectomy in December 2019 presents for follow-up.  He was last in the office in August 2023.   final pathology with clear-cell renal cell carcinoma with CT 1A disease with no identified sarcomatoid or rhabdoid features.  Uninvolved margins.  No submitted lymph nodes.  Postoperative imaging has not demonstrated any sign of recurrence or metastasis.  He last had MRI of the abdomen with and without contrast and chest x-ray in July 2024.  Patient has routine prostate cancer screening last had a PSA 0.49 in July 2023.  No bothersome urinary symptoms at this point in time.  No dysuria, gross hematuria or flank pain.  Creatinine at 1.22 which is at patient's baseline.  He does follow with  nephrology        AUA SYMPTOM SCORE      Flowsheet Row Most Recent Value   AUA SYMPTOM SCORE    How often have you had a sensation of not emptying your bladder completely after you finished urinating? 0 (P)     How often have you had to urinate again less than two hours after you finished urinating? 1 (P)     How often have you found you stopped and started again several times when you urinate? 0 (P)     How often have you found it difficult to postpone urination? 0 (P)     How often have you had a weak urinary stream? 0 (P)     How often have you had to push or strain to begin urination? 0 (P)     How many times did you most typically get up to urinate from the time you went to bed at night until the time you got up in the morning? 4 (P)     Quality of Life: If you were to spend the rest of your life with your urinary condition just the way it is now, how would you feel about that? 2 (P)     AUA SYMPTOM SCORE 5 (P)              ROS:   Review of Systems   Constitutional: Negative.  Negative for chills, fatigue and fever.   HENT: Negative.     Respiratory:  Negative for shortness of breath.    Cardiovascular:  Negative for chest pain.   Gastrointestinal: Negative.  Negative for abdominal pain.   Endocrine: Negative.    Musculoskeletal: Negative.    Skin: Negative.    Neurological: Negative.  Negative for dizziness and light-headedness.   Hematological: Negative.    Psychiatric/Behavioral: Negative.           Past Medical History  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HCC) 2017    behind right eye-    Arthritis     Cancer (HCC) 10/13/2019    kidney    Chronic kidney disease 1/7/2020    stage 3    CKD (chronic kidney disease)     Diverticulitis of colon 10/13/2019    Diverticulitis of large intestine with perforation without bleeding 10/13/2019    Hyperlipidemia     Hypertension     Inflammatory bowel disease 10/13/2019    Renal cell cancer, left (HCC) 10/13/2019    Renal cell carcinoma (HCC)        Past  Surgical History  Past Surgical History:   Procedure Laterality Date    COLON SURGERY  [id number]    COLONOSCOPY  2015    COLONOSCOPY  2015    HERNIA REPAIR      HIP SURGERY Right     age 9    JOINT REPLACEMENT Right 2011    TKR    NEPHRECTOMY  12/30/2019    left    [facility] LAPAROSCOPY COLECTOMY PARTIAL W/ANASTOMOSIS N/A 12/30/2019    Procedure: LAPAROSCOPIC HAND-ASSISTED SIGMOID COLECTOMY; LAPAROSCOPIC MOBILIZATION OF SPLENIC FLEXURE;  Surgeon: ELKIN Ramos MD;  [facility];  Service: Colorectal    [facility] LAPAROSCOPY RADICAL NEPHRECTOMY Left 12/30/2019    Procedure: NEPHRECTOMY LAPAROSCOPIC HAND ASSISTED;  Surgeon: Harshad Gardner MD;  [facility];  Service: Urology    [facility] SURGICAL ARTHROSCOPY SHOULDER W/ROTATOR CUFF RPR Left 12/09/2022    Procedure: SHOULDER ARTHROSCOPIC REVISION ROTATOR CUFF REPAIR AND BICEPS TENODESIS;  Surgeon: Larry Jaquez MD;  [facility];  Service: Orthopedics    REPLACEMENT TOTAL KNEE Right 2011    REPLACEMENT TOTAL KNEE      SHOULDER SURGERY Left     SHOULDER SURGERY Right     THYROID LOBECTOMY Right 10/12/2022    Procedure: RIGHT THYROID LOBECTOMY;  Surgeon: Hieu Melchor MD;  [facility];  Service: Surgical Oncology    US GUIDED THYROID BIOPSY  07/09/2021    WRIST FUSION Left 2016    WRIST SURGERY         Past Family History  Family History   Problem Relation Age of Onset    Heart disease Father     Other Father         cardiac disorder    No Known Problems Mother     Heart attack Maternal Grandfather     Heart attack Paternal Grandfather     Thyroid cancer Son     Colon cancer Neg Hx        Past Social history  Social History     Socioeconomic History    Marital status: /Civil Union     Spouse name: Not on file    Number of children: 4    Years of education: high school or GED    Highest education level: Not on file   Occupational History    Occupation:      Comment: [employer]   Tobacco Use    Smoking  status: Never    Smokeless tobacco: Never   Vaping Use    Vaping status: Never Used   Substance and Sexual Activity    Alcohol use: Yes     Alcohol/week: 2.0 standard drinks of alcohol     Types: 2 Cans of beer per week     Comment: 1-2 beers/week    Drug use: No    Sexual activity: Not Currently     Partners: Female     Birth control/protection: None   Other Topics Concern    Not on file   Social History Narrative    Not on file     Social Determinants of Health     Financial Resource Strain: Not on file   Food Insecurity: No Food Insecurity (6/25/2024)    Hunger Vital Sign     Worried About Running Out of Food in the Last Year: Never true     Ran Out of Food in the Last Year: Never true   Transportation Needs: No Transportation Needs (6/25/2024)    PRAPARE - Transportation     Lack of Transportation (Medical): No     Lack of Transportation (Non-Medical): No   Physical Activity: Not on file   Stress: Not on file   Social Connections: Not on file   Intimate Partner Violence: Not on file   Housing Stability: Low Risk  (6/25/2024)    Housing Stability Vital Sign     Unable to Pay for Housing in the Last Year: No     Number of Times Moved in the Last Year: 0     Homeless in the Last Year: No       Current Medications  Current Outpatient Medications   Medication Sig Dispense Refill    aspirin 81 MG tablet Take 1 tablet by mouth daily      atorvastatin (LIPITOR) 20 mg tablet Take 1 tablet (20 mg total) by mouth daily 90 tablet 1    losartan (COZAAR) 25 mg tablet Take 1 tablet (25 mg total) by mouth daily 90 tablet 1    multivitamin (THERAGRAN) TABS Take 1 tablet by mouth every other day      Omega-3 Fatty Acids (FISH OIL) 1200 MG CAPS Take by mouth in the morning      tamsulosin (FLOMAX) 0.4 mg Take 1 capsule (0.4 mg total) by mouth daily with dinner Begin 5 days before surgery 10 capsule 0     No current facility-administered medications for this visit.       Allergies  No Known Allergies    OBJECTIVE    Vitals  "  Vitals:    07/30/24 0746   BP: 142/82   BP Location: Left arm   Patient Position: Sitting   Cuff Size: Standard   Pulse: 59   SpO2: 98%   Weight: 64.4 kg (142 lb)   Height: 5' 6\" (1.676 m)       PVR:    Physical Exam  Constitutional:       General: He is not in acute distress.     Appearance: Normal appearance. He is normal weight. He is not ill-appearing or toxic-appearing.   HENT:      Head: Normocephalic and atraumatic.   Eyes:      Conjunctiva/sclera: Conjunctivae normal.   Cardiovascular:      Rate and Rhythm: Normal rate.   Pulmonary:      Effort: Pulmonary effort is normal. No respiratory distress.   Genitourinary:     Comments: NILO deferred   Skin:     General: Skin is warm and dry.   Neurological:      General: No focal deficit present.      Mental Status: He is alert and oriented to person, place, and time.      Cranial Nerves: No cranial nerve deficit.   Psychiatric:         Mood and Affect: Mood normal.         Behavior: Behavior normal.         Thought Content: Thought content normal.          Labs:     Lab Results   Component Value Date    PSA 0.49 07/12/2023    PSA 0.4 01/28/2022    PSA 0.4 09/07/2017     Lab Results   Component Value Date    CREATININE 1.22 07/18/2024      Lab Results   Component Value Date    HGBA1C 5.8 12/02/2019     Lab Results   Component Value Date    CALCIUM 9.3 07/18/2024    K 4.2 07/18/2024    CO2 27 07/18/2024     07/18/2024    BUN 25 07/18/2024    CREATININE 1.22 07/18/2024       I have personally reviewed all pertinent lab results and reviewed with patient    Imaging       Nadir Tavares PA-C  Date: 7/30/2024 Time: 7:49 AM  [facility]    This note was written using fluency dictation software. Please excuse any resulting minor grammatical errors.      "

## 2024-07-31 ENCOUNTER — APPOINTMENT (OUTPATIENT)
Dept: LAB | Age: 64
End: 2024-07-31
Payer: MEDICARE

## 2024-07-31 DIAGNOSIS — Z12.5 SCREENING FOR PROSTATE CANCER: ICD-10-CM

## 2024-07-31 LAB — PSA SERPL-MCNC: 0.53 NG/ML (ref 0–4)

## 2024-07-31 PROCEDURE — G0103 PSA SCREENING: HCPCS

## 2024-07-31 PROCEDURE — 36415 COLL VENOUS BLD VENIPUNCTURE: CPT

## 2024-08-06 ENCOUNTER — TELEPHONE (OUTPATIENT)
Dept: UROLOGY | Facility: AMBULATORY SURGERY CENTER | Age: 64
End: 2024-08-06

## 2024-08-06 NOTE — TELEPHONE ENCOUNTER
----- Message from Nadir Tavares PA-C sent at 8/6/2024 11:10 AM EDT -----  PSA is low and stable at 0.5

## 2024-08-06 NOTE — TELEPHONE ENCOUNTER
Called patient to let him know that his PSA came back low and stable at 0.5. Patient is aware of his results.

## 2024-08-12 ENCOUNTER — PREP FOR PROCEDURE (OUTPATIENT)
Dept: OBGYN CLINIC | Facility: OTHER | Age: 64
End: 2024-08-12

## 2024-08-12 DIAGNOSIS — M75.101 TEAR OF RIGHT SUPRASPINATUS TENDON: Primary | ICD-10-CM

## 2024-08-12 RX ORDER — SODIUM CHLORIDE, SODIUM LACTATE, POTASSIUM CHLORIDE, CALCIUM CHLORIDE 600; 310; 30; 20 MG/100ML; MG/100ML; MG/100ML; MG/100ML
125 INJECTION, SOLUTION INTRAVENOUS CONTINUOUS
OUTPATIENT
Start: 2024-08-12

## 2024-08-21 ENCOUNTER — APPOINTMENT (OUTPATIENT)
Dept: LAB | Facility: CLINIC | Age: 64
End: 2024-08-21
Payer: MEDICARE

## 2024-08-21 DIAGNOSIS — K40.90 INGUINAL HERNIA: ICD-10-CM

## 2024-08-21 LAB
ERYTHROCYTE [DISTWIDTH] IN BLOOD BY AUTOMATED COUNT: 12.5 % (ref 11.6–15.1)
HCT VFR BLD AUTO: 42.7 % (ref 36.5–49.3)
HGB BLD-MCNC: 13.9 G/DL (ref 12–17)
MCH RBC QN AUTO: 30 PG (ref 26.8–34.3)
MCHC RBC AUTO-ENTMCNC: 32.6 G/DL (ref 31.4–37.4)
MCV RBC AUTO: 92 FL (ref 82–98)
PLATELET # BLD AUTO: 190 THOUSANDS/UL (ref 149–390)
PMV BLD AUTO: 10.2 FL (ref 8.9–12.7)
RBC # BLD AUTO: 4.63 MILLION/UL (ref 3.88–5.62)
WBC # BLD AUTO: 4.53 THOUSAND/UL (ref 4.31–10.16)

## 2024-08-21 PROCEDURE — 36415 COLL VENOUS BLD VENIPUNCTURE: CPT

## 2024-08-21 PROCEDURE — 85027 COMPLETE CBC AUTOMATED: CPT

## 2024-08-23 NOTE — PRE-PROCEDURE INSTRUCTIONS
Pre-Surgery Instructions:   Medication Instructions    aspirin 81 MG tablet Stop taking 7 days prior to surgery.    atorvastatin (LIPITOR) 20 mg tablet Take day of surgery.    losartan (COZAAR) 25 mg tablet Hold day of surgery.    multivitamin (THERAGRAN) TABS Stop taking 7 days prior to surgery.    Omega-3 Fatty Acids (FISH OIL) 1200 MG CAPS Stop taking 7 days prior to surgery.    tamsulosin (FLOMAX) 0.4 mg Hold day of surgery       Medication instructions for day surgery reviewed. Please use only a sip of water to take your instructed medications. Avoid all over the counter vitamins, supplements and NSAIDS for one week prior to surgery per anesthesia guidelines. Tylenol is ok to take as needed.     You will receive a call one business day prior to surgery with an arrival time and hospital directions. If your surgery is scheduled on a Monday, the hospital will be calling you on the Friday prior to your surgery. If you have not heard from anyone by 8pm, please call the hospital supervisor through the hospital  at [phone]. ([place] [phone] or [place] [phone]).    Do not eat or drink anything after midnight the night before your surgery, including candy, mints, lifesavers, or chewing gum. Do not drink alcohol 24hrs before your surgery. Try not to smoke at least 24hrs before your surgery.       Follow the pre surgery showering instructions as listed in the “My Surgical Experience Booklet” or otherwise provided by your surgeon's office. Do not use a blade to shave the surgical area 1 week before surgery. It is okay to use a clean electric clippers up to 24 hours before surgery. Do not apply any lotions, creams, including makeup, cologne, deodorant, or perfumes after showering on the day of your surgery. Do not use dry shampoo, hair spray, hair gel, or any type of hair products.     No contact lenses, eye make-up, or artificial eyelashes. Remove nail polish, including gel polish, and any  artificial, gel, or acrylic nails if possible. Remove all jewelry including rings and body piercing jewelry.     Wear causal clothing that is easy to take on and off. Consider your type of surgery.    Keep any valuables, jewelry, piercings at home. Please bring any specially ordered equipment (sling, braces) if indicated.    Arrange for a responsible person to drive you to and from the hospital on the day of your surgery. Please confirm the visitor policy for the day of your procedure when you receive your phone call with an arrival time.     Call the surgeon's office with any new illnesses, exposures, or additional questions prior to surgery.    Please reference your “My Surgical Experience Booklet” for additional information to prepare for your upcoming surgery.

## 2024-09-03 ENCOUNTER — ANESTHESIA EVENT (OUTPATIENT)
Dept: PERIOP | Facility: AMBULARY SURGERY CENTER | Age: 64
End: 2024-09-03
Payer: MEDICARE

## 2024-09-03 RX ORDER — SODIUM CHLORIDE, SODIUM LACTATE, POTASSIUM CHLORIDE, CALCIUM CHLORIDE 600; 310; 30; 20 MG/100ML; MG/100ML; MG/100ML; MG/100ML
125 INJECTION, SOLUTION INTRAVENOUS CONTINUOUS
Status: CANCELLED | OUTPATIENT
Start: 2024-09-03

## 2024-09-03 NOTE — ANESTHESIA PREPROCEDURE EVALUATION
Procedure:  REPAIR HERNIA INGUINAL (Right: Groin)    Relevant Problems   ANESTHESIA (within normal limits)      CARDIO   (+) HTN (hypertension)   (+) Mixed hyperlipidemia      /RENAL   (+) Benign hypertension with chronic kidney disease, stage III (HCC)   (+) Renal cell cancer, left (HCC)   (+) Solitary kidney, acquired      MUSCULOSKELETAL   (+) Osteoarthritis of finger of right hand      NEURO/PSYCH   (+) Cluster headache      Other   (+) Right inguinal hernia        Physical Exam    Airway    Mallampati score: II  TM Distance: >3 FB  Neck ROM: full     Dental        Cardiovascular  Rhythm: regular, Rate: normal, Cardiovascular exam normal    Pulmonary  Pulmonary exam normal Breath sounds clear to auscultation    Other Findings        Anesthesia Plan  ASA Score- 2     Anesthesia Type- general with ASA Monitors.         Additional Monitors:     Airway Plan: LMA.           Plan Factors-Exercise tolerance (METS): >4 METS.    Chart reviewed. EKG reviewed.  Existing labs reviewed. Patient summary reviewed.    Patient is not a current smoker.      Obstructive sleep apnea risk education given perioperatively.        Induction- intravenous.    Postoperative Plan- Plan for postoperative opioid use. Planned trial extubation        Informed Consent- Anesthetic plan and risks discussed with patient.  I personally reviewed this patient with the CRNA. Discussed and agreed on the Anesthesia Plan with the CRNA..

## 2024-09-04 ENCOUNTER — ANESTHESIA (OUTPATIENT)
Dept: PERIOP | Facility: AMBULARY SURGERY CENTER | Age: 64
End: 2024-09-04
Payer: MEDICARE

## 2024-09-04 ENCOUNTER — HOSPITAL ENCOUNTER (OUTPATIENT)
Facility: AMBULARY SURGERY CENTER | Age: 64
Setting detail: OUTPATIENT SURGERY
Discharge: HOME | End: 2024-09-04
Attending: SURGERY | Admitting: SURGERY
Payer: MEDICARE

## 2024-09-04 VITALS
BODY MASS INDEX: 23.95 KG/M2 | TEMPERATURE: 97 F | HEART RATE: 62 BPM | DIASTOLIC BLOOD PRESSURE: 76 MMHG | HEIGHT: 66 IN | OXYGEN SATURATION: 97 % | WEIGHT: 149 LBS | RESPIRATION RATE: 18 BRPM | SYSTOLIC BLOOD PRESSURE: 136 MMHG

## 2024-09-04 DIAGNOSIS — K40.90 RIGHT INGUINAL HERNIA: Primary | ICD-10-CM

## 2024-09-04 PROCEDURE — NC001 PR NO CHARGE: Performed by: SURGERY

## 2024-09-04 PROCEDURE — C1781 MESH (IMPLANTABLE): HCPCS | Performed by: SURGERY

## 2024-09-04 PROCEDURE — 49505 PRP I/HERN INIT REDUC >5 YR: CPT | Performed by: SURGERY

## 2024-09-04 DEVICE — MODIFIED ONFLEX MESH
Type: IMPLANTABLE DEVICE | Site: INGUINAL | Status: FUNCTIONAL
Brand: MODIFIED ONFLEX MESH

## 2024-09-04 RX ORDER — BUPIVACAINE HYDROCHLORIDE 2.5 MG/ML
INJECTION, SOLUTION EPIDURAL; INFILTRATION; INTRACAUDAL AS NEEDED
Status: DISCONTINUED | OUTPATIENT
Start: 2024-09-04 | End: 2024-09-04 | Stop reason: HOSPADM

## 2024-09-04 RX ORDER — SODIUM CHLORIDE, SODIUM LACTATE, POTASSIUM CHLORIDE, CALCIUM CHLORIDE 600; 310; 30; 20 MG/100ML; MG/100ML; MG/100ML; MG/100ML
INJECTION, SOLUTION INTRAVENOUS CONTINUOUS PRN
Status: DISCONTINUED | OUTPATIENT
Start: 2024-09-04 | End: 2024-09-04

## 2024-09-04 RX ORDER — ONDANSETRON 2 MG/ML
4 INJECTION INTRAMUSCULAR; INTRAVENOUS ONCE AS NEEDED
Status: DISCONTINUED | OUTPATIENT
Start: 2024-09-04 | End: 2024-09-04 | Stop reason: HOSPADM

## 2024-09-04 RX ORDER — ONDANSETRON 2 MG/ML
4 INJECTION INTRAMUSCULAR; INTRAVENOUS EVERY 4 HOURS PRN
Status: DISCONTINUED | OUTPATIENT
Start: 2024-09-04 | End: 2024-09-04 | Stop reason: HOSPADM

## 2024-09-04 RX ORDER — PROPOFOL 10 MG/ML
INJECTION, EMULSION INTRAVENOUS AS NEEDED
Status: DISCONTINUED | OUTPATIENT
Start: 2024-09-04 | End: 2024-09-04

## 2024-09-04 RX ORDER — MIDAZOLAM HYDROCHLORIDE 2 MG/2ML
INJECTION, SOLUTION INTRAMUSCULAR; INTRAVENOUS AS NEEDED
Status: DISCONTINUED | OUTPATIENT
Start: 2024-09-04 | End: 2024-09-04

## 2024-09-04 RX ORDER — ONDANSETRON 2 MG/ML
INJECTION INTRAMUSCULAR; INTRAVENOUS AS NEEDED
Status: DISCONTINUED | OUTPATIENT
Start: 2024-09-04 | End: 2024-09-04

## 2024-09-04 RX ORDER — METOCLOPRAMIDE HYDROCHLORIDE 5 MG/ML
10 INJECTION INTRAMUSCULAR; INTRAVENOUS ONCE AS NEEDED
Status: DISCONTINUED | OUTPATIENT
Start: 2024-09-04 | End: 2024-09-04 | Stop reason: HOSPADM

## 2024-09-04 RX ORDER — OXYCODONE AND ACETAMINOPHEN 5; 325 MG/1; MG/1
1 TABLET ORAL EVERY 4 HOURS PRN
Status: DISCONTINUED | OUTPATIENT
Start: 2024-09-04 | End: 2024-09-04 | Stop reason: HOSPADM

## 2024-09-04 RX ORDER — CEFAZOLIN SODIUM 2 G/50ML
2000 SOLUTION INTRAVENOUS ONCE
Status: COMPLETED | OUTPATIENT
Start: 2024-09-04 | End: 2024-09-04

## 2024-09-04 RX ORDER — HYDRALAZINE HYDROCHLORIDE 20 MG/ML
5 INJECTION INTRAMUSCULAR; INTRAVENOUS
Status: DISCONTINUED | OUTPATIENT
Start: 2024-09-04 | End: 2024-09-04 | Stop reason: HOSPADM

## 2024-09-04 RX ORDER — OXYCODONE AND ACETAMINOPHEN 5; 325 MG/1; MG/1
1 TABLET ORAL EVERY 4 HOURS PRN
Qty: 10 TABLET | Refills: 0 | Status: SHIPPED | OUTPATIENT
Start: 2024-09-04

## 2024-09-04 RX ORDER — HYDROMORPHONE HCL/PF 1 MG/ML
0.2 SYRINGE (ML) INJECTION
Status: DISCONTINUED | OUTPATIENT
Start: 2024-09-04 | End: 2024-09-04 | Stop reason: HOSPADM

## 2024-09-04 RX ORDER — ALBUTEROL SULFATE 0.83 MG/ML
2.5 SOLUTION RESPIRATORY (INHALATION) ONCE AS NEEDED
Status: DISCONTINUED | OUTPATIENT
Start: 2024-09-04 | End: 2024-09-04 | Stop reason: HOSPADM

## 2024-09-04 RX ORDER — PROMETHAZINE HYDROCHLORIDE 25 MG/ML
12.5 INJECTION, SOLUTION INTRAMUSCULAR; INTRAVENOUS ONCE AS NEEDED
Status: DISCONTINUED | OUTPATIENT
Start: 2024-09-04 | End: 2024-09-04 | Stop reason: HOSPADM

## 2024-09-04 RX ORDER — MAGNESIUM HYDROXIDE 1200 MG/15ML
LIQUID ORAL AS NEEDED
Status: DISCONTINUED | OUTPATIENT
Start: 2024-09-04 | End: 2024-09-04 | Stop reason: HOSPADM

## 2024-09-04 RX ORDER — DEXAMETHASONE SODIUM PHOSPHATE 10 MG/ML
INJECTION, SOLUTION INTRAMUSCULAR; INTRAVENOUS AS NEEDED
Status: DISCONTINUED | OUTPATIENT
Start: 2024-09-04 | End: 2024-09-04

## 2024-09-04 RX ORDER — FENTANYL CITRATE 50 UG/ML
INJECTION, SOLUTION INTRAMUSCULAR; INTRAVENOUS AS NEEDED
Status: DISCONTINUED | OUTPATIENT
Start: 2024-09-04 | End: 2024-09-04

## 2024-09-04 RX ORDER — FUROSEMIDE 10 MG/ML
INJECTION INTRAMUSCULAR; INTRAVENOUS AS NEEDED
Status: DISCONTINUED | OUTPATIENT
Start: 2024-09-04 | End: 2024-09-04

## 2024-09-04 RX ORDER — FENTANYL CITRATE/PF 50 MCG/ML
25 SYRINGE (ML) INJECTION
Status: DISCONTINUED | OUTPATIENT
Start: 2024-09-04 | End: 2024-09-04 | Stop reason: HOSPADM

## 2024-09-04 RX ORDER — HYDROMORPHONE HCL/PF 1 MG/ML
0.5 SYRINGE (ML) INJECTION
Status: DISCONTINUED | OUTPATIENT
Start: 2024-09-04 | End: 2024-09-04 | Stop reason: HOSPADM

## 2024-09-04 RX ORDER — LABETALOL HYDROCHLORIDE 5 MG/ML
10 INJECTION, SOLUTION INTRAVENOUS
Status: DISCONTINUED | OUTPATIENT
Start: 2024-09-04 | End: 2024-09-04 | Stop reason: HOSPADM

## 2024-09-04 RX ORDER — PHENYLEPHRINE HCL IN 0.9% NACL 1 MG/10 ML
SYRINGE (ML) INTRAVENOUS AS NEEDED
Status: DISCONTINUED | OUTPATIENT
Start: 2024-09-04 | End: 2024-09-04

## 2024-09-04 RX ORDER — LIDOCAINE HYDROCHLORIDE 20 MG/ML
INJECTION, SOLUTION EPIDURAL; INFILTRATION; INTRACAUDAL; PERINEURAL AS NEEDED
Status: DISCONTINUED | OUTPATIENT
Start: 2024-09-04 | End: 2024-09-04

## 2024-09-04 RX ADMIN — FENTANYL CITRATE 25 MCG: 50 INJECTION INTRAMUSCULAR; INTRAVENOUS at 08:05

## 2024-09-04 RX ADMIN — FUROSEMIDE 10 MG: 10 INJECTION, SOLUTION INTRAMUSCULAR; INTRAVENOUS at 08:35

## 2024-09-04 RX ADMIN — FENTANYL CITRATE 25 MCG: 50 INJECTION INTRAMUSCULAR; INTRAVENOUS at 07:35

## 2024-09-04 RX ADMIN — FENTANYL CITRATE 25 MCG: 50 INJECTION INTRAMUSCULAR; INTRAVENOUS at 07:47

## 2024-09-04 RX ADMIN — SODIUM CHLORIDE, SODIUM LACTATE, POTASSIUM CHLORIDE, AND CALCIUM CHLORIDE: .6; .31; .03; .02 INJECTION, SOLUTION INTRAVENOUS at 08:12

## 2024-09-04 RX ADMIN — FENTANYL CITRATE 25 MCG: 50 INJECTION INTRAMUSCULAR; INTRAVENOUS at 07:51

## 2024-09-04 RX ADMIN — PROPOFOL 200 MG: 10 INJECTION, EMULSION INTRAVENOUS at 07:31

## 2024-09-04 RX ADMIN — FENTANYL CITRATE 25 MCG: 50 INJECTION INTRAMUSCULAR; INTRAVENOUS at 07:57

## 2024-09-04 RX ADMIN — LIDOCAINE HYDROCHLORIDE 100 MG: 20 INJECTION, SOLUTION EPIDURAL; INFILTRATION; INTRACAUDAL at 07:31

## 2024-09-04 RX ADMIN — CEFAZOLIN SODIUM 2000 MG: 2 SOLUTION INTRAVENOUS at 07:35

## 2024-09-04 RX ADMIN — FENTANYL CITRATE 25 MCG: 50 INJECTION INTRAMUSCULAR; INTRAVENOUS at 08:28

## 2024-09-04 RX ADMIN — DEXAMETHASONE SODIUM PHOSPHATE 10 MG: 10 INJECTION, SOLUTION INTRAMUSCULAR; INTRAVENOUS at 07:34

## 2024-09-04 RX ADMIN — SODIUM CHLORIDE, SODIUM LACTATE, POTASSIUM CHLORIDE, AND CALCIUM CHLORIDE: .6; .31; .03; .02 INJECTION, SOLUTION INTRAVENOUS at 07:27

## 2024-09-04 RX ADMIN — FENTANYL CITRATE 25 MCG: 50 INJECTION INTRAMUSCULAR; INTRAVENOUS at 07:45

## 2024-09-04 RX ADMIN — Medication 100 MCG: at 07:59

## 2024-09-04 RX ADMIN — MIDAZOLAM 2 MG: 1 INJECTION INTRAMUSCULAR; INTRAVENOUS at 07:29

## 2024-09-04 RX ADMIN — FENTANYL CITRATE 25 MCG: 50 INJECTION INTRAMUSCULAR; INTRAVENOUS at 08:13

## 2024-09-04 RX ADMIN — ONDANSETRON 4 MG: 2 INJECTION INTRAMUSCULAR; INTRAVENOUS at 07:34

## 2024-09-04 NOTE — H&P
-General Surgical Care   Adrian Mares 64 y.o. male MRN: [id number]  Unit/Bed#: OR [place] Encounter: [id number]          ASSESSMENT: Right inguinal hernia    PLAN: Right inguinal hernia repair      Review of Systems  Constitutional:  Denies fever or chills   Eyes:  Denies change in visual acuity   HENT:  Denies nasal congestion or sore throat   Respiratory:  Denies cough or shortness of breath   Cardiovascular:  Denies chest pain or edema   GI:  Denies abdominal pain, nausea, vomiting, bloody stools or diarrhea   Musculoskeletal:  Denies back pain or joint pain   Integument:  Denies rash   Neurologic:  Denies headache, focal weakness or sensory changes   Endocrine:  Denies polyuria or polydipsia   Lymphatic:  Denies swollen glands   Psychiatric:  Denies depression or anxiety     Historical Information   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HCC) 2017    behind right eye-    Arthritis     Cancer (HCC) 10/13/2019    kidney    Chronic kidney disease 1/7/2020    stage 3    CKD (chronic kidney disease)     Diverticulitis of colon 10/13/2019    Diverticulitis of large intestine with perforation without bleeding 10/13/2019    Hyperlipidemia     Hypertension     Inflammatory bowel disease 10/13/2019    Renal cell cancer, left (HCC) 10/13/2019    Renal cell carcinoma (HCC)      Past Surgical History:   Procedure Laterality Date    COLON SURGERY  [id number]    COLONOSCOPY  2015    COLONOSCOPY  2015    HERNIA REPAIR      HIP SURGERY Right     age 9    JOINT REPLACEMENT Right 2011    TKR    NEPHRECTOMY  12/30/2019    left    [facility] LAPAROSCOPY COLECTOMY PARTIAL W/ANASTOMOSIS N/A 12/30/2019    Procedure: LAPAROSCOPIC HAND-ASSISTED SIGMOID COLECTOMY; LAPAROSCOPIC MOBILIZATION OF SPLENIC FLEXURE;  Surgeon: ELKIN Ramos MD;  [facility];  Service: Colorectal    [facility] LAPAROSCOPY RADICAL NEPHRECTOMY Left 12/30/2019    Procedure: NEPHRECTOMY LAPAROSCOPIC HAND ASSISTED;  Surgeon: Harshad Gardner MD;   "[facility];  Service: Urology    [facility] SURGICAL ARTHROSCOPY SHOULDER W/ROTATOR CUFF RPR Left 12/09/2022    Procedure: SHOULDER ARTHROSCOPIC REVISION ROTATOR CUFF REPAIR AND BICEPS TENODESIS;  Surgeon: Larry Jaquez MD;  [facility];  Service: Orthopedics    REPLACEMENT TOTAL KNEE Right 2011    REPLACEMENT TOTAL KNEE      SHOULDER SURGERY Left     SHOULDER SURGERY Right     THYROID LOBECTOMY Right 10/12/2022    Procedure: RIGHT THYROID LOBECTOMY;  Surgeon: Hieu Melchor MD;  [facility];  Service: Surgical Oncology    US GUIDED THYROID BIOPSY  07/09/2021    WRIST FUSION Left 2016    WRIST SURGERY       Social History   Social History     Substance and Sexual Activity   Alcohol Use Yes    Alcohol/week: 2.0 standard drinks of alcohol    Types: 2 Cans of beer per week    Comment: 1-2 beers/week     Social History     Substance and Sexual Activity   Drug Use No     Social History     Tobacco Use   Smoking Status Never   Smokeless Tobacco Never     Family History   Problem Relation Age of Onset    Heart disease Father     Other Father         cardiac disorder    No Known Problems Mother     Heart attack Maternal Grandfather     Heart attack Paternal Grandfather     Thyroid cancer Son     Colon cancer Neg Hx        Meds/Allergies       Medications Prior to Admission:     aspirin 81 MG tablet    atorvastatin (LIPITOR) 20 mg tablet    losartan (COZAAR) 25 mg tablet    multivitamin (THERAGRAN) TABS    Omega-3 Fatty Acids (FISH OIL) 1200 MG CAPS    tamsulosin (FLOMAX) 0.4 mg  Current Facility-Administered Medications   Medication Dose Route Frequency    ceFAZolin (ANCEF) IVPB (premix in dextrose) 2,000 mg 50 mL  2,000 mg Intravenous Once       No Known Allergies    Objective     Blood pressure 128/78, pulse 62, temperature 98.6 °F (37 °C), temperature source Temporal, resp. rate 18, height 5' 6\" (1.676 m), weight 67.6 kg (149 lb), SpO2 97%.    No intake or output data in the 24 hours " ending 09/04/24 0719    PHYSICAL EXAM  General appearance: alert and oriented, in no acute distress  Lungs: clear to auscultation bilaterally  Heart: regular rate and rhythm, S1, S2 normal, no murmur, click, rub or gallop  Abdomen: soft, non-tender; bowel sounds normal; no masses,  no organomegaly  Skin: Skin color, texture, turgor normal. No rashes or lesions    Lab Results:   No visits with results within 1 Day(s) from this visit.   Latest known visit with results is:   Appointment on 08/21/2024   Component Date Value    WBC 08/21/2024 4.53     RBC 08/21/2024 4.63     Hemoglobin 08/21/2024 13.9     Hematocrit 08/21/2024 42.7     MCV 08/21/2024 92     MCH 08/21/2024 30.0     MCHC 08/21/2024 32.6     RDW 08/21/2024 12.5     Platelets 08/21/2024 190     MPV 08/21/2024 10.2      Imaging Studies: I have personally reviewed pertinent reports.    XR knee 3 vw right non injury    Result Date: 8/6/2024  Narrative: This result has an attachment that is not available. AP LAT and [place] views of  right knee: Cemented femoral and patellar components are well fixed in adequate alignment. Uncemented monoblock trabecular metal tibial component is well fixed in anatomic alignment. There are no radiographic signs of subsidence or loosening. There are no loose bodies. No radiographic signs of wear. This report is limited to orthopaedic interpretation.    XR hip/pelv 2-3 vws right if performed    Result Date: 8/6/2024  Narrative: This result has an attachment that is not available. AP/LAT of the right hip and AP Pelvis:  Severe degenerative changes with severe loss of the joint space with joint space narrowing. There is Moderately severe osteophyte formation and subchondral sclerosis.There are bone on bone degenerative changes.  No fractures or dislocation. This report is limited to orthopaedic interpretation.       Counseling / Coordination of Care  Total time spent today  15 minutes. Greater than 50% of total time was spent with  the patient and / or family counseling and / or coordination of care.

## 2024-09-04 NOTE — OP NOTE
OPERATIVE REPORT  PATIENT NAME: Adrian Mares    :  1960  MRN: [id number]  Pt [facility] 01    SURGERY DATE: 2024    Surgeons and Role:     * Tim Cole MD - Primary     * Jayna Leon MD - Assisting    Preop Diagnosis:  Inguinal hernia [K40.90]    Post-Op Diagnosis Codes:     * Inguinal hernia [K40.90]    Procedure(s):  Right - REPAIR HERNIA INGUINAL    Specimen(s):  * No specimens in log *    Estimated Blood Loss:   Minimal    Drains:  * No LDAs found *    Anesthesia Type:   General    Operative Indications:  Inguinal hernia [K40.90]      Operative Findings:        Complications:   None    Procedure and Technique:  Adrian Mares is a 64 y.o. male was brought into the operative suite and identified visually and by arm band. The patient was placed in the supine position.  Careful attention was made towards padding and positioning of the extremities.  After sterile prep and drape, a timeout was completed. The prep was dry.  Antibiotics were provided. Athrombic pumps in place.    0.25% Marcaine with epinephrine was utilized throughout the procedure.  An incision was made  within the right inguinal region.  The incision was carried down through the skin and subcutaneous tissue. The superficial epigastric vein was clamped, ligated and  divided. . The external oblique fibers were identified.  The external ring was identified.  The external oblique fibers were then split in the direction of their fibers.  Hemostats were placed on the cut edges.  A self-retaining retractor was then placed.    Exploration of the inguinal canal commenced.  The cremasteric fibers were then divided along the fiber direction of its fibers the cord structures.   The cord was encircled in a atraumatic Penrose drain and preserved during dissection.  Identification of a indirect and /      or direct inguinal hernia was performed. High dissection was completed at the level of the internal ring.  Preperitoneal  dissection commenced identifying and preserving the inferior epigastric vessels.    A preperitoneal mesh was then inserted.  The branch of the genitofemoral nerve was divided in order to help prevent postoperative neuralgia.  The inguinal floor was then repaired with interrupted figure-of-eight 0 Vicryl suture.  The indirect inguinal ring was repositioned more superiorly while repairing the inguinal transversalis muscular floor.  An onlay mesh was then secured to the tendon overlying the lateral pubic tubercle The external oblique fascia was closed with a running 3-0 PDS suture.  Additional 0.25% Marcaine with epinephrine was injected over the ilioinguinal and iliohypogastric nerves.    3-0 Vicryl subcutaneous suture was placed into the Zia's fascia .   4-0 Monocryl suture was used for the subcuticular layer. Dermabond was then applied.    The patient was then awakened from general anesthesia and transferred to the recovery room in stable condition. Sponge and instrument count correct ×2.     I was present for the entire procedure.    Patient Disposition:  PACU              SIGNATURE: Tim Cole MD  DATE: September 4, 2024  TIME: 8:32 AM

## 2024-09-04 NOTE — DISCHARGE INSTR - AVS FIRST PAGE
Please call the office when you leave to schedule an appointment for 2 weeks.              Please call [phone]. [address] [facility], [address], [place], [address]. Off of [address] between [place] and [place].     Activity:    May lift 10 lb as many times as desired the 1st week,       20 lb in 2 weeks,       30 lb in 3 weeks.                Walking is encouraged  Normal daily activities including climbing steps are okay  Do not engage in strenuous activity ( sit-ups or crutches) or contact sports for 4-6 weeks post-operatively    Return to Work:   Okay to return to work when you feel well if you desire.        Diet:   You may return to your normal healthy diet.    Wound Care:  Your wound is closed with dissolvable stitches and glue.  It is okay to shower. Wash incision gently with soap and water and pat dry. Do not soak incisions in bath water or swim for two weeks. Do not apply any creams or ointments.    Pain Medication:   Please take as directed if needed. May use Advil or Motrin in addition.  Recall, the pain medicine and anesthesia is associated with constipation.    No driving while taking narcotic pain medications.      Other:  It is normal to developed a “healing ridge” / firm incision after surgery.  This is your body making scar tissue.  It is a good sign  Constipation is very common after general anesthesia.  Please use milk of magnesia as needed in order to help prevent constipation.  It is normal to get bruising after surgery.  If you have questions after discharge please call the office.    If you have increased pain, fever >101.5, increased drainage, redness or a bad smell at your surgery site, please call us immediately or come directly to the Emergency Room

## 2024-09-04 NOTE — ANESTHESIA POSTPROCEDURE EVALUATION
Post-Op Assessment Note    CV Status:  Stable    Pain management: adequate       Mental Status:  Arousable   Hydration Status:  Stable   PONV Controlled:  None   Airway Patency:  Patent  Two or more mitigation strategies used for obstructive sleep apnea   Post Op Vitals Reviewed: Yes    No anethesia notable event occurred.    Staff: Anesthesiologist               BP   136/69   Temp   97F   Pulse  62   Resp   10/min   SpO2   99%

## 2024-09-17 ENCOUNTER — OFFICE VISIT (OUTPATIENT)
Dept: SURGERY | Facility: CLINIC | Age: 64
End: 2024-09-17

## 2024-09-17 DIAGNOSIS — K40.90 RIGHT INGUINAL HERNIA: Primary | ICD-10-CM

## 2024-09-17 PROCEDURE — 99024 POSTOP FOLLOW-UP VISIT: CPT | Performed by: SURGERY

## 2024-09-17 NOTE — PROGRESS NOTES
Assessment/Plan: Patient is status post inguinal hernia repair.  He returns to the office feeling well.  He is advance his activities nicely.  Incisions clean and healing well.  Activity instructions provided.    There are no diagnoses linked to this encounter.    Pathology: None sent      Postoperative restrictions reviewed. All questions answered.       ______________________________________________________  HPI: Patient presents post operatively.  Right inguinal hernia repair 9/4/2024.               ROS:  General ROS: negative for - chills, fatigue, fever or night sweats, weight loss  Respiratory ROS: no cough, shortness of breath, or wheezing  Cardiovascular ROS: no chest pain or dyspnea on exertion  Genito-Urinary ROS: no dysuria, trouble voiding, or hematuria  Musculoskeletal ROS: negative for - gait disturbance, joint pain or muscle pain  Neurological ROS: no TIA or stroke symptoms  GI ROS: see HPI  Skin ROS: no new rashes or lesions   Lymphatic ROS: no new adenopathy noted by pt.   GYN ROS: see HPI, no new GYN history or bleeding noted  Psy ROS: no new mental or behavioral disturbances         Patient Active Problem List   Diagnosis    Osteoarthritis of finger of right hand    Mixed hyperlipidemia    Renal cell cancer, left (HCC)    Cluster headache    Malignant neoplasm of left kidney, except renal pelvis (HCC)    Tinea versicolor    Benign hypertension with chronic kidney disease, stage III (HCC)    Hyperglycemia    Persistent proteinuria    Hypermagnesemia    Solitary kidney, acquired    Pulse irregularity    Lymphadenopathy of head and neck    Mass of right side of neck    Thyroid nodule    Hip pain, bilateral    Subacromial impingement of left shoulder    Tear of left supraspinatus tendon    HTN (hypertension)    S/P partial thyroidectomy    Aftercare following surgery of the musculoskeletal system    Subacromial bursitis of right shoulder joint    Tendinitis of right rotator cuff    Nontraumatic  incomplete tear of right rotator cuff    Right inguinal hernia       Allergies:  Patient has no known allergies.      Current Outpatient Medications:     aspirin 81 MG tablet, Take 1 tablet by mouth daily, Disp: , Rfl:     atorvastatin (LIPITOR) 20 mg tablet, Take 1 tablet (20 mg total) by mouth daily, Disp: 90 tablet, Rfl: 1    losartan (COZAAR) 25 mg tablet, Take 1 tablet (25 mg total) by mouth daily, Disp: 90 tablet, Rfl: 1    multivitamin (THERAGRAN) TABS, Take 1 tablet by mouth every other day, Disp: , Rfl:     Omega-3 Fatty Acids (FISH OIL) 1200 MG CAPS, Take by mouth in the morning, Disp: , Rfl:     oxyCODONE-acetaminophen (PERCOCET) 5-325 mg per tablet, Take 1 tablet by mouth every 4 (four) hours as needed for moderate pain for up to 10 doses Max Daily Amount: 6 tablets, Disp: 10 tablet, Rfl: 0    tamsulosin (FLOMAX) 0.4 mg, Take 1 capsule (0.4 mg total) by mouth daily with dinner Begin 5 days before surgery, Disp: 10 capsule, Rfl: 0    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HCC) 2017    behind right eye-    Arthritis     Cancer (HCC) 10/13/2019    kidney    Chronic kidney disease 1/7/2020    stage 3    CKD (chronic kidney disease)     Diverticulitis of colon 10/13/2019    Diverticulitis of large intestine with perforation without bleeding 10/13/2019    Hyperlipidemia     Hypertension     Inflammatory bowel disease 10/13/2019    Renal cell cancer, left (HCC) 10/13/2019    Renal cell carcinoma (HCC)        Past Surgical History:   Procedure Laterality Date    COLON SURGERY  [id number]    COLONOSCOPY  2015    COLONOSCOPY  2015    HERNIA REPAIR      HIP SURGERY Right     age 9    JOINT REPLACEMENT Right 2011    TKR    NEPHRECTOMY  12/30/2019    left    AK LAPAROSCOPY COLECTOMY PARTIAL W/ANASTOMOSIS N/A 12/30/2019    Procedure: LAPAROSCOPIC HAND-ASSISTED SIGMOID COLECTOMY; LAPAROSCOPIC MOBILIZATION OF SPLENIC FLEXURE;  Surgeon: ELKIN Ramos MD;  [facility];  Service: Colorectal    AK  LAPAROSCOPY RADICAL NEPHRECTOMY Left 12/30/2019    Procedure: NEPHRECTOMY LAPAROSCOPIC HAND ASSISTED;  Surgeon: Harshad Gardner MD;  [facility];  Service: Urology    OK RPR 1ST INGUN HRNA AGE 5 YRS/> REDUCIBLE Right 9/4/2024    Procedure: REPAIR HERNIA INGUINAL;  Surgeon: Tim Cole MD;  [facility];  Service: General    OK SURGICAL ARTHROSCOPY SHOULDER W/ROTATOR CUFF RPR Left 12/09/2022    Procedure: SHOULDER ARTHROSCOPIC REVISION ROTATOR CUFF REPAIR AND BICEPS TENODESIS;  Surgeon: Larry Jaquez MD;  [facility];  Service: Orthopedics    REPLACEMENT TOTAL KNEE Right 2011    REPLACEMENT TOTAL KNEE      SHOULDER SURGERY Left     SHOULDER SURGERY Right     THYROID LOBECTOMY Right 10/12/2022    Procedure: RIGHT THYROID LOBECTOMY;  Surgeon: Hieu Melchor MD;  [facility];  Service: Surgical Oncology    US GUIDED THYROID BIOPSY  07/09/2021    WRIST FUSION Left 2016    WRIST SURGERY         Family History   Problem Relation Age of Onset    Heart disease Father     Other Father         cardiac disorder    No Known Problems Mother     Heart attack Maternal Grandfather     Heart attack Paternal Grandfather     Thyroid cancer Son     Colon cancer Neg Hx         reports that he has never smoked. He has never used smokeless tobacco. He reports current alcohol use of about 2.0 standard drinks of alcohol per week. He reports that he does not use drugs.    PHYSICAL EXAM    There were no vitals taken for this visit.    General: normal, cooperative, no distress  Abdominal: soft, nondistended, or nontender  Incision: clean, dry, and intact and healing well      Tim Cole MD    Date: 9/17/2024 Time: 10:00 AM

## 2024-09-19 ENCOUNTER — TELEPHONE (OUTPATIENT)
Age: 64
End: 2024-09-19

## 2024-09-19 DIAGNOSIS — N18.31 STAGE 3A CHRONIC KIDNEY DISEASE (HCC): Primary | ICD-10-CM

## 2024-09-19 NOTE — TELEPHONE ENCOUNTER
[facility] is calling back- they just wanted us to know they are refaxing the paper over now so it should be coming.

## 2024-09-19 NOTE — TELEPHONE ENCOUNTER
[facility] is calling because they are sending a fax that they need Dr. Walker to sign off on. It is basically that Dr. Walker is aware of the surgery and is okay with it taking place. If further questions we can call [phone] option 2 and the fax number is [phone]

## 2024-09-19 NOTE — TELEPHONE ENCOUNTER
Luciano for [facility],  to fax clearance form again as I have not received anything yet.    [place] office fax: [phone].

## 2024-09-20 NOTE — TELEPHONE ENCOUNTER
Patient is cleared for surgery from nephrology side as last blood work from July suggestive of renal function stable at creatinine 1.22 mg/dL.  Recommend holding losartan 1 day prior to surgery as well as on the day of surgery.  Repeat blood work in 3 to 7 days after surgery.  BMP ordered for 10/2/2024, please inform patient

## 2024-09-20 NOTE — TELEPHONE ENCOUNTER
I spoke to Ryley , he understood clearance instructions and will hold losartan 1 day prior to surgery and day of procedure. He aware to have labs done 3-7 days after surgery.    Clearance form faxed to [facility].

## 2024-10-01 ENCOUNTER — CONSULT (OUTPATIENT)
Dept: INTERNAL MEDICINE CLINIC | Age: 64
End: 2024-10-01
Payer: MEDICARE

## 2024-10-01 VITALS
HEART RATE: 62 BPM | OXYGEN SATURATION: 98 % | WEIGHT: 150 LBS | SYSTOLIC BLOOD PRESSURE: 122 MMHG | BODY MASS INDEX: 24.11 KG/M2 | HEIGHT: 66 IN | TEMPERATURE: 98 F | DIASTOLIC BLOOD PRESSURE: 70 MMHG

## 2024-10-01 DIAGNOSIS — Z01.818 PRE-OP EXAMINATION: Primary | ICD-10-CM

## 2024-10-01 DIAGNOSIS — M16.11 PRIMARY OSTEOARTHRITIS OF RIGHT HIP: ICD-10-CM

## 2024-10-01 DIAGNOSIS — E78.2 MIXED HYPERLIPIDEMIA: ICD-10-CM

## 2024-10-01 DIAGNOSIS — N18.30 BENIGN HYPERTENSION WITH CHRONIC KIDNEY DISEASE, STAGE III (HCC): ICD-10-CM

## 2024-10-01 DIAGNOSIS — I10 PRIMARY HYPERTENSION: ICD-10-CM

## 2024-10-01 DIAGNOSIS — C64.2 MALIGNANT NEOPLASM OF LEFT KIDNEY, EXCEPT RENAL PELVIS (HCC): ICD-10-CM

## 2024-10-01 DIAGNOSIS — I12.9 BENIGN HYPERTENSION WITH CHRONIC KIDNEY DISEASE, STAGE III (HCC): ICD-10-CM

## 2024-10-01 DIAGNOSIS — C64.2 RENAL CELL CANCER, LEFT (HCC): ICD-10-CM

## 2024-10-01 DIAGNOSIS — Z01.818 PREOP EXAMINATION: ICD-10-CM

## 2024-10-01 PROCEDURE — 99214 OFFICE O/P EST MOD 30 MIN: CPT | Performed by: INTERNAL MEDICINE

## 2024-10-01 PROCEDURE — 93000 ELECTROCARDIOGRAM COMPLETE: CPT | Performed by: INTERNAL MEDICINE

## 2024-10-01 RX ORDER — MUPIROCIN 20 MG/G
OINTMENT TOPICAL
COMMUNITY
Start: 2024-09-19

## 2024-10-01 NOTE — ASSESSMENT & PLAN NOTE
EKG done in today shows normal sinus rhythm.  No ST-T wave changes.  Occasional PVCs.  And incomplete right bundle branch block.  Labs reviewed are unremarkable.  No previous history of MI or stroke.  Patient is nondiabetic.  Medically stable and optimized for right hip arthroplasty as scheduled by orthopedic surgeon.  And anesthesia type he is spinal anesthesia

## 2024-10-01 NOTE — ASSESSMENT & PLAN NOTE
Blood pressure is well-controlled on present regimen.  He can hold off his losartan 1 day prior to surgery

## 2024-10-01 NOTE — PROGRESS NOTES
Assessment/Plan:    HTN (hypertension)  Blood pressure is well-controlled on present regimen.  He can hold off his losartan 1 day prior to surgery    Benign hypertension with chronic kidney disease, stage III (HCC)  Lab Results   Component Value Date    EGFR 64 09/18/2024    EGFR 61 07/18/2024    EGFR 61 03/01/2024    CREATININE 1.24 09/18/2024    CREATININE 1.22 07/18/2024    CREATININE 1.22 03/01/2024   Renal function and blood pressure is stable.  Continue with adequate oral hydration.    Renal cell cancer, left (HCC)  Patient is status post surgical resection.    Malignant neoplasm of left kidney, except renal pelvis (HCC)  Status post surgical resection    Pre-op examination  EKG done in today shows normal sinus rhythm.  No ST-T wave changes.  Occasional PVCs.  And incomplete right bundle branch block.  Labs reviewed are unremarkable.  No previous history of MI or stroke.  Patient is nondiabetic.  Medically stable and optimized for right hip arthroplasty as scheduled by orthopedic surgeon.  And anesthesia type he is spinal anesthesia       Diagnoses and all orders for this visit:    Pre-op examination  -     POCT ECG    Primary hypertension    Benign hypertension with chronic kidney disease, stage III (HCC)    Renal cell cancer, left (HCC)    Mixed hyperlipidemia    Preop examination    Primary osteoarthritis of right hip    Malignant neoplasm of left kidney, except renal pelvis (HCC)    Other orders  -     mupirocin (BACTROBAN) 2 % ointment               Subjective:          Patient ID: Adrian Mares is a 64 y.o. male.    Patient came to office for preop evaluation for right total hip arthroplasty secondary to primary osteoarthritis of right hip.  Preop blood work is also done and preop EKG today done in office  Patient denied any chest pain shortness of breath physical activity are limited due to pain in right hip  Able to walk block and half but limitation is hip not due to shortness of breath or chest  pain    Pre-op Exam    Pre-operative Risk Factors:    History of cerebrovascular disease: No    History of ischemic heart disease: No  Pre-operative treatment with insulin: No  Pre-operative creatinine >2 mg/dL: No    History of congestive heart failure: No      The following portions of the patient's history were reviewed and updated as appropriate: allergies, current medications, past family history, past medical history, past social history, past surgical history, and problem list.    Review of Systems   Constitutional:  Negative for fatigue and fever.   HENT:  Negative for congestion, ear discharge, ear pain, postnasal drip, sinus pressure, sore throat, tinnitus and trouble swallowing.    Eyes:  Negative for discharge, itching and visual disturbance.   Respiratory:  Negative for cough and shortness of breath.    Cardiovascular:  Negative for chest pain and palpitations.   Gastrointestinal:  Negative for abdominal pain, diarrhea, nausea and vomiting.   Endocrine: Negative for cold intolerance and polyuria.   Genitourinary:  Negative for difficulty urinating, dysuria and urgency.   Musculoskeletal:  Positive for arthralgias. Negative for neck pain.   Skin:  Negative for rash.   Allergic/Immunologic: Negative for environmental allergies.   Neurological:  Negative for dizziness, weakness and headaches.   Psychiatric/Behavioral:  Negative for agitation. The patient is not nervous/anxious.          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HCC) 2017    behind right eye-    Arthritis     Cancer (HCC) 10/13/2019    kidney    Chronic kidney disease 1/7/2020    stage 3    CKD (chronic kidney disease)     Diverticulitis of colon 10/13/2019    Diverticulitis of large intestine with perforation without bleeding 10/13/2019    Hyperlipidemia     Hypertension     Inflammatory bowel disease 10/13/2019    Renal cell cancer, left (HCC) 10/13/2019    Renal cell carcinoma (HCC)          Current Outpatient Medications:      aspirin 81 MG tablet, Take 1 tablet by mouth daily, Disp: , Rfl:     atorvastatin (LIPITOR) 20 mg tablet, Take 1 tablet (20 mg total) by mouth daily, Disp: 90 tablet, Rfl: 1    losartan (COZAAR) 25 mg tablet, Take 1 tablet (25 mg total) by mouth daily, Disp: 90 tablet, Rfl: 1    multivitamin (THERAGRAN) TABS, Take 1 tablet by mouth every other day, Disp: , Rfl:     mupirocin (BACTROBAN) 2 % ointment, , Disp: , Rfl:     Omega-3 Fatty Acids (FISH OIL) 1200 MG CAPS, Take by mouth in the morning, Disp: , Rfl:     No Known Allergies    Social History   Past Surgical History:   Procedure Laterality Date    COLON SURGERY  [id number]    COLONOSCOPY  2015    COLONOSCOPY  2015    HERNIA REPAIR      HIP SURGERY Right     age 9    JOINT REPLACEMENT Right 2011    TKR    NEPHRECTOMY  12/30/2019    left    CA LAPAROSCOPY COLECTOMY PARTIAL W/ANASTOMOSIS N/A 12/30/2019    Procedure: LAPAROSCOPIC HAND-ASSISTED SIGMOID COLECTOMY; LAPAROSCOPIC MOBILIZATION OF SPLENIC FLEXURE;  Surgeon: ELKIN Ramos MD;  [facility];  Service: Colorectal    CA LAPAROSCOPY RADICAL NEPHRECTOMY Left 12/30/2019    Procedure: NEPHRECTOMY LAPAROSCOPIC HAND ASSISTED;  Surgeon: Harshad Gardner MD;  [facility];  Service: Urology    CA RPR 1ST INGUN HRNA AGE 5 YRS/> REDUCIBLE Right 9/4/2024    Procedure: REPAIR HERNIA INGUINAL;  Surgeon: Tim Cole MD;  [facility];  Service: General    CA SURGICAL ARTHROSCOPY SHOULDER W/ROTATOR CUFF RPR Left 12/09/2022    Procedure: SHOULDER ARTHROSCOPIC REVISION ROTATOR CUFF REPAIR AND BICEPS TENODESIS;  Surgeon: Larry Jaquez MD;  [facility];  Service: Orthopedics    REPLACEMENT TOTAL KNEE Right 2011    REPLACEMENT TOTAL KNEE      SHOULDER SURGERY Left     SHOULDER SURGERY Right     THYROID LOBECTOMY Right 10/12/2022    Procedure: RIGHT THYROID LOBECTOMY;  Surgeon: Hieu Melchor MD;  [facility];  Service: Surgical Oncology    US GUIDED THYROID  "BIOPSY  07/09/2021    WRIST FUSION Left 2016    WRIST SURGERY       Family History   Problem Relation Age of Onset    Heart disease Father     Other Father         cardiac disorder    No Known Problems Mother     Heart attack Maternal Grandfather     Heart attack Paternal Grandfather     Thyroid cancer Son     Colon cancer Neg Hx        Objective:  /70 (BP Location: Left arm, Patient Position: Sitting, Cuff Size: Standard)   Pulse 62   Temp 98 °F (36.7 °C) (Temporal)   Ht 5' 6\" (1.676 m)   Wt 68 kg (150 lb)   SpO2 98%   BMI 24.21 kg/m²   Body mass index is 24.21 kg/m².     Physical Exam  Constitutional:       General: He is not in acute distress.     Appearance: He is well-developed.   HENT:      Head: Normocephalic.      Right Ear: External ear normal. There is no impacted cerumen.      Left Ear: External ear normal. There is no impacted cerumen.      Nose: No rhinorrhea.      Mouth/Throat:      Pharynx: No oropharyngeal exudate or posterior oropharyngeal erythema.   Eyes:      General: No scleral icterus.     Pupils: Pupils are equal, round, and reactive to light.   Neck:      Thyroid: No thyromegaly.      Trachea: No tracheal deviation.   Cardiovascular:      Rate and Rhythm: Normal rate. Rhythm irregular.      Heart sounds: Normal heart sounds. No murmur heard.  Pulmonary:      Effort: Pulmonary effort is normal. No respiratory distress.      Breath sounds: Normal breath sounds.   Chest:      Chest wall: No tenderness.   Abdominal:      General: Bowel sounds are normal.      Palpations: Abdomen is soft. There is no mass.      Tenderness: There is no abdominal tenderness.   Musculoskeletal:         General: Normal range of motion.      Cervical back: Normal range of motion and neck supple. No rigidity.      Right lower leg: No edema.      Left lower leg: No edema.   Lymphadenopathy:      Cervical: No cervical adenopathy.   Skin:     General: Skin is warm.      Findings: No erythema, lesion or rash. "   Neurological:      Mental Status: He is alert and oriented to person, place, and time.      Cranial Nerves: No cranial nerve deficit.      Sensory: No sensory deficit.      Gait: Gait abnormal.   Psychiatric:         Mood and Affect: Mood normal.         Behavior: Behavior normal.         Thought Content: Thought content normal.

## 2024-10-07 ENCOUNTER — LAB (OUTPATIENT)
Dept: LAB | Age: 64
End: 2024-10-07
Payer: MEDICARE

## 2024-10-07 DIAGNOSIS — N18.30 BENIGN HYPERTENSION WITH CHRONIC KIDNEY DISEASE, STAGE III (HCC): ICD-10-CM

## 2024-10-07 DIAGNOSIS — I12.9 BENIGN HYPERTENSION WITH CHRONIC KIDNEY DISEASE, STAGE III (HCC): ICD-10-CM

## 2024-10-07 DIAGNOSIS — N18.31 STAGE 3A CHRONIC KIDNEY DISEASE (HCC): ICD-10-CM

## 2024-10-07 DIAGNOSIS — Z90.5 SOLITARY KIDNEY, ACQUIRED: ICD-10-CM

## 2024-10-07 LAB
ANION GAP SERPL CALCULATED.3IONS-SCNC: 9 MMOL/L (ref 4–13)
BACTERIA UR QL AUTO: NORMAL /HPF
BILIRUB UR QL STRIP: NEGATIVE
BUN SERPL-MCNC: 22 MG/DL (ref 5–25)
CALCIUM SERPL-MCNC: 8.8 MG/DL (ref 8.4–10.2)
CHLORIDE SERPL-SCNC: 101 MMOL/L (ref 96–108)
CLARITY UR: CLEAR
CO2 SERPL-SCNC: 26 MMOL/L (ref 21–32)
COLOR UR: NORMAL
CREAT SERPL-MCNC: 1.21 MG/DL (ref 0.6–1.3)
CREAT UR-MCNC: 135 MG/DL
ERYTHROCYTE [DISTWIDTH] IN BLOOD BY AUTOMATED COUNT: 12.3 % (ref 11.6–15.1)
GFR SERPL CREATININE-BSD FRML MDRD: 62 ML/MIN/1.73SQ M
GLUCOSE P FAST SERPL-MCNC: 85 MG/DL (ref 65–99)
GLUCOSE UR STRIP-MCNC: NEGATIVE MG/DL
HCT VFR BLD AUTO: 47.8 % (ref 36.5–49.3)
HGB BLD-MCNC: 15.6 G/DL (ref 12–17)
HGB UR QL STRIP.AUTO: NEGATIVE
KETONES UR STRIP-MCNC: NEGATIVE MG/DL
LEUKOCYTE ESTERASE UR QL STRIP: NEGATIVE
MCH RBC QN AUTO: 30.5 PG (ref 26.8–34.3)
MCHC RBC AUTO-ENTMCNC: 32.6 G/DL (ref 31.4–37.4)
MCV RBC AUTO: 94 FL (ref 82–98)
NITRITE UR QL STRIP: NEGATIVE
NON-SQ EPI CELLS URNS QL MICRO: NORMAL /HPF
PH UR STRIP.AUTO: 5.5 [PH]
PHOSPHATE SERPL-MCNC: 3.7 MG/DL (ref 2.3–4.1)
PLATELET # BLD AUTO: 213 THOUSANDS/UL (ref 149–390)
PMV BLD AUTO: 10.5 FL (ref 8.9–12.7)
POTASSIUM SERPL-SCNC: 4 MMOL/L (ref 3.5–5.3)
PROT UR STRIP-MCNC: NEGATIVE MG/DL
PROT UR-MCNC: 7.2 MG/DL
PROT/CREAT UR: 0.1 MG/G{CREAT} (ref 0–0.1)
PTH-INTACT SERPL-MCNC: 40.9 PG/ML (ref 12–88)
RBC # BLD AUTO: 5.11 MILLION/UL (ref 3.88–5.62)
RBC #/AREA URNS AUTO: NORMAL /HPF
SODIUM SERPL-SCNC: 136 MMOL/L (ref 135–147)
SP GR UR STRIP.AUTO: 1.02 (ref 1–1.03)
UROBILINOGEN UR STRIP-ACNC: <2 MG/DL
WBC # BLD AUTO: 5.51 THOUSAND/UL (ref 4.31–10.16)
WBC #/AREA URNS AUTO: NORMAL /HPF

## 2024-10-07 PROCEDURE — 80048 BASIC METABOLIC PNL TOTAL CA: CPT

## 2024-10-07 PROCEDURE — 83970 ASSAY OF PARATHORMONE: CPT

## 2024-10-07 PROCEDURE — 84100 ASSAY OF PHOSPHORUS: CPT

## 2024-10-07 PROCEDURE — 84156 ASSAY OF PROTEIN URINE: CPT

## 2024-10-07 PROCEDURE — 85027 COMPLETE CBC AUTOMATED: CPT

## 2024-10-07 PROCEDURE — 81001 URINALYSIS AUTO W/SCOPE: CPT

## 2024-10-07 PROCEDURE — 36415 COLL VENOUS BLD VENIPUNCTURE: CPT

## 2024-10-07 PROCEDURE — 82570 ASSAY OF URINE CREATININE: CPT

## 2024-10-08 ENCOUNTER — TELEPHONE (OUTPATIENT)
Dept: NEPHROLOGY | Facility: CLINIC | Age: 64
End: 2024-10-08

## 2024-10-08 NOTE — RESULT ENCOUNTER NOTE
Please inform patient that renal function is stable at creatinine 1.2 mg/dL.  Electrolytes are stable.  Continue same treatment

## 2024-10-08 NOTE — TELEPHONE ENCOUNTER
I would leave any further pain issues to Dr. Oliveros at this time. Surgery will be the best treatment, of course. Thanks for those answers. I will make a note to look at his results after his labs and I'll get things sent over to Dr. Oliveros once done.    Lm for Ryley, renal function is stable including electrolytes no changes are to be made at this time

## 2024-10-08 NOTE — TELEPHONE ENCOUNTER
----- Message from Tom Walker MD sent at 10/7/2024  8:56 PM EDT -----  Please inform patient that renal function is stable at creatinine 1.2 mg/dL.  Electrolytes are stable.  Continue same treatment

## 2024-10-16 ENCOUNTER — OFFICE VISIT (OUTPATIENT)
Dept: PHYSICAL THERAPY | Age: 64
End: 2024-10-16
Payer: MEDICARE

## 2024-10-16 DIAGNOSIS — M25.551 ACUTE POSTOPERATIVE PAIN OF RIGHT HIP: Primary | ICD-10-CM

## 2024-10-16 DIAGNOSIS — G89.18 ACUTE POSTOPERATIVE PAIN OF RIGHT HIP: Primary | ICD-10-CM

## 2024-10-16 PROCEDURE — 97110 THERAPEUTIC EXERCISES: CPT | Performed by: PHYSICAL THERAPIST

## 2024-10-16 PROCEDURE — 97161 PT EVAL LOW COMPLEX 20 MIN: CPT | Performed by: PHYSICAL THERAPIST

## 2024-10-16 NOTE — PROGRESS NOTES
PT Evaluation     Today's date: 10/16/2024  Patient name: Adrian Mares  : 1960  MRN: [id number]  Referring provider: Kal Garza MD  Dx:   Encounter Diagnosis     ICD-10-CM    1. Acute postoperative pain of right hip  G89.18     M25.551           Start Time: 0845  Stop Time: 0930  Total time in clinic (min): 45 minutes    Assessment details: Patient refers to PT S/P right SAVI on 10/14/24 performed by Dr. Garza.  Patient presents with pain, decreased strength, and decreased ROM.  Due to these impairments, patient has difficulty performing a/iadls and recreational activities.  Patient's clinical presentation is consistent with their referring diagnosis of S/P right SAVI.  Patient would benefit from skilled physical therapy to address their aforementioned impairments, improve their level of function and to improve their overall quality of life.   Understanding of Dx/Px/POC: good   Prognosis: good     Goals  Short term goals - to be achieved in 4 weeks:     Decrease pain 20-50%.   Increase strength by 1/2 grade.   Improve range of motion by 25%.    Long term goals - to be achieved by discharge:    Ambulation is improved to maximal level of function.   Squatting is improved to maximal level of function.    Stair climbing is improved to maximal level of function.    IADL performance in related activities is improved to maximal level of function.    Performance in related household activities is improved to maximal level of function.      Plan  Planned therapy interventions: manual therapy, neuromuscular re-education, patient education, strengthening, stretching, therapeutic activities, therapeutic exercise, gait training and home exercise program  Frequency: 2x week  Duration in visits: 16  Duration in weeks: 8     Treatment plan discussed with: patient           Subjective Evaluation     History of Present Illness  Mechanism of injury: Patient refers to PT S/P left TKA on 10/14/24. Doing very well with no  complications.   Pain  Current pain ratin  At best pain ratin  At worst pain ratin              Objective      Active Range of Motion   Right hip  Flexion: 90 degrees   Extension: 5 degrees   Abduction: 10 degrees      Passive Range of Motion   Left Knee   Flexion: 100 degrees   Extension: 10 degrees   Abduction: 30 degrees      Strength/Myotome Testing      Right Hip   Planes of Motion   Flexion: 3  Extension: 3+  Abduction: 3+     Right Knee   Flexion: 4-  Extension: 4-     Right Ankle/Foot   Dorsiflexion: 4  Plantar flexion: 4                 Precautions: HTN, CKD, Hx of Ca,       Manuals 10/16            PROM                                                     Neuro Re-Ed                                                                                                        Ther Ex             Quad sets HEP            LAQ HEP            Mini squats HEP            Anterior lateral shifts HEP                                                                Ther Activity                                       Gait Training                                       Modalities

## 2024-10-16 NOTE — LETTER
2024    Kal Garza MD  [address]  [address]  [facility] [address]    Patient: Adrian Mares   YOB: 1960   Date of Visit: 10/16/2024     Encounter Diagnosis     ICD-10-CM    1. Acute postoperative pain of right hip  G89.18     M25.551           Dear Dr. Garza:    Thank you for your recent referral of Adrian Mares. Please review the attached evaluation summary from Adrian's recent visit.     Please verify that you agree with the plan of care by signing the attached order.     If you have any questions or concerns, please do not hesitate to call.     I sincerely appreciate the opportunity to share in the care of one of your patients and hope to have another opportunity to work with you in the near future.       Sincerely,    Aakash Rosario, PT      Referring Provider:      I certify that I have read the below Plan of Care and certify the need for these services furnished under this plan of treatment while under my care.                    Kal Garza MD  [address]  [address]  [facility] [address]  Via Fax: [phone]          PT Evaluation     Today's date: 10/16/2024  Patient name: Adrian Mares  : 1960  MRN: [id number]  Referring provider: Kal Garza MD  Dx:   Encounter Diagnosis     ICD-10-CM    1. Acute postoperative pain of right hip  G89.18     M25.551           Start Time: 0845  Stop Time: 0930  Total time in clinic (min): 45 minutes    Assessment details: Patient refers to PT S/P right SAVI on 10/14/24 performed by Dr. Garza.  Patient presents with pain, decreased strength, and decreased ROM.  Due to these impairments, patient has difficulty performing a/iadls and recreational activities.  Patient's clinical presentation is consistent with their referring diagnosis of S/P right SAVI.  Patient would benefit from skilled physical therapy to address their aforementioned impairments, improve their level of function and to improve their  overall quality of life.   Understanding of Dx/Px/POC: good   Prognosis: good     Goals  Short term goals - to be achieved in 4 weeks:     Decrease pain 20-50%.   Increase strength by 1/2 grade.   Improve range of motion by 25%.    Long term goals - to be achieved by discharge:    Ambulation is improved to maximal level of function.   Squatting is improved to maximal level of function.    Stair climbing is improved to maximal level of function.    IADL performance in related activities is improved to maximal level of function.    Performance in related household activities is improved to maximal level of function.      Plan  Planned therapy interventions: manual therapy, neuromuscular re-education, patient education, strengthening, stretching, therapeutic activities, therapeutic exercise, gait training and home exercise program  Frequency: 2x week  Duration in visits: 16  Duration in weeks: 8     Treatment plan discussed with: patient           Subjective Evaluation     History of Present Illness  Mechanism of injury: Patient refers to PT S/P left TKA on 10/14/24. Doing very well with no complications.   Pain  Current pain ratin  At best pain ratin  At worst pain ratin              Objective      Active Range of Motion   Right hip  Flexion: 90 degrees   Extension: 5 degrees   Abduction: 10 degrees      Passive Range of Motion   Left Knee   Flexion: 100 degrees   Extension: 10 degrees   Abduction: 30 degrees      Strength/Myotome Testing      Right Hip   Planes of Motion   Flexion: 3  Extension: 3+  Abduction: 3+     Right Knee   Flexion: 4-  Extension: 4-     Right Ankle/Foot   Dorsiflexion: 4  Plantar flexion: 4                 Precautions: HTN, CKD, Hx of Ca,       Manuals 10/16            PROM                                                     Neuro Re-Ed                                                                                                        Ther Ex             Quad sets HEP             LAQ HEP            Mini squats HEP            Anterior lateral shifts HEP                                                                Ther Activity                                       Gait Training                                       Modalities

## 2024-10-18 ENCOUNTER — OFFICE VISIT (OUTPATIENT)
Dept: PHYSICAL THERAPY | Age: 64
End: 2024-10-18
Payer: MEDICARE

## 2024-10-18 DIAGNOSIS — M25.551 ACUTE POSTOPERATIVE PAIN OF RIGHT HIP: Primary | ICD-10-CM

## 2024-10-18 DIAGNOSIS — G89.18 ACUTE POSTOPERATIVE PAIN OF RIGHT HIP: Primary | ICD-10-CM

## 2024-10-18 PROCEDURE — 97110 THERAPEUTIC EXERCISES: CPT

## 2024-10-18 PROCEDURE — 97140 MANUAL THERAPY 1/> REGIONS: CPT

## 2024-10-18 NOTE — PROGRESS NOTES
"Daily Note     Today's date: 10/18/2024  Patient name: Adrian Mares  : 1960  MRN: [id number]  Referring provider: Kal Garza MD  Dx:   Encounter Diagnosis     ICD-10-CM    1. Acute postoperative pain of right hip  G89.18     M25.551           Start Time: 08  Stop Time: 923  Total time in clinic (min): 38 minutes    Subjective: Patient reports he's doing well and pain has been controlled with Tylenol. States some increased swelling of thigh since IE.      Objective: See treatment diary below      Assessment: Patient presents for first PT treatment s/p R THR. Treatment session consisted of initiating POC as outlined below. Exhibited a good performance with supine and upright Wbing exercises. Demonstrates great quad activation and absent lag of knee extensors with SLRs although, requires increased support for subsequent reps. Instructed patient to expect DOMS with progress. Patient would benefit from continued PT to improve level of function.       Plan: Continue per POC. Increase reps/resistance as tolerated.      Precautions: HTN, CKD, Hx of Ca,       Manuals 10/16 10/18           PROM   MS                                                  Neuro Re-Ed                                                                                                        Ther Ex             Nustep  L1 x 6'           Quad sets HEP 5\" x 15           LAQ HEP 2x10           Mini squats HEP x15           Anterior lateral shifts HEP            Heel slides  Flex/abd 5\" x 10           SLR  AA 2x5           Clamshell  Supine gtb x15           HR/TR  15x ea           Ther Activity                                       Gait Training                                       Modalities                                            "

## 2024-10-22 ENCOUNTER — OFFICE VISIT (OUTPATIENT)
Dept: PHYSICAL THERAPY | Age: 64
End: 2024-10-22
Payer: MEDICARE

## 2024-10-22 DIAGNOSIS — M25.551 ACUTE POSTOPERATIVE PAIN OF RIGHT HIP: Primary | ICD-10-CM

## 2024-10-22 DIAGNOSIS — G89.18 ACUTE POSTOPERATIVE PAIN OF RIGHT HIP: Primary | ICD-10-CM

## 2024-10-22 PROCEDURE — 97140 MANUAL THERAPY 1/> REGIONS: CPT | Performed by: PHYSICAL THERAPIST

## 2024-10-22 PROCEDURE — 97110 THERAPEUTIC EXERCISES: CPT | Performed by: PHYSICAL THERAPIST

## 2024-10-22 NOTE — PROGRESS NOTES
"Daily Note     Today's date: 10/22/2024  Patient name: Adrian Mares  : 1960  MRN: [id number]  Referring provider: Kal Garza MD  Dx:   Encounter Diagnosis     ICD-10-CM    1. Acute postoperative pain of right hip  G89.18     M25.551           Start Time: 0845  Stop Time: 0930  Total time in clinic (min): 45 minutes    Subjective: Pt reports improvements with household ambulation.       Objective: See treatment diary below      Assessment: Tolerated treatment well. Patient is doing very well post op L SAVI, progress to include more closed chain strengthening. Patient demonstrated fatigue post treatment      Plan: Continue per plan of care.      Precautions: HTN, CKD, Hx of Ca,       Manuals 10/18 10/22           PROM  MS JF                                                  Neuro Re-Ed             Standing hip abd  2x10 b/l           Step 4\"  nv           STS  nv                                                               Ther Ex             Nustep  L2 x 10'           Quad sets HEP 5\" x 15           LAQ HEP 2x10           Mini squats HEP 3x10           Anterior lateral shifts HEP            Heel slides             SLR  AA 2x10           Clamshell  Supine btb x15           HR/TR             Ther Activity                                       Gait Training                                       Modalities                                              "

## 2024-10-24 ENCOUNTER — OFFICE VISIT (OUTPATIENT)
Dept: PHYSICAL THERAPY | Age: 64
End: 2024-10-24
Payer: MEDICARE

## 2024-10-24 DIAGNOSIS — M25.551 ACUTE POSTOPERATIVE PAIN OF RIGHT HIP: Primary | ICD-10-CM

## 2024-10-24 DIAGNOSIS — G89.18 ACUTE POSTOPERATIVE PAIN OF RIGHT HIP: Primary | ICD-10-CM

## 2024-10-24 PROCEDURE — 97112 NEUROMUSCULAR REEDUCATION: CPT

## 2024-10-24 PROCEDURE — 97110 THERAPEUTIC EXERCISES: CPT

## 2024-10-24 NOTE — PROGRESS NOTES
"Daily Note     Today's date: 10/24/2024  Patient name: Adrian Mares  : 1960  MRN: [id number]  Referring provider: No ref. provider found  Dx:   Encounter Diagnosis     ICD-10-CM    1. Acute postoperative pain of right hip  G89.18     M25.551           Start Time: 08          Subjective: Reports that he is doing well. Ambulatory to session with SPC. Walking in the home with no AD. He is watching his 3 grandkids this weekend - taking them to a pumpkin patch.       Objective: See treatment diary below      Assessment: Tolerated treatment well. Continued per primary therapist POC. Added further closed chain WB strengthening during today's visit. Much better performance with SLR. LAQ still rated less than 4/10 RPE even with addition of #3 RAW. Patient demonstrated fatigue post treatment, exhibited good technique with therapeutic exercises, and would benefit from continued PT      Plan: Continue per plan of care.  Progress treatment as tolerated.       Precautions: HTN, CKD, Hx of Ca,       Manuals 10/18 10/22 10/24          PROM  MS JF MH                                                  Neuro Re-Ed             Standing hip abd  2x10 b/l 2x10 b/l           Step 4\"  nv 2x10           STS  nv Chair no UE x15                                                              Ther Ex             Nustep  L2 x 10' L3 x5 min           Quad sets HEP 5\" x 15 5\" x20           LAQ HEP 2x10 2x10 #3 RAW          Leg press    Nv?           Mini squats HEP 3x10 3x10           Anterior lateral shifts HEP            Heel slides             SLR  AA 2x10 2x10           Clamshell  Supine btb x15 Supine blue 2x10          HR/TR             Ther Activity                                       Gait Training                                       Modalities                                                "

## 2024-10-29 ENCOUNTER — OFFICE VISIT (OUTPATIENT)
Dept: PHYSICAL THERAPY | Age: 64
End: 2024-10-29
Payer: MEDICARE

## 2024-10-29 DIAGNOSIS — G89.18 ACUTE POSTOPERATIVE PAIN OF RIGHT HIP: Primary | ICD-10-CM

## 2024-10-29 DIAGNOSIS — M25.551 ACUTE POSTOPERATIVE PAIN OF RIGHT HIP: Primary | ICD-10-CM

## 2024-10-29 PROCEDURE — 97140 MANUAL THERAPY 1/> REGIONS: CPT | Performed by: PHYSICAL THERAPIST

## 2024-10-29 PROCEDURE — 97112 NEUROMUSCULAR REEDUCATION: CPT | Performed by: PHYSICAL THERAPIST

## 2024-10-29 PROCEDURE — 97110 THERAPEUTIC EXERCISES: CPT | Performed by: PHYSICAL THERAPIST

## 2024-10-29 NOTE — PROGRESS NOTES
"Daily Note     Today's date: 10/29/2024  Patient name: Adrian Mares  : 1960  MRN: [id number]  Referring provider: Kal Garza MD  Dx:   Encounter Diagnosis     ICD-10-CM    1. Acute postoperative pain of right hip  G89.18     M25.551           Start Time: 0845  Stop Time: 0930  Total time in clinic (min): 45 minutes    Subjective: Pt reports improvements in symptoms.       Objective: See treatment diary below      Assessment: Tolerated treatment well. Pt's improvements are demonstrates in FOTO score. Patient demonstrated fatigue post treatment and would benefit from continued PT      Plan: Continue per plan of care.      Precautions: HTN, CKD, Hx of Ca,       Manuals 10/18 10/22 10/24 10/29         PROM  MS JF MH  JF                                                Neuro Re-Ed             Standing hip abd  2x10 b/l 2x10 b/l  2x10 b/l         Step 4\"  nv 2x10  6\" 3x10 up L down R         STS  nv Chair no UE x15 2x10 hi/lo         Mini squats    2x10                                                Ther Ex             Upright bike    10'         Quad sets HEP 5\" x 15 5\" x20           LAQ HEP 2x10 2x10 #3 RAW          Leg press    Nv?           Mini squats HEP 3x10 3x10  3x10                                   SLR  AA 2x10 2x10  2x10         Clamshell  Supine btb x15 Supine blue 2x10 Supine blk 3x10         HR/TR             Ther Activity                                       Gait Training                                       Modalities                                                  "

## 2024-10-31 ENCOUNTER — OFFICE VISIT (OUTPATIENT)
Dept: PHYSICAL THERAPY | Age: 64
End: 2024-10-31
Payer: MEDICARE

## 2024-10-31 DIAGNOSIS — G89.18 ACUTE POSTOPERATIVE PAIN OF RIGHT HIP: Primary | ICD-10-CM

## 2024-10-31 DIAGNOSIS — M25.551 ACUTE POSTOPERATIVE PAIN OF RIGHT HIP: Primary | ICD-10-CM

## 2024-10-31 PROCEDURE — 97140 MANUAL THERAPY 1/> REGIONS: CPT

## 2024-10-31 PROCEDURE — 97112 NEUROMUSCULAR REEDUCATION: CPT

## 2024-10-31 PROCEDURE — 97110 THERAPEUTIC EXERCISES: CPT

## 2024-10-31 NOTE — PROGRESS NOTES
"Daily Note     Today's date: 10/31/2024  Patient name: Adrian Mares  : 1960  MRN: [id number]  Referring provider: Kal Garza MD  Dx:   Encounter Diagnosis     ICD-10-CM    1. Acute postoperative pain of right hip  G89.18     M25.551           Start Time: 0845  Stop Time: 0930  Total time in clinic (min): 45 minutes    Subjective: Pt reports improvements in symptoms.       Objective: See treatment diary below      Assessment: Tolerated treatment well. Pt shows improving balance with gait. Pt's improvements are demonstrates in FOTO score. Patient demonstrated fatigue post treatment and would benefit from continued PT      Plan: Continue per plan of care.      Precautions: HTN, CKD, Hx of Ca,       Manuals 10/18 10/22 10/24 10/29 10/31        PROM  MS JF MH  JF KD  ROM WNL                                               Neuro Re-Ed             Standing hip abd  2x10 b/l 2x10 b/l  2x10 b/l 3 x 10        Step 4\"  nv 2x10  6\" 3x10 up L down R 6\" FSU    R up  L down        STS  nv Chair no UE x15 2x10 hi/lo         Airex beam-lateral single leg steps     Balance x 2-3\" x 20 ea        Mini squats    2x10                                                Ther Ex             Upright bike    10' 10'  L3        Quad sets HEP 5\" x 15 5\" x20           LAQ HEP 2x10 2x10 #3 RAW          Leg press    Nv?   105#    3 x 10        Mini squats HEP 3x10 3x10  3x10 Lo mat    3 x 10                                  SLR  AA 2x10 2x10  2x10 3x10        Clamshell  Supine btb x15 Supine blue 2x10 Supine blk 3x10         HR/TR             Ther Activity             FSU     3 x 10                     Gait Training                                       Modalities                                                  "

## 2024-11-05 ENCOUNTER — OFFICE VISIT (OUTPATIENT)
Dept: PHYSICAL THERAPY | Age: 64
End: 2024-11-05
Payer: MEDICARE

## 2024-11-05 DIAGNOSIS — G89.18 ACUTE POSTOPERATIVE PAIN OF RIGHT HIP: Primary | ICD-10-CM

## 2024-11-05 DIAGNOSIS — M25.551 ACUTE POSTOPERATIVE PAIN OF RIGHT HIP: Primary | ICD-10-CM

## 2024-11-05 PROCEDURE — 97140 MANUAL THERAPY 1/> REGIONS: CPT | Performed by: PHYSICAL THERAPIST

## 2024-11-05 PROCEDURE — 97112 NEUROMUSCULAR REEDUCATION: CPT | Performed by: PHYSICAL THERAPIST

## 2024-11-05 PROCEDURE — 97110 THERAPEUTIC EXERCISES: CPT | Performed by: PHYSICAL THERAPIST

## 2024-11-05 NOTE — PROGRESS NOTES
"Daily Note     Today's date: 2024  Patient name: Adrian Mares  : 1960  MRN: [id number]  Referring provider: Kal Garza MD  Dx:   Encounter Diagnosis     ICD-10-CM    1. Acute postoperative pain of right hip  G89.18     M25.551                      Subjective: Pt reports no issues with community ambulation or functional tranfers. Very pleased with results.       Objective: See treatment diary below      Assessment: Tolerated treatment well. Pt doing very well from rehab standpoint. Minimal limitations with ROM, main impairments remaining is hip abduction strength which is being focused on in therapeutic POC. Pt was instructed to consult with orthopedic on return to golf, PT recommended chipping OK at this point but should probably wait till 5-6 weeks  Patient demonstrated fatigue post treatment and would benefit from continued PT      Plan: Continue per plan of care.      Precautions: HTN, CKD, Hx of Ca,       Manuals 10/18 10/22 10/24 10/29 10/31        PROM  MS JF MH  JF KD  ROM WNL                                               Neuro Re-Ed             Standing hip abd  2x10 b/l 2x10 b/l  2x10 b/l 3 x 10        Step 4\"  nv 2x10  6\" 3x10 up L down R 6\" FSU    R up  L down        STS  nv Chair no UE x15 2x10 hi/lo         Airex beam-lateral single leg steps     Balance x 2-3\" x 20 ea        Mini squats    2x10                                                Ther Ex             Upright bike    10' 10'  L3        Quad sets HEP 5\" x 15 5\" x20           LAQ HEP 2x10 2x10 #3 RAW          Leg press    Nv?   105#    3 x 10        Mini squats HEP 3x10 3x10  3x10 Lo mat    3 x 10                                  SLR  AA 2x10 2x10  2x10 3x10        Clamshell  Supine btb x15 Supine blue 2x10 Supine blk 3x10         HR/TR             Ther Activity             FSU     3 x 10                     Gait Training                                       Modalities                                                    "

## 2024-11-07 ENCOUNTER — OFFICE VISIT (OUTPATIENT)
Dept: NEPHROLOGY | Facility: CLINIC | Age: 64
End: 2024-11-07
Payer: MEDICARE

## 2024-11-07 ENCOUNTER — OFFICE VISIT (OUTPATIENT)
Dept: PHYSICAL THERAPY | Age: 64
End: 2024-11-07
Payer: MEDICARE

## 2024-11-07 VITALS
HEART RATE: 60 BPM | WEIGHT: 151 LBS | BODY MASS INDEX: 24.27 KG/M2 | HEIGHT: 66 IN | SYSTOLIC BLOOD PRESSURE: 136 MMHG | DIASTOLIC BLOOD PRESSURE: 74 MMHG

## 2024-11-07 DIAGNOSIS — G89.18 ACUTE POSTOPERATIVE PAIN OF RIGHT HIP: Primary | ICD-10-CM

## 2024-11-07 DIAGNOSIS — E78.2 MIXED HYPERLIPIDEMIA: ICD-10-CM

## 2024-11-07 DIAGNOSIS — I10 ESSENTIAL HYPERTENSION: ICD-10-CM

## 2024-11-07 DIAGNOSIS — Z90.5 SOLITARY KIDNEY, ACQUIRED: ICD-10-CM

## 2024-11-07 DIAGNOSIS — M25.551 ACUTE POSTOPERATIVE PAIN OF RIGHT HIP: Primary | ICD-10-CM

## 2024-11-07 DIAGNOSIS — N18.2 STAGE 2 CHRONIC KIDNEY DISEASE: Primary | ICD-10-CM

## 2024-11-07 PROBLEM — I12.9 BENIGN HYPERTENSION WITH CHRONIC KIDNEY DISEASE, STAGE III (HCC): Status: RESOLVED | Noted: 2020-01-13 | Resolved: 2024-11-07

## 2024-11-07 PROBLEM — N18.30 BENIGN HYPERTENSION WITH CHRONIC KIDNEY DISEASE, STAGE III (HCC): Status: RESOLVED | Noted: 2020-01-13 | Resolved: 2024-11-07

## 2024-11-07 PROCEDURE — 97110 THERAPEUTIC EXERCISES: CPT

## 2024-11-07 PROCEDURE — 97112 NEUROMUSCULAR REEDUCATION: CPT

## 2024-11-07 PROCEDURE — 99213 OFFICE O/P EST LOW 20 MIN: CPT | Performed by: INTERNAL MEDICINE

## 2024-11-07 NOTE — PATIENT INSTRUCTIONS
-Renal Function is stable   -You have Chronic Kidney Disease Stage 2  -Avoid NSAIDs like Ibuprofen/Motrin, Naproxen/Aleve, Celebrex, meloxicam/Mobic, Diclofenac and other NSAIDs.  -Okay to take Acetaminophen/Tylenol if you do not have any liver problems  -Avoid IV contrast used for CT scan and cardiac catheterization.    -If plan for any study with IV contrast, please let me know so we could hydrate with fluids before and after IV contrast  -Dosage  of certain medications may need to be adjusted depending on Kidney function.     Blood pressure is stable.  Continue current treatment.  Repeat BMP this week to monitor kidney function post hip surgery    Follow-up in 6 months with repeat labs

## 2024-11-07 NOTE — PROGRESS NOTES
NEPHROLOGY OUTPATIENT PROGRESS NOTE   Adrian Mares 64 y.o. male MRN: [id number]  DATE: 11/7/2024  Reason for visit:   Chief Complaint   Patient presents with    Follow-up    Chronic Kidney Disease       ASSESSMENT and PLAN:  Chronic kidney disease stage 2   - baseline is around 1.2 to 1.5 mg/dL recently since November 2023 has been around 1.2 to 1.4 mg/dL in the setting of solitary kidney status post left nephrectomy for renal cell carcinoma  -Renal function is stable, last blood work showed creatinine 1.21 mg/dL with stable electrolytes, EGFR of 62.  As a GFR has been more than 60 since March 2024 , would call it as chronic kidney disease stage II.  Electrolytes are stable, continue to avoid nephrotoxins.  Continue oral hydration  -Chronic kidney disease likely due to decreased nephron mass from left nephrectomy done for renal cell carcinoma left kidney. Preop creatinine was 0.8-0.9 but has been elevated and stable at  1.2-1.5 . CKD likely due to decreased nephron mass from left nephrectomy.  -Patient will need renal prep with IV fluid before any CT with IV contrast and this was discussed with patient in detail.   -PTH at normal range at 40.9.  No proteinuria  -Previously discussed about FArxiga but currently with no proteinuria, will hold off but may consider in future if renal function worsens or if there is any significant proteinuria  -Okay to do MRI with gadolinium.   -Will check BMP this week to make sure kidney function is stable post hip surgery as previous blood work was prior to the hip surgery.  Follow-up in 6 months with repeat labs      Primary Hypertension:   -Current medication: losartan 25 mg     -Current blood pressure: BP stable and is at goal. BP at home 120's-130's   -Plan:    Continue current treatment  -Recommend 2 g sodium diet.    -Recommend daily exercise and weight loss  -Discussed home monitoring of BP and maintaining a log of blood pressure.  -Recommend goal BP less than 130/80.       Solitary right kidney  -status post left nephrectomy for renal cell carcinoma-done on 12/30/19 by Dr. Solomon Sagastume pathology results suggestive of renal cell carcinoma-conventional clear cell type.   -Last MRI abdomen with and without contrast from July 2024 did not show any suspicious mass or any tumor recurrence  -continue follow-up with Urology         CKD/MBD: PTH at normal range, phosphorus at normal range, continue to monitor    Hyperlipidemia:   -Lipid panel at goal  -Last lipid panel from March 2024 showed LDL at goal at 89  -continue statin as well as fish oil per PCP  -recommend goal LDL less than 100, continue monitoring and management per PCP.  Continue monitoring and management per PCP, recommend daily exercise          Patient Instructions   -Renal Function is stable   -You have Chronic Kidney Disease Stage 2  -Avoid NSAIDs like Ibuprofen/Motrin, Naproxen/Aleve, Celebrex, meloxicam/Mobic, Diclofenac and other NSAIDs.  -Okay to take Acetaminophen/Tylenol if you do not have any liver problems  -Avoid IV contrast used for CT scan and cardiac catheterization.    -If plan for any study with IV contrast, please let me know so we could hydrate with fluids before and after IV contrast  -Dosage  of certain medications may need to be adjusted depending on Kidney function.     Blood pressure is stable.  Continue current treatment.  Repeat BMP this week to monitor kidney function post hip surgery    Follow-up in 6 months with repeat labs    Diagnoses and all orders for this visit:    Stage 2 chronic kidney disease  -     Basic metabolic panel; Future  -     Albumin / creatinine urine ratio; Future  -     Urinalysis with microscopic; Future  -     Phosphorus; Future  -     Basic metabolic panel; Future    Essential hypertension  -     Basic metabolic panel; Future  -     Albumin / creatinine urine ratio; Future  -     Urinalysis with microscopic; Future  -     Basic metabolic panel; Future    Solitary kidney,  acquired  -     Basic metabolic panel; Future  -     Albumin / creatinine urine ratio; Future    Mixed hyperlipidemia              SUBJECTIVE / HPI:  Adrian Mares is a 64 y.o.  male who underwent elective laparoscopy hand assisted sigmoid resection and left nephrectomy for complicated diverticular disease and left renal cell carcinoma on 12/30/2019.  Nephrology was consulted for increased creatinine postop.  Preoperative creatinine was 0.9.  Postop creatinine stabilized at 1.4 and elevated creatinine was thought to be due to nephron loss.  Since then renal function had been stable at creatinine 1.2-1.3 but gradually worsened to creatinine 1.5 since July 2023, since November 28, 2023 renal function stable creatinine 1.3  Status post right hip replacement on October 14, 2024    Last blood work from 10/7/2024 showed creatinine 1.21 mg/dL with stable electrolytes.  Normal phosphorus.  No proteinuria UPC ratio 0.1   -denies any new complaints    No new complaints.     REVIEW OF SYSTEMS:    Review of Systems   Constitutional:  Negative for activity change, appetite change, chills, diaphoresis, fatigue and fever.   HENT:  Negative for congestion, facial swelling and nosebleeds.    Eyes:  Negative for pain and visual disturbance.   Respiratory:  Negative for cough, chest tightness and shortness of breath.    Cardiovascular:  Negative for chest pain and palpitations.   Gastrointestinal:  Negative for abdominal distention, abdominal pain, diarrhea, nausea and vomiting.   Genitourinary:  Negative for difficulty urinating, dysuria, flank pain, frequency and hematuria.   Musculoskeletal:  Negative for arthralgias, back pain and joint swelling.   Skin:  Negative for rash.   Neurological:  Negative for dizziness, seizures, syncope, weakness and headaches.   Psychiatric/Behavioral:  Negative for agitation and confusion. The patient is not nervous/anxious.        More than 10 point review of systems were obtained and discussed in  length with the patient. Complete review of systems were negative / unremarkable except mentioned above.       PAST MEDICAL HISTORY:  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HCC) 2017    behind right eye-    Arthritis     Cancer (HCC) 10/13/2019    kidney    Chronic kidney disease 1/7/2020    stage 3    CKD (chronic kidney disease)     Diverticulitis of colon 10/13/2019    Diverticulitis of large intestine with perforation without bleeding 10/13/2019    Hyperlipidemia     Hypertension     Inflammatory bowel disease 10/13/2019    Renal cell cancer, left (HCC) 10/13/2019    Renal cell carcinoma (HCC)        PAST SURGICAL HISTORY:  Past Surgical History:   Procedure Laterality Date    COLON SURGERY  [id number]    COLONOSCOPY  2015    COLONOSCOPY  2015    HERNIA REPAIR      HIP SURGERY Right     age 9    JOINT REPLACEMENT Right 2011    TKR    NEPHRECTOMY  12/30/2019    left    VT LAPAROSCOPY COLECTOMY PARTIAL W/ANASTOMOSIS N/A 12/30/2019    Procedure: LAPAROSCOPIC HAND-ASSISTED SIGMOID COLECTOMY; LAPAROSCOPIC MOBILIZATION OF SPLENIC FLEXURE;  Surgeon: ELKIN Ramos MD;  [facility];  Service: Colorectal    VT LAPAROSCOPY RADICAL NEPHRECTOMY Left 12/30/2019    Procedure: NEPHRECTOMY LAPAROSCOPIC HAND ASSISTED;  Surgeon: Harshad Gardner MD;  [facility];  Service: Urology    VT RPR 1ST INGUN HRNA AGE 5 YRS/> REDUCIBLE Right 9/4/2024    Procedure: REPAIR HERNIA INGUINAL;  Surgeon: Tim Cole MD;  [facility];  Service: General    VT SURGICAL ARTHROSCOPY SHOULDER W/ROTATOR CUFF RPR Left 12/09/2022    Procedure: SHOULDER ARTHROSCOPIC REVISION ROTATOR CUFF REPAIR AND BICEPS TENODESIS;  Surgeon: Larry Jaquez MD;  [facility];  Service: Orthopedics    REPLACEMENT TOTAL KNEE Right 2011    REPLACEMENT TOTAL KNEE      SHOULDER SURGERY Left     SHOULDER SURGERY Right     THYROID LOBECTOMY Right 10/12/2022    Procedure: RIGHT THYROID LOBECTOMY;   "Surgeon: Hieu Melchor MD;  [facility];  Service: Surgical Oncology    US GUIDED THYROID BIOPSY  07/09/2021    WRIST FUSION Left 2016    WRIST SURGERY         SOCIAL HISTORY:  Social History     Substance and Sexual Activity   Alcohol Use Yes    Alcohol/week: 2.0 standard drinks of alcohol    Types: 2 Cans of beer per week    Comment: 1-2 beers/week     Social History     Substance and Sexual Activity   Drug Use No     Social History     Tobacco Use   Smoking Status Never   Smokeless Tobacco Never       FAMILY HISTORY:  Family History   Problem Relation Age of Onset    Heart disease Father     Other Father         cardiac disorder    No Known Problems Mother     Heart attack Maternal Grandfather     Heart attack Paternal Grandfather     Thyroid cancer Son     Colon cancer Neg Hx        MEDICATIONS:    Current Outpatient Medications:     aspirin 81 MG tablet, Take 1 tablet by mouth daily, Disp: , Rfl:     atorvastatin (LIPITOR) 20 mg tablet, Take 1 tablet (20 mg total) by mouth daily, Disp: 90 tablet, Rfl: 1    losartan (COZAAR) 25 mg tablet, Take 1 tablet (25 mg total) by mouth daily, Disp: 90 tablet, Rfl: 1    multivitamin (THERAGRAN) TABS, Take 1 tablet by mouth every other day, Disp: , Rfl:     Omega-3 Fatty Acids (FISH OIL) 1200 MG CAPS, Take by mouth in the morning, Disp: , Rfl:       PHYSICAL EXAM:  Vitals:    11/07/24 0816   BP: 136/74   BP Location: Left arm   Patient Position: Sitting   Cuff Size: Adult   Pulse: 60   Weight: 68.5 kg (151 lb)   Height: 5' 6\" (1.676 m)       Body mass index is 24.37 kg/m².    Physical Exam  Constitutional:       General: He is not in acute distress.     Appearance: He is well-developed. He is not diaphoretic.   HENT:      Head: Normocephalic and atraumatic.      Mouth/Throat:      Mouth: Mucous membranes are moist.   Eyes:      General: No scleral icterus.     Conjunctiva/sclera: Conjunctivae normal.      Pupils: Pupils are equal, round, and reactive to light. "   Neck:      Thyroid: No thyromegaly.   Cardiovascular:      Rate and Rhythm: Normal rate and regular rhythm.      Heart sounds: Normal heart sounds. No murmur heard.     No friction rub.   Pulmonary:      Effort: Pulmonary effort is normal. No respiratory distress.      Breath sounds: Normal breath sounds. No wheezing or rales.   Abdominal:      General: Bowel sounds are normal. There is no distension.      Palpations: Abdomen is soft.      Tenderness: There is no abdominal tenderness.   Musculoskeletal:         General: No deformity.      Cervical back: Neck supple.      Right lower leg: No edema.      Left lower leg: No edema.   Lymphadenopathy:      Cervical: No cervical adenopathy.   Skin:     General: Skin is dry.      Coloration: Skin is not jaundiced or pale.      Nails: There is no clubbing.   Neurological:      Mental Status: He is alert and oriented to person, place, and time. He is not disoriented.   Psychiatric:         Mood and Affect: Mood normal. Mood is not anxious. Affect is not inappropriate.         Behavior: Behavior normal.         Thought Content: Thought content normal.         Lab Results:   Results for orders placed or performed in visit on 10/07/24   Protein / creatinine ratio, urine    Collection Time: 10/07/24  8:05 AM   Result Value Ref Range    Creatinine, Ur 135.0 Reference range not established. mg/dL    Protein Urine Random 7.2 Reference range not established. mg/dL    Prot/Creat Ratio, Ur 0.1 0.0 - 0.1   Urinalysis with microscopic    Collection Time: 10/07/24  8:05 AM   Result Value Ref Range    Color, UA Light Yellow     Clarity, UA Clear     Specific Gravity, UA 1.018 1.003 - 1.030    pH, UA 5.5 4.5, 5.0, 5.5, 6.0, 6.5, 7.0, 7.5, 8.0    Leukocytes, UA Negative Negative    Nitrite, UA Negative Negative    Protein, UA Negative Negative mg/dl    Glucose, UA Negative Negative mg/dl    Ketones, UA Negative Negative mg/dl    Urobilinogen, UA <2.0 <2.0 mg/dl mg/dl    Bilirubin, UA  Negative Negative    Occult Blood, UA Negative Negative    RBC, UA None Seen None Seen, 1-2 /hpf    WBC, UA None Seen None Seen, 1-2 /hpf    Epithelial Cells None Seen None Seen, Occasional /hpf    Bacteria, UA None Seen None Seen, Occasional /hpf   PTH, intact    Collection Time: 10/07/24  8:05 AM   Result Value Ref Range    PTH 40.9 12.0 - 88.0 pg/mL   Phosphorus    Collection Time: 10/07/24  8:05 AM   Result Value Ref Range    Phosphorus 3.7 2.3 - 4.1 mg/dL   CBC    Collection Time: 10/07/24  8:05 AM   Result Value Ref Range    WBC 5.51 4.31 - 10.16 Thousand/uL    RBC 5.11 3.88 - 5.62 Million/uL    Hemoglobin 15.6 12.0 - 17.0 g/dL    Hematocrit 47.8 36.5 - 49.3 %    MCV 94 82 - 98 fL    MCH 30.5 26.8 - 34.3 pg    MCHC 32.6 31.4 - 37.4 g/dL    RDW 12.3 11.6 - 15.1 %    Platelets 213 149 - 390 Thousands/uL    MPV 10.5 8.9 - 12.7 fL   Basic metabolic panel    Collection Time: 10/07/24  8:05 AM   Result Value Ref Range    Sodium 136 135 - 147 mmol/L    Potassium 4.0 3.5 - 5.3 mmol/L    Chloride 101 96 - 108 mmol/L    CO2 26 21 - 32 mmol/L    ANION GAP 9 4 - 13 mmol/L    BUN 22 5 - 25 mg/dL    Creatinine 1.21 0.60 - 1.30 mg/dL    Glucose, Fasting 85 65 - 99 mg/dL    Calcium 8.8 8.4 - 10.2 mg/dL    eGFR 62 ml/min/1.73sq m

## 2024-11-07 NOTE — PROGRESS NOTES
"Daily Note     Today's date: 2024  Patient name: Adrian Mares  : 1960  MRN: [id number]  Referring provider: Kal Garza MD  Dx:   Encounter Diagnosis     ICD-10-CM    1. Acute postoperative pain of right hip  G89.18     M25.551             Subjective: Reports hip is doing well. No new complaints. F/u with MD, gave him permission to start chipping for golf.      Objective: See treatment diary below      Assessment: Tolerated treatment well. Patient demonstrated fatigue post treatment and would benefit from continued PT. Good effort noted throughout session. Additional progressions today to include eccentric strengthening and sidestepping. Able to complete all exercises without pain. Overall demo improved strength.      Plan: Continue per plan of care.      Precautions: HTN, CKD, Hx of Ca,       Manuals 10/18 10/22 10/24 10/29 10/31 11/7       PROM  MS JF MH  JF KD  ROM WNL                                               Neuro Re-Ed             Standing hip abd  2x10 b/l 2x10 b/l  2x10 b/l 3 x 10 3x10       Step 4\"  nv 2x10  6\" 3x10 up L down R 6\" FSU    R up  L down 6\" FSU  R up  L down    lateral    2x10ea        STS  nv Chair no UE x15 2x10 hi/lo         Airex beam-lateral single leg steps     Balance x 2-3\" x 20 ea Balance x 2-3\" x 20 ea       Mini squats    2x10         Tandem walk      Foam beams, Fwd/bwd 4 laps       Ecc lat tap downs      6'', 2x10       sidestepping      GTB 3 laps       Ther Ex             Upright bike    10' 10'  L3 10' L3       Quad sets HEP 5\" x 15 5\" x20           LAQ HEP 2x10 2x10 #3 RAW          Leg press    Nv?   105#    3 x 10 105#DL  3x10    55#SL 3x10       Mini squats HEP 3x10 3x10  3x10 Lo mat    3 x 10 Lo mat    3 x 10                                 SLR  AA 2x10 2x10  2x10 3x10 3x10       Clamshell  Supine btb x15 Supine blue 2x10 Supine blk 3x10         HR/TR             Ther Activity             FSU     3 x 10                     Gait Training                   "                     Modalities

## 2024-11-08 ENCOUNTER — LAB (OUTPATIENT)
Dept: LAB | Facility: CLINIC | Age: 64
End: 2024-11-08
Payer: MEDICARE

## 2024-11-08 DIAGNOSIS — I10 ESSENTIAL HYPERTENSION: ICD-10-CM

## 2024-11-08 DIAGNOSIS — N18.2 STAGE 2 CHRONIC KIDNEY DISEASE: ICD-10-CM

## 2024-11-08 LAB
ANION GAP SERPL CALCULATED.3IONS-SCNC: 8 MMOL/L (ref 4–13)
BUN SERPL-MCNC: 17 MG/DL (ref 5–25)
CALCIUM SERPL-MCNC: 8.9 MG/DL (ref 8.4–10.2)
CHLORIDE SERPL-SCNC: 104 MMOL/L (ref 96–108)
CO2 SERPL-SCNC: 28 MMOL/L (ref 21–32)
CREAT SERPL-MCNC: 1.24 MG/DL (ref 0.6–1.3)
GFR SERPL CREATININE-BSD FRML MDRD: 60 ML/MIN/1.73SQ M
GLUCOSE P FAST SERPL-MCNC: 92 MG/DL (ref 65–99)
POTASSIUM SERPL-SCNC: 4.2 MMOL/L (ref 3.5–5.3)
SODIUM SERPL-SCNC: 140 MMOL/L (ref 135–147)

## 2024-11-08 PROCEDURE — 36415 COLL VENOUS BLD VENIPUNCTURE: CPT

## 2024-11-08 PROCEDURE — 80048 BASIC METABOLIC PNL TOTAL CA: CPT

## 2024-11-11 ENCOUNTER — TELEPHONE (OUTPATIENT)
Dept: NEPHROLOGY | Facility: CLINIC | Age: 64
End: 2024-11-11

## 2024-11-11 NOTE — TELEPHONE ENCOUNTER
----- Message from Tom Walker MD sent at 11/9/2024  4:35 PM EST -----  Renal function stable at creatinine 1.24 mg/dl. Continue same treatment.

## 2024-11-12 ENCOUNTER — OFFICE VISIT (OUTPATIENT)
Dept: PHYSICAL THERAPY | Age: 64
End: 2024-11-12
Payer: MEDICARE

## 2024-11-12 DIAGNOSIS — G89.18 ACUTE POSTOPERATIVE PAIN OF RIGHT HIP: Primary | ICD-10-CM

## 2024-11-12 DIAGNOSIS — M25.551 ACUTE POSTOPERATIVE PAIN OF RIGHT HIP: Primary | ICD-10-CM

## 2024-11-12 PROCEDURE — 97112 NEUROMUSCULAR REEDUCATION: CPT

## 2024-11-12 PROCEDURE — 97110 THERAPEUTIC EXERCISES: CPT

## 2024-11-12 NOTE — PROGRESS NOTES
"Daily Note     Today's date: 2024  Patient name: Adrian Mares  : 1960  MRN: [id number]  Referring provider: Kal Garza MD  Dx:   Encounter Diagnosis     ICD-10-CM    1. Acute postoperative pain of right hip  G89.18     M25.551             Subjective: Reports hip is doing well, was able to rake and do yardwork. Notes some soreness afterwards. Patient reports that he will be leaving on vacation for 3 weeks. Expresses goal of being able to golf.      Objective: See treatment diary below      Assessment: Tolerated treatment well. Patient demonstrated fatigue post treatment and would benefit from continued PT. Initiated TM today for warm up. Progressed to include more functional activities to include golf simulation. Worked on rotational movements today. Patient demo good effort and technique throughout session. Patient offered no complaints of pain during session. Monitor and assess soreness NV. Updated and reviewed HEP. Issued green TB for HEP.    Plan: Continue per plan of care.      Precautions: HTN, CKD, Hx of Ca,       Manuals 10/18 10/22 10/24 10/29 10/31 11/7 11/12      PROM  MS JF MH  JF KD  ROM WNL                                               Neuro Re-Ed             Standing hip abd  2x10 b/l 2x10 b/l  2x10 b/l 3 x 10 3x10       Step 4\"  nv 2x10  6\" 3x10 up L down R 6\" FSU    R up  L down 6\" FSU  R up  L down    lateral    2x10ea        STS  nv Chair no UE x15 2x10 hi/lo         Airex beam-lateral single leg steps     Balance x 2-3\" x 20 ea Balance x 2-3\" x 20 ea       Mini squats    2x10         Tandem walk      Foam beams, Fwd/bwd 4 laps       Ecc lat tap downs      6'', 2x10       lunges       W/ rotation YMB 2x10 B      rom         Upswing 7#  Axe chop 11#  Straight arm rotation B 10#    2x10      Monster walks       GTB 2 laps      sidestepping      GTB 3 laps GTB 2 laps      Ther Ex             TM       6'      Upright bike    10' 10'  L3 10' L3       Quad sets HEP 5\" x 15 5\" x20    "        LAQ HEP 2x10 2x10 #3 RAW          Leg press    Nv?   105#    3 x 10 105#DL  3x10    55#SL 3x10       Mini squats HEP 3x10 3x10  3x10 Lo mat    3 x 10 Lo mat    3 x 10 YMB 3x10                                SLR  AA 2x10 2x10  2x10 3x10 3x10       Clamshell  Supine btb x15 Supine blue 2x10 Supine blk 3x10         HR/TR             Ther Activity             FSU     3 x 10                     Gait Training                                       Modalities

## 2024-11-14 ENCOUNTER — OFFICE VISIT (OUTPATIENT)
Dept: PHYSICAL THERAPY | Age: 64
End: 2024-11-14
Payer: MEDICARE

## 2024-11-14 DIAGNOSIS — M25.551 ACUTE POSTOPERATIVE PAIN OF RIGHT HIP: Primary | ICD-10-CM

## 2024-11-14 DIAGNOSIS — G89.18 ACUTE POSTOPERATIVE PAIN OF RIGHT HIP: Primary | ICD-10-CM

## 2024-11-14 PROCEDURE — 97140 MANUAL THERAPY 1/> REGIONS: CPT | Performed by: PHYSICAL THERAPIST

## 2024-11-14 PROCEDURE — 97110 THERAPEUTIC EXERCISES: CPT | Performed by: PHYSICAL THERAPIST

## 2024-11-14 PROCEDURE — 97112 NEUROMUSCULAR REEDUCATION: CPT | Performed by: PHYSICAL THERAPIST

## 2024-11-14 NOTE — PROGRESS NOTES
"Daily Note     Today's date: 2024  Patient name: Adrian Mares  : 1960  MRN: [id number]  Referring provider: Kal Garza MD  Dx:   Encounter Diagnosis     ICD-10-CM    1. Acute postoperative pain of right hip  G89.18     M25.551                      Subjective: ***      Objective: See treatment diary below      Assessment: Tolerated treatment {Tolerated treatment :}. Patient {assessment:}      Plan: {PLAN:[id number]}     Precautions: HTN, CKD, Hx of Ca,       Manuals 10/18 10/22 10/24 10/29 10/31 11/7 11/12      PROM  MS JF MH  JF KD  ROM WNL                                               Neuro Re-Ed             Standing hip abd  2x10 b/l 2x10 b/l  2x10 b/l 3 x 10 3x10       Step 4\"  nv 2x10  6\" 3x10 up L down R 6\" FSU    R up  L down 6\" FSU  R up  L down    lateral    2x10ea        STS  nv Chair no UE x15 2x10 hi/lo         Airex beam-lateral single leg steps     Balance x 2-3\" x 20 ea Balance x 2-3\" x 20 ea       Mini squats    2x10         Tandem walk      Foam beams, Fwd/bwd 4 laps       Ecc lat tap downs      6'', 2x10       lunges       W/ rotation YMB 2x10 B      rom         Upswing 7#  Axe chop 11#  Straight arm rotation B 10#    2x10      Monster walks       GTB 2 laps      sidestepping      GTB 3 laps GTB 2 laps      Ther Ex             TM       6'      Upright bike    10' 10'  L3 10' L3       Quad sets HEP 5\" x 15 5\" x20           LAQ HEP 2x10 2x10 #3 RAW          Leg press    Nv?   105#    3 x 10 105#DL  3x10    55#SL 3x10       Mini squats HEP 3x10 3x10  3x10 Lo mat    3 x 10 Lo mat    3 x 10 YMB 3x10                                SLR  AA 2x10 2x10  2x10 3x10 3x10       Clamshell  Supine btb x15 Supine blue 2x10 Supine blk 3x10         HR/TR             Ther Activity             FSU     3 x 10                     Gait Training                                       Modalities                                                      "

## 2024-11-14 NOTE — PROGRESS NOTES
PT Discharge Summary    Today's date: 2024  Patient name: Adrian Mares  : 1960  MRN: [id number]  Referring provider: Kal Garza MD  Dx:   Encounter Diagnosis     ICD-10-CM    1. Acute postoperative pain of right hip  G89.18     M25.551           Start Time: 0845  Stop Time: 930  Total time in clinic (min): 45 minutes    Assessment details: Patient refers to PT S/P right SAVI on 10/14/24 performed by Dr. Garza.  Patient has met all functional goals. Patient will be going on vacation and will return in 3 weeks. Patient encouraged to come to re-evaluation if functionally limited but is cleared for discharge if he has no functional limitations on vacation.   Patient would benefit from skilled physical therapy to address their aforementioned impairments, improve their level of function and to improve their overall quality of life.   Understanding of Dx/Px/POC: good   Prognosis: good     Goals - ALL MET   Short term goals - to be achieved in 4 weeks:     Decrease pain 20-50%.   Increase strength by 1/2 grade.   Improve range of motion by 25%.    Long term goals - to be achieved by discharge:    Ambulation is improved to maximal level of function.   Squatting is improved to maximal level of function.    Stair climbing is improved to maximal level of function.    IADL performance in related activities is improved to maximal level of function.    Performance in related household activities is improved to maximal level of function.      Plan  D/C PT     Treatment plan discussed with: patient           Subjective Evaluation     History of Present Illness  Mechanism of injury: Patient refers to PT S/P left TKA on 10/14/24. Doing very well with no complications.   Pain  Current pain ratin  At best pain ratin  At worst pain ratin          Manuals 10/18 10/22 10/24 10/29 10/31 11/7 11/14      PROM  MS JF MH  JF KD  ROM WNL                                               Neuro Re-Ed             Standing  "hip abd  2x10 b/l 2x10 b/l  2x10 b/l 3 x 10 3x10       Step 4\"  nv 2x10  6\" 3x10 up L down R 6\" FSU    R up  L down 6\" FSU  R up  L down    lateral    2x10ea        STS  nv Chair no UE x15 2x10 hi/lo         Airex beam-lateral single leg steps     Balance x 2-3\" x 20 ea Balance x 2-3\" x 20 ea       Mini squats    2x10         Tandem walk      Foam beams, Fwd/bwd 4 laps       Ecc lat tap downs      6'', 2x10       lunges       W/ rotation YMB 2x10 B      rom         Upswing 7#  Axe chop 11#  Straight arm rotation B 10#    2x10      Monster walks       GTB 2 laps      sidestepping      GTB 3 laps GTB 2 laps      Ther Ex             TM       6'      Upright bike    10' 10'  L3 10' L3       Quad sets HEP 5\" x 15 5\" x20           LAQ HEP 2x10 2x10 #3 RAW          Leg press    Nv?   105#    3 x 10 105#DL  3x10    55#SL 3x10       Mini squats HEP 3x10 3x10  3x10 Lo mat    3 x 10 Lo mat    3 x 10 YMB 3x10                                SLR  AA 2x10 2x10  2x10 3x10 3x10       Clamshell  Supine btb x15 Supine blue 2x10 Supine blk 3x10         HR/TR             Ther Activity             FSU     3 x 10                     Gait Training                                       Modalities                                              "

## 2024-11-15 DIAGNOSIS — I12.9 BENIGN HYPERTENSION WITH CHRONIC KIDNEY DISEASE, STAGE III (HCC): ICD-10-CM

## 2024-11-15 DIAGNOSIS — N18.30 BENIGN HYPERTENSION WITH CHRONIC KIDNEY DISEASE, STAGE III (HCC): ICD-10-CM

## 2024-11-15 RX ORDER — LOSARTAN POTASSIUM 25 MG/1
25 TABLET ORAL DAILY
Qty: 90 TABLET | Refills: 1 | Status: SHIPPED | OUTPATIENT
Start: 2024-11-15

## 2024-11-19 ENCOUNTER — APPOINTMENT (OUTPATIENT)
Dept: PHYSICAL THERAPY | Age: 64
End: 2024-11-19
Payer: MEDICARE

## 2024-11-21 ENCOUNTER — APPOINTMENT (OUTPATIENT)
Dept: PHYSICAL THERAPY | Age: 64
End: 2024-11-21
Payer: MEDICARE

## 2024-11-22 DIAGNOSIS — E78.2 MIXED HYPERLIPIDEMIA: ICD-10-CM

## 2024-11-22 RX ORDER — ATORVASTATIN CALCIUM 20 MG/1
20 TABLET, FILM COATED ORAL DAILY
Qty: 90 TABLET | Refills: 1 | Status: SHIPPED | OUTPATIENT
Start: 2024-11-22

## 2024-11-26 ENCOUNTER — APPOINTMENT (OUTPATIENT)
Dept: PHYSICAL THERAPY | Age: 64
End: 2024-11-26
Payer: MEDICARE

## 2024-11-29 ENCOUNTER — APPOINTMENT (OUTPATIENT)
Dept: PHYSICAL THERAPY | Age: 64
End: 2024-11-29
Payer: MEDICARE

## 2024-12-06 ENCOUNTER — APPOINTMENT (OUTPATIENT)
Dept: PHYSICAL THERAPY | Age: 64
End: 2024-12-06
Payer: MEDICARE

## 2024-12-10 ENCOUNTER — TELEPHONE (OUTPATIENT)
Age: 64
End: 2024-12-10

## 2024-12-10 NOTE — TELEPHONE ENCOUNTER
Caller: patient    Doctor: Leonid    Reason for call: Patient would like to know when he should start PT so he can start scheduling now   Please advise     Call back#: [phone]

## 2025-01-06 ENCOUNTER — ANESTHESIA EVENT (OUTPATIENT)
Dept: PERIOP | Facility: AMBULARY SURGERY CENTER | Age: 65
End: 2025-01-06
Payer: MEDICARE

## 2025-01-06 RX ORDER — ASCORBIC ACID 500 MG
500 TABLET ORAL DAILY
COMMUNITY

## 2025-01-06 NOTE — PRE-PROCEDURE INSTRUCTIONS
Pre-Surgery Instructions:   Medication Instructions    ascorbic acid (VITAMIN C) 500 MG tablet Stop taking 7 days prior to surgery.    aspirin 81 MG tablet Take day of surgery.    atorvastatin (LIPITOR) 20 mg tablet Take day of surgery.    losartan (COZAAR) 25 mg tablet Hold day of surgery.    Multiple Vitamins-Minerals (AIRBORNE PO) Stop taking 7 days prior to surgery.    multivitamin (THERAGRAN) TABS Stop taking 7 days prior to surgery.    Omega-3 Fatty Acids (FISH OIL) 1200 MG CAPS Stop taking 7 days prior to surgery.    Medication instructions for day surgery reviewed. Please use only a sip of water to take your instructed medications. Avoid all over the counter vitamins, supplements and NSAIDS for one week prior to surgery per anesthesia guidelines. Tylenol is ok to take as needed.     You will receive a call one business day prior to surgery with an arrival time and hospital directions. If your surgery is scheduled on a Monday, the hospital will be calling you on the Friday prior to your surgery. If you have not heard from anyone by 8pm, please call the hospital supervisor through the hospital  at [phone]. ([place] [phone] or [place] [phone]).    Do not eat or drink anything after midnight the night before your surgery, including candy, mints, lifesavers, or chewing gum. Do not drink alcohol 24hrs before your surgery. Try not to smoke at least 24hrs before your surgery.       Follow the pre surgery showering instructions as listed in the “My Surgical Experience Booklet” or otherwise provided by your surgeon's office. Do not use a blade to shave the surgical area 1 week before surgery. It is okay to use a clean electric clippers up to 24 hours before surgery. Do not apply any lotions, creams, including makeup, cologne, deodorant, or perfumes after showering on the day of your surgery. Do not use dry shampoo, hair spray, hair gel, or any type of hair products.     No contact lenses, eye  make-up, or artificial eyelashes. Remove nail polish, including gel polish, and any artificial, gel, or acrylic nails if possible. Remove all jewelry including rings and body piercing jewelry.     Wear causal clothing that is easy to take on and off. Consider your type of surgery.    Keep any valuables, jewelry, piercings at home. Please bring any specially ordered equipment (sling, braces) if indicated.    Arrange for a responsible person to drive you to and from the hospital on the day of your surgery. Please confirm the visitor policy for the day of your procedure when you receive your phone call with an arrival time.     Call the surgeon's office with any new illnesses, exposures, or additional questions prior to surgery.    Please reference your “My Surgical Experience Booklet” for additional information to prepare for your upcoming surgery.      Instructed to bring sling DOS.

## 2025-01-14 PROBLEM — E83.41 HYPERMAGNESEMIA: Status: RESOLVED | Noted: 2021-02-02 | Resolved: 2025-01-14

## 2025-01-14 PROBLEM — R73.9 HYPERGLYCEMIA: Status: RESOLVED | Noted: 2020-01-13 | Resolved: 2025-01-14

## 2025-01-15 ENCOUNTER — HOSPITAL ENCOUNTER (OUTPATIENT)
Facility: AMBULARY SURGERY CENTER | Age: 65
Setting detail: OUTPATIENT SURGERY
Discharge: HOME | End: 2025-01-15
Attending: ORTHOPAEDIC SURGERY | Admitting: ORTHOPAEDIC SURGERY
Payer: MEDICARE

## 2025-01-15 ENCOUNTER — ANESTHESIA (OUTPATIENT)
Dept: PERIOP | Facility: AMBULARY SURGERY CENTER | Age: 65
End: 2025-01-15
Payer: MEDICARE

## 2025-01-15 VITALS
TEMPERATURE: 97 F | OXYGEN SATURATION: 94 % | HEART RATE: 66 BPM | SYSTOLIC BLOOD PRESSURE: 125 MMHG | RESPIRATION RATE: 18 BRPM | HEIGHT: 66 IN | WEIGHT: 156 LBS | DIASTOLIC BLOOD PRESSURE: 63 MMHG | BODY MASS INDEX: 25.07 KG/M2

## 2025-01-15 DIAGNOSIS — Z98.890 S/P RIGHT ROTATOR CUFF REPAIR: Primary | ICD-10-CM

## 2025-01-15 DIAGNOSIS — M75.101 TEAR OF RIGHT SUPRASPINATUS TENDON: ICD-10-CM

## 2025-01-15 PROBLEM — Z47.89 AFTERCARE FOLLOWING SURGERY OF THE MUSCULOSKELETAL SYSTEM: Status: RESOLVED | Noted: 2022-12-12 | Resolved: 2025-01-15

## 2025-01-15 PROBLEM — M75.111 NONTRAUMATIC INCOMPLETE TEAR OF RIGHT ROTATOR CUFF: Status: RESOLVED | Noted: 2024-07-01 | Resolved: 2025-01-15

## 2025-01-15 PROCEDURE — 29827 SHO ARTHRS SRG RT8TR CUF RPR: CPT | Performed by: PHYSICIAN ASSISTANT

## 2025-01-15 PROCEDURE — C1713 ANCHOR/SCREW BN/BN,TIS/BN: HCPCS | Performed by: ORTHOPAEDIC SURGERY

## 2025-01-15 PROCEDURE — 29823 SHO ARTHRS SRG XTNSV DBRDMT: CPT | Performed by: ORTHOPAEDIC SURGERY

## 2025-01-15 PROCEDURE — NC001 PR NO CHARGE: Performed by: ORTHOPAEDIC SURGERY

## 2025-01-15 PROCEDURE — 29828 SHO ARTHRS SRG BICP TENODSIS: CPT | Performed by: ORTHOPAEDIC SURGERY

## 2025-01-15 PROCEDURE — 29826 SHO ARTHRS SRG DECOMPRESSION: CPT | Performed by: PHYSICIAN ASSISTANT

## 2025-01-15 PROCEDURE — 29823 SHO ARTHRS SRG XTNSV DBRDMT: CPT | Performed by: PHYSICIAN ASSISTANT

## 2025-01-15 PROCEDURE — 29828 SHO ARTHRS SRG BICP TENODSIS: CPT | Performed by: PHYSICIAN ASSISTANT

## 2025-01-15 PROCEDURE — 29826 SHO ARTHRS SRG DECOMPRESSION: CPT | Performed by: ORTHOPAEDIC SURGERY

## 2025-01-15 PROCEDURE — 29827 SHO ARTHRS SRG RT8TR CUF RPR: CPT | Performed by: ORTHOPAEDIC SURGERY

## 2025-01-15 DEVICE — SP FIBERTAK RC, DBLOAD TAPE BL/W, BLK/W
Type: IMPLANTABLE DEVICE | Site: SHOULDER | Status: FUNCTIONAL
Brand: ARTHREX®

## 2025-01-15 DEVICE — SELF-PUNCHING TRIPLE LOADED FIBERTAK
Type: IMPLANTABLE DEVICE | Site: SHOULDER | Status: FUNCTIONAL
Brand: ARTHREX®

## 2025-01-15 RX ORDER — SODIUM CHLORIDE, SODIUM LACTATE, POTASSIUM CHLORIDE, CALCIUM CHLORIDE 600; 310; 30; 20 MG/100ML; MG/100ML; MG/100ML; MG/100ML
INJECTION, SOLUTION INTRAVENOUS CONTINUOUS PRN
Status: DISCONTINUED | OUTPATIENT
Start: 2025-01-15 | End: 2025-01-15

## 2025-01-15 RX ORDER — OXYCODONE HYDROCHLORIDE 5 MG/1
5 TABLET ORAL EVERY 6 HOURS PRN
Qty: 13 TABLET | Refills: 0 | Status: SHIPPED | OUTPATIENT
Start: 2025-01-15

## 2025-01-15 RX ORDER — SODIUM CHLORIDE, SODIUM LACTATE, POTASSIUM CHLORIDE, CALCIUM CHLORIDE 600; 310; 30; 20 MG/100ML; MG/100ML; MG/100ML; MG/100ML
125 INJECTION, SOLUTION INTRAVENOUS CONTINUOUS
Status: DISCONTINUED | OUTPATIENT
Start: 2025-01-15 | End: 2025-01-15 | Stop reason: HOSPADM

## 2025-01-15 RX ORDER — FENTANYL CITRATE 50 UG/ML
INJECTION, SOLUTION INTRAMUSCULAR; INTRAVENOUS AS NEEDED
Status: DISCONTINUED | OUTPATIENT
Start: 2025-01-15 | End: 2025-01-15

## 2025-01-15 RX ORDER — DEXAMETHASONE SODIUM PHOSPHATE 10 MG/ML
INJECTION, SOLUTION INTRAMUSCULAR; INTRAVENOUS AS NEEDED
Status: DISCONTINUED | OUTPATIENT
Start: 2025-01-15 | End: 2025-01-15

## 2025-01-15 RX ORDER — CEFAZOLIN SODIUM 2 G/50ML
2000 SOLUTION INTRAVENOUS ONCE
Status: COMPLETED | OUTPATIENT
Start: 2025-01-15 | End: 2025-01-15

## 2025-01-15 RX ORDER — LIDOCAINE HYDROCHLORIDE 10 MG/ML
0.5 INJECTION, SOLUTION EPIDURAL; INFILTRATION; INTRACAUDAL; PERINEURAL ONCE AS NEEDED
Status: DISCONTINUED | OUTPATIENT
Start: 2025-01-15 | End: 2025-01-15 | Stop reason: HOSPADM

## 2025-01-15 RX ORDER — ACETAMINOPHEN 325 MG/1
650 TABLET ORAL EVERY 6 HOURS PRN
Status: DISCONTINUED | OUTPATIENT
Start: 2025-01-15 | End: 2025-01-15 | Stop reason: HOSPADM

## 2025-01-15 RX ORDER — ONDANSETRON 2 MG/ML
4 INJECTION INTRAMUSCULAR; INTRAVENOUS ONCE AS NEEDED
Status: DISCONTINUED | OUTPATIENT
Start: 2025-01-15 | End: 2025-01-15 | Stop reason: HOSPADM

## 2025-01-15 RX ORDER — MIDAZOLAM HYDROCHLORIDE 2 MG/2ML
INJECTION, SOLUTION INTRAMUSCULAR; INTRAVENOUS AS NEEDED
Status: DISCONTINUED | OUTPATIENT
Start: 2025-01-15 | End: 2025-01-15

## 2025-01-15 RX ORDER — LIDOCAINE HYDROCHLORIDE 10 MG/ML
INJECTION, SOLUTION EPIDURAL; INFILTRATION; INTRACAUDAL; PERINEURAL AS NEEDED
Status: DISCONTINUED | OUTPATIENT
Start: 2025-01-15 | End: 2025-01-15

## 2025-01-15 RX ORDER — HYDROMORPHONE HCL/PF 1 MG/ML
0.2 SYRINGE (ML) INJECTION
Status: DISCONTINUED | OUTPATIENT
Start: 2025-01-15 | End: 2025-01-15 | Stop reason: HOSPADM

## 2025-01-15 RX ORDER — PROPOFOL 10 MG/ML
INJECTION, EMULSION INTRAVENOUS AS NEEDED
Status: DISCONTINUED | OUTPATIENT
Start: 2025-01-15 | End: 2025-01-15

## 2025-01-15 RX ORDER — EPHEDRINE SULFATE 50 MG/ML
INJECTION INTRAVENOUS AS NEEDED
Status: DISCONTINUED | OUTPATIENT
Start: 2025-01-15 | End: 2025-01-15

## 2025-01-15 RX ORDER — ONDANSETRON 2 MG/ML
INJECTION INTRAMUSCULAR; INTRAVENOUS AS NEEDED
Status: DISCONTINUED | OUTPATIENT
Start: 2025-01-15 | End: 2025-01-15

## 2025-01-15 RX ORDER — BUPIVACAINE HYDROCHLORIDE 5 MG/ML
INJECTION, SOLUTION EPIDURAL; INTRACAUDAL AS NEEDED
Status: DISCONTINUED | OUTPATIENT
Start: 2025-01-15 | End: 2025-01-15

## 2025-01-15 RX ORDER — ONDANSETRON 2 MG/ML
4 INJECTION INTRAMUSCULAR; INTRAVENOUS EVERY 6 HOURS PRN
Status: DISCONTINUED | OUTPATIENT
Start: 2025-01-15 | End: 2025-01-15 | Stop reason: HOSPADM

## 2025-01-15 RX ORDER — FENTANYL CITRATE/PF 50 MCG/ML
25 SYRINGE (ML) INJECTION
Status: DISCONTINUED | OUTPATIENT
Start: 2025-01-15 | End: 2025-01-15 | Stop reason: HOSPADM

## 2025-01-15 RX ORDER — OXYCODONE HYDROCHLORIDE 5 MG/1
5 TABLET ORAL EVERY 4 HOURS PRN
Refills: 0 | Status: DISCONTINUED | OUTPATIENT
Start: 2025-01-15 | End: 2025-01-15 | Stop reason: HOSPADM

## 2025-01-15 RX ADMIN — EPHEDRINE SULFATE 5 MG: 50 INJECTION INTRAVENOUS at 08:17

## 2025-01-15 RX ADMIN — DEXAMETHASONE SODIUM PHOSPHATE 10 MG: 10 INJECTION INTRAMUSCULAR; INTRAVENOUS at 08:04

## 2025-01-15 RX ADMIN — PROPOFOL 150 MG: 10 INJECTION, EMULSION INTRAVENOUS at 07:55

## 2025-01-15 RX ADMIN — EPHEDRINE SULFATE 5 MG: 50 INJECTION INTRAVENOUS at 08:24

## 2025-01-15 RX ADMIN — ONDANSETRON 4 MG: 2 INJECTION, SOLUTION INTRAMUSCULAR; INTRAVENOUS at 08:04

## 2025-01-15 RX ADMIN — FENTANYL CITRATE 25 MCG: 50 INJECTION INTRAMUSCULAR; INTRAVENOUS at 07:55

## 2025-01-15 RX ADMIN — EPHEDRINE SULFATE 5 MG: 50 INJECTION INTRAVENOUS at 08:22

## 2025-01-15 RX ADMIN — BUPIVACAINE 20 ML: 13.3 INJECTION, SUSPENSION, LIPOSOMAL INFILTRATION at 07:46

## 2025-01-15 RX ADMIN — CEFAZOLIN SODIUM 2000 MG: 2 SOLUTION INTRAVENOUS at 07:57

## 2025-01-15 RX ADMIN — BUPIVACAINE HYDROCHLORIDE 6 ML: 5 INJECTION, SOLUTION EPIDURAL; INTRACAUDAL at 07:46

## 2025-01-15 RX ADMIN — LIDOCAINE HYDROCHLORIDE 50 MG: 10 INJECTION, SOLUTION EPIDURAL; INFILTRATION; INTRACAUDAL at 07:55

## 2025-01-15 RX ADMIN — SODIUM CHLORIDE, SODIUM LACTATE, POTASSIUM CHLORIDE, AND CALCIUM CHLORIDE: .6; .31; .03; .02 INJECTION, SOLUTION INTRAVENOUS at 07:45

## 2025-01-15 RX ADMIN — EPHEDRINE SULFATE 10 MG: 50 INJECTION INTRAVENOUS at 08:05

## 2025-01-15 RX ADMIN — MIDAZOLAM 2 MG: 1 INJECTION INTRAMUSCULAR; INTRAVENOUS at 07:44

## 2025-01-15 NOTE — H&P
I identified and marked the patient in the pre-op holding area after confirming the surgical consent.  No changes to medical health since the H&P was preformed.     The patient's [facility] to ensure compliance with applicable state laws.      Assessment  Diagnoses and all orders for this visit:     Nontraumatic partial thickness tear of right supraspiantus tendon     Discussion and Plan:     Explained to the patient that his MRI shows a partial thickness tear of the supraspinatus tendon. He has tried and continues to have symptoms despite PT/HEP, activity modifications, rest, ice and a cortisone injection.   At this time, he is a candidate for an arthroscopic procedure to further address his rotator cuff tear. He understood and wished to proceed forward with this procedure.   A thorough discussion was performed with the patient regarding the risks and benefit of operative and nonoperative treatment of their rotator cuff tear.  Risks discussed include but were not limited to infection, neurovascular injury, recurrent tear, nonhealing of the repair, need for further surgery, need for biceps tenodesis or tenotomy, stiffness, need for prolonged rehabilitation, as well as the risk of anesthesia.  After this discussion all questions were answered and informed consent was obtained in the office for arthroscopic rotator cuff repair of the right shoulder.  The patient will be scheduled for this procedure accordingly.  Follow up post operatively     Subjective:   Patient ID: Adrian Mares is a 63 y.o. male presents today for a follow up visit for his right shoulder as well as to discuss MRI results.   The pain began several month(s) ago and is not associated with an acute injury. The patient describes the pain as aching, dull, and sharp in intensity,  intermittent in timing, and localizes the pain to the  right posterior and lateral shoulder.  The pain is worse  "with movement, overuse, and golf  and relieved by rest.  The pain is not associated with numbness and tingling.  The pain is not associated with constitutional symptoms. The patient is not awoken at night by the pain.       Patient reports he had a prior RIGHT RTC repair about 20 yrs ago.      The patient had a s/p revision rotator cuff repair of his left shoulder done on 12/9/22.           The following portions of the patient's history were reviewed and updated as appropriate: allergies, current medications, past family history, past medical history, past social history, past surgical history and problem list.     Objective:  /88   Pulse 58   Temp (!) 97 °F (36.1 °C) (Temporal)   Resp 18   Ht 5' 6\" (1.676 m)   Wt 70.8 kg (156 lb)   SpO2 98%   BMI 25.18 kg/m²           Right Shoulder Exam      Range of Motion   The patient has normal right shoulder ROM.     Muscle Strength   Abduction: 4/5   External rotation: 4/5      Tests   Drop arm: positive     Other   Erythema: absent  Sensation: normal  Pulse: present                 Physical Exam  Vitals and nursing note reviewed.   Constitutional:       General: He is not in acute distress.     Appearance: He is well-developed.   HENT:      Head: Normocephalic and atraumatic.   Eyes:      Conjunctiva/sclera: Conjunctivae normal.      Pupils: Pupils are equal, round, and reactive to light.   Cardiovascular:      Rate and Rhythm: Normal rate and regular rhythm.      Pulses: Normal pulses.      Heart sounds: Normal heart sounds.   Pulmonary:      Effort: Pulmonary effort is normal. No respiratory distress.      Breath sounds: Normal breath sounds.   Abdominal:      General: Bowel sounds are normal. There is no distension.      Palpations: Abdomen is soft.   Musculoskeletal:      Cervical back: Normal range of motion and neck supple.   Skin:     General: Skin is warm and dry.      Findings: No erythema or rash.   Neurological:      Mental Status: He is alert and " oriented to person, place, and time.      Deep Tendon Reflexes: Reflexes are normal and symmetric.   Psychiatric:         Mood and Affect: Mood normal.         Behavior: Behavior normal.               I have personally reviewed pertinent films in PACS and my interpretation is as follows.     MRI Right Shoulder 6/21/24:  Partial thickness, articular sided tear of the supraspinatus tendon. Minimal glenohumeral joint osteoarthritis

## 2025-01-15 NOTE — ANESTHESIA PREPROCEDURE EVALUATION
Procedure:  SHOULDER ARTHROSCOPIC ROTATOR CUFF REPAIR, BICEPS TENODESIS, SUBACROMIAL DECOMPRESSION (Right: Shoulder)    Relevant Problems   ANESTHESIA   (-) History of anesthesia complications      CARDIO   (+) Essential hypertension   (+) Mixed hyperlipidemia   (-) Chest pain   (-) FLYNN (dyspnea on exertion)      /RENAL   (+) Malignant neoplasm of left kidney, except renal pelvis (HCC)   (+) Renal cell cancer, left (HCC)   (+) Solitary kidney, acquired   (+) Stage 2 chronic kidney disease      NEURO/PSYCH   (+) Cluster headache      PULMONARY   (-) Shortness of breath   (-) Sleep apnea   (-) URI (upper respiratory infection)        Physical Exam    Airway    Mallampati score: II  TM Distance: >3 FB  Neck ROM: full     Dental       Cardiovascular      Pulmonary      Other Findings        Anesthesia Plan  ASA Score- 2     Anesthesia Type- general with ASA Monitors.         Additional Monitors:     Airway Plan: LMA.           Plan Factors-Exercise tolerance (METS): >4 METS.    Chart reviewed. EKG reviewed.  Existing labs reviewed. Patient summary reviewed.                  Induction- intravenous.    Postoperative Plan-         Informed Consent- Anesthetic plan and risks discussed with patient.  I personally reviewed this patient with the CRNA. Discussed and agreed on the Anesthesia Plan with the CRNA..      NPO Status:  Vitals Value Taken Time   Date of last liquid 01/14/25 01/15/25 0719   Time of last liquid 1830 01/15/25 0719   Date of last solid 01/14/25 01/15/25 0719   Time of last solid 1830 01/15/25 0719

## 2025-01-15 NOTE — ANESTHESIA POSTPROCEDURE EVALUATION
Post-Op Assessment Note    Last Filed PACU Vitals:  Vitals Value Taken Time   Temp 97.5 °F (36.4 °C) 01/15/25 0906   Pulse 63 01/15/25 0920   /61 01/15/25 0915   Resp 19 01/15/25 0920   SpO2 96 % 01/15/25 0920   Vitals shown include unfiled device data.    Modified Ashley:     Vitals Value Taken Time   Activity 2 01/15/25 0915   Respiration 2 01/15/25 0915   Circulation 2 01/15/25 0915   Consciousness 2 01/15/25 0915   Oxygen Saturation 2 01/15/25 0915     Modified Ashley Score: 10

## 2025-01-15 NOTE — OP NOTE
OPERATIVE REPORT  PATIENT NAME: Adrian Mares    :  1960  MRN: [id number]  Pt Location: AN [facility] OR ROOM 06    SURGERY DATE: 1/15/2025     SURGEON: Larry Jaquez MD     ASSISTANT: Nanci Reese PA-C     NOTE: Nanci Reese PA-C was present throughout the entire procedure and performed essential assistance with patient prepping, draping, positioning, suture management, wound closure, sterile dressing application and sling application, all under my direct supervision.     NOTE: No qualified resident physician was available for assistance    PREOPERATIVE DIAGNOSIS:  Right Shoulder Supraspinatus Tear    POSTOPERATIVE DIAGNOSIS: Right Shoulder Supraspinatus Tear, Long Head Biceps Tendon Partial Tear, and Mild Glenohumeral Osteoarthritis    PROCEDURES: Surgical Arthroscopy Right Shoulder with Rotator Cuff Repair, Long Head Biceps Tenodesis, Extensive Debridement, and Subacromial Decompression    ANESTHESIA STAFF:  Мария Page MD      ANESTHESIA TYPE: General LMA with ultrasound guided interscalene block (Exparel). The interscalene block was provided by the anesthesia staff per my request for postoperative pain control and to decrease the use of postoperative narcotic medication for pain control.    COMPLICATIONS: None    FINDINGS: Supraspinatus Tear, Long Head Biceps Tendon Partial Tear, and Mild Glenohumeral Osteoarthritis    SPECIMEN(S):  None    ESTIMATED BLOOD LOSS: Minimal    INDICATION:  Briefly, the patient is a 64 y.o.  male with right shoulder pain. MRI scan confirmed a near full thickness supraspinatus tear. The patient elected for arthroscopic treatment. Informed consent was obtained after a thorough discussion of the risks and benefits of the procedure, as well as alternatives to the procedure.     OPERATIVE TECHNIQUE:  On the day of surgery, I identified the patient’s right shoulder and marked it with my initials. The patient was taken to the operating room where anesthesia was induced and 2  grams of IV Cefazolin were given. The patient was examined in the supine position and was found to have full range of motion of the right shoulder with no instability. The patient was then positioned in the semilateral [facility] chair position. All bony prominences were padded. The shoulder was prepped and draped in normal sterile fashion. After a time-out for safety, a standard posterior arthroscopic portal was made. Glenohumeral evaluation revealed moderate degenerative changes of the humeral head and the glenoid with no loose bodies. There was a near full thickness supraspinatus tear with only a few degenerative fibers remaining on the bursal side.  The infraspinatus had partial thickness fraying as of the superior border subscapularis consistent with degeneration of the tendon.  The long head biceps had an associated high-grade partial tear and instability.  The circumferential and labrum had degenerative changes.  Shaving device was introduced through an anterior cannula and an extensive debridement of the glenohumeral joint was performed, structures debrided included the humeral head articular cartilage changes, the glenoid articular cartilage changes, the circumferential glenoid labrum, the undersurface of the infraspinatus and the superior border subscapularis.  The long head biceps tendon was indicated for tenodesis so an Arthrex 2.6 mm all suture anchor was placed at the superior aspect lesser tuberosity, a loop and whipstitch was placed through and around long head of biceps which was then released and tied off to the lesser tuberosity performing our intra-articular long head biceps tenodesis.   After the intra-articular work was completed, the scope was then placed in the subacromial space through a posterolateral portal where a thorough bursectomy was performed. The supraspinatus tear was tagged with a PDS stitch for evaluation of the subacromial space, this were identified and found to have very  flimsy bursal sided fibers remaining so these were released converting this to a full thickness supraspinatus tear to allow for repair.  After release the tear was found to measure 1.0 cm from anterior to posterior and was able to be easily reduced to the tuberosity.  Again the tendon itself was noted to be very poor quality but was able to except sutures.  A repair of the supraspinatus was performed using a laterally positioned 2.6 mm Arthrex all suture anchor, two simple stitches from that anchor were placed across the tear which was then tied down reducing the tendon to the tuberosity.  The CA ligament was frayed so it was released off the anterolateral edge of acromion and a gentle acromioplasty was performed. The area was then irrigated. Scope was withdrawn. Wounds were closed with 4-0 Monocryl and Histoacryl. Sterile dressings and a sling with an abduction pillow was placed. The patient was awoken without complication and returned to the recovery room in good condition. We will see the patient back in the office next week to initiate therapy following accelerated rotator cuff repair rehabilitation protocol. At the end of procedure, the counts were correct.     PATIENT DISPOSITION:  Stable to PACU      SIGNATURE: Larry Jaquez MD  DATE: January 15, 2025  TIME: 8:54 AM

## 2025-01-15 NOTE — ANESTHESIA PROCEDURE NOTES
Peripheral Block    Patient location during procedure: holding area  Start time: 1/15/2025 7:46 AM  Reason for block: at surgeon's request and post-op pain management  Staffing  Performed by: Ninfa Page MD  Authorized by: Ninfa Page MD    Preanesthetic Checklist  Completed: patient identified, IV checked, site marked, risks and benefits discussed, surgical consent, monitors and equipment checked, pre-op evaluation and timeout performed  Peripheral Block  Patient position: sitting  Prep: ChloraPrep  Patient monitoring: frequent blood pressure checks, continuous pulse oximetry and heart rate  Block type: Interscalene  Laterality: right  Injection technique: single-shot  Procedures: ultrasound guided, Ultrasound guidance required for the procedure to increase accuracy and safety of medication placement and decrease risk of complications.  Ultrasound permanent image saved    Needle  Needle type: Stimuplex   Needle gauge: 22 G  Needle length: 2 in  Needle localization: anatomical landmarks and ultrasound guidance  Assessment  Injection assessment: incremental injection, frequent aspiration, injected with ease, negative aspiration, negative for heart rate change, no paresthesia on injection, no symptoms of intraneural/intravenous injection and needle tip visualized at all times  Paresthesia pain: none  Post-procedure:  site cleaned  patient tolerated the procedure well with no immediate complications

## 2025-01-15 NOTE — ANESTHESIA POSTPROCEDURE EVALUATION
Post-Op Assessment Note    CV Status:  Stable  Pain Score: 0    Pain management: adequate       Mental Status:  Arousable and sleepy   Hydration Status:  Euvolemic   PONV Controlled:  Controlled   Airway Patency:  Patent     Post Op Vitals Reviewed: Yes    No anethesia notable event occurred.    Staff: CRNA           Last Filed PACU Vitals:  Vitals Value Taken Time   Temp 97.5 °F (36.4 °C) 01/15/25 0906   Pulse 62 01/15/25 0909   /68 01/15/25 0906   Resp 16 01/15/25 0909   SpO2 98 % 01/15/25 0909   Vitals shown include unfiled device data.

## 2025-01-20 ENCOUNTER — TELEPHONE (OUTPATIENT)
Age: 65
End: 2025-01-20

## 2025-01-20 ENCOUNTER — OFFICE VISIT (OUTPATIENT)
Dept: PHYSICAL THERAPY | Age: 65
End: 2025-01-20
Payer: MEDICARE

## 2025-01-20 ENCOUNTER — OFFICE VISIT (OUTPATIENT)
Dept: OBGYN CLINIC | Facility: OTHER | Age: 65
End: 2025-01-20

## 2025-01-20 VITALS — WEIGHT: 150 LBS | HEIGHT: 66 IN | BODY MASS INDEX: 24.11 KG/M2

## 2025-01-20 DIAGNOSIS — Z98.890 S/P RIGHT ROTATOR CUFF REPAIR: ICD-10-CM

## 2025-01-20 DIAGNOSIS — Z98.890 S/P RIGHT ROTATOR CUFF REPAIR: Primary | ICD-10-CM

## 2025-01-20 DIAGNOSIS — M25.511 ACUTE POSTOPERATIVE PAIN OF RIGHT SHOULDER: Primary | ICD-10-CM

## 2025-01-20 DIAGNOSIS — G89.18 ACUTE POSTOPERATIVE PAIN OF RIGHT SHOULDER: Primary | ICD-10-CM

## 2025-01-20 PROCEDURE — 97161 PT EVAL LOW COMPLEX 20 MIN: CPT

## 2025-01-20 PROCEDURE — 99024 POSTOP FOLLOW-UP VISIT: CPT | Performed by: PHYSICIAN ASSISTANT

## 2025-01-20 PROCEDURE — 97110 THERAPEUTIC EXERCISES: CPT

## 2025-01-20 NOTE — PATIENT INSTRUCTIONS
Rotator Cuff Repair Rehabilitation :   Accelerated Protocol    (adapted from Azael ISSA et al. “Rehabilitation of the Rotator Cuff: An Evaluation Based Approach”. JAAOS 2006; 14:599-609)  Updated 10.2014  Larry Jaquez MD  [facility]  [address] [address]  [place] [address]  [phone]  Phase 1 (Weeks 0-4)   Immediate Postoperative Period   Goals:    Diminish Pain and Inflammation  Maintain and Protect Integrity of the Repair    Gradually Increase Passive ROM (NO Active or Active Assist until Week 6)    Become Independent with Modified ADLs   Precautions:  Maintain Arm in Abduction Sling, Remove Only for Directed Exercises (may remove Abduction Pillow at any time.  Pillow is for patient comfort only and has no effect on outcome)    Keep Incisions Clean & Dry (okay to shower in 48 hours, band-aids over incisions)  No Immersion (pool) until Wounds Totally Sealed (usually not prior to day #10)  Passive Shoulder Motion ONLY, No Lifting/Holding Objects, Reaching Behind Back  Okay to Type/Write/Use Mouse at Desktop with Arm in Sling   Day 0-6    Elbow, Wrist, Hand AROM Exercises     Start Cervical AROM and Scapula Isometrics    Cryotherapy/Ice for Pain and Inflammation    Instruct in Hygiene, Posture, and Positioning   May Start Pendulum Exercises     Day 7-28    Continue Above  May add Heat prior to PROM Exercises, Ice after Exercises    May Start Supine, Pain Free, PT assisted PROM  Progress PROM to Goal of full PROM by Week 4.  May add Gentle Mobilizations (GH and Scapulothoracic) to Regain full PROM if Needed.  Begin to incorporate AAROM by Week 3 as Tolerated in Prep for AROM Phase  Can Introduce light Cardio (Walking, Stationary Bike)    Aqua Therapy may begin at week 3 (day 21) as long as no wound problems    Phase 2 (Weeks 4-8)   Protection and Active Motion   Goals of Phase:    Allow Healing of Soft Tissues    Decrease Pain and Inflammation  Add ADLs and Regain AROM by End of  Phase   Precautions:    NO STRENGTHENING until Phase 3    Repair is Most Prone to Failure during this Phase!    No Lifting Objects > 3 lbs (Coffee Cup OK), no Sudden Motions    Avoid Upper Extremity Bike and Ergometer   Day 29-60    Discontinue Sling  Continue AAROM & Initiate AROM Forward Flexion, ER, IR and Abd, Rotator Cuff Isometrics    Continue Periscapular Exercises, add Stretching if PROM Lacking  No Strengthening until Week 8 (Minimum Time Needed for Cuff Healing Sufficient to withstand Strengthening)     Phase 3 (Weeks 8-12)   Early Strengthening   Goal of Phase:    Gain full AROM, Maintain PROM    Gradual return of Shoulder Strength, Power and Endurance    Gradual return to Functional Activities   Precautions:    No Lifting > 10lbs, Sudden Lifting or Pushing activities, Overhead Lifting    No Upper Extremity Bike or Ergometer   Week 8    Initiate Strengthening Program (10 lb Maximum until Phase 4)      ER/IR with Bands (Standing)      ER in Lateral Decubitius Position      Lateral Raises      Full Can in Scapular Plane      Prone Rowing, Horizontal Abduction, Extension      Elbow Flexion/Extension   Week 12    Initiate light Functional Activities as Permitted  Progress to Fundamental Shoulder Exercises  Phase 4 (Variable but Weeks 12-18)   Aggressive Rehab  Sport Specific or Activity Specific    Goals of Phase:    Maintain Full Pain-free AROM    Advanced Conditioning Exercises for enhanced Functional use    Continue regaining Shoulder Strength, Power and Endurance    Eventual return to full Functional Activities   Precautions:    None   Week 14    Continue ROM and stretching if appropriate    Progress Strengthening, Proprioceptive and Neuromuscular Training    Light Sports if Progressing Well (Chipping/Putting, easy ground strokes etc.)   Week 16    Continue Strengthening and Stretching    Initiate Interval Sports Program as Appropriate    Note:   This is a general program which may be modified based on  intra-operative findings, additional procedures preformed, repair stability, and patient biological factors.  If in doubt, please check with my office for individual patient specifics.  The ultimate goal of the surgery and rehabilitation is to get the rotator cuff to heal with the least residual functional deficit due to stiffness.  That being said, I would rather have a stiff shoulder with a healed rotator cuff repair, than a loose shoulder with a failed repair!

## 2025-01-20 NOTE — TELEPHONE ENCOUNTER
Caller: Self    Doctor: Leonid    Reason for call: Patient will be beginning physical therapy today and they mentioned needing a new script (last script is from April). Can a new script be put in for patient    Call back#: [phone]

## 2025-01-20 NOTE — PROGRESS NOTES
"Surgery: SARS w/RCR, BT and SAD on 1/15/2025    S: Patient is doing well.  Denies acute post-op events. Therapy starts today. Sleeping in recliner    Ht 5' 6\" (1.676 m)   Wt 68 kg (150 lb)   BMI 24.21 kg/m²     O: RIGHT shoulder  Shoulder in sling  Arthroscopic portals without erythema or drainage  Mild ecchymosis around portals  Good elbow, wrist and hand range of motion  Skin - warm and dry  SI  NVI    A/P: 5 days following arthroscopic right shoulder rotator cuff repair, bicep tenodesis and subacromial decompression  NWB RUE in sling x 4 weeks (remove sling on 2/12/2025)  Intra-operative pictures were reviewed in detail  Formal physical therapy - following accelerated Rotator cuff rehab protocol  HEP - per therapy.  For now, may start elbow/wrist/hand range of motion.  Pendulums to tolerance  Pain control - Tylenol 1000 mg every 8 hours  Ice as needed - 20 minutes on and 20 minutes off  Follow up in 6-8 weeks   "

## 2025-01-20 NOTE — PROGRESS NOTES
PT Evaluation     Today's date: 2025  Patient name: Adrian Mares  : 1960  MRN: [id number]  Referring provider: Kal Garza MD  Dx:   Encounter Diagnosis     ICD-10-CM    1. Acute postoperative pain of right shoulder  G89.18     M25.511       2. S/P right rotator cuff repair  Z98.890 Ambulatory Referral to Physical Therapy          Start Time: 1016  Stop Time: 1055  Total time in clinic (min): 39 minutes    Assessment    Assessment details: Problem List:  1) decreased L shoulder strength  2) decreased L shoulder ROM    Adrian Mares is a pleasant 64 y.o. male who presents with R shoulder pain s/p R RTC repair performed on 1/15/25 by Dr. Jaquez. Pt's pain has been minimal thus far following the surgery. LUE strength and ROM is WNL, however unable to assess RUE due to post-surgical precautions a this phase. Plan to assess when allowed by protocol. Tolerated gentle PROM in reclined position well, with no pain. No further referral appears necessary at this time based upon examination results.  I expect he will improve with skilled PT intervention 2x/wk for approx. 12wks.        Comparable signs:  1) Reaching overhead  2) Reaching behind his back   3) Swinging golf club    Goals  Short Term Goals: to be achieved by 4 weeks  1) Patient to be independent with basic HEP.  2) Pt will demonstrate improved shoulder PROM in all planes as allowed by protocol  3) Pt will display improved shoulder strength by 1/2 MMT grade in all deficient planes as allowed by protocol.    Long Term Goals: to be achieved by discharge  1) FOTO equal to or greater than 71.  2) Patient to be independent with comprehensive HEP.  3) Patient will be able to reach overhead 5/5 times with proper technique to aid in ability to reach top shelf at home and in stores.  4) Pt will demonstrate increased UE strength to 5/5 MMT grade in all planes to improve a/iadls.  5) Patient to be able to swing golf club with good technique and no  "pain/symptoms to allow for return to social golf outings.        Plan  Patient would benefit from: skilled physical therapy    Planned therapy interventions: manual therapy, graded activity, graded exercise, home exercise program, neuromuscular re-education, patient/caregiver education, strengthening, stretching, therapeutic activities and therapeutic exercise    Frequency: 2x week  Duration in weeks: 12        Subjective Evaluation    History of Present Illness  Mechanism of injury: Ryley is a 63 y/o male presenting to OPPT for IE s/p R rotator cuff repair performed on 1/15/25 by Dr. Jaquez. Pt is known to this clinic and had been previously treated for R shoulder pain prior to his surgery. He reports that his pain since the operation has been minimal, and he has not been taking any prescribed medication for pain relief. Pt has PMH of L rotator cuff repair, and states he is familiar with the \"typical\" rehab process for this surgery. He states he has been compliant with protocol thus far, and that he wants to be able to return to golf in April if possible.    Quality of life: good    Patient Goals  Patient goals for therapy: decreased pain, independence with ADLs/IADLs, return to sport/leisure activities, increased strength and increased motion    Pain  Current pain ratin  At best pain ratin  At worst pain ratin  Quality: dull ache  Aggravating factors: overhead activity, keyboarding and lifting    Hand dominance: right          Objective     Active Range of Motion   Left Shoulder   Normal active range of motion    Left Elbow   Normal active range of motion    Right Elbow   Normal active range of motion    Passive Range of Motion   Left Shoulder   Normal passive range of motion    Left Elbow   Normal passive range of motion    Right Elbow   Normal passive range of motion    Strength/Myotome Testing     Left Shoulder   Normal muscle strength    Left Elbow   Normal strength    Left Wrist/Hand   Normal " "wrist strength             Precautions: R RTC Repair 1/15/25, Stage 2 CKD      Manuals                                                                 Neuro Re-Ed 1/20            Scap Retraction 3x10            Ball Squeezes 20x5\"                                                                             Ther Ex 1/20            Elbow Flexion AAROM 3x10            Pendulums Fwd/lat 3x10ea                                                                                          Ther Activity                                       Gait Training                                       Modalities                                            "

## 2025-01-23 ENCOUNTER — OFFICE VISIT (OUTPATIENT)
Dept: PHYSICAL THERAPY | Age: 65
End: 2025-01-23
Payer: MEDICARE

## 2025-01-23 DIAGNOSIS — Z98.890 S/P RIGHT ROTATOR CUFF REPAIR: Primary | ICD-10-CM

## 2025-01-23 DIAGNOSIS — M25.511 ACUTE POSTOPERATIVE PAIN OF RIGHT SHOULDER: ICD-10-CM

## 2025-01-23 DIAGNOSIS — G89.18 ACUTE POSTOPERATIVE PAIN OF RIGHT SHOULDER: ICD-10-CM

## 2025-01-23 PROCEDURE — 97112 NEUROMUSCULAR REEDUCATION: CPT | Performed by: PHYSICAL THERAPIST

## 2025-01-23 PROCEDURE — 97110 THERAPEUTIC EXERCISES: CPT | Performed by: PHYSICAL THERAPIST

## 2025-01-23 PROCEDURE — 97110 THERAPEUTIC EXERCISES: CPT

## 2025-01-23 PROCEDURE — 97140 MANUAL THERAPY 1/> REGIONS: CPT | Performed by: PHYSICAL THERAPIST

## 2025-01-23 PROCEDURE — 97112 NEUROMUSCULAR REEDUCATION: CPT

## 2025-01-23 PROCEDURE — 97140 MANUAL THERAPY 1/> REGIONS: CPT

## 2025-01-23 NOTE — PROGRESS NOTES
"Daily Note     Today's date: 2025  Patient name: Adrian Mares  : 1960  MRN: [id number]  Referring provider: Larry Jaquez*  Dx:   Encounter Diagnosis     ICD-10-CM    1. S/P right rotator cuff repair  Z98.890       2. Acute postoperative pain of right shoulder  G89.18     M25.511           Start Time: 08  Stop Time: 930  Total time in clinic (min): 44 minutes    Subjective: pt reports no issues.  He is eager to accelerate his rehab.       Objective: See treatment diary below      Assessment: Tolerated treatment well. Patient demonstrated fatigue post treatment and would benefit from continued PT      Plan: Continue per plan of care.      Precautions: R RTC Repair 1/15/25, Stage 2 CKD      Manuals            PROM prn  KD                                                  Neuro Re-Ed             Scap Retraction 3x10 30x           Ball Squeezes 20x5\"                                                                             Ther Ex             Elbow Flexion AAROM 3x10 30x           Pendulums Fwd/lat 3x10ea 30x           gripper  Blk    30x                                                                            Ther Activity                                       Gait Training                                       Modalities                                            "

## 2025-01-27 ENCOUNTER — OFFICE VISIT (OUTPATIENT)
Dept: PHYSICAL THERAPY | Age: 65
End: 2025-01-27
Payer: MEDICARE

## 2025-01-27 DIAGNOSIS — M25.511 ACUTE POSTOPERATIVE PAIN OF RIGHT SHOULDER: ICD-10-CM

## 2025-01-27 DIAGNOSIS — Z98.890 S/P RIGHT ROTATOR CUFF REPAIR: Primary | ICD-10-CM

## 2025-01-27 DIAGNOSIS — G89.18 ACUTE POSTOPERATIVE PAIN OF RIGHT SHOULDER: ICD-10-CM

## 2025-01-27 PROCEDURE — 97110 THERAPEUTIC EXERCISES: CPT | Performed by: PHYSICAL THERAPIST

## 2025-01-27 PROCEDURE — 97140 MANUAL THERAPY 1/> REGIONS: CPT | Performed by: PHYSICAL THERAPIST

## 2025-01-27 NOTE — PROGRESS NOTES
"Daily Note     Today's date: 2025  Patient name: Adrian Mares  : 1960  MRN: [id number]  Referring provider: Kal Garza MD  Dx:   Encounter Diagnosis     ICD-10-CM    1. S/P right rotator cuff repair  Z98.890       2. Acute postoperative pain of right shoulder  G89.18     M25.511           Start Time: 0930  Stop Time: 1010  Total time in clinic (min): 40 minutes    Subjective: Pt reports improvements in symptoms.       Objective: See treatment diary below      Assessment: Tolerated treatment well. Patient was educated on his current protocol and realistic goals for golfing. Patient was instructed to not be performing AROM at this time. Patient demonstrated fatigue post treatment and would benefit from continued PT      Plan: Continue per plan of care.      Precautions: R RTC Repair 1/15/25, Stage 2 CKD, Accelerated protocol Leonid      Manuals            PROM prn  JF                                                  Neuro Re-Ed             Scap Retraction 3x10 30x           Ball Squeezes 20x5\"                                                                             Ther Ex             Elbow Flexion AAROM 3x10 30x           Pendulums Fwd/lat 3x10ea 30x           gripper  Blk    30x                                                                            Ther Activity                                       Gait Training                                       Modalities                                              "

## 2025-01-28 ENCOUNTER — APPOINTMENT (OUTPATIENT)
Dept: PHYSICAL THERAPY | Age: 65
End: 2025-01-28
Payer: MEDICARE

## 2025-01-30 ENCOUNTER — OFFICE VISIT (OUTPATIENT)
Dept: PHYSICAL THERAPY | Age: 65
End: 2025-01-30
Payer: MEDICARE

## 2025-01-30 DIAGNOSIS — Z98.890 S/P RIGHT ROTATOR CUFF REPAIR: Primary | ICD-10-CM

## 2025-01-30 DIAGNOSIS — G89.18 ACUTE POSTOPERATIVE PAIN OF RIGHT SHOULDER: ICD-10-CM

## 2025-01-30 DIAGNOSIS — M25.511 ACUTE POSTOPERATIVE PAIN OF RIGHT SHOULDER: ICD-10-CM

## 2025-01-30 PROCEDURE — 97110 THERAPEUTIC EXERCISES: CPT | Performed by: PHYSICAL THERAPIST

## 2025-01-30 PROCEDURE — 97140 MANUAL THERAPY 1/> REGIONS: CPT | Performed by: PHYSICAL THERAPIST

## 2025-01-30 NOTE — PROGRESS NOTES
"Daily Note     Today's date: 2025  Patient name: Adrian Mares  : 1960  MRN: [id number]  Referring provider: Kal Garza MD  Dx:   Encounter Diagnosis     ICD-10-CM    1. S/P right rotator cuff repair  Z98.890       2. Acute postoperative pain of right shoulder  G89.18     M25.511           Start Time: 845  Stop Time: 923  Total time in clinic (min): 38 minutes    Subjective: Pt reports improvements in symptoms.       Objective: See treatment diary below      Assessment: Tolerated treatment well.  Patient's ROM is progressing very well at this point. Patient will progress to table slides next visit, will educated on importance of adhering to protocol to protect integrity of repair. Patient demonstrated fatigue post treatment and would benefit from continued PT      Plan: Continue per plan of care.        POC Expires Auth Status Start Date Expiration Date PT Visit Limit     RE every 10  No Auth -      Med cap   Date            Used 4/10           Remaining                  Precautions: R RTC Repair 1/15/25, Stage 2 CKD, Accelerated protocol Leonid      Manuals            PROM prn  JF                                                  Neuro Re-Ed             Scap Retraction 3x10 30x           Ball Squeezes 20x5\"                                                                             Ther Ex             Elbow Flexion AAROM 3x10 30x           Pendulums Fwd/lat 3x10ea 30x           gripper  Blk    30x                                                                            Ther Activity                                       Gait Training                                       Modalities                                                "

## 2025-02-04 ENCOUNTER — OFFICE VISIT (OUTPATIENT)
Dept: PHYSICAL THERAPY | Age: 65
End: 2025-02-04
Payer: MEDICARE

## 2025-02-04 DIAGNOSIS — Z98.890 S/P RIGHT ROTATOR CUFF REPAIR: Primary | ICD-10-CM

## 2025-02-04 DIAGNOSIS — G89.18 ACUTE POSTOPERATIVE PAIN OF RIGHT SHOULDER: ICD-10-CM

## 2025-02-04 DIAGNOSIS — M25.511 ACUTE POSTOPERATIVE PAIN OF RIGHT SHOULDER: ICD-10-CM

## 2025-02-04 PROCEDURE — 97110 THERAPEUTIC EXERCISES: CPT | Performed by: PHYSICAL THERAPIST

## 2025-02-04 PROCEDURE — 97140 MANUAL THERAPY 1/> REGIONS: CPT | Performed by: PHYSICAL THERAPIST

## 2025-02-04 NOTE — PROGRESS NOTES
"Daily Note     Today's date: 2025  Patient name: Adrian Mares  : 1960  MRN: [id number]  Referring provider: Kal Garza MD  Dx:   Encounter Diagnosis     ICD-10-CM    1. S/P right rotator cuff repair  Z98.890       2. Acute postoperative pain of right shoulder  G89.18     M25.511                      Subjective: pt reports doing very well.  He is eager to progress rehab      Objective: See treatment diary below      Assessment: Progressed AAROM movements today with excellent tolerance.  ROM improving in flexion, abduction, ER directions. Tolerated treatment well. Patient exhibited good technique with therapeutic exercises and would benefit from continued PT      Plan: Continue per plan of care.          POC Expires Auth Status Start Date Expiration Date PT Visit Limit     RE every 10  No Auth -      Med cap   Date            Used 4/10           Remaining                  Precautions: R RTC Repair 1/15/25, Stage 2 CKD, Accelerated protocol Leonid      Manuals            PROM prn  JF KD                                                 Neuro Re-Ed             Scap Retraction 3x10 30x 30x          Ball Squeezes 20x5\"                                                                             Ther Ex             Elbow Flexion AAROM 3x10 30x AROM  HEP          Pendulums Fwd/lat 3x10ea 30x 30x          gripper  Blk    30x           SL ER   30x          Prone /leaning over T and I   3 x 10          Pball aarom-abd   Slight scaption    3 x 10          Wall slide-towel   B  3 x 10                                    Ther Activity                                       Gait Training                                       Modalities                                                  "

## 2025-02-06 ENCOUNTER — APPOINTMENT (OUTPATIENT)
Dept: PHYSICAL THERAPY | Age: 65
End: 2025-02-06
Payer: MEDICARE

## 2025-02-07 ENCOUNTER — OFFICE VISIT (OUTPATIENT)
Dept: PHYSICAL THERAPY | Age: 65
End: 2025-02-07
Payer: MEDICARE

## 2025-02-07 DIAGNOSIS — Z98.890 S/P RIGHT ROTATOR CUFF REPAIR: Primary | ICD-10-CM

## 2025-02-07 DIAGNOSIS — G89.18 ACUTE POSTOPERATIVE PAIN OF RIGHT SHOULDER: ICD-10-CM

## 2025-02-07 DIAGNOSIS — M25.511 ACUTE POSTOPERATIVE PAIN OF RIGHT SHOULDER: ICD-10-CM

## 2025-02-07 PROCEDURE — 97140 MANUAL THERAPY 1/> REGIONS: CPT | Performed by: PHYSICAL THERAPIST

## 2025-02-07 PROCEDURE — 97110 THERAPEUTIC EXERCISES: CPT | Performed by: PHYSICAL THERAPIST

## 2025-02-07 PROCEDURE — 97112 NEUROMUSCULAR REEDUCATION: CPT | Performed by: PHYSICAL THERAPIST

## 2025-02-07 NOTE — PROGRESS NOTES
"Daily Note     Today's date: 2025  Patient name: Adrian Mares  : 1960  MRN: [id number]  Referring provider: Larry Jaquez*  Dx:   Encounter Diagnosis     ICD-10-CM    1. S/P right rotator cuff repair  Z98.890       2. Acute postoperative pain of right shoulder  G89.18     M25.511           Start Time: 0815  Stop Time: 0900  Total time in clinic (min): 45 minutes    Subjective: Pt reports continued improvements in symptoms.       Objective: See treatment diary below      Assessment: Tolerated treatment well. Patient doing very well, educated on being cautious during healing stages. Included more AAROM FOTO NEXT VISIT. Patient demonstrated fatigue post treatment and would benefit from continued PT      Plan: Continue per plan of care.          POC Expires Auth Status Start Date Expiration Date PT Visit Limit     RE every 10  No Auth -      Med cap   Date          Used 4/10  5/10         Remaining                  Precautions: R RTC Repair 1/15/25, Stage 2 CKD, Accelerated protocol Leonid      Manuals           PROM prn  JF JF                                                 Neuro Re-Ed             Scap Retraction 3x10 30x 30x          Ball Squeezes 20x5\"                                                                             Ther Ex             Elbow Flexion AAROM 3x10 30x AROM  HEP          Pendulums Fwd/lat 3x10ea 30x 30x          gripper  Blk    30x           SL ER   30x          Prone /leaning over T and I   3 x 10          Pball aarom-abd   Slight scaption    3 x 10          Wall slide-towel   B  3 x 10                                    Ther Activity                                       Gait Training                                       Modalities                                                    "

## 2025-02-10 ENCOUNTER — OFFICE VISIT (OUTPATIENT)
Dept: PHYSICAL THERAPY | Age: 65
End: 2025-02-10
Payer: MEDICARE

## 2025-02-10 DIAGNOSIS — Z98.890 S/P RIGHT ROTATOR CUFF REPAIR: Primary | ICD-10-CM

## 2025-02-10 DIAGNOSIS — M25.511 ACUTE POSTOPERATIVE PAIN OF RIGHT SHOULDER: ICD-10-CM

## 2025-02-10 DIAGNOSIS — G89.18 ACUTE POSTOPERATIVE PAIN OF RIGHT SHOULDER: ICD-10-CM

## 2025-02-10 PROCEDURE — 97140 MANUAL THERAPY 1/> REGIONS: CPT

## 2025-02-10 PROCEDURE — 97112 NEUROMUSCULAR REEDUCATION: CPT

## 2025-02-10 PROCEDURE — 97110 THERAPEUTIC EXERCISES: CPT

## 2025-02-10 NOTE — PROGRESS NOTES
"Daily Note     Today's date: 2/10/2025  Patient name: Adrian Mares  : 1960  MRN: [id number]  Referring provider: Kal Garza MD  Dx:   Encounter Diagnosis     ICD-10-CM    1. S/P right rotator cuff repair  Z98.890       2. Acute postoperative pain of right shoulder  G89.18     M25.511           Start Time: 845  Stop Time: 929  Total time in clinic (min): 44 minutes    Subjective: Pt reports no significant changes since previous session. States he is eager to get out of his sling when protocol allows.      Objective: See treatment diary below      Assessment: Continued interventions as allowed by protocol, with progression to include isometrics and shoulder AROM as tolerated. Pt reported performed interventions with good technique and no reports of pain. Minimal shoulder hike noted during shoulder flexion AROM, but no discomfort. Tolerated treatment well. Patient would benefit from continued PT      Plan: Continue per plan of care.          POC Expires Auth Status Start Date Expiration Date PT Visit Limit     RE every 10  No Auth -      Med cap   Date 1/30  2/7  2/10       Used 4/10  5/10  6/10       Remaining                  Precautions: R RTC Repair 1/15/25, Stage 2 CKD, Accelerated protocol Leonid      Manuals 1/20 1/30 2/7 2/10         PROM prn  JF JF ES                                                Neuro Re-Ed 1/20   2/10         Scap Retraction 3x10 30x 30x          Ball Squeezes 20x5\"            Shoulder 3-way Isos    20x5\" ea         Mid Rows    Blue TB 3x10         Standing Shoulder Flexion    AROM 2x10                                   Ther Ex    2/10         Elbow Flexion AAROM 3x10 30x AROM  HEP          Pendulums Fwd/lat 3x10ea 30x 30x          gripper  Blk    30x           SL ER   30x          Prone /leaning over T and I   3 x 10 3 x 10         Pball aarom-abd   Slight scaption    3 x 10          Wall slide-towel   B  3 x 10 B  3 x 10         Shoulder Flexion AAROM    Supine w/ cane " 2x10                      Ther Activity                                       Gait Training                                       Modalities

## 2025-02-13 ENCOUNTER — OFFICE VISIT (OUTPATIENT)
Dept: PHYSICAL THERAPY | Age: 65
End: 2025-02-13
Payer: MEDICARE

## 2025-02-13 DIAGNOSIS — Z98.890 S/P RIGHT ROTATOR CUFF REPAIR: Primary | ICD-10-CM

## 2025-02-13 DIAGNOSIS — G89.18 ACUTE POSTOPERATIVE PAIN OF RIGHT SHOULDER: ICD-10-CM

## 2025-02-13 DIAGNOSIS — M25.511 ACUTE POSTOPERATIVE PAIN OF RIGHT SHOULDER: ICD-10-CM

## 2025-02-13 PROCEDURE — 97110 THERAPEUTIC EXERCISES: CPT | Performed by: PHYSICAL THERAPIST

## 2025-02-13 PROCEDURE — 97140 MANUAL THERAPY 1/> REGIONS: CPT | Performed by: PHYSICAL THERAPIST

## 2025-02-13 PROCEDURE — 97112 NEUROMUSCULAR REEDUCATION: CPT | Performed by: PHYSICAL THERAPIST

## 2025-02-14 NOTE — PROGRESS NOTES
"Daily Note     Today's date: 2025  Patient name: Adrian Mares  : 1960  MRN: [id number]  Referring provider: Kal Garza MD  Dx:   Encounter Diagnosis     ICD-10-CM    1. S/P right rotator cuff repair  Z98.890       2. Acute postoperative pain of right shoulder  G89.18     M25.511           Start Time: 0845  Stop Time: 0940  Total time in clinic (min): 55 minutes    Subjective: Pt reports continued improvements in symptoms.       Objective: See treatment diary below      Assessment: Tolerated treatment well. Pt's POC was progressed to include more iso strengthening. Patient demonstrated fatigue post treatment and would benefit from continued PT      Plan: Continue per plan of care.          POC Expires Auth Status Start Date Expiration Date PT Visit Limit     RE every 10  No Auth -      Med cap   Date 1/30  2/7  2/10  2/13     Used 4/10  5/10  6/10  7/10      Remaining                  Precautions: R RTC Repair 1/15/25, Stage 2 CKD, Accelerated protocol Leonid      Manuals          PROM prn  JF JF JF                                                Neuro Re-Ed          Scap Retraction 3x10 30x 30x          Ball Squeezes 20x5\"            Shoulder 3-way Isos    20x5\" ea         Mid Rows    Blue TB 3x10         Standing Shoulder Flexion    AROM 2x10                                   Ther Ex    2/10         Elbow Flexion AAROM 3x10 30x AROM  HEP          Pendulums Fwd/lat 3x10ea 30x 30x          gripper  Blk    30x           SL ER   30x          Prone /leaning over T and I   3 x 10 3 x 10         Pball aarom-abd   Slight scaption    3 x 10          Wall slide-towel   B  3 x 10 B  3 x 10         Shoulder Flexion AAROM    Supine w/ cane 2x10                      Ther Activity                                       Gait Training                                       Modalities                                                        "

## 2025-02-17 ENCOUNTER — OFFICE VISIT (OUTPATIENT)
Dept: PHYSICAL THERAPY | Age: 65
End: 2025-02-17
Payer: MEDICARE

## 2025-02-17 DIAGNOSIS — M25.511 ACUTE POSTOPERATIVE PAIN OF RIGHT SHOULDER: ICD-10-CM

## 2025-02-17 DIAGNOSIS — Z98.890 S/P RIGHT ROTATOR CUFF REPAIR: Primary | ICD-10-CM

## 2025-02-17 DIAGNOSIS — G89.18 ACUTE POSTOPERATIVE PAIN OF RIGHT SHOULDER: ICD-10-CM

## 2025-02-17 PROCEDURE — 97110 THERAPEUTIC EXERCISES: CPT | Performed by: PHYSICAL THERAPIST

## 2025-02-17 PROCEDURE — 97140 MANUAL THERAPY 1/> REGIONS: CPT | Performed by: PHYSICAL THERAPIST

## 2025-02-17 PROCEDURE — 97112 NEUROMUSCULAR REEDUCATION: CPT | Performed by: PHYSICAL THERAPIST

## 2025-02-17 NOTE — PROGRESS NOTES
"Daily Note     Today's date: 2025  Patient name: Adrian Mares  : 1960  MRN: [id number]  Referring provider: Kal Garza MD  Dx:   Encounter Diagnosis     ICD-10-CM    1. S/P right rotator cuff repair  Z98.890       2. Acute postoperative pain of right shoulder  G89.18     M25.511           Start Time: 0845  Stop Time: 930  Total time in clinic (min): 45 minutes    Subjective: Pt reports no new symptoms.       Objective: See treatment diary below      Assessment: Tolerated treatment well. Patient is doing very well overall, adhering to accelerated protocol. Patient educated on anticipated soreness following exercise (not to exceed 4/10 pain). Include prone T + Y in patient's POC/HEP if patient continues to do well. Patient demonstrated fatigue post treatment and would benefit from continued PT      Plan: Continue per plan of care.          POC Expires Auth Status Start Date Expiration Date PT Visit Limit     RE every 10  No Auth -      Med cap   Date 1/30  2/7  2/10  2/13  2/17   Used 4/10  5/10  6/10  7/10   8/10   Remaining                  Precautions: R RTC Repair 1/15/25, Stage 2 CKD, Accelerated protocol Leonid      Manuals         PROM prn  JF JF JF JF                                               Neuro Re-Ed          Scap Retraction 3x10 30x 30x          Ball Squeezes 20x5\"            Iso walk outs      Blk 2x10 IR + ER        Mid Rows    Blue TB 3x10 Blue TB 3x10        Standing Shoulder Flexion    AROM 2x10 AROM 3x10        Prone Y +T      Nv + add to HEP                     Ther Ex    2/10         Elbow Flexion AAROM 3x10 30x AROM  HEP          Pendulums Fwd/lat 3x10ea 30x 30x          gripper  Blk    30x           SL ER   30x          Prone /leaning over T and I   3 x 10 3 x 10         Pball aarom-abd   Slight scaption    3 x 10          Wall slide-towel   B  3 x 10 B  3 x 10         Shoulder Flexion AAROM    Supine w/ cane 2x10                    "   Ther Activity                                       Gait Training                                       Modalities

## 2025-02-20 ENCOUNTER — OFFICE VISIT (OUTPATIENT)
Dept: PHYSICAL THERAPY | Age: 65
End: 2025-02-20
Payer: MEDICARE

## 2025-02-20 DIAGNOSIS — M25.511 ACUTE POSTOPERATIVE PAIN OF RIGHT SHOULDER: ICD-10-CM

## 2025-02-20 DIAGNOSIS — Z98.890 S/P RIGHT ROTATOR CUFF REPAIR: Primary | ICD-10-CM

## 2025-02-20 DIAGNOSIS — G89.18 ACUTE POSTOPERATIVE PAIN OF RIGHT SHOULDER: ICD-10-CM

## 2025-02-20 PROCEDURE — 97110 THERAPEUTIC EXERCISES: CPT

## 2025-02-20 PROCEDURE — 97112 NEUROMUSCULAR REEDUCATION: CPT

## 2025-02-20 PROCEDURE — 97140 MANUAL THERAPY 1/> REGIONS: CPT

## 2025-02-20 NOTE — PROGRESS NOTES
"Daily Note     Today's date: 2025  Patient name: Adrian Mares  : 1960  MRN: [id number]  Referring provider: Kal Garza MD  Dx:   Encounter Diagnosis     ICD-10-CM    1. S/P right rotator cuff repair  Z98.890       2. Acute postoperative pain of right shoulder  G89.18     M25.511             Start Time: 0840  Stop Time: 925  Total time in clinic (min): 45 minutes    Subjective: Pt reports he is doing well. He denies any pain or complaints today. He reports good tolerance to exercises, denies any soreness following last session.       Objective: See treatment diary below      Assessment: Tolerated treatment well. Continued per PT plan of care with  good performance. Performed prone t + y exercises today as charted below, which pt completed without discomfort/symptoms. Patient demonstrated fatigue post treatment and would benefit from continued PT. Good status noted post session.       Plan: Continue per plan of care.          POC Expires Auth Status Start Date Expiration Date PT Visit Limit     RE every 10  No Auth -      Med cap   Date 1/30  2/7  2/10  2/13  2/17   Used 4/10  5/10  6/10  7/10   8/10   Remaining                  Precautions: R RTC Repair 1/15/25, Stage 2 CKD, Accelerated protocol Leonid      Manuals        PROM prn  JF JF JF JF TE                                              Neuro Re-Ed          Scap Retraction 3x10 30x 30x          Ball Squeezes 20x5\"            Iso walk outs      Blk 2x10 IR + ER Blk 2x10 IR + ER       Mid Rows    Blue TB 3x10 Blue TB 3x10 Blue TB 3x10       Standing Shoulder Flexion    AROM 2x10 AROM 3x10 AROM 3x10       Prone Y +T      Nv + add to HEP 2x10 ea.                    Ther Ex    2/10         Elbow Flexion AAROM 3x10 30x AROM  HEP          Pendulums Fwd/lat 3x10ea 30x 30x          gripper  Blk    30x           SL ER   30x          Prone /leaning over T and I   3 x 10 3 x 10         Pball aarom-abd   Slight " scaption    3 x 10          Wall slide-towel   B  3 x 10 B  3 x 10  B 3x10        Shoulder Flexion AAROM    Supine w/ cane 2x10                      Ther Activity                                       Gait Training                                       Modalities

## 2025-03-11 ENCOUNTER — OFFICE VISIT (OUTPATIENT)
Dept: PHYSICAL THERAPY | Age: 65
End: 2025-03-11
Payer: MEDICARE

## 2025-03-11 DIAGNOSIS — Z98.890 S/P RIGHT ROTATOR CUFF REPAIR: Primary | ICD-10-CM

## 2025-03-11 DIAGNOSIS — M25.511 ACUTE POSTOPERATIVE PAIN OF RIGHT SHOULDER: ICD-10-CM

## 2025-03-11 DIAGNOSIS — G89.18 ACUTE POSTOPERATIVE PAIN OF RIGHT SHOULDER: ICD-10-CM

## 2025-03-11 PROCEDURE — 97140 MANUAL THERAPY 1/> REGIONS: CPT

## 2025-03-11 PROCEDURE — 97110 THERAPEUTIC EXERCISES: CPT

## 2025-03-11 PROCEDURE — 97112 NEUROMUSCULAR REEDUCATION: CPT

## 2025-03-11 NOTE — PROGRESS NOTES
"Daily Note     Today's date: 3/11/2025  Patient name: Adrian Mares  : 1960  MRN: [id number]  Referring provider: Kal Garza MD  Dx:   Encounter Diagnosis     ICD-10-CM    1. S/P right rotator cuff repair  Z98.890       2. Acute postoperative pain of right shoulder  G89.18     M25.511           Start Time: 846  Stop Time: 930  Total time in clinic (min): 44 minutes    Subjective: Pt reports he was able to perform HEP nearly every day while he was away in [place]. States he occasionally experiences slight soreness in the morning which does not last too long; thinks he might be sleeping on it wrong.       Objective: See treatment diary below      Assessment: Progressed interventions to include gentle RTC strengthening, with pt tolerating well. Reported fatigue, but no increase in symptoms. Good technique noted with prone Ts and Ys, thus added resistance. Finished session with ball circles for CKC strengthening of RTC, with pt reporting no pain/discomfort. Provided pt with new TB for updated HEP. Tolerated treatment well. Patient would benefit from continued PT      Plan: Continue per plan of care.          POC Expires Auth Status Start Date Expiration Date PT Visit Limit     RE every 10  No Auth -      Med cap   Date 3/11       Used 9/10       Remaining                  Precautions: R RTC Repair 1/15/25, Stage 2 CKD, Accelerated protocol Leonid      Manuals 1/20 1/30 2/7 2/13 2/17 2/20 3/11      PROM prn  JF JF JF JF TE ES                                             Neuro Re-Ed          Scap Retraction 3x10 30x 30x          Ball Squeezes 20x5\"            Iso walk outs      Blk 2x10 IR + ER Blk 2x10 IR + ER       Mid Rows    Blue TB 3x10 Blue TB 3x10 Blue TB 3x10       Standing Shoulder Flexion    AROM 2x10 AROM 3x10 AROM 3x10 2# 3x10      Prone Y +T      Nv + add to HEP 2x10 ea. 1# 3x10ea      Sidelying ER       3# 3x10      Ball Circles at wall       GMB cw/ccw x20ea                 "   Ther Ex 1/20   2/10   3/11      Elbow Flexion AAROM 3x10 30x AROM  HEP          Pendulums Fwd/lat 3x10ea 30x 30x          gripper  Blk    30x           SL ER   30x          Prone /leaning over T and I   3 x 10 3 x 10         Pball aarom-abd   Slight scaption    3 x 10          Wall slide-towel   B  3 x 10 B  3 x 10  B 3x10  W/ liftoff 3x10      Shoulder Flexion AAROM    Supine w/ cane 2x10         UBE       3'/3'      Ther Activity                                       Gait Training                                       Modalities

## 2025-03-13 ENCOUNTER — OFFICE VISIT (OUTPATIENT)
Dept: OBGYN CLINIC | Facility: OTHER | Age: 65
End: 2025-03-13

## 2025-03-13 VITALS — BODY MASS INDEX: 24.11 KG/M2 | HEIGHT: 66 IN | WEIGHT: 150 LBS

## 2025-03-13 DIAGNOSIS — Z98.890 S/P RIGHT ROTATOR CUFF REPAIR: Primary | ICD-10-CM

## 2025-03-13 PROCEDURE — 99024 POSTOP FOLLOW-UP VISIT: CPT | Performed by: PHYSICIAN ASSISTANT

## 2025-03-13 NOTE — PROGRESS NOTES
"Surgery: SARS w/RCR, BT, SAD on 1/15/25    S: Patient is doing well.  They have been compliant with formal PT and the HEP.  Denies new injury or trauma. Soreness in AM upon waking - attributes to way he sleeps.    Ht 5' 6\" (1.676 m) Comment: Verbal  Wt 68 kg (150 lb) Comment: Verbal  BMI 24.21 kg/m²     O: RIGHT shoulder  Good shoulder motion  Rotator cuff with good resistance  Good elbow, wrist and hand range of motion  Skin - warm and dry  SI  NVI    A/P: 8 weeks following arthroscopic right shoulder rotator cuff repair, bicep tenodesis, subacromial decompression - doing well  Continue with formal physical therapy - following accelerated RCR protocol. Progress to strengthening phase  HEP - per therapy.    Pain control - Tylenol 1000 mg every 8 hours  Ice as needed - 20 minutes on and 20 minutes off  Follow up in 6-8 weeks     Goals - cycling and golf.  "

## 2025-03-14 ENCOUNTER — OFFICE VISIT (OUTPATIENT)
Dept: PHYSICAL THERAPY | Age: 65
End: 2025-03-14
Payer: MEDICARE

## 2025-03-14 DIAGNOSIS — Z98.890 S/P RIGHT ROTATOR CUFF REPAIR: Primary | ICD-10-CM

## 2025-03-14 DIAGNOSIS — G89.18 ACUTE POSTOPERATIVE PAIN OF RIGHT SHOULDER: ICD-10-CM

## 2025-03-14 DIAGNOSIS — M25.511 ACUTE POSTOPERATIVE PAIN OF RIGHT SHOULDER: ICD-10-CM

## 2025-03-14 PROCEDURE — 97140 MANUAL THERAPY 1/> REGIONS: CPT | Performed by: PHYSICAL THERAPIST

## 2025-03-14 PROCEDURE — 97112 NEUROMUSCULAR REEDUCATION: CPT | Performed by: PHYSICAL THERAPIST

## 2025-03-14 PROCEDURE — 97110 THERAPEUTIC EXERCISES: CPT | Performed by: PHYSICAL THERAPIST

## 2025-03-14 NOTE — PROGRESS NOTES
Daily Note     Today's date: 3/14/2025  Patient name: Adrian Mares  : 1960  MRN: [id number]  Referring provider: Kal Garza MD  Dx:   Encounter Diagnosis     ICD-10-CM    1. S/P right rotator cuff repair  Z98.890       2. Acute postoperative pain of right shoulder  G89.18     M25.511           Start Time: 0845  Stop Time: 930  Total time in clinic (min): 45 minutes    Subjective: Pt reports improvements in symptoms       Objective: See treatment diary below    Via orthopedic note; assessment performed yesterday.   O: RIGHT shoulder  Good shoulder motion  Rotator cuff with good resistance  Good elbow, wrist and hand range of motion  Skin - warm and dry  SI  NVI    Goals  Short Term Goals: to be achieved by 4 weeks - MET   1) Patient to be independent with basic HEP.  2) Pt will demonstrate improved shoulder PROM in all planes as allowed by protocol  3) Pt will display improved shoulder strength by 1/2 MMT grade in all deficient planes as allowed by protocol.    Long Term Goals: to be achieved by discharge - Partially met   1) FOTO equal to or greater than 71.  2) Patient to be independent with comprehensive HEP.  3) Patient will be able to reach overhead 5/5 times with proper technique to aid in ability to reach top shelf at home and in stores.  4) Pt will demonstrate increased UE strength to 5/5 MMT grade in all planes to improve a/iadls.  5) Patient to be able to swing golf club with good technique and no pain/symptoms to allow for return to social golf outings.    Assessment: Tolerated treatment well. Pt's POC was progressed to include more light strengthening interventions replicating recreational activities. Patient has shown improvements in ROM and pain but requires more physical therapy given procedure performed on shoulder. Patient demonstrated fatigue post treatment      Plan: Continue per plan of care. Continue PT twice a week for 8 weeks.          POC Expires Auth Status Start Date Expiration  "Date PT Visit Limit     RE every 10  No Auth -      Med cap   Date 3/11 3/14      Used 9/10 10/10      Remaining                  Precautions: R RTC Repair 1/15/25, Stage 2 CKD, Accelerated protocol Leonid      Manuals 1/20 1/30 2/7 2/13 2/17 3/11 3/14      PROM prn  JF JF JF JF ES JF                                             Neuro Re-Ed 1/20 2/13         Scap Retraction 3x10 30x 30x          Ball Squeezes 20x5\"            Iso walk outs      Blk 2x10 IR + ER Blk 2x10 IR + ER       Mid Rows    Blue TB 3x10 Blue TB 3x10 Blue TB 3x10       Standing Shoulder Flexion    AROM 2x10 AROM 3x10 AROM 3x10 2# 3x10      Prone Y +T      Nv + add to HEP 2x10 ea. 2# 3x10ea      Sidelying ER       3# 3x10      Ball Circles at wall       GMB cw/ccw x20ea                   Ther Ex 1/20   2/10   3/14      Elbow Flexion AAROM 3x10 30x AROM  HEP          Pendulums Fwd/lat 3x10ea 30x 30x          gripper  Blk    30x           SL ER   30x          Prone /leaning over T and I   3 x 10 3 x 10         Pball aarom-abd   Slight scaption    3 x 10          Wall slide-towel   B  3 x 10 B  3 x 10  B 3x10  W/ liftoff 3x10      Shoulder Flexion AAROM    Supine w/ cane 2x10         UBE       3'/3'      Ther Activity                                       Gait Training                                       Modalities                                                              "

## 2025-03-17 ENCOUNTER — OFFICE VISIT (OUTPATIENT)
Dept: PHYSICAL THERAPY | Age: 65
End: 2025-03-17
Payer: MEDICARE

## 2025-03-17 DIAGNOSIS — Z98.890 S/P RIGHT ROTATOR CUFF REPAIR: Primary | ICD-10-CM

## 2025-03-17 DIAGNOSIS — G89.18 ACUTE POSTOPERATIVE PAIN OF RIGHT SHOULDER: ICD-10-CM

## 2025-03-17 DIAGNOSIS — M25.511 ACUTE POSTOPERATIVE PAIN OF RIGHT SHOULDER: ICD-10-CM

## 2025-03-17 PROCEDURE — 97140 MANUAL THERAPY 1/> REGIONS: CPT | Performed by: PHYSICAL THERAPIST

## 2025-03-17 PROCEDURE — 97112 NEUROMUSCULAR REEDUCATION: CPT | Performed by: PHYSICAL THERAPIST

## 2025-03-17 PROCEDURE — 97110 THERAPEUTIC EXERCISES: CPT | Performed by: PHYSICAL THERAPIST

## 2025-03-17 NOTE — PROGRESS NOTES
"Daily Note     Today's date: 3/17/2025  Patient name: Adrian Mares  : 1960  MRN: [id number]  Referring provider: aKl Garza MD  Dx:   Encounter Diagnosis     ICD-10-CM    1. S/P right rotator cuff repair  Z98.890       2. Acute postoperative pain of right shoulder  G89.18     M25.511           Start Time: 0845  Stop Time: 930  Total time in clinic (min): 45 minutes    Subjective: Pt reports no new symptoms, continued improvements.       Objective: See treatment diary below      Assessment: Tolerated treatment well. Pt's POC was progressed to include more closed chain functional strengthening. Patient demonstrated fatigue post treatment and would benefit from continued PT      Plan: Continue per plan of care.          POC Expires Auth Status Start Date Expiration Date PT Visit Limit     RE every 10  No Auth -      Med cap   Date 3/11 3/14 3/17     Used   11     Remaining      1/10            Precautions: R RTC Repair 1/15/25, Stage 2 CKD, Accelerated protocol Leonid      Manuals 1/20 1/30 2/7 2/13 2/17 3/11 3/17      PROM prn  JF JF JF JF ES JF                                             Neuro Re-Ed          Scap Retraction 3x10 30x 30x          Ball Squeezes 20x5\"            Iso walk outs      Blk 2x10 IR + ER Blk 2x10 IR + ER       Mid Rows    Blue TB 3x10 Blue TB 3x10 Blue TB 3x10       Standing Shoulder Flexion    AROM 2x10 AROM 3x10 AROM 3x10 2# 3x10      Prone Y +T      Nv + add to HEP 2x10 ea. 2# 3x10ea      Sidelying ER       3# 3x10      Ball Circles at wall             Counter push-ups        3*10      Ther Ex 1/20   2/10   3/17      Elbow Flexion AAROM 3x10 30x AROM  HEP          Pendulums Fwd/lat 3x10ea 30x 30x          gripper  Blk    30x           SL ER   30x          Prone /leaning over T and I   3 x 10 3 x 10         Pball aarom-abd   Slight scaption    3 x 10          Wall slide-towel   B  3 x 10 B  3 x 10  B 3x10        Pulleys       5'      UBE       3'/3'      Ther " Activity             Golf chipping with SPC       3# 3x10                   Gait Training                                       Modalities

## 2025-03-20 ENCOUNTER — OFFICE VISIT (OUTPATIENT)
Dept: PHYSICAL THERAPY | Age: 65
End: 2025-03-20
Payer: MEDICARE

## 2025-03-20 DIAGNOSIS — G89.18 ACUTE POSTOPERATIVE PAIN OF RIGHT SHOULDER: ICD-10-CM

## 2025-03-20 DIAGNOSIS — M25.511 ACUTE POSTOPERATIVE PAIN OF RIGHT SHOULDER: ICD-10-CM

## 2025-03-20 DIAGNOSIS — Z98.890 S/P RIGHT ROTATOR CUFF REPAIR: Primary | ICD-10-CM

## 2025-03-20 PROCEDURE — 97140 MANUAL THERAPY 1/> REGIONS: CPT | Performed by: PHYSICAL THERAPIST

## 2025-03-20 PROCEDURE — 97112 NEUROMUSCULAR REEDUCATION: CPT | Performed by: PHYSICAL THERAPIST

## 2025-03-20 PROCEDURE — 97110 THERAPEUTIC EXERCISES: CPT | Performed by: PHYSICAL THERAPIST

## 2025-03-20 NOTE — PROGRESS NOTES
"Daily Note     Today's date: 3/20/2025  Patient name: Adrian Mares  : 1960  MRN: [id number]  Referring provider: Kal Garza MD  Dx:   Encounter Diagnosis     ICD-10-CM    1. S/P right rotator cuff repair  Z98.890       2. Acute postoperative pain of right shoulder  G89.18     M25.511           Start Time: 0845  Stop Time: 930  Total time in clinic (min): 45 minutes    Subjective: Pt reports ability to do some light raking around the yard yesterday.       Objective: See treatment diary below      Assessment: Tolerated treatment well. Patient demonstrated fatigue post treatment and would benefit from continued PT      Plan: Continue per plan of care.          POC Expires Auth Status Start Date Expiration Date PT Visit Limit     RE every 10  No Auth -      Med cap   Date 3/11 3/14 3/17 3/20    Used   11 12            Remaining      1/10  2/10          Precautions: R RTC Repair 1/15/25, Stage 2 CKD, Accelerated protocol Leonid      Manuals 1/20 1/30 2/7 2/13 2/17 3/11 3/20      PROM prn  JF JF JF JF ES JF                                             Neuro Re-Ed          Scap Retraction 3x10 30x 30x          Ball Squeezes 20x5\"            Iso walk outs      Blk 2x10 IR + ER Blk 2x10 IR + ER       Mid Rows    Blue TB 3x10 Blue TB 3x10 Blue TB 3x10 [place] 20# 3x10      Standing Shoulder Flexion    AROM 2x10 AROM 3x10 AROM 3x10 2# 3x10      Prone Y +T      Nv + add to HEP 2x10 ea. 2# 3x10ea      Sidelying ER       3# 3x10      Ball Circles at wall             Counter push-ups        3*10      Ther Ex 1/20   2/10   3/20      Elbow Flexion AAROM 3x10 30x AROM  HEP          Pendulums Fwd/lat 3x10ea 30x 30x          gripper  Blk    30x           SL ER   30x          Prone /leaning over T and I   3 x 10 3 x 10         Pball aarom-abd   Slight scaption    3 x 10          Wall slide-towel   B  3 x 10 B  3 x 10  B 3x10        Pulleys       5'      UBE       3'/3'      Ther Activity             Golf chipping " with SPC       nv                   Gait Training                                       Modalities

## 2025-03-24 ENCOUNTER — OFFICE VISIT (OUTPATIENT)
Dept: PHYSICAL THERAPY | Age: 65
End: 2025-03-24
Payer: MEDICARE

## 2025-03-24 DIAGNOSIS — G89.18 ACUTE POSTOPERATIVE PAIN OF RIGHT SHOULDER: ICD-10-CM

## 2025-03-24 DIAGNOSIS — Z98.890 S/P RIGHT ROTATOR CUFF REPAIR: Primary | ICD-10-CM

## 2025-03-24 DIAGNOSIS — M25.511 ACUTE POSTOPERATIVE PAIN OF RIGHT SHOULDER: ICD-10-CM

## 2025-03-24 PROCEDURE — 97112 NEUROMUSCULAR REEDUCATION: CPT | Performed by: PHYSICAL THERAPIST

## 2025-03-24 PROCEDURE — 97110 THERAPEUTIC EXERCISES: CPT | Performed by: PHYSICAL THERAPIST

## 2025-03-24 PROCEDURE — 97140 MANUAL THERAPY 1/> REGIONS: CPT | Performed by: PHYSICAL THERAPIST

## 2025-03-24 NOTE — PROGRESS NOTES
"Daily Note     Today's date: 3/24/2025  Patient name: Adrian Mares  : 1960  MRN: [id number]  Referring provider: Kal Garza MD  Dx:   Encounter Diagnosis     ICD-10-CM    1. S/P right rotator cuff repair  Z98.890       2. Acute postoperative pain of right shoulder  G89.18     M25.511           Start Time: 0845  Stop Time: 930  Total time in clinic (min): 45 minutes    Subjective: Pt reports improvements in symptoms.       Objective: See treatment diary below      Assessment: Tolerated treatment well. POC progressed to include more SA activation interventions. Patient demonstrated fatigue post treatment and would benefit from continued PT      Plan: Continue per plan of care.          POC Expires Auth Status Start Date Expiration Date PT Visit Limit     RE every 10  No Auth -      Med cap   Date 3/11 3/14 3/17 3/20 3/24   Used    12 13           Remaining      1/10  2/10  3/10        Precautions: R RTC Repair 1/15/25, Stage 2 CKD, Accelerated protocol Leonid      Manuals 1/20 1/30 2/7 2/13 2/17 3/11 3/24      PROM prn  JF JF JF JF ES JF      PT resistance D1        JF                                Neuro Re-Ed          Scap Retraction 3x10 30x 30x          Ball Squeezes 20x5\"                         Low Rows    Blue TB 3x10 Blue TB 3x10 Blue TB 3x10 [place] 20# 3x10      Standing Shoulder Flexion    AROM 2x10 AROM 3x10 AROM 3x10 2# 3x10      Prone Y +T      Nv + add to HEP 2x10 ea. 2# 3x10ea      Sidelying ER       Standing ER yanelis 4.5# 3x10      Ball Circles at wall             Counter push-ups        3*10      SA slides        RTB 3x10      Ther Ex 1/20   2/10   3/24      Elbow Flexion AAROM 3x10 30x AROM  HEP          Pendulums Fwd/lat 3x10ea 30x 30x          gripper  Blk    30x           SL ER   30x          Prone /leaning over T and I   3 x 10 3 x 10         Pball aarom-abd   Slight scaption    3 x 10          Wall slide-towel   B  3 x 10 B  3 x 10  B 3x10        Pulleys       5'    "   UBE       3'/3'      Ther Activity             Golf chipping with SPC       nv                   Gait Training                                       Modalities

## 2025-03-28 ENCOUNTER — OFFICE VISIT (OUTPATIENT)
Dept: PHYSICAL THERAPY | Age: 65
End: 2025-03-28
Payer: MEDICARE

## 2025-03-28 DIAGNOSIS — Z98.890 S/P RIGHT ROTATOR CUFF REPAIR: Primary | ICD-10-CM

## 2025-03-28 DIAGNOSIS — M25.511 ACUTE POSTOPERATIVE PAIN OF RIGHT SHOULDER: ICD-10-CM

## 2025-03-28 DIAGNOSIS — G89.18 ACUTE POSTOPERATIVE PAIN OF RIGHT SHOULDER: ICD-10-CM

## 2025-03-28 PROCEDURE — 97110 THERAPEUTIC EXERCISES: CPT | Performed by: PHYSICAL THERAPIST

## 2025-03-28 PROCEDURE — 97112 NEUROMUSCULAR REEDUCATION: CPT | Performed by: PHYSICAL THERAPIST

## 2025-03-28 PROCEDURE — 97140 MANUAL THERAPY 1/> REGIONS: CPT | Performed by: PHYSICAL THERAPIST

## 2025-03-28 NOTE — PROGRESS NOTES
Daily Note     Today's date: 3/28/2025  Patient name: Adrian Mares  : 1960  MRN: [id number]  Referring provider: Kal Garza MD  Dx:   Encounter Diagnosis     ICD-10-CM    1. S/P right rotator cuff repair  Z98.890       2. Acute postoperative pain of right shoulder  G89.18     M25.511           Start Time: 0845  Stop Time: 930  Total time in clinic (min): 45 minutes    Subjective: Pt reports improvements in symptoms.       Objective: See treatment diary below      Assessment: Tolerated treatment well. Pt's POC was progressed to include more closed chain strengthening.  Patient demonstrated fatigue post treatment and would benefit from continued PT      Plan: Continue per plan of care.          POC Expires Auth Status Start Date Expiration Date PT Visit Limit     RE every 10  No Auth -      Med cap   Date 3/28       Used 16               Remaining 610            Precautions: R RTC Repair 1/15/25, Stage 2 CKD, Accelerated protocol Leonid      Manuals 1/20 1/30 2/7 2/13 2/17 3/11 3/24 3/28     PROM prn  JF JF JF JF ES JF JF     PT resistance D1        JF JF                               Neuro Re-Ed /90 IR        3*10 RTB     SA SLIDES        3*10 GTB                  Low Rows    Blue TB 3x10 Blue TB 3x10 Blue TB 3x10 [place] 20# 3x10      Standing Shoulder Flexion    AROM 2x10 AROM 3x10 AROM 3x10 2# 3x10      Prone Y +T      Nv + add to HEP 2x10 ea. 2# 3x10ea      Sidelying ER       Standing ER yanelis 4.5# 3x10      Ball Circles at wall             Counter push-ups        3*10 3*10     SA slides        RTB 3x10      Ther Ex    2/10   3/24      Elbow Flexion AAROM 3x10 30x AROM  HEP          Pendulums Fwd/lat 3x10ea 30x 30x          gripper  Blk    30x           SL ER   30x          Prone /leaning over T and I   3 x 10 3 x 10         Pball aarom-abd   Slight scaption    3 x 10          Wall slide-towel   B  3 x 10 B  3 x 10  B 3x10        Pulleys       5'      UBE       3'/3'       Ther Activity             Golf chipping with SPC       nv 3*10 2# SPC                  Gait Training                                       Modalities

## 2025-03-31 ENCOUNTER — OFFICE VISIT (OUTPATIENT)
Dept: PHYSICAL THERAPY | Age: 65
End: 2025-03-31
Payer: MEDICARE

## 2025-03-31 DIAGNOSIS — M25.511 ACUTE POSTOPERATIVE PAIN OF RIGHT SHOULDER: ICD-10-CM

## 2025-03-31 DIAGNOSIS — G89.18 ACUTE POSTOPERATIVE PAIN OF RIGHT SHOULDER: ICD-10-CM

## 2025-03-31 DIAGNOSIS — Z98.890 S/P RIGHT ROTATOR CUFF REPAIR: Primary | ICD-10-CM

## 2025-03-31 PROCEDURE — 97110 THERAPEUTIC EXERCISES: CPT | Performed by: PHYSICAL THERAPIST

## 2025-03-31 PROCEDURE — 97140 MANUAL THERAPY 1/> REGIONS: CPT | Performed by: PHYSICAL THERAPIST

## 2025-03-31 PROCEDURE — 97112 NEUROMUSCULAR REEDUCATION: CPT | Performed by: PHYSICAL THERAPIST

## 2025-03-31 NOTE — PROGRESS NOTES
Daily Note     Today's date: 3/31/2025  Patient name: Adrian Mares  : 1960  MRN: [id number]  Referring provider: Kal Garza MD  Dx:   Encounter Diagnosis     ICD-10-CM    1. S/P right rotator cuff repair  Z98.890       2. Acute postoperative pain of right shoulder  G89.18     M25.511           Start Time: 0845  Stop Time: 930  Total time in clinic (min): 45 minutes    Subjective: Pt reports no new symptoms.      Objective: See treatment diary below      Assessment: Tolerated treatment well. Patient dong very well form strength prospective, recommended not performing a full round of golf until May to avoid set backs. Recommend hitting balls at range on mat to avoid injury.  Patient demonstrated fatigue post treatment and would benefit from continued PT      Plan: Continue per plan of care.          POC Expires Auth Status Start Date Expiration Date PT Visit Limit     RE every 10  No Auth -      Med cap   Date 3/28       Used 16               Remaining 6/10            Precautions: R RTC Repair 1/15/25, Stage 2 CKD, Accelerated protocol Leonid      Manuals 1/20 1/30 2/7 2/13 2/17 3/11 3/24 3/31     PROM prn  JF JF JF JF ES JF JF     PT resistance D1 + D2       JF JF                               Neuro Re-Ed  IR + Er        3*10 GTB     SA SLIDES                          Low Rows    Blue TB 3x10 Blue TB 3x10 Blue TB 3x10 [place] 20# 3x10      Standing Shoulder Flexion    AROM 2x10 AROM 3x10 AROM 3x10 2# 3x10      Prone Y +T      Nv + add to HEP 2x10 ea. 2# 3x10ea      Sidelying ER       Standing ER rom 4.5# 3x10      Ball Circles at wall             Counter push-ups        3*10 3*10     SA slides        RTB 3x10      Ther Ex    2/10   3/24      Elbow Flexion AAROM 3x10 30x AROM  HEP          Pendulums Fwd/lat 3x10ea 30x 30x          gripper  Blk    30x           SL ER   30x          Prone /leaning over T and I   3 x 10 3 x 10         Pball aarom-abd   Slight scaption    3 x  10          Wall slide-towel   B  3 x 10 B  3 x 10  B 3x10        Pulleys       5' 5'     UBE       3'/3' 4/4' lv3     Ther Activity             Golf chipping with SPC       nv 3*10 3# SPC                  Gait Training                                       Modalities

## 2025-04-04 ENCOUNTER — OFFICE VISIT (OUTPATIENT)
Dept: PHYSICAL THERAPY | Age: 65
End: 2025-04-04
Payer: MEDICARE

## 2025-04-04 DIAGNOSIS — Z98.890 S/P RIGHT ROTATOR CUFF REPAIR: Primary | ICD-10-CM

## 2025-04-04 DIAGNOSIS — G89.18 ACUTE POSTOPERATIVE PAIN OF RIGHT SHOULDER: ICD-10-CM

## 2025-04-04 DIAGNOSIS — M25.511 ACUTE POSTOPERATIVE PAIN OF RIGHT SHOULDER: ICD-10-CM

## 2025-04-04 PROCEDURE — 97140 MANUAL THERAPY 1/> REGIONS: CPT | Performed by: PHYSICAL THERAPIST

## 2025-04-04 PROCEDURE — 97110 THERAPEUTIC EXERCISES: CPT | Performed by: PHYSICAL THERAPIST

## 2025-04-04 PROCEDURE — 97112 NEUROMUSCULAR REEDUCATION: CPT | Performed by: PHYSICAL THERAPIST

## 2025-04-04 NOTE — PROGRESS NOTES
Daily Note     Today's date: 2025  Patient name: Adrian Mares  : 1960  MRN: [id number]  Referring provider: Kal Garza MD  Dx:   Encounter Diagnosis     ICD-10-CM    1. S/P right rotator cuff repair  Z98.890       2. Acute postoperative pain of right shoulder  G89.18     M25.511                      Subjective: Pt reports improvements in symptoms.       Objective: See treatment diary below      Assessment: Tolerated treatment well. Patient demonstrated fatigue post treatment and would benefit from continued PT      Plan: Continue per plan of care. Re-eval following vacation.          POC Expires Auth Status Start Date Expiration Date PT Visit Limit     RE every 10  No Auth -      Med cap   Date 3/28 4/4      Used 16               Remaining 6/10            Precautions: R RTC Repair 1/15/25, Stage 2 CKD, Accelerated protocol Leonid      Manuals 1/20 1/30 2/7 2/13 2/17 3/11 3/24 4/3     PROM prn  JF JF JF JF ES JF JF     PT resistance D1 + D2       JF JF                               Neuro Re-Ed  IR + Er        3*10 GTB     SA SLIDES                          Low Rows    Blue TB 3x10 Blue TB 3x10 Blue TB 3x10 [place] 20# 3x10      Standing Shoulder Flexion    AROM 2x10 AROM 3x10 AROM 3x10 2# 3x10      Prone Y +T      Nv + add to HEP 2x10 ea. 2# 3x10ea      Sidelying ER       Standing ER yanelis 4.5# 3x10      Ball Circles at wall             Counter push-ups        3*10 3*10     SA slides        RTB 3x10      Ther Ex    2/10   3/24      Elbow Flexion AAROM 3x10 30x AROM  HEP          Pendulums Fwd/lat 3x10ea 30x 30x          gripper  Blk    30x           SL ER   30x          Prone /leaning over T and I   3 x 10 3 x 10         Pball aarom-abd   Slight scaption    3 x 10          Wall slide-towel   B  3 x 10 B  3 x 10  B 3x10        Pulleys       5' 5'     UBE       3'/3' 4/4' lv3     Ther Activity             Golf chipping with SPC       nv 3*10 3# SPC                  Gait  Training                                       Modalities

## 2025-04-21 ENCOUNTER — OFFICE VISIT (OUTPATIENT)
Dept: PHYSICAL THERAPY | Age: 65
End: 2025-04-21
Payer: MEDICARE

## 2025-04-21 DIAGNOSIS — M25.511 ACUTE POSTOPERATIVE PAIN OF RIGHT SHOULDER: ICD-10-CM

## 2025-04-21 DIAGNOSIS — G89.18 ACUTE POSTOPERATIVE PAIN OF RIGHT SHOULDER: ICD-10-CM

## 2025-04-21 DIAGNOSIS — Z98.890 S/P RIGHT ROTATOR CUFF REPAIR: Primary | ICD-10-CM

## 2025-04-21 PROCEDURE — 97110 THERAPEUTIC EXERCISES: CPT | Performed by: PHYSICAL THERAPIST

## 2025-04-21 PROCEDURE — 97112 NEUROMUSCULAR REEDUCATION: CPT | Performed by: PHYSICAL THERAPIST

## 2025-04-21 PROCEDURE — 97140 MANUAL THERAPY 1/> REGIONS: CPT | Performed by: PHYSICAL THERAPIST

## 2025-04-21 NOTE — PROGRESS NOTES
Daily Note     Today's date: 2025  Patient name: Adrian Mares  : 1960  MRN: [id number]  Referring provider: Kal Garza MD  Dx:   Encounter Diagnosis     ICD-10-CM    1. S/P right rotator cuff repair  Z98.890       2. Acute postoperative pain of right shoulder  G89.18     M25.511           Start Time: 0845  Stop Time: 930  Total time in clinic (min): 45 minutes    Subjective: Pt reports feeling great following trip to [place].       Objective: See treatment diary below      Assessment: Tolerated treatment well. Pt's POC was progressed to include more overhead strengthening and LUE strengthening as well. Patient demonstrated fatigue post treatment and would benefit from continued PT      Plan: Continue per plan of care.          POC Expires Auth Status Start Date Expiration Date PT Visit Limit     RE every 10  No Auth -      Med cap   Date 3/28 4/4      Used 16               Remaining 6/10            Precautions: R RTC Repair 1/15/25, Stage 2 CKD, Accelerated protocol Leonid      Manuals 1/20 1/30 2/7 2/13 2/17 3/11 3/24 4/21     PROM prn  JF JF JF JF ES JF JF     PT resistance D1 + D2       JF JF                               Neuro Re-Ed  IR + Er        3*10 GTB     SA SLIDES                          Low Rows    Blue TB 3x10 Blue TB 3x10 Blue TB 3x10 [place] 20# 3x10      Standing Shoulder Flexion    AROM 2x10 AROM 3x10 AROM 3x10 2# 3x10      Prone Y +T      Nv + add to HEP 2x10 ea. 2# 3x10ea      Sidelying ER       Standing ER rom 4.5# 3x10      Ball Circles at wall             Counter push-ups        3*10 3*10     SA slides        RTB 3x10      Ther Ex    2/10   3/24      Elbow Flexion AAROM 3x10 30x AROM  HEP          Pendulums Fwd/lat 3x10ea 30x 30x          gripper  Blk    30x           SL ER   30x          Prone /leaning over T and I   3 x 10 3 x 10         Pball aarom-abd   Slight scaption    3 x 10          Wall slide-towel   B  3 x 10 B  3 x 10  B 3x10         Pulleys       5' 5'     UBE       3'/3' 4/4' lv3     Ther Activity             Golf chipping with SPC       nv 3*10 3# SPC                  Gait Training                                       Modalities

## 2025-04-24 ENCOUNTER — OFFICE VISIT (OUTPATIENT)
Dept: PHYSICAL THERAPY | Age: 65
End: 2025-04-24
Payer: MEDICARE

## 2025-04-24 DIAGNOSIS — M25.511 ACUTE POSTOPERATIVE PAIN OF RIGHT SHOULDER: ICD-10-CM

## 2025-04-24 DIAGNOSIS — Z98.890 S/P RIGHT ROTATOR CUFF REPAIR: Primary | ICD-10-CM

## 2025-04-24 DIAGNOSIS — G89.18 ACUTE POSTOPERATIVE PAIN OF RIGHT SHOULDER: ICD-10-CM

## 2025-04-24 PROCEDURE — 97110 THERAPEUTIC EXERCISES: CPT | Performed by: PHYSICAL THERAPIST

## 2025-04-24 PROCEDURE — 97140 MANUAL THERAPY 1/> REGIONS: CPT | Performed by: PHYSICAL THERAPIST

## 2025-04-24 NOTE — PROGRESS NOTES
Daily Note     Today's date: 2025  Patient name: Adrian Mares  : 1960  MRN: [id number]  Referring provider: Kal Garza MD  Dx: No diagnosis found.               Subjective: ***      Objective: See treatment diary below      Assessment: Tolerated treatment {Tolerated treatment :[id number]}. Patient {assessment:[id number]}      Plan: Continue per plan of care.          POC Expires Auth Status Start Date Expiration Date PT Visit Limit     RE every 10  No Auth -      Med cap   Date 3/28 4/4      Used 16               Remaining 6/10            Precautions: R RTC Repair 1/15/25, Stage 2 CKD, Accelerated protocol Leonid      Manuals 1/20 1/30 2/7 2/13 2/17 3/11 3/24 4/21 4/24    PROM prn  JF JF JF JF ES JF JF     PT resistance D1 + D2       JF JF                               Neuro Re-Ed          90/90 IR + Er        3*10 GTB     SA SLIDES                          Low Rows    Blue TB 3x10 Blue TB 3x10 Blue TB 3x10 [place] 20# 3x10      Standing Shoulder Flexion    AROM 2x10 AROM 3x10 AROM 3x10 2# 3x10      Prone Y +T      Nv + add to HEP 2x10 ea. 2# 3x10ea      Sidelying ER       Standing ER yanelis 4.5# 3x10      Ball Circles at wall             Counter push-ups        3*10 3*10     SA slides        RTB 3x10      Ther Ex 1/20   2/10   3/24      Elbow Flexion AAROM 3x10 30x AROM  HEP          Pendulums Fwd/lat 3x10ea 30x 30x          gripper  Blk    30x           SL ER   30x          Prone /leaning over T and I   3 x 10 3 x 10         Pball aarom-abd   Slight scaption    3 x 10          Wall slide-towel   B  3 x 10 B  3 x 10  B 3x10        Pulleys       5' 5'     UBE       3'/3' 4/4' lv3     Ther Activity             Golf chipping with SPC       nv 3*10 3# SPC                  Gait Training                                       Modalities

## 2025-04-24 NOTE — PROGRESS NOTES
PT Discharge Summary     Today's date: 2025  Patient name: Adrian Mares  : 1960  MRN: [id number]  Referring provider: Kal Garza MD  Dx:   Encounter Diagnosis     ICD-10-CM    1. Acute postoperative pain of right shoulder  G89.18     M25.511       2. S/P right rotator cuff repair  Z98.890 Ambulatory Referral to Physical Therapy          Start Time: 845  Stop Time: 930  Total time in clinic (min): 45 minutes    Assessment        Adrian Mares is a pleasant 64 y.o. male who presents with R shoulder pain s/p R RTC repair performed on 1/15/25 by Dr. Jaquez. Pt has demonstrated excellent progress with R Shoulder, on re-evaluation his right shoulder is stronger than his left with RTC MMT testing in a 90/90 position and 10 pound overhead shoulder press. I have a good rapport with patient and he is very compliant with HEP and an active individual. He was given an updated HEP to prepare him for his active life style and to address any strength deficits in both shoulders. He will be discharged from formal PT at this time, any concerns he would gladly be seen in the office for more formal PT sessions.     Comparable signs:  1) Reaching overhead  2) Reaching behind his back   3) Swinging golf club    Goals - ALL MET   Short Term Goals: to be achieved by 4 weeks  1) Patient to be independent with basic HEP.  2) Pt will demonstrate improved shoulder PROM in all planes as allowed by protocol  3) Pt will display improved shoulder strength by 1/2 MMT grade in all deficient planes as allowed by protocol.    Long Term Goals: to be achieved by discharge  1) FOTO equal to or greater than 71.  2) Patient to be independent with comprehensive HEP.  3) Patient will be able to reach overhead 5/5 times with proper technique to aid in ability to reach top shelf at home and in stores.  4) Pt will demonstrate increased UE strength to 5/5 MMT grade in all planes to improve a/iadls.  5) Patient to be able to swing golf club  "with good technique and no pain/symptoms to allow for return to social golf outings.        Plan  D/C PT with HEP               Precautions: R RTC Repair 1/15/25, Stage 2 CKD      Manuals                                                                 Neuro Re-Ed 4/24            Scap Retraction 3x10            Ball Squeezes 20x5\"                                                                             Ther Ex 1/20            Elbow Flexion AAROM 3x10            Pendulums Fwd/lat 3x10ea                                                                                          Ther Activity                                       Gait Training                                       Modalities                                          "

## 2025-04-28 ENCOUNTER — OFFICE VISIT (OUTPATIENT)
Dept: OBGYN CLINIC | Facility: OTHER | Age: 65
End: 2025-04-28
Payer: MEDICARE

## 2025-04-28 ENCOUNTER — APPOINTMENT (OUTPATIENT)
Dept: PHYSICAL THERAPY | Age: 65
End: 2025-04-28
Payer: MEDICARE

## 2025-04-28 VITALS — HEIGHT: 66 IN | WEIGHT: 150 LBS | BODY MASS INDEX: 24.11 KG/M2

## 2025-04-28 DIAGNOSIS — M75.101 TEAR OF RIGHT SUPRASPINATUS TENDON: Primary | ICD-10-CM

## 2025-04-28 PROCEDURE — 99213 OFFICE O/P EST LOW 20 MIN: CPT | Performed by: PHYSICIAN ASSISTANT

## 2025-04-28 NOTE — PROGRESS NOTES
"  Assessment & Plan  Tear of right supraspinatus tendon  Continue with HEP as outlined by therapist  Slowly increase all activities to tolerance.  Already playing 9 holes of golf and cycling in gym.  He is okay to do more with both as his shoulder allows.  No formal restrictions  Follow up PRN    Subjective:   Patient ID: Adrian Mares is a 64 y.o. male    Ryley presents to the office in follow up of his right shoulder.  He is 3.5 months s/p arthroscopic rotator cuff repair.  Denies new injury or trauma.  He has been compliant with formal PT and his HEP (recently d/c from PT given excellent progress).  Ryley is doing more around the house without increase in pain.  Denies any issues sleeping at night.  Has been doing more in gym and on golf course without pain.  Certain extremes of motion will cause slight discomfort but quickly resolves.  Pleased with progress    The following portions of the patient's history were reviewed and updated as appropriate: allergies, current medications, past family history, past medical history, past social history, past surgical history and problem list.    Objective:  Ht 5' 6\" (1.676 m)   Wt 68 kg (150 lb)   BMI 24.21 kg/m²       Right Shoulder Exam     Tenderness   The patient is experiencing no tenderness.    Range of Motion   The patient has normal right shoulder ROM.    Muscle Strength   The patient has normal right shoulder strength.    Tests   Stallworth test: negative  Impingement: negative    Other   Erythema: absent  Sensation: normal  Pulse: present            Physical Exam  Constitutional:       Appearance: He is well-developed.   HENT:      Head: Normocephalic.   Eyes:      Extraocular Movements: Extraocular movements intact.   Pulmonary:      Effort: No respiratory distress.      Breath sounds: No wheezing.   Musculoskeletal:      Cervical back: Normal range of motion.   Skin:     General: Skin is warm and dry.   Neurological:      Mental Status: He is alert and oriented " to person, place, and time.   Psychiatric:         Behavior: Behavior normal.         Thought Content: Thought content normal.         Judgment: Judgment normal.

## 2025-04-28 NOTE — ASSESSMENT & PLAN NOTE
Continue with HEP as outlined by therapist  Slowly increase all activities to tolerance.  Already playing 9 holes of golf and cycling in gym.  He is okay to do more with both as his shoulder allows.  No formal restrictions  Follow up PRN

## 2025-05-11 DIAGNOSIS — I12.9 BENIGN HYPERTENSION WITH CHRONIC KIDNEY DISEASE, STAGE III (HCC): ICD-10-CM

## 2025-05-11 DIAGNOSIS — N18.30 BENIGN HYPERTENSION WITH CHRONIC KIDNEY DISEASE, STAGE III (HCC): ICD-10-CM

## 2025-05-11 RX ORDER — LOSARTAN POTASSIUM 25 MG/1
25 TABLET ORAL DAILY
Qty: 90 TABLET | Refills: 0 | Status: SHIPPED | OUTPATIENT
Start: 2025-05-11

## 2025-05-13 DIAGNOSIS — E78.2 MIXED HYPERLIPIDEMIA: ICD-10-CM

## 2025-05-13 RX ORDER — ATORVASTATIN CALCIUM 20 MG/1
20 TABLET, FILM COATED ORAL DAILY
Qty: 30 TABLET | Refills: 0 | Status: SHIPPED | OUTPATIENT
Start: 2025-05-13

## 2025-05-21 ENCOUNTER — APPOINTMENT (OUTPATIENT)
Dept: LAB | Facility: CLINIC | Age: 65
End: 2025-05-21
Payer: MEDICARE

## 2025-05-21 DIAGNOSIS — N18.2 STAGE 2 CHRONIC KIDNEY DISEASE: ICD-10-CM

## 2025-05-21 DIAGNOSIS — Z90.5 SOLITARY KIDNEY, ACQUIRED: ICD-10-CM

## 2025-05-21 DIAGNOSIS — I10 ESSENTIAL HYPERTENSION: ICD-10-CM

## 2025-05-21 LAB
ANION GAP SERPL CALCULATED.3IONS-SCNC: 9 MMOL/L (ref 4–13)
BACTERIA UR QL AUTO: NORMAL /HPF
BILIRUB UR QL STRIP: NEGATIVE
BUN SERPL-MCNC: 20 MG/DL (ref 5–25)
CALCIUM SERPL-MCNC: 9.2 MG/DL (ref 8.4–10.2)
CHLORIDE SERPL-SCNC: 106 MMOL/L (ref 96–108)
CLARITY UR: CLEAR
CO2 SERPL-SCNC: 26 MMOL/L (ref 21–32)
COLOR UR: NORMAL
CREAT SERPL-MCNC: 1.23 MG/DL (ref 0.6–1.3)
CREAT UR-MCNC: 110.9 MG/DL
GFR SERPL CREATININE-BSD FRML MDRD: 60 ML/MIN/1.73SQ M
GLUCOSE P FAST SERPL-MCNC: 86 MG/DL (ref 65–99)
GLUCOSE UR STRIP-MCNC: NEGATIVE MG/DL
HGB UR QL STRIP.AUTO: NEGATIVE
KETONES UR STRIP-MCNC: NEGATIVE MG/DL
LEUKOCYTE ESTERASE UR QL STRIP: NEGATIVE
MICROALBUMIN UR-MCNC: <7 MG/L
NITRITE UR QL STRIP: NEGATIVE
NON-SQ EPI CELLS URNS QL MICRO: NORMAL /HPF
PH UR STRIP.AUTO: 6 [PH]
PHOSPHATE SERPL-MCNC: 3.5 MG/DL (ref 2.3–4.1)
POTASSIUM SERPL-SCNC: 4 MMOL/L (ref 3.5–5.3)
PROT UR STRIP-MCNC: NEGATIVE MG/DL
RBC #/AREA URNS AUTO: NORMAL /HPF
SODIUM SERPL-SCNC: 141 MMOL/L (ref 135–147)
SP GR UR STRIP.AUTO: 1.02 (ref 1–1.03)
UROBILINOGEN UR STRIP-ACNC: <2 MG/DL
WBC #/AREA URNS AUTO: NORMAL /HPF

## 2025-05-21 PROCEDURE — 84100 ASSAY OF PHOSPHORUS: CPT

## 2025-05-21 PROCEDURE — 81001 URINALYSIS AUTO W/SCOPE: CPT

## 2025-05-21 PROCEDURE — 36415 COLL VENOUS BLD VENIPUNCTURE: CPT

## 2025-05-21 PROCEDURE — 82570 ASSAY OF URINE CREATININE: CPT

## 2025-05-21 PROCEDURE — 80048 BASIC METABOLIC PNL TOTAL CA: CPT

## 2025-05-21 PROCEDURE — 82043 UR ALBUMIN QUANTITATIVE: CPT

## 2025-05-26 NOTE — ASSESSMENT & PLAN NOTE
Lab Results   Component Value Date    EGFR 60 05/21/2025    EGFR 60 11/08/2024    EGFR 62 10/07/2024    CREATININE 1.23 05/21/2025    CREATININE 1.24 11/08/2024    CREATININE 1.21 10/07/2024   -baseline is around 1.2 to 1.5 mg/dL recently since November 2023 has been around 1.2 to 1.4 mg/dL in the setting of solitary kidney status post left nephrectomy for renal cell carcinoma   -Chronic kidney disease likely due to decreased nephron mass from left nephrectomy done for renal cell carcinoma left kidney. Preop creatinine was 0.8-0.9 but has been elevated and stable at  1.2-1.5 . CKD likely due to decreased nephron mass from left nephrectomy.   - Renal function is stable at creatinine 1.23 mg/dL, electrolytes are stable.  eGFR 60.  Phosphorus 3.5.  No proteinuria, UA bland.  - Continue oral hydration.  Avoid nephrotoxins, okay to do MRI with gadolinium.  As renal function stable and no proteinuria, holding off on SGLT2 inhibitors.    -BMP in 6 months. Follow-up in 12 months with repeat labs    Orders:    Basic metabolic panel; Future    Basic metabolic panel; Future    Albumin / creatinine urine ratio; Future    Urinalysis with microscopic; Future    PTH, intact; Future    Phosphorus; Future

## 2025-05-26 NOTE — ASSESSMENT & PLAN NOTE
-Blood pressure stable and is at goal.  Currently on losartan 25 mg daily, recommend low-sodium diet   -continue home monitoring of blood pressure  Orders:    Basic metabolic panel; Future    Basic metabolic panel; Future    Albumin / creatinine urine ratio; Future

## 2025-05-26 NOTE — PROGRESS NOTES
Name: Adrian Mares      : 1960      MRN: [id number]  Encounter Provider: Tom Walker MD  Encounter Date: 2025   Encounter department: [facility] [place]  :  Assessment & Plan  Stage 2 chronic kidney disease  Lab Results   Component Value Date    EGFR 60 2025    EGFR 60 2024    EGFR 62 10/07/2024    CREATININE 1.23 2025    CREATININE 1.24 2024    CREATININE 1.21 10/07/2024   -baseline is around 1.2 to 1.5 mg/dL recently since 2023 has been around 1.2 to 1.4 mg/dL in the setting of solitary kidney status post left nephrectomy for renal cell carcinoma   -Chronic kidney disease likely due to decreased nephron mass from left nephrectomy done for renal cell carcinoma left kidney. Preop creatinine was 0.8-0.9 but has been elevated and stable at  1.2-1.5 . CKD likely due to decreased nephron mass from left nephrectomy.   - Renal function is stable at creatinine 1.23 mg/dL, electrolytes are stable.  eGFR 60.  Phosphorus 3.5.  No proteinuria, UA bland.  - Continue oral hydration.  Avoid nephrotoxins, okay to do MRI with gadolinium.  As renal function stable and no proteinuria, holding off on SGLT2 inhibitors.    -BMP in 6 months. Follow-up in 12 months with repeat labs    Orders:    Basic metabolic panel; Future    Basic metabolic panel; Future    Albumin / creatinine urine ratio; Future    Urinalysis with microscopic; Future    PTH, intact; Future    Phosphorus; Future    Essential hypertension  -Blood pressure stable and is at goal.  Currently on losartan 25 mg daily, recommend low-sodium diet   -continue home monitoring of blood pressure  Orders:    Basic metabolic panel; Future    Basic metabolic panel; Future    Albumin / creatinine urine ratio; Future    Solitary kidney, acquired  status post left nephrectomy for renal cell carcinoma-done on 19 by Dr. Solomon Sagastume pathology results suggestive of renal cell carcinoma-conventional clear cell  type.    -Abdomen abdomen with and without contrast from July 2024-did not show any signs of residual or recurrent or metastatic malignancy in the abdomen  Orders:    Basic metabolic panel; Future    Mixed hyperlipidemia  - Recommend goal LDL less than 100 mg/dL, currently on fish oil as well as statins. Last LDL 89 mg/dl            Patient Instructions   -Renal Function is stable   -You have Chronic Kidney Disease Stage 2   -Avoid NSAIDs like Ibuprofen/Motrin, Naproxen/Aleve, Celebrex, meloxicam/Mobic, Diclofenac and other NSAIDs.  -Okay to take Acetaminophen/Tylenol if you do not have any liver problems  -Avoid IV contrast used for CT scan and cardiac catheterization.    -If plan for any study with IV contrast, please let me know so we could hydrate with fluids before and after IV contrast  -Dosage  of certain medications may need to be adjusted depending on Kidney function.     Blood pressure is stable    -Recommend 2 g sodium diet.    -Recommend daily exercise and weight loss  -Discussed home monitoring of BP and maintaining a log of blood pressure.  -Recommend goal BP less than 130/80. Please inform me if SBP below 110 or above 140's persistently.    BMP in 6 months     Follow up: 12  months with repeat Lab work within a week of the scheduled office visit. Will discuss the results of the previsit Labs during the office visit.         It was a pleasure evaluating your patient in the office today. Thank you for allowing our team to participate in the care of  Adrian Mares. Please do not hesitate to contact our team if further issues/questions shall arise in the interim.     History of Present Illness   HPI  Adrian Mares is a 64 y.o. male   who underwent elective laparoscopy hand assisted sigmoid resection and left nephrectomy for complicated diverticular disease and left renal cell carcinoma on 12/30/2019.  Nephrology was consulted for increased creatinine postop.  Preoperative creatinine was 0.9.  Postop  "creatinine stabilized at 1.4 and elevated creatinine was thought to be due to nephron loss.  Since then renal function had been stable at creatinine 1.2-1.3 but gradually worsened to creatinine 1.5 since July 2023, since November 28, 2023 renal function stable creatinine 1.3  Status post right hip replacement on October 14, 2024    -Reviewed blood work from 5/21/2025.  Renal function is stable creatinine 1.23 mg/dL, EGFR of 60.  No proteinuria, UA bland  No new complaints          Review of Systems   Constitutional:  Negative for activity change, appetite change, chills, diaphoresis, fatigue and fever.   HENT:  Negative for congestion, ear pain, facial swelling, nosebleeds and sore throat.    Eyes:  Negative for pain and visual disturbance.   Respiratory:  Negative for cough, chest tightness and shortness of breath.    Cardiovascular:  Negative for chest pain and palpitations.   Gastrointestinal:  Negative for abdominal distention, abdominal pain, diarrhea, nausea and vomiting.   Genitourinary:  Negative for difficulty urinating, dysuria, flank pain, frequency and hematuria.   Musculoskeletal:  Negative for arthralgias, back pain and joint swelling.   Skin:  Negative for color change and rash.   Neurological:  Negative for dizziness, seizures, syncope, weakness and headaches.   Psychiatric/Behavioral:  Negative for agitation and confusion. The patient is not nervous/anxious.    All other systems reviewed and are negative.    Medical History Reviewed by provider this encounter:  Tobacco  Allergies  Meds  Problems  Med Hx  Surg Hx  Fam Hx     .       Medications Ordered Prior to Encounter[1]  Objective   /64 (BP Location: Left arm, Patient Position: Sitting, Cuff Size: Standard)   Pulse 58   Ht 5' 6\" (1.676 m)   Wt 71.7 kg (158 lb)   BMI 25.50 kg/m²      Physical Exam  Vitals and nursing note reviewed.   Constitutional:       General: He is not in acute distress.     Appearance: He is well-developed. "   HENT:      Head: Normocephalic and atraumatic.      Mouth/Throat:      Mouth: Mucous membranes are moist.     Eyes:      Conjunctiva/sclera: Conjunctivae normal.       Cardiovascular:      Rate and Rhythm: Normal rate and regular rhythm.      Heart sounds: No murmur heard.  Pulmonary:      Effort: Pulmonary effort is normal. No respiratory distress.      Breath sounds: Normal breath sounds.   Abdominal:      Palpations: Abdomen is soft.      Tenderness: There is no abdominal tenderness. There is no right CVA tenderness or left CVA tenderness.     Musculoskeletal:         General: No swelling.      Cervical back: Neck supple.      Right lower leg: No edema.      Left lower leg: No edema.     Skin:     General: Skin is warm and dry.     Neurological:      Mental Status: He is alert and oriented to person, place, and time.     Psychiatric:         Mood and Affect: Mood normal.         Behavior: Behavior normal.         Thought Content: Thought content normal.           Laboratory Results:  Results from last 7 days   Lab Units 05/21/25  0720   POTASSIUM mmol/L 4.0   CHLORIDE mmol/L 106   CO2 mmol/L 26   BUN mg/dL 20   CREATININE mg/dL 1.23   CALCIUM mg/dL 9.2   PHOSPHORUS mg/dL 3.5       Results for orders placed or performed in visit on 05/21/25   Basic metabolic panel   Result Value Ref Range    Sodium 141 135 - 147 mmol/L    Potassium 4.0 3.5 - 5.3 mmol/L    Chloride 106 96 - 108 mmol/L    CO2 26 21 - 32 mmol/L    ANION GAP 9 4 - 13 mmol/L    BUN 20 5 - 25 mg/dL    Creatinine 1.23 0.60 - 1.30 mg/dL    Glucose, Fasting 86 65 - 99 mg/dL    Calcium 9.2 8.4 - 10.2 mg/dL    eGFR 60 ml/min/1.73sq m   Albumin / creatinine urine ratio   Result Value Ref Range    Creatinine, Ur 110.9 Reference range not established. mg/dL    Albumin,U,Random <7.0 <20.0 mg/L    Albumin Creat Ratio     Urinalysis with microscopic   Result Value Ref Range    Color, UA Light Yellow     Clarity, UA Clear     Specific Gravity, UA 1.018 1.003 -  1.030    pH, UA 6.0 4.5, 5.0, 5.5, 6.0, 6.5, 7.0, 7.5, 8.0    Leukocytes, UA Negative Negative    Nitrite, UA Negative Negative    Protein, UA Negative Negative mg/dl    Glucose, UA Negative Negative mg/dl    Ketones, UA Negative Negative mg/dl    Urobilinogen, UA <2.0 <2.0 mg/dl mg/dl    Bilirubin, UA Negative Negative    Occult Blood, UA Negative Negative    RBC, UA None Seen None Seen, 1-2 /hpf    WBC, UA None Seen None Seen, 1-2 /hpf    Epithelial Cells Occasional None Seen, Occasional /hpf    Bacteria, UA None Seen None Seen, Occasional /hpf   Phosphorus   Result Value Ref Range    Phosphorus 3.5 2.3 - 4.1 mg/dL                [1]   Current Outpatient Medications on File Prior to Visit   Medication Sig Dispense Refill    ascorbic acid (VITAMIN C) 500 MG tablet Take 500 mg by mouth in the morning.      aspirin 81 MG tablet Take 1 tablet by mouth in the morning.      atorvastatin (LIPITOR) 20 mg tablet TAKE 1 TABLET BY MOUTH EVERY DAY 30 tablet 0    losartan (COZAAR) 25 mg tablet TAKE 1 TABLET (25 MG TOTAL) BY MOUTH DAILY. 90 tablet 0    Multiple Vitamins-Minerals (AIRBORNE PO) Take by mouth      multivitamin (THERAGRAN) TABS Take 1 tablet by mouth every other day      Omega-3 Fatty Acids (FISH OIL) 1200 MG CAPS Take by mouth in the morning       No current facility-administered medications on file prior to visit.

## 2025-05-27 ENCOUNTER — OFFICE VISIT (OUTPATIENT)
Dept: NEPHROLOGY | Facility: CLINIC | Age: 65
End: 2025-05-27
Payer: MEDICARE

## 2025-05-27 VITALS
HEIGHT: 66 IN | WEIGHT: 158 LBS | HEART RATE: 58 BPM | SYSTOLIC BLOOD PRESSURE: 132 MMHG | DIASTOLIC BLOOD PRESSURE: 64 MMHG | BODY MASS INDEX: 25.39 KG/M2

## 2025-05-27 DIAGNOSIS — E78.2 MIXED HYPERLIPIDEMIA: ICD-10-CM

## 2025-05-27 DIAGNOSIS — Z90.5 SOLITARY KIDNEY, ACQUIRED: ICD-10-CM

## 2025-05-27 DIAGNOSIS — I10 ESSENTIAL HYPERTENSION: ICD-10-CM

## 2025-05-27 DIAGNOSIS — N18.2 STAGE 2 CHRONIC KIDNEY DISEASE: Primary | ICD-10-CM

## 2025-05-27 PROCEDURE — 99214 OFFICE O/P EST MOD 30 MIN: CPT | Performed by: INTERNAL MEDICINE

## 2025-05-27 NOTE — ASSESSMENT & PLAN NOTE
- Recommend goal LDL less than 100 mg/dL, currently on fish oil as well as statins. Last LDL 89 mg/dl

## 2025-05-27 NOTE — PATIENT INSTRUCTIONS
-Renal Function is stable   -You have Chronic Kidney Disease Stage 2   -Avoid NSAIDs like Ibuprofen/Motrin, Naproxen/Aleve, Celebrex, meloxicam/Mobic, Diclofenac and other NSAIDs.  -Okay to take Acetaminophen/Tylenol if you do not have any liver problems  -Avoid IV contrast used for CT scan and cardiac catheterization.    -If plan for any study with IV contrast, please let me know so we could hydrate with fluids before and after IV contrast  -Dosage  of certain medications may need to be adjusted depending on Kidney function.     Blood pressure is stable    -Recommend 2 g sodium diet.    -Recommend daily exercise and weight loss  -Discussed home monitoring of BP and maintaining a log of blood pressure.  -Recommend goal BP less than 130/80. Please inform me if SBP below 110 or above 140's persistently.    BMP in 6 months     Follow up: 12  months with repeat Lab work within a week of the scheduled office visit. Will discuss the results of the previsit Labs during the office visit.

## 2025-06-06 DIAGNOSIS — E78.2 MIXED HYPERLIPIDEMIA: ICD-10-CM

## 2025-06-09 RX ORDER — ATORVASTATIN CALCIUM 20 MG/1
20 TABLET, FILM COATED ORAL DAILY
Qty: 90 TABLET | Refills: 1 | Status: SHIPPED | OUTPATIENT
Start: 2025-06-09

## 2025-07-24 ENCOUNTER — OFFICE VISIT (OUTPATIENT)
Dept: INTERNAL MEDICINE CLINIC | Age: 65
End: 2025-07-24
Payer: MEDICARE

## 2025-07-24 VITALS
DIASTOLIC BLOOD PRESSURE: 82 MMHG | TEMPERATURE: 97.7 F | HEART RATE: 69 BPM | HEIGHT: 66 IN | WEIGHT: 158 LBS | OXYGEN SATURATION: 97 % | BODY MASS INDEX: 25.39 KG/M2 | SYSTOLIC BLOOD PRESSURE: 118 MMHG

## 2025-07-24 DIAGNOSIS — Z12.11 COLON CANCER SCREENING: ICD-10-CM

## 2025-07-24 DIAGNOSIS — C64.2 MALIGNANT NEOPLASM OF LEFT KIDNEY, EXCEPT RENAL PELVIS (HCC): ICD-10-CM

## 2025-07-24 DIAGNOSIS — E78.2 MIXED HYPERLIPIDEMIA: ICD-10-CM

## 2025-07-24 DIAGNOSIS — I10 ESSENTIAL HYPERTENSION: ICD-10-CM

## 2025-07-24 DIAGNOSIS — Z12.83 SCREENING FOR SKIN CANCER: ICD-10-CM

## 2025-07-24 DIAGNOSIS — I12.9 BENIGN HYPERTENSION WITH CHRONIC KIDNEY DISEASE, STAGE III (HCC): ICD-10-CM

## 2025-07-24 DIAGNOSIS — Z12.11 ENCOUNTER FOR COLORECTAL CANCER SCREENING: Primary | ICD-10-CM

## 2025-07-24 DIAGNOSIS — Z23 ENCOUNTER FOR IMMUNIZATION: ICD-10-CM

## 2025-07-24 DIAGNOSIS — C64.2 RENAL CELL CANCER, LEFT (HCC): ICD-10-CM

## 2025-07-24 DIAGNOSIS — N18.30 BENIGN HYPERTENSION WITH CHRONIC KIDNEY DISEASE, STAGE III (HCC): ICD-10-CM

## 2025-07-24 DIAGNOSIS — N18.2 STAGE 2 CHRONIC KIDNEY DISEASE: ICD-10-CM

## 2025-07-24 DIAGNOSIS — Z12.12 ENCOUNTER FOR COLORECTAL CANCER SCREENING: Primary | ICD-10-CM

## 2025-07-24 DIAGNOSIS — R73.03 PREDIABETES: ICD-10-CM

## 2025-07-24 DIAGNOSIS — Z12.5 PROSTATE CANCER SCREENING: ICD-10-CM

## 2025-07-24 PROCEDURE — 99214 OFFICE O/P EST MOD 30 MIN: CPT | Performed by: INTERNAL MEDICINE

## 2025-07-24 PROCEDURE — 90677 PCV20 VACCINE IM: CPT

## 2025-07-24 PROCEDURE — G0438 PPPS, INITIAL VISIT: HCPCS | Performed by: INTERNAL MEDICINE

## 2025-07-24 PROCEDURE — G0009 ADMIN PNEUMOCOCCAL VACCINE: HCPCS

## 2025-07-24 RX ORDER — LOSARTAN POTASSIUM 25 MG/1
25 TABLET ORAL DAILY
Qty: 90 TABLET | Refills: 1 | Status: SHIPPED | OUTPATIENT
Start: 2025-07-24

## 2025-07-24 RX ORDER — ATORVASTATIN CALCIUM 20 MG/1
20 TABLET, FILM COATED ORAL DAILY
Qty: 90 TABLET | Refills: 1 | Status: SHIPPED | OUTPATIENT
Start: 2025-07-24

## 2025-07-24 NOTE — PATIENT INSTRUCTIONS
Medicare Preventive Visit Patient Instructions  Thank you for completing your Welcome to Medicare Visit or Medicare Annual Wellness Visit today. Your next wellness visit will be due in one year (7/25/2026).  The screening/preventive services that you may require over the next 5-10 years are detailed below. Some tests may not apply to you based off risk factors and/or age. Screening tests ordered at today's visit but not completed yet may show as past due. Also, please note that scanned in results may not display below.  Preventive Screenings:  Service Recommendations Previous Testing/Comments   Colorectal Cancer Screening  Colonoscopy    Fecal Occult Blood Test (FOBT)/Fecal Immunochemical Test (FIT)  Fecal DNA/Cologuard Test  Flexible Sigmoidoscopy Age: 45-75 years old   Colonoscopy: every 10 years (May be performed more frequently if at higher risk)  OR  FOBT/FIT: every 1 year  OR  Cologuard: every 3 years  OR  Sigmoidoscopy: every 5 years  Screening may be recommended earlier than age 45 if at higher risk for colorectal cancer. Also, an individualized decision between you and your healthcare provider will decide whether screening between the ages of 76-85 would be appropriate. Colonoscopy: 08/11/2015  FOBT/FIT: Not on file  Cologuard: Not on file  Sigmoidoscopy: Not on file    Screening Current     Prostate Cancer Screening Individualized decision between patient and health care provider in men between ages of 55-69   Medicare will cover every 12 months beginning on the day after your 50th birthday PSA: 0.530 ng/mL     Screening Current     Hepatitis C Screening Once for adults born between 1945 and 1965  More frequently in patients at high risk for Hepatitis C Hep C Antibody: 01/07/2020    Screening Current   Diabetes Screening 1-2 times per year if you're at risk for diabetes or have pre-diabetes Fasting glucose: 86 mg/dL (5/21/2025)  A1C: No results in last 5 years (No results in last 5 years)  Screening  Current   Cholesterol Screening Once every 5 years if you don't have a lipid disorder. May order more often based on risk factors. Lipid panel: 03/01/2024  Screening Not Indicated  History Lipid Disorder      Other Preventive Screenings Covered by Medicare:  Abdominal Aortic Aneurysm (AAA) Screening: covered once if your at risk. You're considered to be at risk if you have a family history of AAA or a male between the age of 65-75 who smoking at least 100 cigarettes in your lifetime.  Lung Cancer Screening: covers low dose CT scan once per year if you meet all of the following conditions: (1) Age 55-77; (2) No signs or symptoms of lung cancer; (3) Current smoker or have quit smoking within the last 15 years; (4) You have a tobacco smoking history of at least 20 pack years (packs per day x number of years you smoked); (5) You get a written order from a healthcare provider.  Glaucoma Screening: covered annually if you're considered high risk: (1) You have diabetes OR (2) Family history of glaucoma OR (3)  aged 50 and older OR (4)  American aged 65 and older  Osteoporosis Screening: covered every 2 years if you meet one of the following conditions: (1) Have a vertebral abnormality; (2) On glucocorticoid therapy for more than 3 months; (3) Have primary hyperparathyroidism; (4) On osteoporosis medications and need to assess response to drug therapy.  HIV Screening: covered annually if you're between the age of 15-65. Also covered annually if you are younger than 15 and older than 65 with risk factors for HIV infection. For pregnant patients, it is covered up to 3 times per pregnancy.    Immunizations:  Immunization Recommendations   Influenza Vaccine Annual influenza vaccination during flu season is recommended for all persons aged >= 6 months who do not have contraindications   Pneumococcal Vaccine   * Pneumococcal conjugate vaccine = PCV13 (Prevnar 13), PCV15 (Vaxneuvance), PCV20 (Prevnar 20)  *  Pneumococcal polysaccharide vaccine = PPSV23 (Pneumovax) Adults 19-65 yo with certain risk factors or if 65+ yo  If never received any pneumonia vaccine: recommend Prevnar 20 (PCV20)  Give PCV20 if previously received 1 dose of PCV13 or PPSV23   Hepatitis B Vaccine 3 dose series if at intermediate or high risk (ex: diabetes, end stage renal disease, liver disease)   Respiratory syncytial virus (RSV) Vaccine - COVERED BY MEDICARE PART D  * RSVPreF3 (Arexvy) CDC recommends that adults 60 years of age and older may receive a single dose of RSV vaccine using shared clinical decision-making (SCDM)   Tetanus (Td) Vaccine - COST NOT COVERED BY MEDICARE PART B Following completion of primary series, a booster dose should be given every 10 years to maintain immunity against tetanus. Td may also be given as tetanus wound prophylaxis.   Tdap Vaccine - COST NOT COVERED BY MEDICARE PART B Recommended at least once for all adults. For pregnant patients, recommended with each pregnancy.   Shingles Vaccine (Shingrix) - COST NOT COVERED BY MEDICARE PART B  2 shot series recommended in those 19 years and older who have or will have weakened immune systems or those 50 years and older     Health Maintenance Due:      Topic Date Due   • Colorectal Cancer Screening  08/11/2025   • Hepatitis C Screening  Completed     Immunizations Due:      Topic Date Due   • Pneumococcal Vaccine: 50+ Years (1 of 1 - PCV) Never done   • COVID-19 Vaccine (4 - 2024-25 season) 09/01/2024   • Influenza Vaccine (1) 09/01/2025     Advance Directives   What are advance directives?  Advance directives are legal documents that state your wishes and plans for medical care. These plans are made ahead of time in case you lose your ability to make decisions for yourself. Advance directives can apply to any medical decision, such as the treatments you want, and if you want to donate organs.   What are the types of advance directives?  There are many types of advance  directives, and each state has rules about how to use them. You may choose a combination of any of the following:  Living will:  This is a written record of the treatment you want. You can also choose which treatments you do not want, which to limit, and which to stop at a certain time. This includes surgery, medicine, IV fluid, and tube feedings.   Durable power of  for healthcare (DPAHC):  This is a written record that states who you want to make healthcare choices for you when you are unable to make them for yourself. This person, called a proxy, is usually a family member or a friend. You may choose more than 1 proxy.  Do not resuscitate (DNR) order:  A DNR order is used in case your heart stops beating or you stop breathing. It is a request not to have certain forms of treatment, such as CPR. A DNR order may be included in other types of advance directives.  Medical directive:  This covers the care that you want if you are in a coma, near death, or unable to make decisions for yourself. You can list the treatments you want for each condition. Treatment may include pain medicine, surgery, blood transfusions, dialysis, IV or tube feedings, and a ventilator (breathing machine).  Values history:  This document has questions about your views, beliefs, and how you feel and think about life. This information can help others choose the care that you would choose.  Why are advance directives important?  An advance directive helps you control your care. Although spoken wishes may be used, it is better to have your wishes written down. Spoken wishes can be misunderstood, or not followed. Treatments may be given even if you do not want them. An advance directive may make it easier for your family to make difficult choices about your care.   Weight Management   Why it is important to manage your weight:  Being overweight increases your risk of health conditions such as heart disease, high blood pressure, type 2  diabetes, and certain types of cancer. It can also increase your risk for osteoarthritis, sleep apnea, and other respiratory problems. Aim for a slow, steady weight loss. Even a small amount of weight loss can lower your risk of health problems.  How to lose weight safely:  A safe and healthy way to lose weight is to eat fewer calories and get regular exercise. You can lose up about 1 pound a week by decreasing the number of calories you eat by 500 calories each day.   Healthy meal plan for weight management:  A healthy meal plan includes a variety of foods, contains fewer calories, and helps you stay healthy. A healthy meal plan includes the following:  Eat whole-grain foods more often.  A healthy meal plan should contain fiber. Fiber is the part of grains, fruits, and vegetables that is not broken down by your body. Whole-grain foods are healthy and provide extra fiber in your diet. Some examples of whole-grain foods are whole-wheat breads and pastas, oatmeal, brown rice, and bulgur.  Eat a variety of vegetables every day.  Include dark, leafy greens such as spinach, kale, vinicius greens, and mustard greens. Eat yellow and orange vegetables such as carrots, sweet potatoes, and winter squash.   Eat a variety of fruits every day.  Choose fresh or canned fruit (canned in its own juice or light syrup) instead of juice. Fruit juice has very little or no fiber.  Eat low-fat dairy foods.  Drink fat-free (skim) milk or 1% milk. Eat fat-free yogurt and low-fat cottage cheese. Try low-fat cheeses such as mozzarella and other reduced-fat cheeses.  Choose meat and other protein foods that are low in fat.  Choose beans or other legumes such as split peas or lentils. Choose fish, skinless poultry (chicken or turkey), or lean cuts of red meat (beef or pork). Before you cook meat or poultry, cut off any visible fat.   Use less fat and oil.  Try baking foods instead of frying them. Add less fat, such as margarine, sour cream,  regular salad dressing and mayonnaise to foods. Eat fewer high-fat foods. Some examples of high-fat foods include french fries, doughnuts, ice cream, and cakes.  Eat fewer sweets.  Limit foods and drinks that are high in sugar. This includes candy, cookies, regular soda, and sweetened drinks.  Exercise:  Exercise at least 30 minutes per day on most days of the week. Some examples of exercise include walking, biking, dancing, and swimming. You can also fit in more physical activity by taking the stairs instead of the elevator or parking farther away from stores. Ask your healthcare provider about the best exercise plan for you.    © Copyright [employer] 2018 Information is for End User's use only and may not be sold, redistributed or otherwise used for commercial purposes. All illustrations and images included in CareNotes® are the copyrighted property of [employer] or [employer]

## 2025-07-24 NOTE — ASSESSMENT & PLAN NOTE
Lab Results   Component Value Date    EGFR 60 05/21/2025    EGFR 60 11/08/2024    EGFR 62 10/07/2024    CREATININE 1.23 05/21/2025    CREATININE 1.24 11/08/2024    CREATININE 1.21 10/07/2024   Stable continue to monitor and also followed by nephrologist

## 2025-07-24 NOTE — ASSESSMENT & PLAN NOTE
Continue with present dose of statin along with low-fat diet.  Will check lipid profile  Orders:    atorvastatin (LIPITOR) 20 mg tablet; Take 1 tablet (20 mg total) by mouth daily    Lipid Panel with Direct LDL reflex; Future

## 2025-07-24 NOTE — PROGRESS NOTES
Name: Adrian Mares      : 1960      MRN: [id number]  Encounter Provider: Matthew Henao MD  Encounter Date: 2025   Encounter department: [facility]  :  Assessment & Plan  Benign hypertension with chronic kidney disease, stage III (HCC)  Lab Results   Component Value Date    EGFR 60 2025    EGFR 60 2024    EGFR 62 10/07/2024    CREATININE 1.23 2025    CREATININE 1.24 2024    CREATININE 1.21 10/07/2024     Well-controlled on present regimen.  Will continue to monitor.  Continue with adequate oral hydration and to avoid nephrotoxic meds  Orders:    losartan (COZAAR) 25 mg tablet; Take 1 tablet (25 mg total) by mouth daily    CBC; Future    Mixed hyperlipidemia  Continue with present dose of statin along with low-fat diet.  Will check lipid profile  Orders:    atorvastatin (LIPITOR) 20 mg tablet; Take 1 tablet (20 mg total) by mouth daily    Lipid Panel with Direct LDL reflex; Future    Encounter for colorectal cancer screening  Referred to a gastroenterologist  Orders:    Ambulatory Referral to Gastroenterology; Future    Essential hypertension  Stable on present regimen       Malignant neoplasm of left kidney, except renal pelvis (HCC)  Status post surgical resection and in remission       Renal cell cancer, left (HCC)  Status post surgical resection       Stage 2 chronic kidney disease  Lab Results   Component Value Date    EGFR 60 2025    EGFR 60 2024    EGFR 62 10/07/2024    CREATININE 1.23 2025    CREATININE 1.24 2024    CREATININE 1.21 10/07/2024   Stable continue to monitor and also followed by nephrologist         Colon cancer screening    Orders:    Ambulatory Referral to Gastroenterology; Future    Screening for skin cancer    Orders:    Ambulatory Referral to Dermatology; Future    Prediabetes    Orders:    Hemoglobin A1C; Future    Prostate cancer screening    Orders:    PSA, Total Screen;  Future      Depression Screening and Follow-up Plan: Patient was screened for depression during today's encounter. They screened negative with a PHQ-2 score of 0.        Preventive health issues were discussed with patient, and age appropriate screening tests were ordered as noted in patient's After Visit Summary. Personalized health advice and appropriate referrals for health education or preventive services given if needed, as noted in patient's After Visit Summary.    History of Present Illness     Patient is here for AWV and follow-up.  Also would like to get a referral to dermatologist and gastroenterologist for colonoscopy.  No other complaints       Patient Care Team:  Matthew Heano MD as PCP - General  JULIETTE Santillan as PCP - PCP-[facility]  Avila MD Ilia Calles MD Iftikhar Ahmad, MD Darius Desai, MD (Surgical Oncology)  Tom Walker MD (Nephrology)    Review of Systems   Constitutional:  Negative for fatigue and fever.   HENT:  Negative for congestion, ear discharge, ear pain, postnasal drip, sinus pressure, sore throat, tinnitus and trouble swallowing.    Eyes:  Negative for discharge, itching and visual disturbance.   Respiratory:  Negative for cough and shortness of breath.    Cardiovascular:  Negative for chest pain and palpitations.   Gastrointestinal:  Negative for abdominal pain, diarrhea, nausea and vomiting.   Endocrine: Negative for cold intolerance and polyuria.   Genitourinary:  Negative for difficulty urinating, dysuria and urgency.   Musculoskeletal:  Negative for arthralgias and neck pain.   Skin:  Negative for rash.   Allergic/Immunologic: Negative for environmental allergies.   Neurological:  Negative for dizziness, weakness and headaches.   Psychiatric/Behavioral:  Negative for agitation and behavioral problems. The patient is not nervous/anxious.      Medical History Reviewed by provider this encounter:       Annual Wellness  Visit Questionnaire   Adrian is here for his Initial Wellness visit.     Health Risk Assessment:   Patient rates overall health as very good. Patient feels that their physical health rating is slightly better. Patient is very satisfied with their life. Eyesight was rated as same. Hearing was rated as slightly worse. Patient feels that their emotional and mental health rating is same. Patients states they are never, rarely angry. Patient states they are sometimes unusually tired/fatigued. Pain experienced in the last 7 days has been some. Patient's pain rating has been 2/10. Patient states that he has experienced no weight loss or gain in last 6 months.     Depression Screening:   PHQ-2 Score: 0      Fall Risk Screening:   In the past year, patient has experienced: no history of falling in past year      Home Safety:  Patient does not have trouble with stairs inside or outside of their home. Patient has working smoke alarms and has working carbon monoxide detector. Home safety hazards include: none.     Nutrition:   Current diet is No Added Salt.     Medications:   Patient is currently taking over-the-counter supplements. OTC medications include: see medication list. Patient is able to manage medications.     Activities of Daily Living (ADLs)/Instrumental Activities of Daily Living (IADLs):   Walk and transfer into and out of bed and chair?: Yes  Dress and groom yourself?: Yes    Bathe or shower yourself?: Yes    Feed yourself? Yes  Do your laundry/housekeeping?: Yes  Manage your money, pay your bills and track your expenses?: Yes  Make your own meals?: Yes    Do your own shopping?: Yes    Previous Hospitalizations:   Any hospitalizations or ED visits within the last 12 months?: Yes    How many hospitalizations have you had in the last year?: 1-2    Hospitalization Comments: right hip replacement overnight stay    Advance Care Planning:   Living will: Yes    Durable POA for healthcare: Yes    Advanced directive: Yes     Advanced directive counseling given: Yes      Cognitive Screening:   Provider or family/friend/caregiver concerned regarding cognition?: No    Preventive Screenings      Cardiovascular Screening:    General: Screening Not Indicated and History Lipid Disorder      Diabetes Screening:     General: Screening Current and Risks and Benefits Discussed      Colorectal Cancer Screening:     General: Screening Current      Prostate Cancer Screening:    General: Screening Current      Osteoporosis Screening:    General: Screening Not Indicated      Abdominal Aortic Aneurysm (AAA) Screening:    Risk factors include: age between 65-74 yo        General: Screening Not Indicated      Lung Cancer Screening:     General: Screening Not Indicated      Hepatitis C Screening:    General: Screening Current    Immunizations:  - Immunizations due: Prevnar 20 and Zoster (Shingrix)    Screening, Brief Intervention, and Referral to Treatment (SBIRT)     Screening  Typical number of drinks in a day: 0  Typical number of drinks in a week: 1  Interpretation: Low risk drinking behavior.    AUDIT-C Screenin) How often did you have a drink containing alcohol in the past year? 2 to 4 times a month  2) How many drinks did you have on a typical day when you were drinking in the past year? 1 to 2  3) How often did you have 6 or more drinks on one occasion in the past year? never    AUDIT-C Score: 2  Interpretation: Score 0-3 (male): Negative screen for alcohol misuse    Single Item Drug Screening:  How often have you used an illegal drug (including marijuana) or a prescription medication for non-medical reasons in the past year? never    Single Item Drug Screen Score: 0  Interpretation: Negative screen for possible drug use disorder    Brief Intervention  Alcohol & drug use screenings were reviewed. No concerns regarding substance use disorder identified.     Other Counseling Topics:   Car/seat belt/driving safety, skin self-exam, sunscreen  and calcium and vitamin D intake and regular weightbearing exercise.     Social Drivers of Health     Financial Resource Strain: Low Risk  (10/14/2024)    Received from [facility]    Overall Financial Resource Strain (CARDIA)     Difficulty of Paying Living Expenses: Not hard at all   Food Insecurity: No Food Insecurity (7/22/2025)    Nursing - Inadequate Food Risk Classification     Worried About Running Out of Food in the Last Year: Never true     Ran Out of Food in the Last Year: Never true   Transportation Needs: No Transportation Needs (7/22/2025)    PRAPARE - Transportation     Lack of Transportation (Medical): No     Lack of Transportation (Non-Medical): No   Housing Stability: Low Risk  (7/22/2025)    Housing Stability Vital Sign     Unable to Pay for Housing in the Last Year: No     Number of Times Moved in the Last Year: 0     Homeless in the Last Year: No   Utilities: Not At Risk (7/22/2025)    [facility] Utilities     Threatened with loss of utilities: No     No results found.    Objective   There were no vitals taken for this visit.    Physical Exam  Constitutional:       General: He is not in acute distress.     Appearance: Normal appearance. He is well-developed.   HENT:      Head: Normocephalic.      Right Ear: External ear normal. There is no impacted cerumen.      Left Ear: External ear normal. There is no impacted cerumen.      Nose: No rhinorrhea.      Mouth/Throat:      Pharynx: No oropharyngeal exudate or posterior oropharyngeal erythema.     Eyes:      General: No scleral icterus.     Pupils: Pupils are equal, round, and reactive to light.     Neck:      Thyroid: No thyromegaly.      Trachea: No tracheal deviation.     Cardiovascular:      Rate and Rhythm: Normal rate and regular rhythm.      Heart sounds: Normal heart sounds. No murmur heard.  Pulmonary:      Effort: Pulmonary effort is normal. No respiratory distress.      Breath sounds: Normal breath sounds.   Chest:      Chest  wall: No tenderness.   Abdominal:      General: Bowel sounds are normal.      Palpations: Abdomen is soft. There is no mass.      Tenderness: There is no abdominal tenderness.     Musculoskeletal:         General: Normal range of motion.      Cervical back: Normal range of motion and neck supple. No rigidity.      Right lower leg: No edema.      Left lower leg: No edema.   Lymphadenopathy:      Cervical: No cervical adenopathy.     Skin:     General: Skin is warm.      Findings: No erythema or rash.     Neurological:      Mental Status: He is alert and oriented to person, place, and time.      Cranial Nerves: No cranial nerve deficit.     Psychiatric:         Mood and Affect: Mood normal.         Behavior: Behavior normal.

## 2025-07-25 ENCOUNTER — HOSPITAL ENCOUNTER (OUTPATIENT)
Dept: RADIOLOGY | Facility: HOSPITAL | Age: 65
Discharge: HOME | End: 2025-07-25
Payer: MEDICARE

## 2025-07-25 DIAGNOSIS — C64.2 RENAL CELL CANCER, LEFT (HCC): ICD-10-CM

## 2025-07-25 PROCEDURE — A9585 GADOBUTROL INJECTION: HCPCS | Performed by: INTERNAL MEDICINE

## 2025-07-25 PROCEDURE — 74183 MRI ABD W/O CNTR FLWD CNTR: CPT

## 2025-07-25 RX ORDER — GADOBUTROL 604.72 MG/ML
7 INJECTION INTRAVENOUS
Status: COMPLETED | OUTPATIENT
Start: 2025-07-25 | End: 2025-07-25

## 2025-07-25 RX ADMIN — GADOBUTROL 7 ML: 604.72 INJECTION INTRAVENOUS at 17:30

## 2025-07-28 ENCOUNTER — PREP FOR PROCEDURE (OUTPATIENT)
Age: 65
End: 2025-07-28

## 2025-07-28 ENCOUNTER — TELEPHONE (OUTPATIENT)
Age: 65
End: 2025-07-28

## 2025-07-28 ENCOUNTER — APPOINTMENT (OUTPATIENT)
Dept: RADIOLOGY | Age: 65
End: 2025-07-28
Payer: MEDICARE

## 2025-07-28 DIAGNOSIS — Z12.11 SCREENING FOR COLON CANCER: Primary | ICD-10-CM

## 2025-07-28 DIAGNOSIS — C64.2 RENAL CELL CANCER, LEFT (HCC): ICD-10-CM

## 2025-07-28 PROCEDURE — 71046 X-RAY EXAM CHEST 2 VIEWS: CPT

## 2025-07-29 PROBLEM — Z12.5 SCREENING FOR PROSTATE CANCER: Status: ACTIVE | Noted: 2025-07-29

## 2025-07-31 ENCOUNTER — NURSE TRIAGE (OUTPATIENT)
Age: 65
End: 2025-07-31

## 2025-08-01 ENCOUNTER — OFFICE VISIT (OUTPATIENT)
Dept: UROLOGY | Facility: AMBULATORY SURGERY CENTER | Age: 65
End: 2025-08-01
Payer: MEDICARE

## 2025-08-01 VITALS
SYSTOLIC BLOOD PRESSURE: 142 MMHG | HEIGHT: 66 IN | WEIGHT: 154 LBS | BODY MASS INDEX: 24.75 KG/M2 | HEART RATE: 55 BPM | OXYGEN SATURATION: 99 % | DIASTOLIC BLOOD PRESSURE: 82 MMHG

## 2025-08-01 DIAGNOSIS — C64.2 RENAL CELL CANCER, LEFT (HCC): Primary | ICD-10-CM

## 2025-08-01 DIAGNOSIS — Z12.5 SCREENING FOR PROSTATE CANCER: ICD-10-CM

## 2025-08-01 PROCEDURE — 99213 OFFICE O/P EST LOW 20 MIN: CPT

## (undated) DEVICE — GLOVE SRG BIOGEL 7.5

## (undated) DEVICE — SPECIMEN CONTAINER STERILE PEEL PACK

## (undated) DEVICE — MEDI-VAC YANK SUCT HNDL W/TPRD BULBOUS TIP: Brand: CARDINAL HEALTH

## (undated) DEVICE — INSUFFLATION TUBING PRIMFLO

## (undated) DEVICE — SUT MONOCRYL 4-0 PS-2 27 IN Y426H

## (undated) DEVICE — PACK PBDS SHOULDER ARTHROSCOPY RF

## (undated) DEVICE — SHOULDER SUSPENSION KIT 6 PER BOX

## (undated) DEVICE — GLOVE INDICATOR PI UNDERGLOVE SZ 8 BLUE

## (undated) DEVICE — ENSEAL LAPAROSCOPIC TISSUE SEALER G2 CURVED JAW FOR USE WITH G2 GENERATOR 5MM DIAMETER 35CM SHAFT LENGTH: Brand: ENSEAL

## (undated) DEVICE — TUBING ARTHROSCOPY REDUCE PUMP

## (undated) DEVICE — LIGACLIP 12 MM ENDOSCOPIC ROTATING MULTIPLE CLIP APPLIER (LARGE): Brand: LIGACLIP

## (undated) DEVICE — DRESSING EXUDERM SATIN HYDROCOLLOID 4 X 4 IN

## (undated) DEVICE — TROCAR: Brand: KII FIOS FIRST ENTRY

## (undated) DEVICE — SUT VICRYL 2-0 18 IN J911T

## (undated) DEVICE — SUT PROLENE 2-0 KS 30 IN 8623H

## (undated) DEVICE — CHLORAPREP HI-LITE 26ML ORANGE

## (undated) DEVICE — PACK PBDS STERILE LAP LITHOTOMY RF

## (undated) DEVICE — NEEDLE 25G X 1 1/2

## (undated) DEVICE — BLADE SHAVER DISSECTOR 3.5MM 13CM COOLCUT

## (undated) DEVICE — THE ECHELON, ECHELON ENDOPATH™ AND ECHELON FLEX™ FAMILIES OF ENDOSCOPIC LINEAR CUTTERS AND RELOADS ARE STERILE, SINGLE PATIENT USE INSTRUMENTS THAT SIMULTANEOUSLY CUT AND STAPLE TISSUE. THERE ARE SIX STAGGERED ROWS OF STAPLES, THREE ON EITHER SIDE OF THE CUT LINE. THE 45 MM INSTRUMENTS HAVE A STAPLE LINE THATIS APPROXIMATELY 45 MM LONG AND A CUT LINE THAT IS APPROXIMATELY 42 MM LONG. THE SHAFT CAN ROTATE FREELY IN BOTH DIRECTIONS AND AN ARTICULATION MECHANISM ON ARTICULATING INSTRUMENTS ENABLES BENDING THE DISTAL PORTIONOF THE SHAFT TO FACILITATE LATERAL ACCESS OF THE OPERATIVE SITE.THE INSTRUMENTS ARE SHIPPED WITHOUT A RELOAD AND MUST BE LOADED PRIOR TO USE. A STAPLE RETAINING CAP ON THE RELOAD PROTECTS THE STAPLE LEG POINTS DURING SHIPPING AND TRANSPORTATION. THE INSTRUMENTS’ LOCK-OUT FEATURE IS DESIGNED TO PREVENT A USED RELOAD FROM BEING REFIRED.: Brand: ECHELON ENDOPATH

## (undated) DEVICE — PAD GROUNDING ADULT

## (undated) DEVICE — GAUZE SPONGES,USP TYPE VII GAUZE, 12 PLY: Brand: CURITY

## (undated) DEVICE — EXPRESSEW III SUTURE NEEDLE FOR USE WITH EXPRESSEW II OR III SUTURE PASSER: Brand: EXPRESSEW

## (undated) DEVICE — THYROID SHEET: Brand: CONVERTORS

## (undated) DEVICE — NEEDLE 20 G X 1 1/2

## (undated) DEVICE — GLOVE SRG BIOGEL ECLIPSE 7

## (undated) DEVICE — SKN PRP WNG SPNGE PVP SCRB STR: Brand: MEDLINE INDUSTRIES, INC.

## (undated) DEVICE — SUT SILK 2-0 SH CR/8 18 IN C012D

## (undated) DEVICE — BLADE SHAVER DISSECTOR 4MM 13CM COOLCUT

## (undated) DEVICE — DRESSING MEPILEX AG BORDER 4 X 4 IN

## (undated) DEVICE — THE ECHELON FLEX POWERED PLUS ARTICULATING ENDOSCOPIC LINEAR CUTTERS ARE STERILE, SINGLE PATIENT USE INSTRUMENTS THAT SIMULTANEOUSLYCUT AND STAPLE TISSUE. THERE ARE SIX STAGGERED ROWS OF STAPLES, THREE ON EITHER SIDE OF THE CUT LINE. THE ECHELON FLEX 45 POWERED PLUSINSTRUMENTS HAVE A STAPLE LINE THAT IS APPROXIMATELY 45 MM LONG AND A CUT LINE THAT IS APPROXIMATELY 42 MM LONG. THE SHAFT CAN ROTATE FREELYIN BOTH DIRECTIONS AND AN ARTICULATION MECHANISM ENABLES THE DISTAL PORTION OF THE SHAFT TO PIVOT TO FACILITATE LATERAL ACCESS TO THE OPERATIVESITE.THE INSTRUMENTS ARE PACKAGED WITH A PRIMARY LITHIUM BATTERY PACK THAT MUST BE INSTALLED PRIOR TO USE. THERE ARE SPECIFIC REQUIREMENTS FORDISPOSING OF THE BATTERY PACK. REFER TO THE BATTERY PACK DISPOSAL SECTION.THE INSTRUMENTS ARE PACKAGED WITHOUT A RELOAD AND MUST BE LOADED PRIOR TO USE. A STAPLE RETAINING CAP ON THE RELOAD PROTECTS THE STAPLE LEGPOINTS DURING SHIPPING AND TRANSPORTATION. THE INSTRUMENTS’ LOCK-OUT FEATURE IS DESIGNED TO PREVENT A USED OR IMPROPERLY INSTALLED RELOADFROM BEING REFIRED OR AN INSTRUMENT FROM BEING FIRED WITHOUT A RELOAD.: Brand: ECHELON FLEX

## (undated) DEVICE — GLOVE SRG BIOGEL ECLIPSE 8

## (undated) DEVICE — 3M™ STERI-STRIP™ REINFORCED ADHESIVE SKIN CLOSURES, R1547, 1/2 IN X 4 IN (12 MM X 100 MM), 6 STRIPS/ENVELOPE: Brand: 3M™ STERI-STRIP™

## (undated) DEVICE — SUT VICRYL PLUS 2-0 54IN VCP286G

## (undated) DEVICE — SYRINGE 20ML LL

## (undated) DEVICE — SUT MONOCRYL 4-0 PS-2 18 IN Y496G

## (undated) DEVICE — DECANTER: Brand: UNBRANDED

## (undated) DEVICE — TUBING ARTHROSCOPY REDUCE PATIENT

## (undated) DEVICE — VIAL DECANTER

## (undated) DEVICE — PENCIL ELECTROSURG E-Z CLEAN -0035H

## (undated) DEVICE — SUT PDS II 1 CTX 36 IN Z371T

## (undated) DEVICE — 3M™ IOBAN™ 2 ANTIMICROBIAL INCISE DRAPE 6650EZ: Brand: IOBAN™ 2

## (undated) DEVICE — ADHESIVE SKIN HIGH VISCOSITY EXOFIN 1ML

## (undated) DEVICE — TRAY FOLEY 16FR URIMETER SURESTEP

## (undated) DEVICE — SUT SILK 2-0 18 IN A185H

## (undated) DEVICE — SUT VICRYL 1 CT-1 27 IN J261H

## (undated) DEVICE — GAUZE SPONGES,16 PLY: Brand: CURITY

## (undated) DEVICE — THREADED CLEAR CANNULA WITH OBTURATOR 8.5MM X 75MM

## (undated) DEVICE — ABDOMINAL PAD: Brand: DERMACEA

## (undated) DEVICE — SUT VICRYL 2-0 SH 27 IN UNDYED J417H

## (undated) DEVICE — TISSUE RETRIEVAL SYSTEM: Brand: INZII RETRIEVAL SYSTEM

## (undated) DEVICE — LAPAROSCOPIC TROCAR SLEEVE/SINGLE USE: Brand: KII® SLEEVE

## (undated) DEVICE — DISPOSABLE EQUIPMENT COVER: Brand: SMALL TOWEL DRAPE

## (undated) DEVICE — GLOVE INDICATOR PI UNDERGLOVE SZ 7.5 BLUE

## (undated) DEVICE — SPONGE 4 X 4 XRAY 16 PLY STRL LF RFD

## (undated) DEVICE — TOWEL SURG XR DETECT GREEN STRL RFD

## (undated) DEVICE — BETHLEHEM UNIVERSAL MINOR GEN: Brand: CARDINAL HEALTH

## (undated) DEVICE — EXOFIN PRECISION PEN HIGH VISCOSITY TOPICAL SKIN ADHESIVE: Brand: EXOFIN PRECISION PEN, 1G

## (undated) DEVICE — BOWL: 16OZ PEELPOUCH 75/CS 16/PLT: Brand: MEDEGEN MEDICAL PRODUCTS, LLC

## (undated) DEVICE — SYRINGE 10ML LL

## (undated) DEVICE — UNDYED BRAIDED (POLYGLACTIN 910), SYNTHETIC ABSORBABLE SUTURE: Brand: COATED VICRYL

## (undated) DEVICE — INSUFLATION TUBING INSUFLOW (LEXION)

## (undated) DEVICE — HARMONIC FOCUS SHEARS 9CM LENGTH + ADAPTIVE TISSUE TECHNOLOGY FOR USE WITH BLUE HAND PIECE ONLY: Brand: HARMONIC FOCUS

## (undated) DEVICE — INTENDED FOR TISSUE SEPARATION, AND OTHER PROCEDURES THAT REQUIRE A SHARP SURGICAL BLADE TO PUNCTURE OR CUT.: Brand: BARD-PARKER ® CARBON RIB-BACK BLADES

## (undated) DEVICE — COTTON TIP APPLICTOR 2 PK

## (undated) DEVICE — LIGHT HANDLE COVER SLEEVE DISP BLUE STELLAR

## (undated) DEVICE — SPONGE PVP SCRUB WING STERILE

## (undated) DEVICE — POSITIONER EGGCRATE

## (undated) DEVICE — VISUALIZATION SYSTEM: Brand: CLEARIFY

## (undated) DEVICE — PACK UNIVERSAL NECK

## (undated) DEVICE — SUT MONOCRYL PLUS 4-0 PS-2 18 IN MCP496G

## (undated) DEVICE — GLOVE INDICATOR PI UNDERGLOVE SZ 7 BLUE

## (undated) DEVICE — SUT VICRYL 3-0 SH 27 IN J416H

## (undated) DEVICE — BLUE HEAT SCOPE WARMER

## (undated) DEVICE — INTENDED FOR TISSUE SEPARATION, AND OTHER PROCEDURES THAT REQUIRE A SHARP SURGICAL BLADE TO PUNCTURE OR CUT.: Brand: BARD-PARKER SAFETY BLADES SIZE 11, STERILE

## (undated) DEVICE — 3M™ TEGADERM™ TRANSPARENT FILM DRESSING FRAME STYLE, 1626W, 4 IN X 4-3/4 IN (10 CM X 12 CM), 50/CT 4CT/CASE: Brand: 3M™ TEGADERM™

## (undated) DEVICE — THREADED CLEAR CANNULA WITH OBTURATOR 7MM X 75MM

## (undated) DEVICE — TUBING SUCTION 5MM X 12 FT

## (undated) DEVICE — PROBE ABLATION  APOLLO RF ASPIRING 90 DEG

## (undated) DEVICE — INTENDED FOR TISSUE SEPARATION, AND OTHER PROCEDURES THAT REQUIRE A SHARP SURGICAL BLADE TO PUNCTURE OR CUT.: Brand: BARD-PARKER SAFETY BLADES SIZE 15, STERILE

## (undated) DEVICE — LIGACLIP 10-M/L, 10MM ENDOSCOPIC ROTATING MULTIPLE CLIP APPLIERS: Brand: LIGACLIP

## (undated) DEVICE — BULB SYRINGE,IRRIGATION WITH PROTECTIVE CAP: Brand: DOVER

## (undated) DEVICE — SUT VICRYL 0 REEL 54 IN J287G

## (undated) DEVICE — BETHLEHEM MAJOR GENERAL PACK: Brand: CARDINAL HEALTH

## (undated) DEVICE — CURVED INTRALUMINAL STAPLER (ILS) 24 TITANIUM ADJUSTABLE HEIGHT STAPLES

## (undated) DEVICE — PLUMEPEN PRO 10FT

## (undated) DEVICE — ANTI-FOG SOLUTION WITH FOAM PAD: Brand: DEVON

## (undated) DEVICE — SUT VICRYL 0 CT-1 27 IN J260H

## (undated) DEVICE — SUT VICRYL 0 UR-6 27 IN J603H

## (undated) DEVICE — IRRIG ENDO FLO TUBING

## (undated) DEVICE — SUT VICRYL 4-0 SH 27 IN J415H

## (undated) DEVICE — ELECTRODE BLADE MOD E-Z CLEAN  2.75IN 7CM -0012AM

## (undated) DEVICE — ACCESS PLATFORM FOR MINIMALLY INVASIVE SURGERY.: Brand: GELPORT® LAPAROSCOPIC  SYSTEM

## (undated) DEVICE — NERVE STIMULATOR HAND HELD

## (undated) DEVICE — GLOVE SRG BIOGEL 7

## (undated) DEVICE — SPONGE LAP 18 X 18 IN STRL RFD

## (undated) DEVICE — POOLE SUCTION HANDLE: Brand: CARDINAL HEALTH

## (undated) DEVICE — SUT PDS II 3-0 SH 27 IN Z316H

## (undated) DEVICE — TROCAR: Brand: KII SLEEVE

## (undated) DEVICE — 3M™ DURAPORE™ SURGICAL TAPE 1538-3, 3 INCH X 10 YARD (7,5CM X 9,1M), 4 ROLLS/BOX: Brand: 3M™ DURAPORE™

## (undated) DEVICE — SUT PDS II 1 CT-1 27 IN Z347H

## (undated) DEVICE — VAPR COOLPULSE 90 ELECTRODE 90 DEGREES SUCTION WITH INTEGRATED HANDPIECE: Brand: VAPR COOLPULSE